# Patient Record
Sex: MALE | Race: WHITE | NOT HISPANIC OR LATINO | Employment: OTHER | URBAN - METROPOLITAN AREA
[De-identification: names, ages, dates, MRNs, and addresses within clinical notes are randomized per-mention and may not be internally consistent; named-entity substitution may affect disease eponyms.]

---

## 2017-10-03 ENCOUNTER — TRANSFERRED RECORDS (OUTPATIENT)
Dept: HEALTH INFORMATION MANAGEMENT | Facility: CLINIC | Age: 82
End: 2017-10-03

## 2017-10-11 ENCOUNTER — TELEPHONE (OUTPATIENT)
Dept: CARDIOLOGY | Facility: CLINIC | Age: 82
End: 2017-10-11

## 2017-10-11 DIAGNOSIS — I44.30 AV BLOCK: Primary | ICD-10-CM

## 2017-10-11 NOTE — TELEPHONE ENCOUNTER
Pt called to report that he was seen in [place], [place] for dizziness and shortness of breath with exertion and scheduled an OV with  on 10/19. He wanted to make sure it was ok to travel back to his farm in [place] tomorrow. I received pt's records from [facility], which includes a 24 hour holter report. 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 Mean heart rate is 41 bpm. 148 pauses, the longest of which was 2.26 seconds. Pt said he was having frequent episodes of dizziness and shortness of breath last week, but it was has improved a little so far this week. However, he said he has not done a lot of activity because he was worried about the reproducing the episodes. He said he did get a call from someone at the [facility] yesterday stating he would need a pacemaker. I told pt that he should not be driving until he is seen and that I would review his records with EP and call him back. 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    I reviewed with  and he advised pt be set up for OV with him on Friday 10/13 @ 1000 and set up for pacemaker implantation @ 1:00 on 10/13. Pt should hold his warfarin for 2 days prior as long as DVT was not recent. Pt can have clear liquid breakfast in the am on 10/13. I called pt and reviewed 's recommendations. Pt said his DVT was over 5 years ago.He understands that he should not be driving. 's RN will call pt to review prep further. Copy of records sent to be scanned. Pt advised to go to ED if he develops any prolonged episodes of dizziness or shortness of breath.

## 2017-10-12 RX ORDER — SODIUM CHLORIDE 450 MG/100ML
INJECTION, SOLUTION INTRAVENOUS CONTINUOUS
Status: CANCELLED | OUTPATIENT
Start: 2017-10-12

## 2017-10-12 RX ORDER — LIDOCAINE 40 MG/G
CREAM TOPICAL
Status: CANCELLED | OUTPATIENT
Start: 2017-10-12

## 2017-10-12 RX ORDER — CEFAZOLIN SODIUM 2 G/100ML
2 INJECTION, SOLUTION INTRAVENOUS
Status: CANCELLED | OUTPATIENT
Start: 2017-10-12

## 2017-10-13 ENCOUNTER — HOSPITAL ENCOUNTER (OUTPATIENT)
Facility: CLINIC | Age: 82
Discharge: HOME | End: 2017-10-15
Attending: INTERNAL MEDICINE | Admitting: INTERNAL MEDICINE
Payer: MEDICARE

## 2017-10-13 ENCOUNTER — APPOINTMENT (OUTPATIENT)
Dept: CARDIOLOGY | Facility: CLINIC | Age: 82
End: 2017-10-13
Attending: INTERNAL MEDICINE
Payer: MEDICARE

## 2017-10-13 ENCOUNTER — OFFICE VISIT (OUTPATIENT)
Dept: CARDIOLOGY | Facility: CLINIC | Age: 82
End: 2017-10-13
Payer: COMMERCIAL

## 2017-10-13 VITALS
HEIGHT: 72 IN | HEART RATE: 52 BPM | BODY MASS INDEX: 27.22 KG/M2 | SYSTOLIC BLOOD PRESSURE: 112 MMHG | DIASTOLIC BLOOD PRESSURE: 66 MMHG | WEIGHT: 201 LBS

## 2017-10-13 DIAGNOSIS — R00.1 BRADYCARDIA: Primary | ICD-10-CM

## 2017-10-13 DIAGNOSIS — R00.1 BRADYCARDIA: ICD-10-CM

## 2017-10-13 DIAGNOSIS — Z95.0 CARDIAC PACEMAKER IN SITU: Primary | ICD-10-CM

## 2017-10-13 LAB
ANION GAP SERPL CALCULATED.3IONS-SCNC: 5 MMOL/L (ref 3–14)
BUN SERPL-MCNC: 21 MG/DL (ref 7–30)
CALCIUM SERPL-MCNC: 8.9 MG/DL (ref 8.5–10.1)
CHLORIDE SERPL-SCNC: 106 MMOL/L (ref 94–109)
CO2 SERPL-SCNC: 29 MMOL/L (ref 20–32)
CREAT SERPL-MCNC: 0.98 MG/DL (ref 0.66–1.25)
ERYTHROCYTE [DISTWIDTH] IN BLOOD BY AUTOMATED COUNT: 12.6 % (ref 10–15)
GFR SERPL CREATININE-BSD FRML MDRD: 73 ML/MIN/1.7M2
GLUCOSE SERPL-MCNC: 91 MG/DL (ref 70–99)
HCT VFR BLD AUTO: 43.2 % (ref 40–53)
HGB BLD-MCNC: 14.8 G/DL (ref 13.3–17.7)
INR PPP: 2.1 (ref 0.86–1.14)
MCH RBC QN AUTO: 33 PG (ref 26.5–33)
MCHC RBC AUTO-ENTMCNC: 34.3 G/DL (ref 31.5–36.5)
MCV RBC AUTO: 96 FL (ref 78–100)
PLATELET # BLD AUTO: 161 10E9/L (ref 150–450)
POTASSIUM SERPL-SCNC: 4.6 MMOL/L (ref 3.4–5.3)
RBC # BLD AUTO: 4.49 10E12/L (ref 4.4–5.9)
SODIUM SERPL-SCNC: 140 MMOL/L (ref 133–144)
WBC # BLD AUTO: 5.7 10E9/L (ref 4–11)

## 2017-10-13 PROCEDURE — 80048 BASIC METABOLIC PNL TOTAL CA: CPT | Performed by: INTERNAL MEDICINE

## 2017-10-13 PROCEDURE — 85027 COMPLETE CBC AUTOMATED: CPT | Performed by: INTERNAL MEDICINE

## 2017-10-13 PROCEDURE — 85610 PROTHROMBIN TIME: CPT | Performed by: INTERNAL MEDICINE

## 2017-10-13 PROCEDURE — 99152 MOD SED SAME PHYS/QHP 5/>YRS: CPT

## 2017-10-13 PROCEDURE — 99205 OFFICE O/P NEW HI 60 MIN: CPT | Mod: 25 | Performed by: INTERNAL MEDICINE

## 2017-10-13 PROCEDURE — A9270 NON-COVERED ITEM OR SERVICE: HCPCS | Mod: GY | Performed by: INTERNAL MEDICINE

## 2017-10-13 PROCEDURE — 0JH606Z INSERTION OF PACEMAKER, DUAL CHAMBER INTO CHEST SUBCUTANEOUS TISSUE AND FASCIA, OPEN APPROACH: ICD-10-PCS | Performed by: INTERNAL MEDICINE

## 2017-10-13 PROCEDURE — 40000852 ZZH STATISTIC HEART CATH LAB OR EP LAB

## 2017-10-13 PROCEDURE — 36415 COLL VENOUS BLD VENIPUNCTURE: CPT | Performed by: INTERNAL MEDICINE

## 2017-10-13 PROCEDURE — 99152 MOD SED SAME PHYS/QHP 5/>YRS: CPT | Performed by: INTERNAL MEDICINE

## 2017-10-13 PROCEDURE — 40000235 ZZH STATISTIC TELEMETRY

## 2017-10-13 PROCEDURE — C1785 PMKR, DUAL, RATE-RESP: HCPCS

## 2017-10-13 PROCEDURE — 99153 MOD SED SAME PHYS/QHP EA: CPT

## 2017-10-13 PROCEDURE — 33208 INSRT HEART PM ATRIAL & VENT: CPT | Mod: KX | Performed by: INTERNAL MEDICINE

## 2017-10-13 PROCEDURE — 33208 INSRT HEART PM ATRIAL & VENT: CPT | Mod: KX

## 2017-10-13 PROCEDURE — C1898 LEAD, PMKR, OTHER THAN TRANS: HCPCS

## 2017-10-13 PROCEDURE — 25000128 H RX IP 250 OP 636: Performed by: INTERNAL MEDICINE

## 2017-10-13 PROCEDURE — 27210995 ZZH RX 272: Performed by: INTERNAL MEDICINE

## 2017-10-13 PROCEDURE — 27210784 ZZH KIT PACEMAKER CR8

## 2017-10-13 PROCEDURE — 40000936 ZZH STATISTIC OUTPATIENT (NON-OBS) NIGHT

## 2017-10-13 PROCEDURE — 25000132 ZZH RX MED GY IP 250 OP 250 PS 637: Mod: GY | Performed by: INTERNAL MEDICINE

## 2017-10-13 PROCEDURE — 93000 ELECTROCARDIOGRAM COMPLETE: CPT | Performed by: INTERNAL MEDICINE

## 2017-10-13 PROCEDURE — 27210886 ZZH ACCESSORY CR5

## 2017-10-13 PROCEDURE — 40000935 ZZH STATISTIC OUTPATIENT (NON-OBS) EVE

## 2017-10-13 PROCEDURE — 25000125 ZZHC RX 250: Performed by: INTERNAL MEDICINE

## 2017-10-13 PROCEDURE — 27210795 ZZH PAD DEFIB QUICK CR4

## 2017-10-13 PROCEDURE — 02H63JZ INSERTION OF PACEMAKER LEAD INTO RIGHT ATRIUM, PERCUTANEOUS APPROACH: ICD-10-PCS | Performed by: INTERNAL MEDICINE

## 2017-10-13 PROCEDURE — S0020 INJECTION, BUPIVICAINE HYDRO: HCPCS | Performed by: INTERNAL MEDICINE

## 2017-10-13 PROCEDURE — 02HK3JZ INSERTION OF PACEMAKER LEAD INTO RIGHT VENTRICLE, PERCUTANEOUS APPROACH: ICD-10-PCS | Performed by: INTERNAL MEDICINE

## 2017-10-13 PROCEDURE — C1769 GUIDE WIRE: HCPCS

## 2017-10-13 RX ORDER — FLUTICASONE PROPIONATE AND SALMETEROL XINAFOATE 115; 21 UG/1; UG/1
2 AEROSOL, METERED RESPIRATORY (INHALATION) 2 TIMES DAILY
Status: DISCONTINUED | OUTPATIENT
Start: 2017-10-13 | End: 2017-10-15 | Stop reason: HOSPADM

## 2017-10-13 RX ORDER — ATROPINE SULFATE 0.1 MG/ML
.5-1 INJECTION INTRAVENOUS
Status: DISCONTINUED | OUTPATIENT
Start: 2017-10-13 | End: 2017-10-13 | Stop reason: HOSPADM

## 2017-10-13 RX ORDER — CEFAZOLIN SODIUM 2 G/100ML
2 INJECTION, SOLUTION INTRAVENOUS
Status: COMPLETED | OUTPATIENT
Start: 2017-10-13 | End: 2017-10-13

## 2017-10-13 RX ORDER — WARFARIN SODIUM 4 MG/1
4 TABLET ORAL
Status: CANCELLED | OUTPATIENT
Start: 2017-10-14

## 2017-10-13 RX ORDER — LIDOCAINE 40 MG/G
CREAM TOPICAL
Status: DISCONTINUED | OUTPATIENT
Start: 2017-10-13 | End: 2017-10-13 | Stop reason: HOSPADM

## 2017-10-13 RX ORDER — WARFARIN SODIUM 5 MG/1
5 TABLET ORAL
Status: CANCELLED | OUTPATIENT
Start: 2017-10-13

## 2017-10-13 RX ORDER — DOBUTAMINE HYDROCHLORIDE 200 MG/100ML
5-40 INJECTION INTRAVENOUS CONTINUOUS PRN
Status: DISCONTINUED | OUTPATIENT
Start: 2017-10-13 | End: 2017-10-13 | Stop reason: HOSPADM

## 2017-10-13 RX ORDER — ONDANSETRON 2 MG/ML
4 INJECTION INTRAMUSCULAR; INTRAVENOUS EVERY 4 HOURS PRN
Status: DISCONTINUED | OUTPATIENT
Start: 2017-10-13 | End: 2017-10-13 | Stop reason: HOSPADM

## 2017-10-13 RX ORDER — BUPIVACAINE HYDROCHLORIDE 2.5 MG/ML
10-30 INJECTION, SOLUTION EPIDURAL; INFILTRATION; INTRACAUDAL
Status: COMPLETED | OUTPATIENT
Start: 2017-10-13 | End: 2017-10-13

## 2017-10-13 RX ORDER — MULTIPLE VITAMINS W/ MINERALS TAB 9MG-400MCG
1 TAB ORAL DAILY
Status: CANCELLED | OUTPATIENT
Start: 2017-10-13

## 2017-10-13 RX ORDER — NALOXONE HYDROCHLORIDE 0.4 MG/ML
.1-.4 INJECTION, SOLUTION INTRAMUSCULAR; INTRAVENOUS; SUBCUTANEOUS
Status: DISCONTINUED | OUTPATIENT
Start: 2017-10-13 | End: 2017-10-15 | Stop reason: HOSPADM

## 2017-10-13 RX ORDER — MORPHINE SULFATE 2 MG/ML
1-2 INJECTION, SOLUTION INTRAMUSCULAR; INTRAVENOUS EVERY 5 MIN PRN
Status: DISCONTINUED | OUTPATIENT
Start: 2017-10-13 | End: 2017-10-13 | Stop reason: HOSPADM

## 2017-10-13 RX ORDER — SODIUM CHLORIDE 450 MG/100ML
INJECTION, SOLUTION INTRAVENOUS CONTINUOUS
Status: DISCONTINUED | OUTPATIENT
Start: 2017-10-13 | End: 2017-10-13 | Stop reason: HOSPADM

## 2017-10-13 RX ORDER — SIMVASTATIN 40 MG
40 TABLET ORAL DAILY
Status: CANCELLED | OUTPATIENT
Start: 2017-10-13

## 2017-10-13 RX ORDER — SIMVASTATIN 40 MG
40 TABLET ORAL DAILY
Status: DISCONTINUED | OUTPATIENT
Start: 2017-10-14 | End: 2017-10-15 | Stop reason: HOSPADM

## 2017-10-13 RX ORDER — FUROSEMIDE 10 MG/ML
20-100 INJECTION INTRAMUSCULAR; INTRAVENOUS
Status: DISCONTINUED | OUTPATIENT
Start: 2017-10-13 | End: 2017-10-13 | Stop reason: HOSPADM

## 2017-10-13 RX ORDER — DIPHENHYDRAMINE HYDROCHLORIDE 50 MG/ML
25-50 INJECTION INTRAMUSCULAR; INTRAVENOUS
Status: DISCONTINUED | OUTPATIENT
Start: 2017-10-13 | End: 2017-10-13 | Stop reason: HOSPADM

## 2017-10-13 RX ORDER — LIDOCAINE HYDROCHLORIDE AND EPINEPHRINE 10; 10 MG/ML; UG/ML
10-30 INJECTION, SOLUTION INFILTRATION; PERINEURAL
Status: DISCONTINUED | OUTPATIENT
Start: 2017-10-13 | End: 2017-10-13 | Stop reason: HOSPADM

## 2017-10-13 RX ORDER — PROTAMINE SULFATE 10 MG/ML
1-5 INJECTION, SOLUTION INTRAVENOUS
Status: DISCONTINUED | OUTPATIENT
Start: 2017-10-13 | End: 2017-10-13 | Stop reason: HOSPADM

## 2017-10-13 RX ORDER — WARFARIN SODIUM 5 MG/1
5 TABLET ORAL
Status: COMPLETED | OUTPATIENT
Start: 2017-10-13 | End: 2017-10-13

## 2017-10-13 RX ORDER — NALOXONE HYDROCHLORIDE 0.4 MG/ML
0.4 INJECTION, SOLUTION INTRAMUSCULAR; INTRAVENOUS; SUBCUTANEOUS EVERY 5 MIN PRN
Status: DISCONTINUED | OUTPATIENT
Start: 2017-10-13 | End: 2017-10-13 | Stop reason: HOSPADM

## 2017-10-13 RX ORDER — FLUTICASONE PROPIONATE AND SALMETEROL XINAFOATE 115; 21 UG/1; UG/1
2 AEROSOL, METERED RESPIRATORY (INHALATION) 2 TIMES DAILY
Status: CANCELLED | OUTPATIENT
Start: 2017-10-13

## 2017-10-13 RX ORDER — CALCIUM CARBONATE 500(1250)
500 TABLET ORAL DAILY
Status: CANCELLED | OUTPATIENT
Start: 2017-10-13

## 2017-10-13 RX ORDER — HEPARIN SODIUM 1000 [USP'U]/ML
1000-10000 INJECTION, SOLUTION INTRAVENOUS; SUBCUTANEOUS EVERY 5 MIN PRN
Status: DISCONTINUED | OUTPATIENT
Start: 2017-10-13 | End: 2017-10-13 | Stop reason: HOSPADM

## 2017-10-13 RX ORDER — FENTANYL CITRATE 50 UG/ML
25-50 INJECTION, SOLUTION INTRAMUSCULAR; INTRAVENOUS
Status: DISCONTINUED | OUTPATIENT
Start: 2017-10-13 | End: 2017-10-13 | Stop reason: HOSPADM

## 2017-10-13 RX ORDER — ADENOSINE 3 MG/ML
6-12 INJECTION, SOLUTION INTRAVENOUS EVERY 5 MIN PRN
Status: DISCONTINUED | OUTPATIENT
Start: 2017-10-13 | End: 2017-10-13 | Stop reason: HOSPADM

## 2017-10-13 RX ORDER — FLUMAZENIL 0.1 MG/ML
0.2 INJECTION, SOLUTION INTRAVENOUS
Status: DISCONTINUED | OUTPATIENT
Start: 2017-10-13 | End: 2017-10-13 | Stop reason: HOSPADM

## 2017-10-13 RX ORDER — BUPIVACAINE HYDROCHLORIDE 2.5 MG/ML
10-30 INJECTION, SOLUTION EPIDURAL; INFILTRATION; INTRACAUDAL
Status: DISCONTINUED | OUTPATIENT
Start: 2017-10-13 | End: 2017-10-13 | Stop reason: HOSPADM

## 2017-10-13 RX ORDER — CEFAZOLIN SODIUM 2 G/100ML
2 INJECTION, SOLUTION INTRAVENOUS EVERY 8 HOURS
Status: DISPENSED | OUTPATIENT
Start: 2017-10-13 | End: 2017-10-14

## 2017-10-13 RX ORDER — LIDOCAINE HYDROCHLORIDE 10 MG/ML
10-30 INJECTION, SOLUTION EPIDURAL; INFILTRATION; INTRACAUDAL; PERINEURAL
Status: DISCONTINUED | OUTPATIENT
Start: 2017-10-13 | End: 2017-10-13 | Stop reason: HOSPADM

## 2017-10-13 RX ORDER — KETOROLAC TROMETHAMINE 30 MG/ML
15 INJECTION, SOLUTION INTRAMUSCULAR; INTRAVENOUS
Status: DISCONTINUED | OUTPATIENT
Start: 2017-10-13 | End: 2017-10-13 | Stop reason: HOSPADM

## 2017-10-13 RX ORDER — LORAZEPAM 2 MG/ML
.5-2 INJECTION INTRAMUSCULAR EVERY 10 MIN PRN
Status: DISCONTINUED | OUTPATIENT
Start: 2017-10-13 | End: 2017-10-13 | Stop reason: HOSPADM

## 2017-10-13 RX ORDER — LIDOCAINE HYDROCHLORIDE 10 MG/ML
10-30 INJECTION, SOLUTION EPIDURAL; INFILTRATION; INTRACAUDAL; PERINEURAL
Status: COMPLETED | OUTPATIENT
Start: 2017-10-13 | End: 2017-10-13

## 2017-10-13 RX ORDER — PROMETHAZINE HYDROCHLORIDE 25 MG/ML
6.25-25 INJECTION, SOLUTION INTRAMUSCULAR; INTRAVENOUS EVERY 4 HOURS PRN
Status: DISCONTINUED | OUTPATIENT
Start: 2017-10-13 | End: 2017-10-13 | Stop reason: HOSPADM

## 2017-10-13 RX ORDER — IBUTILIDE FUMARATE 1 MG/10ML
0.01 INJECTION, SOLUTION INTRAVENOUS
Status: DISCONTINUED | OUTPATIENT
Start: 2017-10-13 | End: 2017-10-13 | Stop reason: HOSPADM

## 2017-10-13 RX ORDER — MULTIPLE VITAMINS W/ MINERALS TAB 9MG-400MCG
1 TAB ORAL DAILY
Status: DISCONTINUED | OUTPATIENT
Start: 2017-10-14 | End: 2017-10-15 | Stop reason: HOSPADM

## 2017-10-13 RX ORDER — IBUTILIDE FUMARATE 1 MG/10ML
1 INJECTION, SOLUTION INTRAVENOUS
Status: DISCONTINUED | OUTPATIENT
Start: 2017-10-13 | End: 2017-10-13 | Stop reason: HOSPADM

## 2017-10-13 RX ORDER — LIDOCAINE 40 MG/G
CREAM TOPICAL
Status: DISCONTINUED | OUTPATIENT
Start: 2017-10-13 | End: 2017-10-15 | Stop reason: HOSPADM

## 2017-10-13 RX ORDER — PROTAMINE SULFATE 10 MG/ML
5-40 INJECTION, SOLUTION INTRAVENOUS EVERY 10 MIN PRN
Status: DISCONTINUED | OUTPATIENT
Start: 2017-10-13 | End: 2017-10-13 | Stop reason: HOSPADM

## 2017-10-13 RX ORDER — WARFARIN SODIUM 4 MG/1
4 TABLET ORAL
Status: DISCONTINUED | OUTPATIENT
Start: 2017-10-14 | End: 2017-10-13

## 2017-10-13 RX ADMIN — CEFAZOLIN SODIUM 2 G: 2 INJECTION, SOLUTION INTRAVENOUS at 20:02

## 2017-10-13 RX ADMIN — MIDAZOLAM HYDROCHLORIDE 1 MG: 1 INJECTION, SOLUTION INTRAMUSCULAR; INTRAVENOUS at 13:27

## 2017-10-13 RX ADMIN — FENTANYL CITRATE 50 MCG: 50 INJECTION, SOLUTION INTRAMUSCULAR; INTRAVENOUS at 12:57

## 2017-10-13 RX ADMIN — SODIUM CHLORIDE 25000 UNITS: 900 IRRIGANT IRRIGATION at 13:31

## 2017-10-13 RX ADMIN — FENTANYL CITRATE 50 MCG: 50 INJECTION, SOLUTION INTRAMUSCULAR; INTRAVENOUS at 13:05

## 2017-10-13 RX ADMIN — BUPIVACAINE HYDROCHLORIDE 10 ML: 2.5 INJECTION, SOLUTION EPIDURAL; INFILTRATION; INTRACAUDAL at 13:09

## 2017-10-13 RX ADMIN — WARFARIN SODIUM 5 MG: 5 TABLET ORAL at 20:01

## 2017-10-13 RX ADMIN — SODIUM CHLORIDE: 4.5 INJECTION, SOLUTION INTRAVENOUS at 10:20

## 2017-10-13 RX ADMIN — CEFAZOLIN SODIUM 2 G: 2 INJECTION, SOLUTION INTRAVENOUS at 12:45

## 2017-10-13 RX ADMIN — MIDAZOLAM HYDROCHLORIDE 1 MG: 1 INJECTION, SOLUTION INTRAMUSCULAR; INTRAVENOUS at 13:06

## 2017-10-13 RX ADMIN — MIDAZOLAM HYDROCHLORIDE 1 MG: 1 INJECTION, SOLUTION INTRAMUSCULAR; INTRAVENOUS at 13:10

## 2017-10-13 RX ADMIN — LIDOCAINE HYDROCHLORIDE 100 MG: 10 INJECTION, SOLUTION EPIDURAL; INFILTRATION; INTRACAUDAL; PERINEURAL at 13:09

## 2017-10-13 RX ADMIN — ACETAMINOPHEN AND CODEINE PHOSPHATE 1 TABLET: 300; 30 TABLET ORAL at 21:59

## 2017-10-13 RX ADMIN — MIDAZOLAM HYDROCHLORIDE 1 MG: 1 INJECTION, SOLUTION INTRAMUSCULAR; INTRAVENOUS at 12:57

## 2017-10-13 NOTE — MR AVS SNAPSHOT
After Visit Summary   10/13/2017    Victorino Khan    MRN: [id number]           Patient Information     Date Of Birth          2/13/1934        Visit Information        Provider Department      10/13/2017 9:00 AM Evaristo Carrera MD [facility] [place]        Today's Diagnoses     Bradycardia    -  1       Follow-ups after your visit        Your next 10 appointments already scheduled     Oct 13, 2017 11:30 AM CDT   Ep 90 Minute with SHCVR4   [facility] ([facility])    [address]  [place] [id number] [phone]            Oct 19, 2017 11:15 AM CDT   New Visit with Flaco Gray MD   [facility] ([facility])    [address]  [place] [id number] [phone]              Future tests that were ordered for you today     Open Future Orders        Priority Expected Expires Ordered    ICD/Pacemaker/Loop Recorder Procedure Routine 10/20/2017 10/13/2018 10/13/2017            Who to contact     If you have questions or need follow up information about today's clinic visit or your schedule please contact [facility] directly at [phone].  Normal or non-critical lab and imaging results will be communicated to you by Bundle Ithart, letter or phone within 4 business days after the clinic has received the results. If you do not hear from us within 7 days, please contact the clinic through Bundle Ithart or phone. If you have a critical or abnormal lab result, we will notify you by phone as soon as possible.  Submit refill requests through GetNotes or call your pharmacy and they will forward the refill request to us. Please allow 3 business days for your refill to be completed.          Additional Information About Your Visit        GetNotes Information     GetNotes lets you send messages to your doctor, view your test  "results, renew your prescriptions, schedule appointments and more. To sign up, go to [email or web address] . Click on \"Log in\" on the left side of the screen, which will take you to the Welcome page. Then click on \"Sign up Now\" on the right side of the page.     You will be asked to enter the access code listed below, as well as some personal information. Please follow the directions to create your username and password.     Your access code is: [email or web address]  Expires: 2018  9:31 AM     Your access code will  in 90 days. If you need help or a new code, please call your [facility] clinic or [phone].        Care EveryWhere ID     This is your Care EveryWhere ID. This could be used by other organizations to access your [facility] medical records  [id number]        Your Vitals Were     Pulse Height BMI (Body Mass Index)             52 1.829 m (6') 27.26 kg/m2          Blood Pressure from Last 3 Encounters:   10/13/17 112/66   09/10/15 115/75    Weight from Last 3 Encounters:   10/13/17 91.2 kg (201 lb)   09/10/15 96 kg (211 lb 11.2 oz)              We Performed the Following     EKG 12-lead complete w/read - Clinics (performed today)        Primary Care Provider Office Phone # Fax #    Vaughn POTTER [place] [phone]       [place] [facility] [address]  [facility] [address]        Equal Access to Services     [facility]: Hadii aad ku hadasho Soomaali, waaxda luqadaha, qaybta kaalmada adeegyada, hermes larson . So [facility] [phone].    ATENCIÓN: Si habla howard, tiene a jules disposición servicios gratuitos de asistencia lingüística. Llame al [phone].    We comply with applicable federal civil rights laws and [place] laws. We do not discriminate on the basis of race, color, national origin, age, disability, sex, sexual orientation, or gender identity.            Thank you!     Thank you for choosing [facility] [place]  for your " care. Our goal is always to provide you with excellent care. Hearing back from our patients is one way we can continue to improve our services. Please take a few minutes to complete the written survey that you may receive in the mail after your visit with us. Thank you!             Your Updated Medication List - Protect others around you: Learn how to safely use, store and throw away your medicines at www.disposemymeds.org.          This list is accurate as of: 10/13/17  9:31 AM.  Always use your most recent med list.                   Brand Name Dispense Instructions for use Diagnosis    ALENDRONATE SODIUM PO      Take 70 mg by mouth    Thrombocytopenia (H)       calcium carbonate 1250 MG tablet    OS- mg [place]. Ca     Take 500 mg by mouth 2 times daily    Thrombocytopenia (H)       cod liver oil Caps capsule      Take 1 capsule by mouth    Thrombocytopenia (H)       fluticasone-salmeterol 115-21 MCG/ACT Inhaler    ADVAIR-HFA     Inhale 2 puffs into the lungs 2 times daily    Thrombocytopenia (H)       Multi-vitamin Tabs tablet      Take 1 tablet by mouth daily    Thrombocytopenia (H)       SIMVASTATIN PO      Take 80 mg by mouth    Thrombocytopenia (H)       VITAMIN D3 PO      Take 1,000 Units by mouth daily    Thrombocytopenia (H)       WARFARIN SODIUM PO      Take 5 mg by mouth    Thrombocytopenia (H)

## 2017-10-13 NOTE — LETTER
10/13/2017    Vaughn Evans  [facility]   [address]  [place] [address]    RE: Victorino Khan       Dear Colleague,    I had the pleasure of seeing Victorino Khan in the [facility].    Thank you for allowing me to participate in the care of this very delightful patient.  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  Recently, he was up in the [place] where he felt he could not keep up with hiking and other physical activities.  He subsequently saw you for followup and you had him on a 24-hour Holter monitoring.  The Holter was read as frequent second-degree Mobitz type 2 along probable third-degree heart block even in the daytime.  In light of that, I was asked to see the patient on an urgent basis.      The patient otherwise was quite healthy before all this, able to do most of the things he wanted to.  He denies any fever or chills or skin rash, constipation or heat intolerance.  EKG today demonstrates sinus bradycardia with a right bundle branch block.      It appears that the patient had intermittent advanced AV block and likely to be the cause of his recent episode of decreased exercise tolerance and likely to explain his near-syncopal episode in the past.  This is likely to be from intrinsic conduction disease as well in the absence of any symptoms.  This is not suggestive of Lyme carditis or hypothyroidism.  I would agree that he would benefit from a permanent pacemaker.  I went over the procedure in detail with risks including but not limited to vascular injury, infection and pneumothorax.  The patient already held his warfarin for 2 days and will resume it after the procedure to be performed today.     Again, thank you for allowing me to participate in the care of  your patient.      Sincerely,    Evaristo Hernandez MD   [facility]

## 2017-10-13 NOTE — PROGRESS NOTES
HISTORY OF PRESENT ILLNESS:  Thank you for allowing me to participate in the care of this very delightful patient.  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  Recently, he was up in the [place] where he felt he could not keep up with hiking and other physical activities.  He subsequently saw you for followup and you had him on a 24-hour Holter monitoring.  The Holter was read as frequent second-degree Mobitz type 2 along probable third-degree heart block even in the daytime.  In light of that, I was asked to see the patient on an urgent basis.      The patient otherwise was quite healthy before all this, able to do most of the things he wanted to.  He denies any fever or chills or skin rash, constipation or heat intolerance.  EKG today demonstrates sinus bradycardia with a right bundle branch block.      It appears that the patient had intermittent advanced AV block and likely to be the cause of his recent episode of decreased exercise tolerance and likely to explain his near-syncopal episode in the past.  This is likely to be from intrinsic conduction disease as well in the absence of any symptoms.  This is not suggestive of Lyme carditis or hypothyroidism.  I would agree that he would benefit from a permanent pacemaker.  I went over the procedure in detail with risks including but not limited to vascular injury, infection and pneumothorax.  The patient already held his warfarin for 2 days and will resume it after the procedure to be performed today.      cc:   Vaughn Evans DO    [facility] [place]   [address]   [place] [id number]         WIL ANDRADE MD             D: 10/13/2017 09:30   T: 10/13/2017 12:16   MT: JACQUELINE      Name:     DONATO BULLOCK   MRN:      [id number]        Account:       [id number]   :      1934           Service Date: 10/13/2017      Document: [id number]

## 2017-10-13 NOTE — IP AVS SNAPSHOT
MRN:[id number]                      After Visit Summary   10/13/2017    Victorino Khan    MRN: [id number]           Thank you!     Thank you for choosing [facility] for your care. Our goal is always to provide you with excellent care. Hearing back from our patients is one way we can continue to improve our services. Please take a few minutes to complete the written survey that you may receive in the mail after you visit with us. Thank you!        Patient Information     Date Of Birth          2/13/1934        About your hospital stay     You were admitted on:  October 13, 2017 [facility]    You were discharged on:  October 15, 2017       Who to Call     For medical emergencies, please call 911.  For non-urgent questions about your medical care, please call your primary care provider or clinic, [phone]          Attending Provider     Provider Specialty    Evaristo Carrera MD Cardiology       Primary Care Provider Office Phone # Fax #    Vaughn Evans [phone]      Your next 10 appointments already scheduled     Oct 16, 2017 10:45 AM CDT   LAB with  LAB ONLY   [facility] ([facility])    [address] [place] [address]   [phone]           Patient must bring picture ID. Patient should be prepared to give a urine specimen  Please do not eat 10-12 hours before your appointment if you are coming in fasting for labs on lipids, cholesterol, or glucose (sugar). Pregnant women should follow their Care Team instructions. Water with medications is okay. Do not drink coffee or other fluids. If you have concerns about taking  your medications, please ask at office or if scheduling via [employer], send a message by clicking on Secure Messaging, Message Your Care Team.            Oct 19, 2017 11:15 AM CDT   New Visit with Flaco Gray MD   [facility] [place] ([facility]  [facility]    [id number]  [facility] [id number] [phone]              Additional Services     Follow-Up with Electrophysiologist       Patient needs follow up with Dr. Carrera on Monday, Oct. 16th along with Chest Xray.                  Future tests that were ordered for you     INR       INR to be drawn on Monday, Oct. 16th at [facility] at 10:45am.  Please check in at the Welcome Desk on 1st floor near the [facility].    Results to be called to patient's primary MD:   Vaughn Evans     [phone]            XR Chest 1 View       Repeat Chest Xray on Monday, Oct. 16th to re-evaluate Pneumothorax.                  Further instructions from your care team       Pacemaker Implant Discharge Instructions     After you go home:      Have an adult stay with you until tomorrow.    You may resume your normal diet.       For 24 hours - due to the sedation you received:    Relax and take it easy.    Do NOT make any important or legal decisions.    Do NOT drive or operate machines at home or at work.    Do NOT drink alcohol.    Care of Chest Incision:      Keep the bandage on at least 3 days. You may remove the dressing on October 16th. Change it only if it gets loose or soaked. If you need to change it, use 4x4-inch gauze and a large clear bandage.     If there is a pressure dressing (gauze & tape) - 24 hours after your procedure you may remove ONLY the top dressing. Leave the bottom dressing on.    Leave the strips of tape on. They will fall off on their own, or we will remove them at your first check-up.    Check your wound daily for signs of infection, such as increased redness, severe swelling or draining. Fever may also be a sign of infection. Call us if you see any of these signs.    If there are no signs of infection, you may shower after the bandage comes off in 3 days. If you take a tub bath, keep the wound dry.    No soaking the incision (swimming pool, bathtub, hot  tub) for 2 weeks.    You may have mild to medium pain for 3 to 5 days. Take Acetaminophen (Tylenol) or Ibuprofen (Advil) for the pain. If the pain persists or is severe, call us.    Activity:      For at least 2 weeks: Do not raise your elbow above your shoulder. You can begin to use your arm as it feels comfortable to you.    Do not use arm on implant side to lift more than 10 pounds for 2 weeks.    In 6 to 8 weeks: You may begin to golf, play tennis, swim and do similar activities.    No driving for one day & limit to necessary driving for one week.    Bleeding:      If you start bleeding from the incision site, sit down and press firmly on the site for 10 minutes.     Once bleeding stops, call [facility] as soon as you can.       Call 911 right away if you have heavy bleeding or bleeding that does not stop.      Medicines:      Take your medications, including blood thinners, unless your provider tells you not to.    If you have stopped any medicines, check with your provider about when to restart them.    Follow Up Appointments:      Follow up with Device Clinic at [facility] of patient preference in 7-10 days.    Call the clinic if:      You have a large or growing hard lump around the site.    The site is red, swollen, hot or tender.    Blood or fluid is draining from the site.    You have chills or a fever greater than 101 F (38 C).    You feel dizzy or light-headed.    Questions or concerns    Telling others about your device:      Before you leave the hospital, you will receive a temporary ID card. A permanent card will be mailed to you about 6 to 8 weeks later. Always carry the ID card with you. It has important details about your device.    You may also get a Medical Alert bracelet or tag that says you have a pacemaker.  Go to [email or web address].     Always tell doctors, dentists and other care providers that you have a device implanted in you.    Let us know before you plan any surgeries.  "Your care team must take special steps to keep you safe during certain procedures. These steps will depend on the type of device you have. Your provider will need to see your ID card. They may need to call us for instructions.    Device Safety:      Please refer to device  s booklet for further information.        [facility] [place]:    [phone] UMP (7 days a week)              Warfarin Instruction     You have started taking a medicine called warfarin. This is a blood-thinning medicine (anticoagulant). It helps prevent and treat blood clots.      Before leaving the hospital, make sure you know how much to take and how long to take it.      You will need regular blood tests to make sure your blood is clotting safely. It is very important to see your doctor for regular blood tests.    Talk to your doctor before taking any new medicine (this includes over-the-counter drugs and herbal products). Many medicines can interact with warfarin. This may cause more bleeding or too much clotting.     Eating a lot of vitamin K--found in green, leafy vegetables--can change the way warfarin works in your body. Do NOT avoid these foods. Instead, try to eat the same amount each day.     Bleeding is the most common side-effect of warfarin. You may notice bleeding gums, a bloody nose, bruises and bleeding longer when you cut yourself. See a doctor at once if:   o You cough up blood  o You find blood in your stool (poop)  o You have a deep cut, or a cut that bleeds longer than 10 minutes   o You have a bad cut, hard fall, accident or hit your head (go to urgent care or the emergency room).    For women who can get pregnant: This medicine can harm an unborn baby. Be very careful not to get pregnant while taking this medicine. If you think you might be pregnant, call your doctor right away.    For more information, read \"Guide to Warfarin Therapy,  the booklet you received in the " "hospital.        Pending Results     Date and Time Order Name Status Description    10/15/2017 1017 EKG 12-lead, tracing only Preliminary     10/15/2017 0005 XR Chest 1 View Preliminary     10/14/2017 0812 EKG 12-lead, tracing only Preliminary             Statement of Approval     Ordered          10/15/17 1355  I have reviewed and agree with all the recommendations and orders detailed in this document.  EFFECTIVE NOW     Approved and electronically signed by:  Angie Joseph MD           10/14/17 2831  I have reviewed and agree with all the recommendations and orders detailed in this document.  EFFECTIVE NOW     Approved and electronically signed by:  Angie Joseph MD             Admission Information     Date & Time Provider Department Dept. Phone    10/13/2017 Evaristo Carrera MD [facility] [phone]      Your Vitals Were     Blood Pressure Pulse Temperature Respirations Height Weight    122/71 (BP Location: Right arm) 60 97.2  F (36.2  C) (Oral) 16 1.829 m (6') 88.3 kg (194 lb 11.2 oz)    Pulse Oximetry BMI (Body Mass Index)                92% 26.41 kg/m2          SignNowharUber Information     [facility]ASS lets you send messages to your doctor, view your test results, renew your prescriptions, schedule appointments and more. To sign up, go to [email or web address] . Click on \"Log in\" on the left side of the screen, which will take you to the Welcome page. Then click on \"Sign up Now\" on the right side of the page.     You will be asked to enter the access code listed below, as well as some personal information. Please follow the directions to create your username and password.     Your access code is: [email or web address]  Expires: 2018  9:31 AM     Your access code will  in 90 days. If you need help or a new code, please call your [facility] clinic or [phone].        Care EveryWhere ID     This is your Care EveryWhere ID. This could be used by other organizations to access your [facility] medical " records  [id number]        Equal Access to Services     [facility] EYAD : Hadii marcela carey keonjany Solawandaali, wakeyurda luqadaha, qaybta rigotheresaneel jauregui, hermes burgosdyanmax wen. So [facility] [phone].    ATENCIÓN: Si habla español, tiene a jules disposición servicios gratuitos de asistencia lingüística. Llame al [phone].    We comply with applicable federal civil rights laws and [place] laws. We do not discriminate on the basis of race, color, national origin, age, disability, sex, sexual orientation, or gender identity.               Review of your medicines      CONTINUE these medicines which may have CHANGED, or have new prescriptions. If we are uncertain of the size of tablets/capsules you have at home, strength may be listed as something that might have changed.        Dose / Directions    * WARFARIN SODIUM PO   This may have changed:    - Another medication with the same name was added. Make sure you understand how and when to take each.  - Another medication with the same name was changed. Make sure you understand how and when to take each.        Dose:  5 mg   Take 5 mg by mouth three times a week Monday, Wednesday & Friday   Refills:  0       * warfarin 1 MG tablet   Commonly known as:  COUMADIN   This may have changed:    - how much to take  - when to take this  - additional instructions   Used for:  Bradycardia        Dose:  2.5 mg   Take 2.5 tablets (2.5 mg) by mouth daily   Quantity:  30 tablet   Refills:  3       * Warfarin Therapy Reminder   This may have changed:  You were already taking a medication with the same name, and this prescription was added. Make sure you understand how and when to take each.   Used for:  Bradycardia        Dose:  1 each   1 each continuous prn   Quantity:  30 each   Refills:  3       * Notice:  This list has 3 medication(s) that are the same as other medications prescribed for you. Read the directions carefully, and ask your doctor or other care provider  to review them with you.      CONTINUE these medicines which have NOT CHANGED        Dose / Directions    ALENDRONATE SODIUM PO   Used for:  Thrombocytopenia (H)        Dose:  70 mg   Take 70 mg by mouth once a week   Refills:  0       calcium carbonate 1250 MG tablet   Commonly known as:  OS- mg [place]. [place]   Used for:  Thrombocytopenia (H)        Dose:  500 mg   Take 500 mg by mouth daily   Refills:  0       fluticasone-salmeterol 115-21 MCG/ACT Inhaler   Commonly known as:  ADVAIR-HFA   Used for:  Thrombocytopenia (H)        Dose:  2 puff   Inhale 2 puffs into the lungs 2 times daily   Refills:  0       Multi-vitamin Tabs tablet   Used for:  Thrombocytopenia (H)        Dose:  1 tablet   Take 1 tablet by mouth daily   Refills:  0       SIMVASTATIN PO   Used for:  Thrombocytopenia (H)        Dose:  40 mg   Take 40 mg by mouth daily   Refills:  0       VITAMIN D3 PO   Used for:  Thrombocytopenia (H)        Dose:  1000 Units   Take 1,000 Units by mouth daily   Refills:  0            Where to get your medicines      These medications were sent to [facility] [place] - [address]  [address] [facility] [address], [place] [address]     Phone:  [phone]     warfarin 1 MG tablet         Some of these will need a paper prescription and others can be bought over the counter. Ask your nurse if you have questions.     Bring a paper prescription for each of these medications     Warfarin Therapy Reminder                Protect others around you: Learn how to safely use, store and throw away your medicines at www.disposemymeds.org.             Medication List: This is a list of all your medications and when to take them. Check marks below indicate your daily home schedule. Keep this list as a reference.      Medications           Morning Afternoon Evening Bedtime As Needed    ALENDRONATE SODIUM PO   Take 70 mg by mouth once a week                                calcium carbonate 1250 MG tablet    Commonly known as:  OS- mg [place]. Ca   Take 500 mg by mouth daily                                fluticasone-salmeterol 115-21 MCG/ACT Inhaler   Commonly known as:  ADVAIR-HFA   Inhale 2 puffs into the lungs 2 times daily   Last time this was given:  2 puffs on 10/15/2017  8:38 AM                                Multi-vitamin Tabs tablet   Take 1 tablet by mouth daily                                SIMVASTATIN PO   Take 40 mg by mouth daily   Last time this was given:  40 mg on 10/14/2017  6:34 PM                                VITAMIN D3 PO   Take 1,000 Units by mouth daily                                * WARFARIN SODIUM PO   Take 5 mg by mouth three times a week Monday, Wednesday & Friday   Last time this was given:  7.5 mg on 10/14/2017  7:55 PM                                * warfarin 1 MG tablet   Commonly known as:  COUMADIN   Take 2.5 tablets (2.5 mg) by mouth daily   Last time this was given:  7.5 mg on 10/14/2017  7:55 PM                                * Warfarin Therapy Reminder   1 each continuous prn                                * Notice:  This list has 3 medication(s) that are the same as other medications prescribed for you. Read the directions carefully, and ask your doctor or other care provider to review them with you.

## 2017-10-13 NOTE — PROGRESS NOTES
HPI and Plan:   See dictation  [id number]  Orders Placed This Encounter   Procedures     EKG 12-lead complete w/read - Clinics (performed today)     ICD/Pacemaker/Loop Recorder Procedure       No orders of the defined types were placed in this encounter.      There are no discontinued medications.      Encounter Diagnosis   Name Primary?     Bradycardia Yes       CURRENT MEDICATIONS:  Current Outpatient Prescriptions   Medication Sig Dispense Refill     WARFARIN SODIUM PO Take 5 mg by mouth       ALENDRONATE SODIUM PO Take 70 mg by mouth       fluticasone-salmeterol (ADVAIR-HFA) 115-21 MCG/ACT inhaler Inhale 2 puffs into the lungs 2 times daily       SIMVASTATIN PO Take 80 mg by mouth       Cod Liver Oil CAPS Take 1 capsule by mouth       calcium carbonate (OS- MG [place]. [place]) 500 MG tablet Take 500 mg by mouth 2 times daily       Cholecalciferol (VITAMIN D3 PO) Take 1,000 Units by mouth daily       multivitamin, therapeutic with minerals (MULTI-VITAMIN) TABS Take 1 tablet by mouth daily         ALLERGIES   Not on File    PAST MEDICAL HISTORY:  No past medical history on file.    PAST SURGICAL HISTORY:  No past surgical history on file.    FAMILY HISTORY:  Family History   Problem Relation Age of Onset     Coronary Artery Disease Early Onset Father      Coronary Artery Disease Early Onset Brother        SOCIAL HISTORY:  Social History     Social History     Marital status: Single     Spouse name: N/A     Number of children: N/A     Years of education: N/A     Social History Main Topics     Smoking status: Never Smoker     Smokeless tobacco: Never Used     Alcohol use None     Drug use: None     Sexual activity: Not Asked     Other Topics Concern     None     Social History Narrative     None       Review of Systems:  Skin:  Negative       Eyes:  Negative      ENT:  Positive for hearing loss    Respiratory:  Positive for dyspnea on exertion     Cardiovascular:  palpitations;dizziness      Gastroenterology: Negative       Genitourinary:  Negative      Musculoskeletal:  Negative      Neurologic:  Negative      Psychiatric:  Negative      Heme/Lymph/Imm:  Negative      Endocrine:  Negative        Physical Exam:  Vitals: /66  Pulse 52  Ht 1.829 m (6')  Wt 91.2 kg (201 lb)  BMI 27.26 kg/m2    Constitutional:  cooperative, alert and oriented, well developed, well nourished, in no acute distress        Skin:  warm and dry to the touch, no apparent skin lesions or masses noted        Head:  normocephalic, no masses or lesions        Eyes:  pupils equal and round, conjunctivae and lids unremarkable, sclera white, no xanthalasma, EOMS intact, no nystagmus        ENT:  no pallor or cyanosis, dentition good        Neck:  carotid pulses are full and equal bilaterally, JVP normal, no carotid bruit, no thyromegaly        Chest:  normal breath sounds, clear to auscultation, normal A-P diameter, normal symmetry, normal respiratory excursion, no use of accessory muscles          Cardiac: regular rhythm, normal S1/S2, no S3 or S4, apical impulse not displaced, no murmurs, gallops or rubs bradycardic                Abdomen:  abdomen soft, non-tender, BS normoactive, no mass, no HSM, no bruits        Vascular: pulses full and equal, no bruits auscultated                                        Extremities and Back:  no deformities, clubbing, cyanosis, erythema observed              Neurological:  affect appropriate, oriented to time, person and place              CC  No referring provider defined for this encounter.

## 2017-10-13 NOTE — PROGRESS NOTES
Return to CS 16 at 1400.  Pacemaker dressing CDI, no swelling or hematoma.  Denies pain, VSS.  Pt will be overnight in obs per verbal order from Dr Georges.  Wife here at bedside.  Tolerating fluids, tray ordered.  Rhythm is 100% AV paced post procedure. Will cont to monitor, call light in reach.

## 2017-10-13 NOTE — PROGRESS NOTES
1450 Report received from Haylie Yang RN.  1500 Drsg CDI to left chest. No oozing or hematoma noted. Area soft & flat. Pt denies pain. Pt instructed on activity restrictions with left arm. Verbal understanding received. No family present at this time. Wife has gone home for the evening.  1515 Pt taking diet & flds well. No complaints.  1615 Resting quietly - no complaints.  1700 Detailed report called to SINGH Hatch in OBS.  1715 OOB - steady on feet. Ambulated in halls to bathroom with good tejas.   1725 Pt transferred to OBS rm 20. All personal belongings sent w/ pt. PPM booklet & ID card sent with pt to OBS.

## 2017-10-13 NOTE — IP AVS SNAPSHOT
[facility]    [address] [place] [id number]    Phone:  [phone]                                       After Visit Summary   10/13/2017    Victorino Khan    MRN: [id number]           After Visit Summary Signature Page     I have received my discharge instructions, and my questions have been answered. I have discussed any challenges I see with this plan with the nurse or doctor.    ..........................................................................................................................................  Patient/Patient Representative Signature      ..........................................................................................................................................  Patient Representative Print Name and Relationship to Patient    ..................................................               ................................................  Date                                            Time    ..........................................................................................................................................  Reviewed by Signature/Title    ...................................................              ..............................................  Date                                                            Time

## 2017-10-14 ENCOUNTER — APPOINTMENT (OUTPATIENT)
Dept: GENERAL RADIOLOGY | Facility: CLINIC | Age: 82
End: 2017-10-14
Attending: INTERNAL MEDICINE
Payer: MEDICARE

## 2017-10-14 LAB
GLUCOSE BLDC GLUCOMTR-MCNC: 87 MG/DL (ref 70–99)
INR PPP: 1.64 (ref 0.86–1.14)
RADIOLOGIST FLAGS: ABNORMAL

## 2017-10-14 PROCEDURE — 99213 OFFICE O/P EST LOW 20 MIN: CPT | Mod: 24 | Performed by: INTERNAL MEDICINE

## 2017-10-14 PROCEDURE — 40000986 XR CHEST 2 VW

## 2017-10-14 PROCEDURE — A9270 NON-COVERED ITEM OR SERVICE: HCPCS | Mod: GY | Performed by: INTERNAL MEDICINE

## 2017-10-14 PROCEDURE — 25000132 ZZH RX MED GY IP 250 OP 250 PS 637: Mod: GY | Performed by: INTERNAL MEDICINE

## 2017-10-14 PROCEDURE — 71010 XR CHEST 1 VW: CPT

## 2017-10-14 PROCEDURE — 25000128 H RX IP 250 OP 636: Performed by: INTERNAL MEDICINE

## 2017-10-14 PROCEDURE — 40000936 ZZH STATISTIC OUTPATIENT (NON-OBS) NIGHT

## 2017-10-14 PROCEDURE — 93005 ELECTROCARDIOGRAM TRACING: CPT

## 2017-10-14 PROCEDURE — 82962 GLUCOSE BLOOD TEST: CPT

## 2017-10-14 PROCEDURE — 40000934 ZZH STATISTIC OUTPATIENT (NON-OBS) DAY

## 2017-10-14 PROCEDURE — 40000065 ZZH STATISTIC EKG NON-CHARGEABLE

## 2017-10-14 PROCEDURE — 36415 COLL VENOUS BLD VENIPUNCTURE: CPT | Performed by: INTERNAL MEDICINE

## 2017-10-14 PROCEDURE — 85610 PROTHROMBIN TIME: CPT | Performed by: INTERNAL MEDICINE

## 2017-10-14 PROCEDURE — 40000935 ZZH STATISTIC OUTPATIENT (NON-OBS) EVE

## 2017-10-14 RX ADMIN — FLUTICASONE PROPIONATE AND SALMETEROL XINAFOATE 2 PUFF: 115; 21 AEROSOL, METERED RESPIRATORY (INHALATION) at 20:38

## 2017-10-14 RX ADMIN — SIMVASTATIN 40 MG: 40 TABLET, FILM COATED ORAL at 18:34

## 2017-10-14 RX ADMIN — WARFARIN SODIUM 7.5 MG: 2.5 TABLET ORAL at 19:55

## 2017-10-14 RX ADMIN — CEFAZOLIN SODIUM 2 G: 2 INJECTION, SOLUTION INTRAVENOUS at 04:49

## 2017-10-14 NOTE — PLAN OF CARE
Problem: Patient Care Overview  Goal: Plan of Care/Patient Progress Review  Outcome: Improving  A&O. VSS,RA. Denied pain/discomfort. Incision site in le chest CDI. Sling present. CMS intact.  Regular diet. IV saline locked. Tele AV paced. Plan for x ray and EKG today. Continue to monitor.

## 2017-10-14 NOTE — PLAN OF CARE
Problem: Patient Care Overview  Goal: Plan of Care/Patient Progress Review  Outcome: Improving  VSS. Left chest incision UTV. Dressing is clean dry and intact. L. Arm in sling. Pt notes soreness around incisions. Denies pain meds. EKG showed NSR with BBB. Atrial paced. Chest xray showed 1.9 cm pneumothorax.  2nd Chest Xray showed pneumothorax 1.4 cm. IS teaching done. Ambulated in alcantar with stable 02 stats and HR. Pt denies shortness of breath. Keeping overnight for observation. Chest X ray Tomorrow morning. INR 1.64. Pharmacy is aware of latest INR and will order evening dose of warfarin. Continue to monitor.

## 2017-10-14 NOTE — DISCHARGE SUMMARY
[facility]    Discharge Summary  Cardiology    Date of Admission:  10/13/2017  Date of Discharge:  10/14/2017  Discharging Provider: Angie Joseph    Discharge Diagnoses   Patient Active Problem List   Diagnosis     Cardiac pacemaker in situ       History of Present Illness   Victorino Khan is an 83 year old male who presented with lightheadness and heart block. He is s/p PPM insertion by Dr. Carrera. No acute issues did well overnight. No pain in pacemaker site. He can discharge today.     Hospital Course   Victorino Khan was admitted on 10/13/2017.  The following problems were addressed during his hospitalization:    Summary:   -s/p PPM  -can discharge today  -outpatient follow up per request in 2 weeks      Angie Joseph MD    Significant Results and Procedures   Pacemaker Implantation    Pending Results   These results will be followed up by   Unresulted Labs Ordered in the Past 30 Days of this Admission     No orders found for last 61 day(s).          Code Status   Full Code    Primary Care Physician   Vaughn Evans        Time Spent on this Encounter   I, Angie Joseph, personally saw the patient today and spent less than or equal to 30 minutes discharging this patient.    Discharge Disposition   Discharged to home  Condition at discharge: Stable    Consultations This Hospital Stay   None    Discharge Orders   No discharge procedures on file.  Discharge Medications   Current Discharge Medication List      CONTINUE these medications which have NOT CHANGED    Details   WARFARIN SODIUM PO Take 5 mg by mouth three times a week Monday, Wednesday & Friday      fluticasone-salmeterol (ADVAIR-HFA) 115-21 MCG/ACT inhaler Inhale 2 puffs into the lungs 2 times daily    Associated Diagnoses: Thrombocytopenia (H)      SIMVASTATIN PO Take 40 mg by mouth daily     Associated Diagnoses: Thrombocytopenia (H)      calcium carbonate (OS- MG [place]. [place]) 500 MG tablet Take 500 mg by mouth daily     Associated  Diagnoses: Thrombocytopenia (H)      Cholecalciferol (VITAMIN D3 PO) Take 1,000 Units by mouth daily    Associated Diagnoses: Thrombocytopenia (H)      multivitamin, therapeutic with minerals (MULTI-VITAMIN) TABS Take 1 tablet by mouth daily    Associated Diagnoses: Thrombocytopenia (H)      ALENDRONATE SODIUM PO Take 70 mg by mouth once a week     Associated Diagnoses: Thrombocytopenia (H)           Allergies   No Known Allergies  Data   Most Recent 3 CBC's:  Recent Labs   Lab Test  10/13/17   1005  09/10/15   1614   WBC  5.7  6.0   HGB  14.8  14.8   MCV  96  99   PLT  161  130*      Most Recent 3 BMP's:  Recent Labs   Lab Test  10/13/17   1005  09/10/15   1614   NA  140  141   POTASSIUM  4.6  4.6   CHLORIDE  106  106   CO2  29  28   BUN  21  24   CR  0.98  1.02   ANIONGAP  5  6   TEE  8.9  9.1   GLC  91  90     Most Recent 2 LFT's:  Recent Labs   Lab Test  09/10/15   1614   AST  28   ALT  32   ALKPHOS  58   BILITOTAL  1.4*     Most Recent INR's and Anticoagulation Dosing History:  Anticoagulation Dose History     Recent Dosing and Labs Latest Ref Rng & Units 10/13/2017    Warfarin 5 mg - 5 mg    INR 0.86 - 1.14 2.10(H)        Most Recent 3 Troponin's:No lab results found.  Most Recent Cholesterol Panel:No lab results found.  Most Recent 6 Bacteria Isolates From Any Culture (See EPIC Reports for Culture Details):No lab results found.  Most Recent TSH, T4 and A1c Labs:No lab results found.

## 2017-10-14 NOTE — PLAN OF CARE
Problem: Patient Care Overview  Goal: Individualization & Mutuality  Outcome: No Change  Vss, aox4. Surgical site dressing is clean dry and intact. L arm in sling, pt educated on parameters of arm movement. Pain at 5/10, managed with tylenol 3. Up with standby assist. Regular diet. Tele: nsr. Pacemaker education booklet given to pt.

## 2017-10-14 NOTE — DISCHARGE INSTRUCTIONS
Pacemaker Implant Discharge Instructions     After you go home:      Have an adult stay with you until tomorrow.    You may resume your normal diet.       For 24 hours - due to the sedation you received:    Relax and take it easy.    Do NOT make any important or legal decisions.    Do NOT drive or operate machines at home or at work.    Do NOT drink alcohol.    Care of Chest Incision:      Keep the bandage on at least 3 days. You may remove the dressing on October 16th. Change it only if it gets loose or soaked. If you need to change it, use 4x4-inch gauze and a large clear bandage.     If there is a pressure dressing (gauze & tape) - 24 hours after your procedure you may remove ONLY the top dressing. Leave the bottom dressing on.    Leave the strips of tape on. They will fall off on their own, or we will remove them at your first check-up.    Check your wound daily for signs of infection, such as increased redness, severe swelling or draining. Fever may also be a sign of infection. Call us if you see any of these signs.    If there are no signs of infection, you may shower after the bandage comes off in 3 days. If you take a tub bath, keep the wound dry.    No soaking the incision (swimming pool, bathtub, hot tub) for 2 weeks.    You may have mild to medium pain for 3 to 5 days. Take Acetaminophen (Tylenol) or Ibuprofen (Advil) for the pain. If the pain persists or is severe, call us.    Activity:      For at least 2 weeks: Do not raise your elbow above your shoulder. You can begin to use your arm as it feels comfortable to you.    Do not use arm on implant side to lift more than 10 pounds for 2 weeks.    In 6 to 8 weeks: You may begin to golf, play tennis, swim and do similar activities.    No driving for one day & limit to necessary driving for one week.    Bleeding:      If you start bleeding from the incision site, sit down and press firmly on the site for 10 minutes.     Once bleeding stops, call [facility] as soon as you can.       Call 911 right away if you have heavy bleeding or bleeding that does not stop.      Medicines:      Take your medications, including blood thinners, unless your provider tells you not to.    If you have stopped any medicines, check with your provider about when to restart them.    Follow Up Appointments:      Follow up with Device [facility] at [facility] of patient preference in 7-10 days.    Call the clinic if:      You have a large or growing hard lump around the site.    The site is red, swollen, hot or tender.    Blood or fluid is draining from the site.    You have chills or a fever greater than 101 F (38 C).    You feel dizzy or light-headed.    Questions or concerns    Telling others about your device:      Before you leave the hospital, you will receive a temporary ID card. A permanent card will be mailed to you about 6 to 8 weeks later. Always carry the ID card with you. It has important details about your device.    You may also get a Medical Alert bracelet or tag that says you have a pacemaker.  Go to [email or web address].     Always tell doctors, dentists and other care providers that you have a device implanted in you.    Let us know before you plan any surgeries. Your care team must take special steps to keep you safe during certain procedures. These steps will depend on the type of device you have. Your provider will need to see your ID card. They may need to call us for instructions.    Device Safety:      Please refer to device  s booklet for further information.        [facility] [place]:    [phone] [facility] (7 days a week)

## 2017-10-14 NOTE — PROGRESS NOTES
[facility]    Cardiology Progress Note     Assessment & Plan   Victorino Khan is a 83 year old male who was admitted on 10/13/2017. He is s/p PPM for symptomatic heart block. Did well overnight.     Small PTX noted on CXR. WIll repeat in 2 hours. Will likely need continued observation over the weekend to monitor stability.     Summary:   -s/p PPM  -CXR in2 hours to monitor PTX  -plan for additional CXR tomorrow AM  -likely will need admission.       Angie Joseph MD    Interval History   S/p PPM. Doing well. No events overnight.     Physical Exam   Temp: 97.5  F (36.4  C) Temp src: Oral BP: 130/69 Pulse: 63   Resp: 18 SpO2: 95 % O2 Device: None (Room air)    Vitals:    10/13/17 0944   Weight: 91.6 kg (202 lb)     Vital Signs with Ranges  Temp:  [97.5  F (36.4  C)] 97.5  F (36.4  C)  Pulse:  [51-63] 63  Resp:  [16-18] 18  BP: (108-158)/(66-90) 130/69  SpO2:  [95 %-98 %] 95 %     There is no problem list on file for this patient.      Gen: no distress  ENT: oropharynx clear  Heart: RRR  Lungs: clear  Abdomen: soft, nontender  Extremities: no edema    Medications     Warfarin Therapy Reminder         fluticasone-salmeterol  2 puff Inhalation BID     simvastatin (ZOCOR) tablet 40 mg  40 mg Oral Daily     cholecalciferol  1,000 Units Oral Daily     multivitamin, therapeutic with minerals  1 tablet Oral Daily     sodium chloride (PF)  3 mL Intracatheter Q8H     ceFAZolin  2 g Intravenous Q8H       Data   Results for orders placed or performed during the hospital encounter of 10/13/17 (from the past 24 hour(s))   Basic metabolic panel   Result Value Ref Range    Sodium 140 133 - 144 mmol/L    Potassium 4.6 3.4 - 5.3 mmol/L    Chloride 106 94 - 109 mmol/L    Carbon Dioxide 29 20 - 32 mmol/L    Anion Gap 5 3 - 14 mmol/L    Glucose 91 70 - 99 mg/dL    Urea Nitrogen 21 7 - 30 mg/dL    Creatinine 0.98 0.66 - 1.25 mg/dL    GFR Estimate 73 >60 mL/min/1.7m2    GFR Estimate If Black 88 >60 mL/min/1.7m2    Calcium 8.9  8.5 - 10.1 mg/dL   CBC with platelets   Result Value Ref Range    WBC 5.7 4.0 - 11.0 10e9/L    RBC Count 4.49 4.4 - 5.9 10e12/L    Hemoglobin 14.8 13.3 - 17.7 g/dL    Hematocrit 43.2 40.0 - 53.0 %    MCV 96 78 - 100 fl    MCH 33.0 26.5 - 33.0 pg    MCHC 34.3 31.5 - 36.5 g/dL    RDW 12.6 10.0 - 15.0 %    Platelet Count 161 150 - 450 10e9/L   INR   Result Value Ref Range    INR 2.10 (H) 0.86 - 1.14   EP Perm pacer dble lead    Narrative    PROCEDURES PERFORMED:  1. Conscious sedation.  2. Cardiac fluoroscopy.  3. Dual-chamber permanent pacemaker implantation.    INDICATION: Symptomatic bradycardia secondary to paroxysmal complete  AV block.    HPI: 83-year-old gentleman with a history of DVT and factor V Leiden  deficiency, who presents with intermittent episodes of lightheadedness  associated diaphoresis and near syncope. A monitor confirmed  symptomatic episodes of paroxysmal complete AV block. ?No reversible  causes were identified and he was referred for permanent pacemaker  implantation.    Risks and benefits of the procedure were reviewed including but not  limited to arrhythmia, pain, infection, bleeding, skin damage from  ionized radiation, kidney damage or allergic reactions to conscious  dye, injury to the neighboring vascular structures, need for emergent  cardiopulmonary resuscitation, heart attack, stroke or death.  Alternatives were discussed including doing nothing. All questions  were answered to the patient's satisfaction. Informed consent was  obtained and patient wished to proceed.    SEDATION PROCEDURE:   Sedatives were given by the nurse staff under my direct supervision.   The patient received a total of 4 mg of Versed and 100 mcg of Fentanyl  during procedure.  Total sedation time was 63 minutes. Heart rate,  blood pressure, oxygen saturation and patient responses were monitored  throughout the procedure with the RN assistance.    FLUOROSCOPY DATA:  Fluoro time:  0.8 minutes.  Radiation dose  (AK): 5 mGy.  Dose-area product (DAP):  295 Gy.cm2.    DESCRIPTION OF PROCEDURE: After written informed consent was obtained,  patient was brought to the EP lab. An intravenous infusion of  prophylactic antibiotic was begun. The chest was sterilely prepped  from the level of iliac crest to level of the chin with antibiotic  soap and disinfectant, draped appropriately. Following infiltration  with 1% lidocaine, an incision was made parallel to and 1 cm beneath  the clavicle and extended across the deltopectoral groove. Using blunt  dissection, the excision was extended down the anterior pectoral  fascia. Using blunt and sharp dissection, a pocket was developed  parallel to the fascia and extended inferiorly.    Two pocket punctures via fluoroscopy guidance and modified Seldinger  technique was used to attain access into the left subclavian vein. A 8  Romansh sheath was inserted over the wire. The right ventricular lead  was advanced under fluoroscopy and a secure location found. The lead  was fixated. Stimulation and sensing thresholds were found to be  satisfactory. No diaphragmatic stimulation was noticed with high  output pacing. The lead was sutured to the underlying pectoral muscle  with interrupted 0 silk suture over a plastic collar. A 8 Romansh  sheath was inserted over the wire. The right atrial lead was advanced  under fluoroscopy and secure location found. The lead was fixated.  Stimulation and sensing thresholds were found to be satisfactory. No  diaphragmatic stimulation was noted with high output pacing. The lead  was sutured to the underlying pectoral muscle with interrupted 0 silk  suture over a plastic collar.    After irrigation with antibiotic solution, the pocket was inspected,  no bleeding was seen. The generator was connected to the leads and  inserted into the pocket. The wound was closed with two layers of  subcutaneous sutures.    PACEMAKER GENERATOR: [employer] Walter, model [id number], serial  #[id number].    LEAD INFORMATION:  Right atrial lead: [employer] Walter, model [id number], serial #[id number].  Right ventricular lead: [employer] Walter, model [id number]/58, serial #[id number].    MEASURED DATA:  Right atrial lead: Sensing 2.7 mV, threshold 0.7 volts at 0.5 msec,  impedance 590 ohms.  Right ventricular lead: Sensing 5.0 mV, threshold 0.5 volt at 0.5  msec, impedance 760 ohms.      Impression    IMPRESSION:  1. Successful dual chamber pacemaker implantation in left  infraclavicular region above the pectoral fascia.  2. Bradycardia pacing set to DDD .    RECOMMENDATIONS:  1. Avoid vascular access to the left jugular and subclavian veins.  2. Keep wound clean, dry and covered for seven days.  3. Ancef 1 gm IV q8h.  4. PA and lateral chest x-ray to rule out dislodgement.  5. Device interrogation prior to discharge.  6. Left arm in sling overnight and then off in the morning.  7. May start oral medications. Avoid IV or subcutaneous heparin for at  least two weeks. If heparin must be used, please contract the  procedural team to discuss.  8. Wound care as follows:  a) Do not get incision wet for three days.  b) Leave the Steri-Strips in place, allow to come off naturally. If  they do not come off in two weeks, you may remove them.  c) Check incision daily and call if they notice one of the following:  redness, drainage (pus or blood), swelling, warmth or if you develop  fever.    If you have questions regarding wound care, please contact our office.              ISH MONTERROSO MD   Glucose by meter   Result Value Ref Range    Glucose 87 70 - 99 mg/dL

## 2017-10-15 ENCOUNTER — APPOINTMENT (OUTPATIENT)
Dept: GENERAL RADIOLOGY | Facility: CLINIC | Age: 82
End: 2017-10-15
Attending: INTERNAL MEDICINE
Payer: MEDICARE

## 2017-10-15 VITALS
OXYGEN SATURATION: 92 % | RESPIRATION RATE: 16 BRPM | DIASTOLIC BLOOD PRESSURE: 71 MMHG | TEMPERATURE: 97.2 F | HEART RATE: 60 BPM | WEIGHT: 194.7 LBS | HEIGHT: 72 IN | BODY MASS INDEX: 26.37 KG/M2 | SYSTOLIC BLOOD PRESSURE: 122 MMHG

## 2017-10-15 LAB — INR PPP: 1.89 (ref 0.86–1.14)

## 2017-10-15 PROCEDURE — 93005 ELECTROCARDIOGRAM TRACING: CPT

## 2017-10-15 PROCEDURE — 40000935 ZZH STATISTIC OUTPATIENT (NON-OBS) EVE

## 2017-10-15 PROCEDURE — 40000065 ZZH STATISTIC EKG NON-CHARGEABLE

## 2017-10-15 PROCEDURE — 71010 XR CHEST 1 VW: CPT

## 2017-10-15 PROCEDURE — 71010 XR CHEST 1 VW: CPT | Mod: 76

## 2017-10-15 PROCEDURE — 40000934 ZZH STATISTIC OUTPATIENT (NON-OBS) DAY

## 2017-10-15 PROCEDURE — 36415 COLL VENOUS BLD VENIPUNCTURE: CPT | Performed by: INTERNAL MEDICINE

## 2017-10-15 PROCEDURE — 85610 PROTHROMBIN TIME: CPT | Performed by: INTERNAL MEDICINE

## 2017-10-15 RX ORDER — WARFARIN SODIUM 1 MG/1
2.5 TABLET ORAL DAILY
Qty: 30 TABLET | Refills: 3 | Status: SHIPPED | OUTPATIENT
Start: 2017-10-15 | End: 2022-04-13

## 2017-10-15 RX ADMIN — FLUTICASONE PROPIONATE AND SALMETEROL XINAFOATE 2 PUFF: 115; 21 AEROSOL, METERED RESPIRATORY (INHALATION) at 08:38

## 2017-10-15 NOTE — PLAN OF CARE
Problem: Patient Care Overview  Goal: Individualization & Mutuality  Outcome: Improving  Plan of care  Pt A&O, VSS, pt PPM site CD/I, pt is SR with 60% V-paced.  Pt wearing sling.  Pt denies any pain or sob.  Pt to have CXR this am.  Pt up independently around room and bathroom.  Will continue to monitor.

## 2017-10-15 NOTE — PLAN OF CARE
Problem: Patient Care Overview  Goal: Plan of Care/Patient Progress Review  Outcome: Adequate for Discharge Date Met:  10/15/17  VSS. A/O. AV Paced. Has small pneumothorax. Plan to DC home this evening. Will have INR draw in the am and repeat chest xray in [facility]. DC instructions reviewed with patient. DC script given to patient and reviewed including side effects/administration. Patient will take 6.5 mg Warfarin per pharmacy. All questions answered. Patient verbalizes understanding of DC care. Patient has pacemaker card in [facility].

## 2017-10-15 NOTE — PROGRESS NOTES
Repeat CXR shows ptx is smaller. He can be discharged home as we have observed for 24 hours. He will present for repeat CXR tomorrow and f/u with EP.   Please contact w questions.

## 2017-10-15 NOTE — PROGRESS NOTES
[facility]    Cardiology Progress Note     Assessment & Plan   Victorino Khan is a 83 year old male who was admitted on 10/13/2017. He is s/p PPM for symptomatic heart block. Did well overnight. However, CXR shows pneuomothorax appears slightly larger than CXR from yesterday,    Small PTX noted on CXR. WIll repeat in 2 hours. Will likely need continued observation over the weekend to monitor stability.     Summary:   -s/p PPM  -CXR in AM  -plan for additional CXR tomorrow AM        Angie Joseph MD    Interval History   S/p PPM. Ptx appears larger this morning. Will need to keep overnight to watch per radiology.   Physical Exam   Temp: 97.2  F (36.2  C) Temp src: Oral BP: 122/71 Pulse: 60   Resp: 16 SpO2: 92 % O2 Device: None (Room air)    Vitals:    10/13/17 0944 10/14/17 0653   Weight: 91.6 kg (202 lb) 88.3 kg (194 lb 11.2 oz)     Vital Signs with Ranges  Temp:  [97.2  F (36.2  C)-98.7  F (37.1  C)] 97.2  F (36.2  C)  Pulse:  [60-66] 60  Resp:  [16-18] 16  BP: ()/(60-71) 122/71  SpO2:  [92 %-96 %] 92 %  I/O last 3 completed shifts:  In: 600 [P.O.:600]  Out: -   There is no problem list on file for this patient.      Gen: no distress  ENT: oropharynx clear  Heart: RRR  Lungs: clear  Abdomen: soft, nontender  Extremities: no edema    Medications     Warfarin Therapy Reminder         warfarin  6.5 mg Oral ONCE at 18:00     fluticasone-salmeterol  2 puff Inhalation BID     simvastatin (ZOCOR) tablet 40 mg  40 mg Oral Daily     cholecalciferol  1,000 Units Oral Daily     multivitamin, therapeutic with minerals  1 tablet Oral Daily     sodium chloride (PF)  3 mL Intracatheter Q8H       Data   Results for orders placed or performed during the hospital encounter of 10/13/17 (from the past 24 hour(s))   INR   Result Value Ref Range    INR 1.64 (H) 0.86 - 1.14   INR   Result Value Ref Range    INR 1.89 (H) 0.86 - 1.14   XR Chest 1 View    Narrative    CHEST ONE VIEW  10/15/2017 7:20 AM     HISTORY:  Monitor pneumothorax.    COMPARISON: 10/14/2017.      Impression    IMPRESSION: Left apex pneumothorax is stable versus just slightly  larger measuring 1.7 cm in thickness, previously 1.4 cm. Remainder of  the exam appears stable.   EKG 12-lead, tracing only   Result Value Ref Range    Interpretation ECG Click View Image link to view waveform and result

## 2017-10-15 NOTE — PHARMACY-ANTICOAGULATION SERVICE
Clinical Pharmacy - Warfarin Dosing Consult     Pharmacy has been consulted to manage this patient s warfarin therapy.  Indication: DVT/PE Prophylaxis  Therapy Goal: INR 2-3  Provider/Team: Jake  Warfarin Prior to Admission: Yes  Warfarin PTA Regimen: 5mg MWF, 4mg ROW  Significant drug interactions: None  Recent documented change in oral intake/nutrition: Unknown  Dose Comments: 7.5mg today as patient missed dose 10/12    INR   Date Value Ref Range Status   10/14/2017 1.64 (H) 0.86 - 1.14 Final   10/13/2017 2.10 (H) 0.86 - 1.14 Final       Recommend warfarin 7.5 mg today.  Pharmacy will monitor Victorino Khan daily and order warfarin doses to achieve specified goal.      Please contact pharmacy as soon as possible if the warfarin needs to be held for a procedure or if the warfarin goals change.

## 2017-10-16 ENCOUNTER — TELEPHONE (OUTPATIENT)
Dept: CARDIOLOGY | Facility: CLINIC | Age: 82
End: 2017-10-16

## 2017-10-16 NOTE — TELEPHONE ENCOUNTER
"Call from Lisbeth WINTERS, discharge coordinator. Pt had PPM implanted on 10-13-17; developed a pneumo with subsequent CXRs, last one yesterday.  Report from Hospitalist stated \"improved\". Order placed for pt to be seen by EP with follow up xray. Reviewed with Dr Meyer- pt is asymptomatic (no chest pain, no SOB). Recommends pt have CXR tomorrow in the afternoon, with review by Dr Meyer before pt leaves the clinic.Will determine plan of care at that point. Pt will also have an INR in our clinic at 3pm. Results will be reported to the pts managing clinic- he will bring contact information.  I reviewed with Jorge; she will receive the INR and forward. SueLangenbrunnerRN  "

## 2017-10-16 NOTE — PROGRESS NOTES
Contacted [facility] RN Gayatri re: follow up CXR and appointment with Dr. Carrera that was to be scheduled for today.  Gayatri spoke with Dr. Meyer who was in clinic today and he said that the patient did not need to come in today, but that he should follow up on Tuesday, Oct. 17th at 3pm for repeat CXR, F/U Appt, and INR check in [facility].  INR scheduled for today was cancelled by writer and patient fully aware of new plan.

## 2017-10-17 ENCOUNTER — TELEPHONE (OUTPATIENT)
Dept: CARDIOLOGY | Facility: CLINIC | Age: 82
End: 2017-10-17

## 2017-10-17 ENCOUNTER — ANTICOAGULATION THERAPY VISIT (OUTPATIENT)
Dept: CARDIOLOGY | Facility: CLINIC | Age: 82
End: 2017-10-17

## 2017-10-17 ENCOUNTER — HOSPITAL ENCOUNTER (OUTPATIENT)
Dept: GENERAL RADIOLOGY | Facility: CLINIC | Age: 82
Discharge: HOME | End: 2017-10-17
Attending: PHYSICIAN ASSISTANT | Admitting: PHYSICIAN ASSISTANT
Payer: MEDICARE

## 2017-10-17 DIAGNOSIS — Z79.01 LONG-TERM (CURRENT) USE OF ANTICOAGULANTS: ICD-10-CM

## 2017-10-17 DIAGNOSIS — Z95.0 CARDIAC PACEMAKER IN SITU: ICD-10-CM

## 2017-10-17 DIAGNOSIS — D68.9 COAGULATION DEFECT (H): ICD-10-CM

## 2017-10-17 LAB
INR POINT OF CARE: 3.6 (ref 0.86–1.14)
INTERPRETATION ECG - MUSE: NORMAL
INTERPRETATION ECG - MUSE: NORMAL

## 2017-10-17 PROCEDURE — 71010 XR CHEST 1 VW: CPT

## 2017-10-17 NOTE — TELEPHONE ENCOUNTER
Pt is s/p PPM check and had small pneumothorax. He had CXR today and it was reviewed by Dr Marshall. There is only a very small pneumothorax remaining. Pt feels well without SOB or other symptoms. Pt will come back in one week for repeat CXR and PPM check. CXR ordered but not scheduled. He also had INR done and it was faxed to Dr Evans clinic as requested by RN from that clinic.

## 2017-10-27 ENCOUNTER — ALLIED HEALTH/NURSE VISIT (OUTPATIENT)
Dept: CARDIOLOGY | Facility: CLINIC | Age: 82
End: 2017-10-27
Payer: COMMERCIAL

## 2017-10-27 ENCOUNTER — HOSPITAL ENCOUNTER (OUTPATIENT)
Dept: GENERAL RADIOLOGY | Facility: CLINIC | Age: 82
Discharge: HOME | End: 2017-10-27
Attending: INTERNAL MEDICINE | Admitting: INTERNAL MEDICINE
Payer: MEDICARE

## 2017-10-27 DIAGNOSIS — Z95.0 CARDIAC PACEMAKER IN SITU: Primary | ICD-10-CM

## 2017-10-27 DIAGNOSIS — Z95.0 CARDIAC PACEMAKER IN SITU: ICD-10-CM

## 2017-10-27 PROCEDURE — 93280 PM DEVICE PROGR EVAL DUAL: CPT | Performed by: INTERNAL MEDICINE

## 2017-10-27 PROCEDURE — 71020 XR CHEST 2 VW: CPT

## 2017-10-27 NOTE — PROGRESS NOTES
[facility] 7-10 day Post Pacemaker Device Check  AP: 20 % : 73 %  Mode: DDD  bpm        Underlying Rhythm: NSR (hx of paroxysmal CHB and PAF)  Heart Rate: Heart rate stable with good variability.  Sensing: WNL    Pacing Threshold: WNL    Impedance: WNL  Battery Status: Approximately 5.4-6.4 years longevity  Incision: Steri strips removed, incision clean and dry with minimal bruising and swelling over site.  Atrial Arrhythmia: 6 mode switches comprising of <1% of the time, EGM showed a 10 second PAT. Patient is on warfarin.  Ventricular Arrhythmia: 0  Setting Change: 0    Care Plan: Follow up chest x-ray obtained, Follow up on 6 weeks for turn down. INOCENTE Woods RN

## 2017-10-27 NOTE — MR AVS SNAPSHOT
"              After Visit Summary   10/27/2017    Victorino Khan    MRN: [id number]           Patient Information     Date Of Birth          2/13/1934        Visit Information        Provider Department      10/27/2017 3:00 PM WU DCR2 [facility]        Today's Diagnoses     Cardiac pacemaker in situ    -  1       Follow-ups after your visit        Your next 10 appointments already scheduled     Dec 08, 2017 11:00 AM CST   Pacemaker Check with WU DCR2   [facility] ([facility])    [address] [place] [address] [id number] [phone]              Future tests that were ordered for you today     Open Future Orders        Priority Expected Expires Ordered    X-ray Chest 2 vws* Routine 10/27/2017 10/27/2018 10/27/2017            Who to contact     If you have questions or need follow up information about today's clinic visit or your schedule please contact [facility] directly at [phone].  Normal or non-critical lab and imaging results will be communicated to you by [employer], letter or phone within 4 business days after the clinic has received the results. If you do not hear from us within 7 days, please contact the clinic through [employer] or phone. If you have a critical or abnormal lab result, we will notify you by phone as soon as possible.  Submit refill requests through [employer] or call your pharmacy and they will forward the refill request to us. Please allow 3 business days for your refill to be completed.          Additional Information About Your Visit        [employer] Information     [employer] lets you send messages to your doctor, view your test results, renew your prescriptions, schedule appointments and more. To sign up, go to [email or web address] . Click on \"Log in\" on the left side of the screen, which will take you to the Welcome page. Then click on \"Sign up " "Now\" on the right side of the page.     You will be asked to enter the access code listed below, as well as some personal information. Please follow the directions to create your username and password.     Your access code is: [email or web address]  Expires: 2018  9:31 AM     Your access code will  in 90 days. If you need help or a new code, please call your [place] clinic or [phone].        Care EveryWhere ID     This is your Care EveryWhere ID. This could be used by other organizations to access your [place] medical records  [id number]         Blood Pressure from Last 3 Encounters:   10/15/17 122/71   10/13/17 112/66   09/10/15 115/75    Weight from Last 3 Encounters:   10/14/17 88.3 kg (194 lb 11.2 oz)   10/13/17 91.2 kg (201 lb)   09/10/15 96 kg (211 lb 11.2 oz)              We Performed the Following     PM DEVICE PROGRAMMING EVAL, DUAL LEAD PACER ([id number])        Primary Care Provider Office Phone # Fax #    Vaughn POTTER Cristina [phone]       [facility] [address]  [facility] [address]        Equal Access to Services     [facility] : Hadii aad ku hadasho Soomaali, waaxda luqadaha, qaybta kaalmada adeegyada, waxay idiin haystefann katie larson . So [facility] [phone].    ATENCIÓN: Si habla español, tiene a jules disposición servicios gratuitos de asistencia lingüística. Llame al [phone].    We comply with applicable federal civil rights laws and [place] laws. We do not discriminate on the basis of race, color, national origin, age, disability, sex, sexual orientation, or gender identity.            Thank you!     Thank you for choosing [facility] [place]  for your care. Our goal is always to provide you with excellent care. Hearing back from our patients is one way we can continue to improve our services. Please take a few minutes to complete the written survey that you may receive in the mail after your visit with us. Thank you!      "        Your Updated Medication List - Protect others around you: Learn how to safely use, store and throw away your medicines at www.disposemymeds.org.          This list is accurate as of: 10/27/17  3:54 PM.  Always use your most recent med list.                   Brand Name Dispense Instructions for use Diagnosis    ALENDRONATE SODIUM PO      Take 70 mg by mouth once a week    Thrombocytopenia (H)       calcium carbonate 1250 MG tablet    OS- mg [place]. Ca     Take 500 mg by mouth daily    Thrombocytopenia (H)       fluticasone-salmeterol 115-21 MCG/ACT Inhaler    ADVAIR-HFA     Inhale 2 puffs into the lungs 2 times daily    Thrombocytopenia (H)       Multi-vitamin Tabs tablet      Take 1 tablet by mouth daily    Thrombocytopenia (H)       SIMVASTATIN PO      Take 40 mg by mouth daily    Thrombocytopenia (H)       VITAMIN D3 PO      Take 1,000 Units by mouth daily    Thrombocytopenia (H)       * WARFARIN SODIUM PO      Take 5 mg by mouth three times a week Monday, Wednesday & Friday        * warfarin 1 MG tablet    COUMADIN    30 tablet    Take 2.5 tablets (2.5 mg) by mouth daily    Bradycardia       * Warfarin Therapy Reminder     30 each    1 each continuous prn    Bradycardia       * Notice:  This list has 3 medication(s) that are the same as other medications prescribed for you. Read the directions carefully, and ask your doctor or other care provider to review them with you.

## 2017-10-30 ENCOUNTER — TELEPHONE (OUTPATIENT)
Dept: CARDIOLOGY | Facility: CLINIC | Age: 82
End: 2017-10-30

## 2017-10-30 NOTE — TELEPHONE ENCOUNTER
Notes Recorded by Antonina Meyer MD on 10/28/2017 at 4:28 PM  Please inform patient that the PTX has resolved.  Called and informed pt clark PXT has resolved.

## 2017-11-07 ENCOUNTER — AMBULATORY - HEALTHEAST (OUTPATIENT)
Dept: LAB | Facility: HOSPITAL | Age: 82
End: 2017-11-07

## 2017-11-07 DIAGNOSIS — I48.91 ATRIAL FIBRILLATION (H): ICD-10-CM

## 2017-11-13 ENCOUNTER — AMBULATORY - HEALTHEAST (OUTPATIENT)
Dept: LAB | Facility: HOSPITAL | Age: 82
End: 2017-11-13

## 2017-11-13 DIAGNOSIS — Z79.01 ANTICOAGULATED ON COUMADIN: ICD-10-CM

## 2017-12-08 ENCOUNTER — ALLIED HEALTH/NURSE VISIT (OUTPATIENT)
Dept: CARDIOLOGY | Facility: CLINIC | Age: 82
End: 2017-12-08
Payer: COMMERCIAL

## 2017-12-08 DIAGNOSIS — I44.30 AV BLOCK: Primary | ICD-10-CM

## 2017-12-08 DIAGNOSIS — I47.29 NSVT (NONSUSTAINED VENTRICULAR TACHYCARDIA) (H): ICD-10-CM

## 2017-12-08 DIAGNOSIS — Z95.0 CARDIAC PACEMAKER IN SITU: ICD-10-CM

## 2017-12-08 PROCEDURE — 93280 PM DEVICE PROGR EVAL DUAL: CPT | Performed by: INTERNAL MEDICINE

## 2017-12-08 NOTE — PROGRESS NOTES
[employer] Ass[employer] MRI compatible (D) Pacemaker 6 Wk post implant Device Check  AP: 17 % : 62 %  Mode: DDD 60/130        Underlying Rhythm: intermittent Sinus with a First degree alternating with CHB  Heart Rate: Overall histogram shows stable heart rates  Sensing: WNL    Pacing Threshold: WNL   Impedance: WNL  Battery Status: Full  Device Site: Incision well healed, one suture end visible which was easily removed.   Atrial Arrhythmia: 8 mode switches, longest duration was one minute and 24 seconds. EGM's show short bursts of atrial tach. Patient is on warfarin.   Ventricular Arrhythmia: 1 VHR. EGM showed a 10 beat burst of NSVT at a rate of 197 BPM. No echo on file- will notify Dr Andrade.   Setting Change: Outputs lowered per protocol AV delays extended just slightly to allow for intrinsic conduction when possible.     Care Plan: Begin Q 3 month remote transmissions. Report will be faxed to patients PCP per his request. SINGH Soler   PER DR ANDRADE- WE WILL SCHEDULE AN ECHO TO ASSESS RV FUNCTION.

## 2017-12-08 NOTE — MR AVS SNAPSHOT
After Visit Summary   12/8/2017    Victorino Khan    MRN: [id number]           Patient Information     Date Of Birth          2/13/1934        Visit Information        Provider Department      12/8/2017 11:00 AM WU DCR2 [facility]        Today's Diagnoses     AV block    -  1    Cardiac pacemaker in situ        NSVT (nonsustained ventricular tachycardia) (H)           Follow-ups after your visit        Your next 10 appointments already scheduled     Mar 20, 2018  3:30 PM CDT   Remote PPM Check with WU TECH1   [facility] ([facility])    [address] [place] [address] [address]  [facility] [id number] [phone]           This appointment is for a remote check of your pacemaker.  This is not an appointment at the office.              Future tests that were ordered for you today     Open Future Orders        Priority Expected Expires Ordered    Echocardiogram Routine 12/15/2017 12/8/2018 12/8/2017            Who to contact     If you have questions or need follow up information about today's clinic visit or your schedule please contact [facility] directly at [phone].  Normal or non-critical lab and imaging results will be communicated to you by [employer], letter or phone within 4 business days after the clinic has received the results. If you do not hear from us within 7 days, please contact the clinic through [employer] or phone. If you have a critical or abnormal lab result, we will notify you by phone as soon as possible.  Submit refill requests through [email or web address] or call your pharmacy and they will forward the refill request to us. Please allow 3 business days for your refill to be completed.          Additional Information About Your Visit        [email or web address] Information     [email or web address] lets you send messages to your doctor, view your test results, renew your prescriptions, schedule appointments and  "more. To sign up, go to [email or web address] . Click on \"Log in\" on the left side of the screen, which will take you to the Welcome page. Then click on \"Sign up Now\" on the right side of the page.     You will be asked to enter the access code listed below, as well as some personal information. Please follow the directions to create your username and password.     Your access code is: [email or web address]  Expires: 2018  8:31 AM     Your access code will  in 90 days. If you need help or a new code, please call your [place] clinic or [phone].        Care EveryWhere ID     This is your Care EveryWhere ID. This could be used by other organizations to access your [place] medical records  [id number]         Blood Pressure from Last 3 Encounters:   10/15/17 122/71   10/13/17 112/66   09/10/15 115/75    Weight from Last 3 Encounters:   10/14/17 88.3 kg (194 lb 11.2 oz)   10/13/17 91.2 kg (201 lb)   09/10/15 96 kg (211 lb 11.2 oz)              We Performed the Following     PM DEVICE PROGRAMMING EVAL, DUAL LEAD PACER ([id number])        Primary Care Provider Office Phone # Fax #    Vaughn POTTER [place] [phone]       [facility] [address]  [facility] [address]        Equal Access to Services     [facility]: Hadii aad ku hadasho Solawandaali, waaxda luqadaha, qaybta kaalmada adeegyada, hermes wen. So [facility] [phone].    ATENCIÓN: Si habla español, tiene a jules disposición servicios gratuitos de asistencia lingüística. Llame al [phone].    We comply with applicable federal civil rights laws and [place] laws. We do not discriminate on the basis of race, color, national origin, age, disability, sex, sexual orientation, or gender identity.            Thank you!     Thank you for choosing [facility]  for your care. Our goal is always to provide you with excellent care. Hearing back from our patients is one way we can " continue to improve our services. Please take a few minutes to complete the written survey that you may receive in the mail after your visit with us. Thank you!             Your Updated Medication List - Protect others around you: Learn how to safely use, store and throw away your medicines at www.disposemymeds.org.          This list is accurate as of: 12/8/17  1:35 PM.  Always use your most recent med list.                   Brand Name Dispense Instructions for use Diagnosis    ALENDRONATE SODIUM PO      Take 70 mg by mouth once a week    Thrombocytopenia (H)       calcium carbonate 1250 MG tablet    OS- mg [place]. Ca     Take 500 mg by mouth daily    Thrombocytopenia (H)       fluticasone-salmeterol 115-21 MCG/ACT Inhaler    ADVAIR-HFA     Inhale 2 puffs into the lungs 2 times daily    Thrombocytopenia (H)       Multi-vitamin Tabs tablet      Take 1 tablet by mouth daily    Thrombocytopenia (H)       SIMVASTATIN PO      Take 40 mg by mouth daily    Thrombocytopenia (H)       VITAMIN D3 PO      Take 1,000 Units by mouth daily    Thrombocytopenia (H)       * WARFARIN SODIUM PO      Take 5 mg by mouth three times a week Monday, Wednesday & Friday        * warfarin 1 MG tablet    COUMADIN    30 tablet    Take 2.5 tablets (2.5 mg) by mouth daily    Bradycardia       * Warfarin Therapy Reminder     30 each    1 each continuous prn    Bradycardia       * Notice:  This list has 3 medication(s) that are the same as other medications prescribed for you. Read the directions carefully, and ask your doctor or other care provider to review them with you.

## 2017-12-14 ENCOUNTER — OFFICE VISIT (OUTPATIENT)
Dept: CARDIOLOGY | Facility: CLINIC | Age: 82
End: 2017-12-14
Payer: COMMERCIAL

## 2017-12-14 ENCOUNTER — HOSPITAL ENCOUNTER (OUTPATIENT)
Dept: CARDIOLOGY | Facility: CLINIC | Age: 82
Discharge: HOME | End: 2017-12-14
Attending: INTERNAL MEDICINE | Admitting: INTERNAL MEDICINE
Payer: MEDICARE

## 2017-12-14 ENCOUNTER — DOCUMENTATION ONLY (OUTPATIENT)
Dept: CARDIOLOGY | Facility: CLINIC | Age: 82
End: 2017-12-14

## 2017-12-14 VITALS
BODY MASS INDEX: 28.05 KG/M2 | WEIGHT: 207.1 LBS | HEART RATE: 69 BPM | HEIGHT: 72 IN | SYSTOLIC BLOOD PRESSURE: 112 MMHG | DIASTOLIC BLOOD PRESSURE: 70 MMHG

## 2017-12-14 DIAGNOSIS — M79.10 MYALGIA: ICD-10-CM

## 2017-12-14 DIAGNOSIS — I47.29 NSVT (NONSUSTAINED VENTRICULAR TACHYCARDIA) (H): ICD-10-CM

## 2017-12-14 DIAGNOSIS — M79.10 MYALGIA: Primary | ICD-10-CM

## 2017-12-14 LAB — CK SERPL-CCNC: 65 U/L (ref 30–300)

## 2017-12-14 PROCEDURE — 93321 DOPPLER ECHO F-UP/LMTD STD: CPT | Mod: 26 | Performed by: INTERNAL MEDICINE

## 2017-12-14 PROCEDURE — 93325 DOPPLER ECHO COLOR FLOW MAPG: CPT | Mod: 26 | Performed by: INTERNAL MEDICINE

## 2017-12-14 PROCEDURE — 93308 TTE F-UP OR LMTD: CPT | Mod: 26 | Performed by: INTERNAL MEDICINE

## 2017-12-14 PROCEDURE — 36415 COLL VENOUS BLD VENIPUNCTURE: CPT | Performed by: INTERNAL MEDICINE

## 2017-12-14 PROCEDURE — 40000264 ECHO LIMITED WITH OPTISON

## 2017-12-14 PROCEDURE — 82550 ASSAY OF CK (CPK): CPT | Performed by: INTERNAL MEDICINE

## 2017-12-14 PROCEDURE — 25500064 ZZH RX 255 OP 636: Performed by: INTERNAL MEDICINE

## 2017-12-14 PROCEDURE — 99214 OFFICE O/P EST MOD 30 MIN: CPT | Mod: 24 | Performed by: INTERNAL MEDICINE

## 2017-12-14 RX ADMIN — HUMAN ALBUMIN MICROSPHERES AND PERFLUTREN 2 ML: 10; .22 INJECTION, SOLUTION INTRAVENOUS at 11:15

## 2017-12-14 NOTE — PROGRESS NOTES
HPI and Plan:   See dictation  [id number]  Orders Placed This Encounter   Procedures     CK total       No orders of the defined types were placed in this encounter.      Medications Discontinued During This Encounter   Medication Reason     SIMVASTATIN PO          Encounter Diagnosis   Name Primary?     Myalgia Yes       CURRENT MEDICATIONS:  Current Outpatient Prescriptions   Medication Sig Dispense Refill     warfarin (COUMADIN) 1 MG tablet Take 2.5 tablets (2.5 mg) by mouth daily 30 tablet 3     Warfarin Therapy Reminder 1 each continuous prn 30 each 3     WARFARIN SODIUM PO Take 5 mg by mouth three times a week Monday, Wednesday & Friday       ALENDRONATE SODIUM PO Take 70 mg by mouth once a week        fluticasone-salmeterol (ADVAIR-HFA) 115-21 MCG/ACT inhaler Inhale 2 puffs into the lungs 2 times daily       calcium carbonate (OS- MG [place]. CA) 500 MG tablet Take 500 mg by mouth daily        Cholecalciferol (VITAMIN D3 PO) Take 1,000 Units by mouth daily       multivitamin, therapeutic with minerals (MULTI-VITAMIN) TABS Take 1 tablet by mouth daily         ALLERGIES   No Known Allergies    PAST MEDICAL HISTORY:  History reviewed. No pertinent past medical history.    PAST SURGICAL HISTORY:  History reviewed. No pertinent surgical history.    FAMILY HISTORY:  Family History   Problem Relation Age of Onset     Coronary Artery Disease Early Onset Father      Coronary Artery Disease Early Onset Brother        SOCIAL HISTORY:  Social History     Social History     Marital status: Single     Spouse name: N/A     Number of children: N/A     Years of education: N/A     Social History Main Topics     Smoking status: Never Smoker     Smokeless tobacco: Never Used     Alcohol use No     Drug use: None     Sexual activity: Not Asked     Other Topics Concern     None     Social History Narrative       Review of Systems:  Skin:  Negative       Eyes:  Negative      ENT:  Positive for hearing loss    Respiratory:  Negative        Cardiovascular:  Negative      Gastroenterology: Negative      Genitourinary:  Negative      Musculoskeletal:  Negative      Neurologic:  Negative      Psychiatric:  Negative      Heme/Lymph/Imm:  Negative      Endocrine:  Negative        Physical Exam:  Vitals: /70  Pulse 69  Ht 1.829 m (6')  Wt 93.9 kg (207 lb 1.6 oz)  BMI 28.09 kg/m2    Constitutional:  cooperative, alert and oriented, well developed, well nourished, in no acute distress        Skin:  warm and dry to the touch, no apparent skin lesions or masses noted          Head:  normocephalic, no masses or lesions        Eyes:  pupils equal and round, conjunctivae and lids unremarkable, sclera white, no xanthalasma, EOMS intact, no nystagmus        Lymph:No Cervical lymphadenopathy present     ENT:           Neck:  carotid pulses are full and equal bilaterally, JVP normal, no carotid bruit        Respiratory:  normal breath sounds, clear to auscultation, normal A-P diameter, normal symmetry, normal respiratory excursion, no use of accessory muscles         Cardiac: regular rhythm, normal S1/S2, no S3 or S4, apical impulse not displaced, no murmurs, gallops or rubs                pulses full and equal, no bruits auscultated                                        GI:  abdomen soft, non-tender, BS normoactive, no mass, no HSM, no bruits        Extremities and Muscular Skeletal:  no deformities, clubbing, cyanosis, erythema observed              Neurological:  no gross motor deficits        Psych:  Alert and Oriented x 3        CC  Vaughn POTTER [facility]  [address] [address]

## 2017-12-14 NOTE — LETTER
12/14/2017      Vaughn TRAVIS [facility] [address]  [place] [address]      RE: Victorino Khan       Dear Colleague,    I had the pleasure of seeing Victorino Khan in the [facility].    HISTORY OF PRESENT ILLNESS:  Thank you for allowing me to participate in the care of your delightful patient.  As you know, Victorino is an 83-year-old gentleman whom I had the pleasure of meeting for the first time this past October when he was noted to have frequent advanced AV block.  He does have a history of intermittent lightheadedness which is likely due to these bradyarrhythmias from intrinsic cause.  In light of that, I recommended pacemaker implantation for the patient.  He since was noted to have nonsustained VT episodes.  Because of that, I recommended an echocardiogram which was performed today.  The echocardiogram demonstrated normal LV systolic function.  The patient is also noted to have some paroxysmal atrial tachycardia for which I understand you had him on a beta blocker on a p.r.n. basis.      I was asked to see the patient on an urgent basis after the echocardiogram today as he was complaining of some muscle aches and weakness this past Friday.  The patient stated he could not touch his face with his fingers on both sides and was having a tough time getting up, especially climbing stairs.  He is getting better now.  I noted the patient has been on simvastatin for the past 5 years and that this could be a manifestation of a myalgia.  I asked him to stop this medication and obtain a total CK level today.  If the patient continues to feel weak but has a normal CK level and despite the stoppage of simvastatin, I would recommend neurological consultation.  Otherwise, the patient will continue to follow in the Device Clinic and see us back in a year's time.  We are going to continue to monitor for these nonsustained VT episodes along with paroxysmal atrial tachycardia for now.         Outpatient Encounter Prescriptions as of 12/14/2017   Medication Sig Dispense Refill     warfarin (COUMADIN) 1 MG tablet Take 2.5 tablets (2.5 mg) by mouth daily 30 tablet 3     Warfarin Therapy Reminder 1 each continuous prn 30 each 3     WARFARIN SODIUM PO Take 5 mg by mouth three times a week Monday, Wednesday & Friday       ALENDRONATE SODIUM PO Take 70 mg by mouth once a week        fluticasone-salmeterol (ADVAIR-HFA) 115-21 MCG/ACT inhaler Inhale 2 puffs into the lungs 2 times daily       calcium carbonate (OS- MG [place]. CA) 500 MG tablet Take 500 mg by mouth daily        Cholecalciferol (VITAMIN D3 PO) Take 1,000 Units by mouth daily       multivitamin, therapeutic with minerals (MULTI-VITAMIN) TABS Take 1 tablet by mouth daily       [DISCONTINUED] SIMVASTATIN PO Take 40 mg by mouth daily        No facility-administered encounter medications on file as of 12/14/2017.        Again, thank you for allowing me to participate in the care of your patient.      Sincerely,    Evaristo Hernandez MD     [facility]

## 2017-12-14 NOTE — PROGRESS NOTES
HISTORY OF PRESENT ILLNESS:  Thank you for allowing me to participate in the care of your delightful patient.  As you know, Donato is an 83-year-old gentleman whom I had the pleasure of meeting for the first time this past October when he was noted to have frequent advanced AV block.  He does have a history of intermittent lightheadedness which is likely due to these bradyarrhythmias from intrinsic cause.  In light of that, I recommended pacemaker implantation for the patient.  He since was noted to have nonsustained VT episodes.  Because of that, I recommended an echocardiogram which was performed today.  The echocardiogram demonstrated normal LV systolic function.  The patient is also noted to have some paroxysmal atrial tachycardia for which I understand you had him on a beta blocker on a p.r.n. basis.      I was asked to see the patient on an urgent basis after the echocardiogram today as he was complaining of some muscle aches and weakness this past Friday.  The patient stated he could not touch his face with his fingers on both sides and was having a tough time getting up, especially climbing stairs.  He is getting better now.  I noted the patient has been on simvastatin for the past 5 years and that this could be a manifestation of a myalgia.  I asked him to stop this medication and obtain a total CK level today.  If the patient continues to feel weak but has a normal CK level and despite the stoppage of simvastatin, I would recommend neurological consultation.  Otherwise, the patient will continue to follow in the Device Clinic and see us back in a year's time.  We are going to continue to monitor for these nonsustained VT episodes along with paroxysmal atrial tachycardia for now.      cc:   [place] [address]         WIL ANDRADE MD             D: 12/14/2017 14:27   T: 12/14/2017 17:10   MT: PRADEEP      Name:     DONATO BULLOCK   MRN:       [id number]        Account:      [id number]   :      1934           Service Date: 2017      Document: [id number]

## 2017-12-14 NOTE — MR AVS SNAPSHOT
After Visit Summary   12/14/2017    Victorino Khan    MRN: [id number]           Patient Information     Date Of Birth          2/13/1934        Visit Information        Provider Department      12/14/2017 1:15 PM Evaristo Carrera MD [facility]        Today's Diagnoses     Myalgia    -  1       Follow-ups after your visit        Additional Services     Follow-Up with Cardiac Advanced Practice Provider                 Your next 10 appointments already scheduled     Mar 20, 2018  3:30 PM CDT   Remote PPM Check with WU TECH1   [facility] ([facility])    [facility]  [facility] [id number] [phone]           This appointment is for a remote check of your pacemaker.  This is not an appointment at the office.              Future tests that were ordered for you today     Open Future Orders        Priority Expected Expires Ordered    Follow-Up with Cardiac Advanced Practice Provider Routine 12/14/2018 12/15/2018 12/14/2017    ECHO LIMITED WITH OPTISON Routine 12/15/2017 12/8/2018 12/8/2017            Who to contact     If you have questions or need follow up information about today's clinic visit or your schedule please contact [facility] directly at [phone].  Normal or non-critical lab and imaging results will be communicated to you by MyChart, letter or phone within 4 business days after the clinic has received the results. If you do not hear from us within 7 days, please contact the clinic through MyChart or phone. If you have a critical or abnormal lab result, we will notify you by phone as soon as possible.  Submit refill requests through Vilynx or call your pharmacy and they will forward the refill request to us. Please allow 3 business days for your refill to be completed.          Additional Information About Your Visit        MyChart Information      "Tabtor lets you send messages to your doctor, view your test results, renew your prescriptions, schedule appointments and more. To sign up, go to [email or web address] . Click on \"Log in\" on the left side of the screen, which will take you to the Welcome page. Then click on \"Sign up Now\" on the right side of the page.     You will be asked to enter the access code listed below, as well as some personal information. Please follow the directions to create your username and password.     Your access code is: [email or web address]  Expires: 2018  8:31 AM     Your access code will  in 90 days. If you need help or a new code, please call your [place] clinic or [phone].        Care EveryWhere ID     This is your Care EveryWhere ID. This could be used by other organizations to access your [place] medical records  [id number]        Your Vitals Were     Pulse Height BMI (Body Mass Index)             69 1.829 m (6') 28.09 kg/m2          Blood Pressure from Last 3 Encounters:   17 112/70   10/15/17 122/71   10/13/17 112/66    Weight from Last 3 Encounters:   17 93.9 kg (207 lb 1.6 oz)   10/14/17 88.3 kg (194 lb 11.2 oz)   10/13/17 91.2 kg (201 lb)                 Today's Medication Changes          These changes are accurate as of: 17  2:28 PM.  If you have any questions, ask your nurse or doctor.               Stop taking these medicines if you haven't already. Please contact your care team if you have questions.     SIMVASTATIN PO   Stopped by:  Evaristo Carrera MD                    Primary Care Provider Office Phone # Fax #    Vaughn Evans [phone]       [facility] [address]  [place] [address]        Equal Access to Services     [facility]: Geraldine Montoya, manasa hua, qaybta kaalhermes chang . [facility] [phone].    ATENCIÓN: Si habla español, tiene a jules disposición servicios gratuitos de " asistencia lingüísticaJcaob Willingham al [phone].    We comply with applicable federal civil rights laws and [place] laws. We do not discriminate on the basis of race, color, national origin, age, disability, sex, sexual orientation, or gender identity.            Thank you!     Thank you for choosing [facility]  [facility] your [facility]. Our goal is always to provide you with excellent care. Hearing back from our patients is one way we can continue to improve our services. Please take a few minutes to complete the written survey that you may receive in the mail after your visit with us. Thank you!             Your Updated Medication List - Protect others around you: Learn how to safely use, store and throw away your medicines at www.disposemymeds.org.          This list is accurate as of: 12/14/17  2:28 PM.  Always use your most recent med list.                   Brand Name Dispense Instructions for use Diagnosis    ALENDRONATE SODIUM PO      Take 70 mg by mouth once a week    Thrombocytopenia (H)       calcium carbonate 1250 MG tablet    OS- mg [place]. Ca     Take 500 mg by mouth daily    Thrombocytopenia (H)       fluticasone-salmeterol 115-21 MCG/ACT Inhaler    ADVAIR-HFA     Inhale 2 puffs into the lungs 2 times daily    Thrombocytopenia (H)       Multi-vitamin Tabs tablet      Take 1 tablet by mouth daily    Thrombocytopenia (H)       VITAMIN D3 PO      Take 1,000 Units by mouth daily    Thrombocytopenia (H)       * WARFARIN SODIUM PO      Take 5 mg by mouth three times a week Monday, Wednesday & Friday        * warfarin 1 MG tablet    COUMADIN    30 tablet    Take 2.5 tablets (2.5 mg) by mouth daily    Bradycardia       * Warfarin Therapy Reminder     30 each    1 each continuous prn    Bradycardia       * Notice:  This list has 3 medication(s) that are the same as other medications prescribed for you. Read the directions carefully, and ask your doctor or other care provider to  review them with you.

## 2017-12-14 NOTE — PROGRESS NOTES
"Pt has a [employer] Assurity PPM, implanted 10-13-17 for CHB. He was in the clinic on 12-8-17 for his 8 week device check.  One episode of NSVT lasting 10 beats at 197 bpm were documented. As there is no echo on file, Dr Carrera ordered this to be done. Pt is here today for the echo. He called and spoke to writer to report 2 days of extreme fatigue and \"muscle failure\".  On Sat/Sun (12-9,10) he states his muscles did not work. He could not raise his arms and legs didn't want to work. It gradually resolved over the next couple of days. I reviewed with Dr Carrera- he will see Mr Khan today. I spoke with his wife, who is in the waiting room on 3rd floor. They will come down to our waiting room when finished with the echo. I requested a stat read on the echo. SueLangenbrunnerRN    Pt added to the schedule at 1:15pm today.   "

## 2017-12-15 NOTE — PROGRESS NOTES
Pt left voicemail asking for his records from yesterday to be faxed to his PMD Dr. Evans in [place], [place], fax number [phone]. Writer faxed note from Gayatri WINTERS, OV note from Dr. Carrera, echo results, and lab results.     Called pt to let him know, no answer, left message that the information has been sent.

## 2017-12-18 ENCOUNTER — DOCUMENTATION ONLY (OUTPATIENT)
Dept: CARDIOLOGY | Facility: CLINIC | Age: 82
End: 2017-12-18

## 2017-12-18 NOTE — PROGRESS NOTES
[employer]urity PPM Remote  Alert for VHR   1 NSVT detected lasting 7 beats at a rate of 180 BPM. Normal EF per recent echo. Dr Carrera is aware of NSVT episodes. continue to monitor at this time. SINGH Soler

## 2017-12-21 ENCOUNTER — TELEPHONE (OUTPATIENT)
Dept: CARDIOLOGY | Facility: CLINIC | Age: 82
End: 2017-12-21

## 2017-12-21 NOTE — TELEPHONE ENCOUNTER
Called and LM regarding normal CK lab level. Pt is instructed however to stay off of statin and he should follow up with Neurology regarding his event of sudden weakness. I instructed pt to call us back at number given with any further questions. SINGH Soler

## 2017-12-22 ENCOUNTER — TELEPHONE (OUTPATIENT)
Dept: CARDIOLOGY | Facility: CLINIC | Age: 82
End: 2017-12-22

## 2017-12-22 NOTE — TELEPHONE ENCOUNTER
Patient calling in for a recommendation on a neurologist to follow up with. I will review with Dr Carrera. SINGH Soler

## 2018-01-11 ENCOUNTER — AMBULATORY - HEALTHEAST (OUTPATIENT)
Dept: LAB | Facility: HOSPITAL | Age: 83
End: 2018-01-11

## 2018-01-11 DIAGNOSIS — I48.91 A-FIB (H): ICD-10-CM

## 2018-01-11 LAB — INR PPP: 2.82 (ref 0.9–1.1)

## 2018-03-13 ENCOUNTER — ALLIED HEALTH/NURSE VISIT (OUTPATIENT)
Dept: CARDIOLOGY | Facility: CLINIC | Age: 83
End: 2018-03-13
Payer: COMMERCIAL

## 2018-03-13 DIAGNOSIS — Z95.0 CARDIAC PACEMAKER IN SITU: Primary | ICD-10-CM

## 2018-03-13 PROCEDURE — 99207 ZZC NO CHARGE LOS: CPT

## 2018-03-13 NOTE — PROGRESS NOTES
[employer] Merlin Alert-Courtesy Check Only-No Charge  One episode of NSVT logged, lasting 6 beats at a rate of 196 bpm. Last EF 2017 was 55%. Dr. Carrera aware of episodes of NSVT. Continue to monitor. INOCENTE Gong RN.

## 2018-03-13 NOTE — MR AVS SNAPSHOT
After Visit Summary   3/13/2018    Victorino Khan    MRN: [id number]           Patient Information     Date Of Birth          2/13/1934        Visit Information        Provider Department      3/13/2018 4:30 PM WU DCR2 [facility]        Today's Diagnoses     Cardiac pacemaker in situ    -  1       Follow-ups after your visit        Your next 10 appointments already scheduled     Mar 13, 2018  4:30 PM CDT   Courtesy Device Check with WU DCR2   [facility] ([facility])    [address]  [place] [id number] [phone]            Mar 20, 2018  3:30 PM CDT   Remote PPM Check with WU TECH1   [facility] ([facility])    [address]  [place] [id number] [phone]           This appointment is for a remote check of your pacemaker.  This is not an appointment at the office.              Who to contact     If you have questions or need follow up information about today's clinic visit or your schedule please contact [facility] directly at [phone].  Normal or non-critical lab and imaging results will be communicated to you by Stratiohart, letter or phone within 4 business days after the clinic has received the results. If you do not hear from us within 7 days, please contact the clinic through Stratiohart or phone. If you have a critical or abnormal lab result, we will notify you by phone as soon as possible.  Submit refill requests through 1spire or call your pharmacy and they will forward the refill request to us. Please allow 3 business days for your refill to be completed.          Additional Information About Your Visit        MyChart Information     1spire lets you send messages to your doctor, view your test results, renew your prescriptions, schedule appointments and more. To sign up, go to  "[email or web address] . Click on \"Log in\" on the left side of the screen, which will take you to the Welcome page. Then click on \"Sign up Now\" on the right side of the page.     You will be asked to enter the access code listed below, as well as some personal information. Please follow the directions to create your username and password.     Your access code is: [email or web address]  Expires: 2018  3:53 PM     Your access code will  in 90 days. If you need help or a new code, please call your [place] clinic or [phone].        Care EveryWhere ID     This is your Care EveryWhere ID. This could be used by other organizations to access your [place] medical records  [id number]         Blood Pressure from Last 3 Encounters:   17 112/70   10/15/17 122/71   10/13/17 112/66    Weight from Last 3 Encounters:   17 93.9 kg (207 lb 1.6 oz)   10/14/17 88.3 kg (194 lb 11.2 oz)   10/13/17 91.2 kg (201 lb)              Today, you had the following     No orders found for display       Primary Care Provider Office Phone # Fax #    Vaughn POTTER [place] [phone]       [facility] [address]  [facility] [address]        Equal Access to Services     [facility]: Hadii aad ku hadasho Soomaali, waaxda luqadaha, qaybta kaalmada adeegyada, hermes rowe haymarino larson . So [facility] [phone].    ATENCIÓN: Si habla español, tiene a jules disposición servicios gratuitos de asistencia lingüística. Llame al [phone].    We comply with applicable federal civil rights laws and [place] laws. We do not discriminate on the basis of race, color, national origin, age, disability, sex, sexual orientation, or gender identity.            Thank you!     Thank you for choosing [facility]  for your care. Our goal is always to provide you with excellent care. Hearing back from our patients is one way we can continue to improve our services. Please take a few " minutes to complete the written survey that you may receive in the mail after your visit with us. Thank you!             Your Updated Medication List - Protect others around you: Learn how to safely use, store and throw away your medicines at www.disposemymeds.org.          This list is accurate as of 3/13/18  3:53 PM.  Always use your most recent med list.                   Brand Name Dispense Instructions for use Diagnosis    ALENDRONATE SODIUM PO      Take 70 mg by mouth once a week    Thrombocytopenia (H)       calcium carbonate 1250 MG tablet    OS- mg [place]. Ca     Take 500 mg by mouth daily    Thrombocytopenia (H)       fluticasone-salmeterol 115-21 MCG/ACT Inhaler    ADVAIR-HFA     Inhale 2 puffs into the lungs 2 times daily    Thrombocytopenia (H)       Multi-vitamin Tabs tablet      Take 1 tablet by mouth daily    Thrombocytopenia (H)       VITAMIN D3 PO      Take 1,000 Units by mouth daily    Thrombocytopenia (H)       * WARFARIN SODIUM PO      Take 5 mg by mouth three times a week Monday, Wednesday & Friday        * warfarin 1 MG tablet    COUMADIN    30 tablet    Take 2.5 tablets (2.5 mg) by mouth daily    Bradycardia       * Warfarin Therapy Reminder     30 each    1 each continuous prn    Bradycardia       * Notice:  This list has 3 medication(s) that are the same as other medications prescribed for you. Read the directions carefully, and ask your doctor or other care provider to review them with you.

## 2018-03-20 ENCOUNTER — ALLIED HEALTH/NURSE VISIT (OUTPATIENT)
Dept: CARDIOLOGY | Facility: CLINIC | Age: 83
End: 2018-03-20
Payer: COMMERCIAL

## 2018-03-20 DIAGNOSIS — Z95.0 CARDIAC PACEMAKER IN SITU: Primary | ICD-10-CM

## 2018-03-20 PROCEDURE — 93294 REM INTERROG EVL PM/LDLS PM: CPT | Performed by: INTERNAL MEDICINE

## 2018-03-20 PROCEDURE — 93296 REM INTERROG EVL PM/IDS: CPT | Performed by: INTERNAL MEDICINE

## 2018-03-20 NOTE — PROGRESS NOTES
[employer] Walter Assurity (D) Remote PPM Device Check  AP: 19 % : 81 %  Mode: DDD        Presenting Rhythm: AP/  Heart Rate: Adequate rates per histogram  Sensing: Stable    Pacing Threshold: Stable    Impedance: Stable  Battery Status: 8.8 years  Atrial Arrhythmia: 29 mode switch episodes comprising <1% of the time. EGM's show short bursts of atrial tach. Patient is on warfarin. All episodes lasted <1 minute.     Ventricular Arrhythmia: 6 ventricular high rates. 3 EGMs show As=Vs for PAT lasting 12-17 beats, rates 165-210bpm. 2 EGMs show Vs>As for NSVT lasting 6-12 beats, rates 180-220bpm. EF 55-60% (2017). Per Dr. Carrera, continue to monitor. Reviewed with SINGH Johnston.       Care Plan: F/u PPM Merlin q 3 months. LM with results. TONY ShahT

## 2018-03-20 NOTE — MR AVS SNAPSHOT
"              After Visit Summary   3/20/2018    Victorino Khan    MRN: [id number]           Patient Information     Date Of Birth          2/13/1934        Visit Information        Provider Department      3/20/2018 3:30 PM JAZMIN OVERTON [facility]        Today's Diagnoses     Cardiac pacemaker in situ    -  1       Follow-ups after your visit        Your next 10 appointments already scheduled     Mar 20, 2018  3:30 PM CDT   Remote PPM Check with JAZMIN OVERTON   [facility] ([facility])    [address] [place], [place] [address]  [facility] [address] [phone]           This appointment is for a remote check of your pacemaker.  This is not an appointment at the office.              Who to contact     If you have questions or need follow up information about today's clinic visit or your schedule please contact [facility] directly at [phone].  Normal or non-critical lab and imaging results will be communicated to you by Lyfepointshart, letter or phone within 4 business days after the clinic has received the results. If you do not hear from us within 7 days, please contact the clinic through Lyfepointshart or phone. If you have a critical or abnormal lab result, we will notify you by phone as soon as possible.  Submit refill requests through [employer] or call your pharmacy and they will forward the refill request to us. Please allow 3 business days for your refill to be completed.          Additional Information About Your Visit        Lyfepointshart Information     E[employer] lets you send messages to your doctor, view your test results, renew your prescriptions, schedule appointments and more. To sign up, go to [email or web address] . Click on \"Log in\" on the left side of the screen, which will take you to the Welcome page. Then click on \"Sign up Now\" on the right side of the page.     You will be asked to enter the access code " listed below, as well as some personal information. Please follow the directions to create your username and password.     Your access code is: [email or web address]  Expires: 2018  3:53 PM     Your access code will  in 90 days. If you need help or a new code, please call your [place] clinic or [phone].        Care EveryWhere ID     This is your Care EveryWhere ID. This could be used by other organizations to access your [place] medical records  [id number]         Blood Pressure from Last 3 Encounters:   17 112/70   10/15/17 122/71   10/13/17 112/66    Weight from Last 3 Encounters:   17 93.9 kg (207 lb 1.6 oz)   10/14/17 88.3 kg (194 lb 11.2 oz)   10/13/17 91.2 kg (201 lb)              We Performed the Following     INTERROGATION DEVICE EVAL REMOTE, PACER/ICD (72584)     PM DEVICE INTERROGATE REMOTE (27993)        Primary Care Provider Office Phone # Fax #    Vaughn POTTER Cristina [phone]       [facility] [address]  [facility] [address]        Equal Access to Services     [facility] : Hadii aad ku hadasho Soomaali, waaxda luqadaha, qaybta kaalmada adeegyada, waxay idiin hayaan adeeg khdavion lajeb . So [facility] [phone].    ATENCIÓN: Si habla español, tiene a jules disposición servicios gratuitos de asistencia lingüística. Llame al [phone].    We comply with applicable federal civil rights laws and [place] laws. We do not discriminate on the basis of race, color, national origin, age, disability, sex, sexual orientation, or gender identity.            Thank you!     Thank you for choosing [facility]  for your care. Our goal is always to provide you with excellent care. Hearing back from our patients is one way we can continue to improve our services. Please take a few minutes to complete the written survey that you may receive in the mail after your visit with us. Thank you!             Your Updated Medication List - Protect  others around you: Learn how to safely use, store and throw away your medicines at www.disposemymeds.org.          This list is accurate as of 3/20/18  9:51 AM.  Always use your most recent med list.                   Brand Name Dispense Instructions for use Diagnosis    ALENDRONATE SODIUM PO      Take 70 mg by mouth once a week    Thrombocytopenia (H)       calcium carbonate 1250 MG tablet    OS- mg [place]. Ca     Take 500 mg by mouth daily    Thrombocytopenia (H)       fluticasone-salmeterol 115-21 MCG/ACT Inhaler    ADVAIR-HFA     Inhale 2 puffs into the lungs 2 times daily    Thrombocytopenia (H)       Multi-vitamin Tabs tablet      Take 1 tablet by mouth daily    Thrombocytopenia (H)       VITAMIN D3 PO      Take 1,000 Units by mouth daily    Thrombocytopenia (H)       * WARFARIN SODIUM PO      Take 5 mg by mouth three times a week Monday, Wednesday & Friday        * warfarin 1 MG tablet    COUMADIN    30 tablet    Take 2.5 tablets (2.5 mg) by mouth daily    Bradycardia       * Warfarin Therapy Reminder     30 each    1 each continuous prn    Bradycardia       * Notice:  This list has 3 medication(s) that are the same as other medications prescribed for you. Read the directions carefully, and ask your doctor or other care provider to review them with you.

## 2018-06-30 ENCOUNTER — ALLIED HEALTH/NURSE VISIT (OUTPATIENT)
Dept: CARDIOLOGY | Facility: CLINIC | Age: 83
End: 2018-06-30
Payer: COMMERCIAL

## 2018-06-30 DIAGNOSIS — Z95.0 CARDIAC PACEMAKER IN SITU: Primary | ICD-10-CM

## 2018-06-30 PROCEDURE — 93294 REM INTERROG EVL PM/LDLS PM: CPT | Performed by: INTERNAL MEDICINE

## 2018-06-30 PROCEDURE — 93296 REM INTERROG EVL PM/IDS: CPT | Performed by: INTERNAL MEDICINE

## 2018-06-30 NOTE — MR AVS SNAPSHOT
"              After Visit Summary   2018    Victorino Khan    MRN: [id number]           Patient Information     Date Of Birth          1934        Visit Information        Provider Department      2018 4:30 PM WU TECH1 [facility]        Today's Diagnoses     Cardiac pacemaker in situ    -  1       Follow-ups after your visit        Who to contact     If you have questions or need follow up information about today's clinic visit or your schedule please contact [facility] directly at [phone].  Normal or non-critical lab and imaging results will be communicated to you by Airwoothart, letter or phone within 4 business days after the clinic has received the results. If you do not hear from us within 7 days, please contact the clinic through [employer] or phone. If you have a critical or abnormal lab result, we will notify you by phone as soon as possible.  Submit refill requests through [email or web address] or call your pharmacy and they will forward the refill request to us. Please allow 3 business days for your refill to be completed.          Additional Information About Your Visit        MyChart Information     [email or web address] lets you send messages to your doctor, view your test results, renew your prescriptions, schedule appointments and more. To sign up, go to [email or web address] . Click on \"Log in\" on the left side of the screen, which will take you to the Welcome page. Then click on \"Sign up Now\" on the right side of the page.     You will be asked to enter the access code listed below, as well as some personal information. Please follow the directions to create your username and password.     Your access code is: [email or web address]  Expires: 2018  8:44 AM     Your access code will  in 90 days. If you need help or a new code, please call your [place] clinic or [phone].        Care EveryWhere ID     This is your Care EveryWhere ID. " This could be used by other organizations to access your [place] medical records  [id number]         Blood Pressure from Last 3 Encounters:   12/14/17 112/70   10/15/17 122/71   10/13/17 112/66    Weight from Last 3 Encounters:   12/14/17 93.9 kg (207 lb 1.6 oz)   10/14/17 88.3 kg (194 lb 11.2 oz)   10/13/17 91.2 kg (201 lb)              We Performed the Following     INTERROGATION DEVICE EVAL REMOTE, PACER/ICD (16140)     PM DEVICE INTERROGATE REMOTE (64059)        Primary Care Provider Office Phone # Fax #    Vaughn Evans [phone]       [facility] [address]  [facility] [address]        Equal Access to Services     JOSE FRANCISCO CASTANO : Hadii aad ku hadasho Soomaali, waaxda luqadaha, qaybta kaalmada adeegyada, waxay soledad haystefann katie larson . So [facility] [phone].    ATENCIÓN: Si habla español, tiene a jules disposición servicios gratuitos de asistencia lingüística. Llame al [phone].    We comply with applicable federal civil rights laws and [place] laws. We do not discriminate on the basis of race, color, national origin, age, disability, sex, sexual orientation, or gender identity.            Thank you!     Thank you for choosing [facility]  for your care. Our goal is always to provide you with excellent care. Hearing back from our patients is one way we can continue to improve our services. Please take a few minutes to complete the written survey that you may receive in the mail after your visit with us. Thank you!             Your Updated Medication List - Protect others around you: Learn how to safely use, store and throw away your medicines at www.disposemymeds.org.          This list is accurate as of 6/30/18 11:59 PM.  Always use your most recent med list.                   Brand Name Dispense Instructions for use Diagnosis    ALENDRONATE SODIUM PO      Take 70 mg by mouth once a week    Thrombocytopenia (H)       calcium  carbonate 500 MG tablet    OS- mg [place]. Ca     Take 500 mg by mouth daily    Thrombocytopenia (H)       fluticasone-salmeterol 115-21 MCG/ACT Inhaler    ADVAIR-HFA     Inhale 2 puffs into the lungs 2 times daily    Thrombocytopenia (H)       Multi-vitamin Tabs tablet      Take 1 tablet by mouth daily    Thrombocytopenia (H)       VITAMIN D3 PO      Take 1,000 Units by mouth daily    Thrombocytopenia (H)       * WARFARIN SODIUM PO      Take 5 mg by mouth three times a week Monday, Wednesday & Friday        * warfarin 1 MG tablet    COUMADIN    30 tablet    Take 2.5 tablets (2.5 mg) by mouth daily    Bradycardia       * Warfarin Therapy Reminder     30 each    1 each continuous prn    Bradycardia       * Notice:  This list has 3 medication(s) that are the same as other medications prescribed for you. Read the directions carefully, and ask your doctor or other care provider to review them with you.

## 2018-07-02 NOTE — PROGRESS NOTES
St Walter Assurity (D) Remote PPM Device Check  AP: 16 % : 99 %  Mode: DDD        Presenting Rhythm: AS/  Heart Rate: Adequate rates per histogram  Sensing: Stable    Pacing Threshold: A. Safety margin <2:1, V. Stable    Impedance: Stable  Battery Status: 8.1-8.4 years  Atrial Arrhythmia: 29 mode switch episodes comprising <1% of the time. 1 EGM shows PAT lasting 10 seconds, ventricular rates controlled.    Ventricular Arrhythmia: 5 ventricular high rates. 3 EGMs show As=Vs for PAT lasting 2-6 seconds, rates 175-220bpm. 2 EGMs show Vs>As for NSVT lasting 6-17 beats, rates 155-195bpm. EF 55-60% (2017). Reviewed with SINGH Johnston.       Care Plan: F/u PPM Merlin q 3 months. Gave patient results over the phone. AlyssaCVT

## 2018-10-13 ENCOUNTER — ALLIED HEALTH/NURSE VISIT (OUTPATIENT)
Dept: CARDIOLOGY | Facility: CLINIC | Age: 83
End: 2018-10-13
Payer: COMMERCIAL

## 2018-10-13 DIAGNOSIS — Z95.0 CARDIAC PACEMAKER IN SITU: Primary | ICD-10-CM

## 2018-10-13 PROCEDURE — 93296 REM INTERROG EVL PM/IDS: CPT | Performed by: INTERNAL MEDICINE

## 2018-10-13 PROCEDURE — 93294 REM INTERROG EVL PM/LDLS PM: CPT | Performed by: INTERNAL MEDICINE

## 2018-10-13 NOTE — MR AVS SNAPSHOT
"              After Visit Summary   10/13/2018    Victorino Khan    MRN: [id number]           Patient Information     Date Of Birth          1934        Visit Information        Provider Department      10/13/2018 4:45 PM WU TECH1 [facility]        Today's Diagnoses     Cardiac pacemaker in situ    -  1       Follow-ups after your visit        Additional Services     Follow-Up with Device Clinic                 Who to contact     If you have questions or need follow up information about today's clinic visit or your schedule please contact [facility] directly at [phone].  Normal or non-critical lab and imaging results will be communicated to you by Autrement (HotelHotel)hart, letter or phone within 4 business days after the clinic has received the results. If you do not hear from us within 7 days, please contact the clinic through Autrement (HotelHotel)hart or phone. If you have a critical or abnormal lab result, we will notify you by phone as soon as possible.  Submit refill requests through Simple Crossing or call your pharmacy and they will forward the refill request to us. Please allow 3 business days for your refill to be completed.          Additional Information About Your Visit        MyChart Information     Simple Crossing lets you send messages to your doctor, view your test results, renew your prescriptions, schedule appointments and more. To sign up, go to [email or web address] . Click on \"Log in\" on the left side of the screen, which will take you to the Welcome page. Then click on \"Sign up Now\" on the right side of the page.     You will be asked to enter the access code listed below, as well as some personal information. Please follow the directions to create your username and password.     Your access code is: [email or web address]  Expires: 2019  2:30 PM     Your access code will  in 90 days. If you need help or a new code, please call your [place] clinic or " [phone].        Care EveryWhere ID     This is your Care EveryWhere ID. This could be used by other organizations to access your [place] medical records  [id number]         Blood Pressure from Last 3 Encounters:   12/14/17 112/70   10/15/17 122/71   10/13/17 112/66    Weight from Last 3 Encounters:   12/14/17 93.9 kg (207 lb 1.6 oz)   10/14/17 88.3 kg (194 lb 11.2 oz)   10/13/17 91.2 kg (201 lb)              We Performed the Following     INTERROGATION DEVICE EVAL REMOTE, PACER/ICD (00693)     PM DEVICE INTERROGATE REMOTE (88065)        Primary Care Provider Office Phone # Fax #    Vaughn POTTER [place] [phone]       [facility] [address]  [place] [address]        Equal Access to Services     [facility]: Hadii aad ku hadasho Soomaali, waaxda luqadaha, qaybta kaalmada adeegyada, waxay idiin hayaan adeeg kharamax larson . So [facility] [phone].    ATENCIÓN: Si habla español, tiene a jules disposición servicios gratuitos de asistencia lingüística. Llame al [phone].    We comply with applicable federal civil rights laws and [place] laws. We do not discriminate on the basis of race, color, national origin, age, disability, sex, sexual orientation, or gender identity.            Thank you!     Thank you for choosing [facility]  for your care. Our goal is always to provide you with excellent care. Hearing back from our patients is one way we can continue to improve our services. Please take a few minutes to complete the written survey that you may receive in the mail after your visit with us. Thank you!             Your Updated Medication List - Protect others around you: Learn how to safely use, store and throw away your medicines at www.disposemymeds.org.          This list is accurate as of 10/13/18 11:59 PM.  Always use your most recent med list.                   Brand Name Dispense Instructions for use Diagnosis    ALENDRONATE SODIUM PO       Take 70 mg by mouth once a week    Thrombocytopenia (H)       calcium carbonate 500 mg (elemental) 500 MG tablet    OS-TEE     Take 500 mg by mouth daily    Thrombocytopenia (H)       fluticasone-salmeterol 115-21 MCG/ACT Inhaler    ADVAIR-HFA     Inhale 2 puffs into the lungs 2 times daily    Thrombocytopenia (H)       Multi-vitamin Tabs tablet      Take 1 tablet by mouth daily    Thrombocytopenia (H)       VITAMIN D3 PO      Take 1,000 Units by mouth daily    Thrombocytopenia (H)       * WARFARIN SODIUM PO      Take 5 mg by mouth three times a week Monday, Wednesday & Friday        * warfarin 1 MG tablet    COUMADIN    30 tablet    Take 2.5 tablets (2.5 mg) by mouth daily    Bradycardia       * Warfarin Therapy Reminder     30 each    1 each continuous prn    Bradycardia       * Notice:  This list has 3 medication(s) that are the same as other medications prescribed for you. Read the directions carefully, and ask your doctor or other care provider to review them with you.

## 2018-10-15 NOTE — PROGRESS NOTES
St Walter Assurity (D) Remote PPM Device Check  AP: 26 % : 91 %  Mode: DDD        Presenting Rhythm: AS/, AP/  Heart Rate: Adequate rates per histogram  Sensing: Stable    Pacing Threshold: Stable    Impedance: Stable  Battery Status: 8.5-8.9 years  Atrial Arrhythmia: 62 mode switch episodes comprising <1% of the time. Longest episode lasted 56 seconds, ventricular rates controlled. 39 PMT episodes detected,  tracings show ST rates 120-130 bpm and durations of 6-10 seconds. These are not new findings.    Ventricular Arrhythmia: 4 ventricular high rates. EGM shows NSVT lasting 1-2 seconds, rate 157 bpm.  V>A. Normal EF. Not a new finding.Reviewed with SLangenbrunner,RN.       Care Plan: F/u Annual threshold in 3 months, order placed. LM with results. JORDIN Shah

## 2018-10-19 DIAGNOSIS — Z95.0 CARDIAC PACEMAKER IN SITU: Primary | ICD-10-CM

## 2018-12-10 ENCOUNTER — AMBULATORY - HEALTHEAST (OUTPATIENT)
Dept: LAB | Facility: HOSPITAL | Age: 83
End: 2018-12-10

## 2018-12-10 DIAGNOSIS — D68.51 FACTOR V LEIDEN MUTATION (H): ICD-10-CM

## 2018-12-10 DIAGNOSIS — Z51.81 ENCOUNTER FOR THERAPEUTIC DRUG MONITORING: ICD-10-CM

## 2018-12-10 LAB — INR PPP: 1.11 (ref 0.9–1.1)

## 2018-12-21 PROBLEM — I44.1 2ND DEGREE AV BLOCK: Status: ACTIVE | Noted: 2018-12-21

## 2018-12-21 PROBLEM — I44.1 SECOND DEGREE MOBITZ II AV BLOCK: Status: ACTIVE | Noted: 2018-12-21

## 2018-12-26 ENCOUNTER — ANCILLARY PROCEDURE (OUTPATIENT)
Dept: CARDIOLOGY | Facility: CLINIC | Age: 83
End: 2018-12-26
Attending: INTERNAL MEDICINE
Payer: COMMERCIAL

## 2018-12-26 ENCOUNTER — OFFICE VISIT (OUTPATIENT)
Dept: CARDIOLOGY | Facility: CLINIC | Age: 83
End: 2018-12-26
Payer: COMMERCIAL

## 2018-12-26 VITALS
WEIGHT: 191 LBS | HEART RATE: 81 BPM | BODY MASS INDEX: 25.9 KG/M2 | SYSTOLIC BLOOD PRESSURE: 119 MMHG | DIASTOLIC BLOOD PRESSURE: 80 MMHG

## 2018-12-26 DIAGNOSIS — Z95.0 CARDIAC PACEMAKER IN SITU: ICD-10-CM

## 2018-12-26 DIAGNOSIS — Z95.0 PACEMAKER: ICD-10-CM

## 2018-12-26 PROCEDURE — 93280 PM DEVICE PROGR EVAL DUAL: CPT | Performed by: INTERNAL MEDICINE

## 2018-12-26 PROCEDURE — 99214 OFFICE O/P EST MOD 30 MIN: CPT | Performed by: NURSE PRACTITIONER

## 2018-12-26 NOTE — LETTER
12/26/2018    Vaughn Evans  [facility] [address]  [facility] [address]    RE: Victorino Khan       Dear Colleague,    I had the pleasure of seeing Victorino Khan in the [facility].    HPI:  Victorino Khan is a 84 year old male who presents today for 1 year follow up of his permanent pacemaker.  Dr. 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      Diagnostics.  ECHO (12/2017) EF is 55-60%.      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     Today Victorino Khan presents having had 3 different interventions for his back over the course of the year.  Unfortunately he continues to have chronic back pain.  He exercises regularly at the [facility] with no angina.  His weight has been stable with no increase swelling in his abdomen or LE extremities.  He is tolerating his warfarin and has it monitored in [place] and in [place]; he denies bleeding, hematuria, hematochezia, and epistaxis.   He continues to have intermittent episodes diffuse diaphoresis at rest; becomes exhausted, sleeps afterward and then feels fine.  He states his last 2 episodes occurred on 11/11/2018 and 11/15/2018.      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    Device check completed today  Plan:    Continue to follow with clinic every 3 months    Normal device function    Patient expresses understanding and agreement with the plan.     I appreciate the chance to help with Victorino Khan Please let me know if you have any questions or concerns.    Allie Frazier, JOHN, CNP    This note was completed in part using Dragon voice recognition software. Although reviewed after completion, some word and grammatical errors may occur.    No orders of the defined types were  placed in this encounter.    Orders Placed This Encounter   Medications     aspirin (ASA) 81 MG EC tablet     Sig: Take 81 mg by mouth daily     There are no discontinued medications.      Encounter Diagnosis   Name Primary?     Cardiac pacemaker in situ        CURRENT MEDICATIONS:  Current Outpatient Medications   Medication Sig Dispense Refill     aspirin (ASA) 81 MG EC tablet Take 81 mg by mouth daily       calcium carbonate (OS- MG [place]. CA) 500 MG tablet Take 500 mg by mouth daily        Cholecalciferol (VITAMIN D3 PO) Take 1,000 Units by mouth daily       fluticasone-salmeterol (ADVAIR-HFA) 115-21 MCG/ACT inhaler Inhale 2 puffs into the lungs 2 times daily       multivitamin, therapeutic with minerals (MULTI-VITAMIN) TABS Take 1 tablet by mouth daily       ALENDRONATE SODIUM PO Take 70 mg by mouth once a week        warfarin (COUMADIN) 1 MG tablet Take 2.5 tablets (2.5 mg) by mouth daily 30 tablet 3     WARFARIN SODIUM PO Take 5 mg by mouth three times a week Monday, Wednesday & Friday       Warfarin Therapy Reminder 1 each continuous prn 30 each 3       ALLERGIES   No Known Allergies    PAST MEDICAL HISTORY:  History reviewed. No pertinent past medical history.    PAST SURGICAL HISTORY:  History reviewed. No pertinent surgical history.    FAMILY HISTORY:  Family History   Problem Relation Age of Onset     Coronary Artery Disease Early Onset Father      Coronary Artery Disease Early Onset Brother        SOCIAL HISTORY:  Social History     Socioeconomic History     Marital status: Single     Spouse name: None     Number of children: None     Years of education: None     Highest education level: None   Social Needs     Financial resource strain: None     Food insecurity - worry: None     Food insecurity - inability: None     Transportation needs - medical: None     Transportation needs - non-medical: None   Occupational History     None   Tobacco Use     Smoking status: Never Smoker     Smokeless tobacco:  Never Used   Substance and Sexual Activity     Alcohol use: No     Drug use: None     Sexual activity: None   Other Topics Concern     Parent/sibling w/ CABG, MI or angioplasty before 65F 55M? Not Asked   Social History Narrative     None       Review of Systems:  Skin:  Negative     Eyes:  Negative    ENT:  Positive for hearing loss  Respiratory:  Positive for dyspnea on exertion;shortness of breath  Cardiovascular:  Negative lightheadedness;Positive for;fatigue  Gastroenterology: Negative    Genitourinary:  Negative    Musculoskeletal:  Negative    Neurologic:  Negative    Psychiatric:  Negative    Heme/Lymph/Imm:  Negative    Endocrine:  Negative      Physical Exam:  Vitals: /80   Pulse 81   Wt 86.6 kg (191 lb)   BMI 25.90 kg/m       Constitutional:    frail;thin kyphosis    Skin:           Head:  normocephalic        Eyes:  pupils equal and round        ENT:  no pallor or cyanosis        Neck:  JVP normal        Chest:  clear to auscultation        Cardiac: normal S1 and S2                  Abdomen:  abdomen soft        Vascular:       right radial artery;2+             left radial artery;2+                  Extremities and Back:  no edema        Neurological:  affect appropriate          Recent Lab Results:  LIPID RESULTS:  No results found for: CHOL, HDL, LDL, TRIG, CHOLHDLRATIO    LIVER ENZYME RESULTS:  Lab Results   Component Value Date    AST 28 09/10/2015    ALT 32 09/10/2015       CBC RESULTS:  Lab Results   Component Value Date    WBC 5.7 10/13/2017    RBC 4.49 10/13/2017    HGB 14.8 10/13/2017    HCT 43.2 10/13/2017    MCV 96 10/13/2017    MCH 33.0 10/13/2017    MCHC 34.3 10/13/2017    RDW 12.6 10/13/2017     10/13/2017       BMP RESULTS:  Lab Results   Component Value Date     10/13/2017    POTASSIUM 4.6 10/13/2017    CHLORIDE 106 10/13/2017    CO2 29 10/13/2017    ANIONGAP 5 10/13/2017    GLC 91 10/13/2017    BUN 21 10/13/2017    CR 0.98 10/13/2017    GFRESTIMATED 73 10/13/2017     GFRESTBLACK 88 10/13/2017    TEE 8.9 10/13/2017        A1C RESULTS:  No results found for: A1C    INR RESULTS:  Lab Results   Component Value Date    INR 3.6 (A) 10/17/2017    INR 1.89 (H) 10/15/2017    INR 1.64 (H) 10/14/2017           CC  Vaughn Evans  [facility]  [address]  [place] [address]                  Thank you for allowing me to participate in the care of your patient.      Sincerely,     JOHN Renee [facility]    cc:   Vaughn Evans  [facility]  [address] [place] [address]

## 2018-12-26 NOTE — LETTER
12/26/2018    Vaughn Evans  [facility] [address]  [facility] [address]    RE: Victorino Khan       Dear Colleague,    I had the pleasure of seeing Victorino Khan in the [facility].    HPI:  Victorino Khan is a 84 year old male who presents today for 1 year follow up of his permanent pacemaker.  Dr. 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      Diagnostics.  ECHO (12/2017) EF is 55-60%.      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     Today Victorino Khan presents having had 3 different interventions for his back over the course of the year.  Unfortunately he continues to have chronic back pain.  He exercises regularly at the [facility] with no angina.  His weight has been stable with no increase swelling in his abdomen or LE extremities.  He is tolerating his warfarin and has it monitored in [place] and in [place]; he denies bleeding, hematuria, hematochezia, and epistaxis.   He continues to have intermittent episodes diffuse diaphoresis at rest; becomes exhausted, sleeps afterward and then feels fine.  He states his last 2 episodes occurred on 11/11/2018 and 11/15/2018.      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    Device check completed today  Plan:    Continue to follow with clinic every 3 months    Normal device function    Patient expresses understanding and agreement with the plan.     I appreciate the chance to help with Victorino Khan Please let me know if you have any questions or concerns.    Allie Frazier, JOHN, CNP    This note was completed in part using Dragon voice recognition software. Although reviewed after completion, some word and grammatical errors may occur.    No orders of the defined types were  placed in this encounter.    Orders Placed This Encounter   Medications     aspirin (ASA) 81 MG EC tablet     Sig: Take 81 mg by mouth daily     There are no discontinued medications.      Encounter Diagnosis   Name Primary?     Cardiac pacemaker in situ        CURRENT MEDICATIONS:  Current Outpatient Medications   Medication Sig Dispense Refill     aspirin (ASA) 81 MG EC tablet Take 81 mg by mouth daily       calcium carbonate (OS- MG [place]. [place]) 500 MG tablet Take 500 mg by mouth daily        Cholecalciferol (VITAMIN D3 PO) Take 1,000 Units by mouth daily       fluticasone-salmeterol (ADVAIR-HFA) 115-21 MCG/ACT inhaler Inhale 2 puffs into the lungs 2 times daily       multivitamin, therapeutic with minerals (MULTI-VITAMIN) TABS Take 1 tablet by mouth daily       ALENDRONATE SODIUM PO Take 70 mg by mouth once a week        warfarin (COUMADIN) 1 MG tablet Take 2.5 tablets (2.5 mg) by mouth daily 30 tablet 3     WARFARIN SODIUM PO Take 5 mg by mouth three times a week Monday, Wednesday & Friday       Warfarin Therapy Reminder 1 each continuous prn 30 each 3       ALLERGIES   No Known Allergies    PAST MEDICAL HISTORY:  History reviewed. No pertinent past medical history.    PAST SURGICAL HISTORY:  History reviewed. No pertinent surgical history.    FAMILY HISTORY:  Family History   Problem Relation Age of Onset     Coronary Artery Disease Early Onset Father      Coronary Artery Disease Early Onset Brother        SOCIAL HISTORY:  Social History     Socioeconomic History     Marital status: Single     Spouse name: None     Number of children: None     Years of education: None     Highest education level: None   Social Needs     Financial resource strain: None     Food insecurity - worry: None     Food insecurity - inability: None     Transportation needs - medical: None     Transportation needs - non-medical: None   Occupational History     None   Tobacco Use     Smoking status: Never Smoker     Smokeless tobacco:  Never Used   Substance and Sexual Activity     Alcohol use: No     Drug use: None     Sexual activity: None   Other Topics Concern     Parent/sibling w/ CABG, MI or angioplasty before 65F 55M? Not Asked   Social History Narrative     None       Review of Systems:  Skin:  Negative     Eyes:  Negative    ENT:  Positive for hearing loss  Respiratory:  Positive for dyspnea on exertion;shortness of breath  Cardiovascular:  Negative lightheadedness;Positive for;fatigue  Gastroenterology: Negative    Genitourinary:  Negative    Musculoskeletal:  Negative    Neurologic:  Negative    Psychiatric:  Negative    Heme/Lymph/Imm:  Negative    Endocrine:  Negative      Physical Exam:  Vitals: /80   Pulse 81   Wt 86.6 kg (191 lb)   BMI 25.90 kg/m       Constitutional:    frail;thin kyphosis    Skin:           Head:  normocephalic        Eyes:  pupils equal and round        ENT:  no pallor or cyanosis        Neck:  JVP normal        Chest:  clear to auscultation        Cardiac: normal S1 and S2                  Abdomen:  abdomen soft        Vascular:       right radial artery;2+             left radial artery;2+                  Extremities and Back:  no edema        Neurological:  affect appropriate          Recent Lab Results:  LIPID RESULTS:  No results found for: CHOL, HDL, LDL, TRIG, CHOLHDLRATIO    LIVER ENZYME RESULTS:  Lab Results   Component Value Date    AST 28 09/10/2015    ALT 32 09/10/2015       CBC RESULTS:  Lab Results   Component Value Date    WBC 5.7 10/13/2017    RBC 4.49 10/13/2017    HGB 14.8 10/13/2017    HCT 43.2 10/13/2017    MCV 96 10/13/2017    MCH 33.0 10/13/2017    MCHC 34.3 10/13/2017    RDW 12.6 10/13/2017     10/13/2017       BMP RESULTS:  Lab Results   Component Value Date     10/13/2017    POTASSIUM 4.6 10/13/2017    CHLORIDE 106 10/13/2017    CO2 29 10/13/2017    ANIONGAP 5 10/13/2017    GLC 91 10/13/2017    BUN 21 10/13/2017    CR 0.98 10/13/2017    GFRESTIMATED 73 10/13/2017     GFRESTBLACK 88 10/13/2017    TEE 8.9 10/13/2017        A1C RESULTS:  No results found for: A1C    INR RESULTS:  Lab Results   Component Value Date    INR 3.6 (A) 10/17/2017    INR 1.89 (H) 10/15/2017    INR 1.64 (H) 10/14/2017         Thank you for allowing me to participate in the care of your patient.    Sincerely,     JOHN Renee [facility]

## 2018-12-26 NOTE — PROGRESS NOTES
HPI:  Victorino Khan is a 84 year old male who presents today for 1 year follow up of his permanent pacemaker.  Dr. 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      Diagnostics.  ECHO (12/2017) EF is 55-60%.      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     Today Victorino Khan presents having had 3 different interventions for his back over the course of the year.  Unfortunately he continues to have chronic back pain.  He exercises regularly at the [facility] with no angina.  His weight has been stable with no increase swelling in his abdomen or LE extremities.  He is tolerating his warfarin and has it monitored in [place] and in [place]; he denies bleeding, hematuria, hematochezia, and epistaxis.   He continues to have intermittent episodes diffuse diaphoresis at rest; becomes exhausted, sleeps afterward and then feels fine.  He states his last 2 episodes occurred on 11/11/2018 and 11/15/2018.      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    Device check completed today  Plan:    Continue to follow with clinic every 3 months    Normal device function    Patient expresses understanding and agreement with the plan.     I appreciate the chance to help with Victorino Khan Please let me know if you have any questions or concerns.    JOHN Gill, CNP    This note was completed in part using Dragon voice recognition software. Although reviewed after completion, some word and grammatical errors may occur.    No orders of the defined types were placed in this encounter.    Orders Placed This Encounter   Medications     aspirin (ASA) 81 MG EC tablet     Sig: Take 81 mg by mouth daily     There are no discontinued medications.      Encounter Diagnosis   Name  Primary?     Cardiac pacemaker in situ        CURRENT MEDICATIONS:  Current Outpatient Medications   Medication Sig Dispense Refill     aspirin (ASA) 81 MG EC tablet Take 81 mg by mouth daily       calcium carbonate (OS- MG [place]. [place]) 500 MG tablet Take 500 mg by mouth daily        Cholecalciferol (VITAMIN D3 PO) Take 1,000 Units by mouth daily       fluticasone-salmeterol (ADVAIR-HFA) 115-21 MCG/ACT inhaler Inhale 2 puffs into the lungs 2 times daily       multivitamin, therapeutic with minerals (MULTI-VITAMIN) TABS Take 1 tablet by mouth daily       ALENDRONATE SODIUM PO Take 70 mg by mouth once a week        warfarin (COUMADIN) 1 MG tablet Take 2.5 tablets (2.5 mg) by mouth daily 30 tablet 3     WARFARIN SODIUM PO Take 5 mg by mouth three times a week Monday, Wednesday & Friday       Warfarin Therapy Reminder 1 each continuous prn 30 each 3       ALLERGIES   No Known Allergies    PAST MEDICAL HISTORY:  History reviewed. No pertinent past medical history.    PAST SURGICAL HISTORY:  History reviewed. No pertinent surgical history.    FAMILY HISTORY:  Family History   Problem Relation Age of Onset     Coronary Artery Disease Early Onset Father      Coronary Artery Disease Early Onset Brother        SOCIAL HISTORY:  Social History     Socioeconomic History     Marital status: Single     Spouse name: None     Number of children: None     Years of education: None     Highest education level: None   Social Needs     Financial resource strain: None     Food insecurity - worry: None     Food insecurity - inability: None     Transportation needs - medical: None     Transportation needs - non-medical: None   Occupational History     None   Tobacco Use     Smoking status: Never Smoker     Smokeless tobacco: Never Used   Substance and Sexual Activity     Alcohol use: No     Drug use: None     Sexual activity: None   Other Topics Concern     Parent/sibling w/ CABG, MI or angioplasty before 65F 55M? Not Asked   Social  History Narrative     None       Review of Systems:  Skin:  Negative     Eyes:  Negative    ENT:  Positive for hearing loss  Respiratory:  Positive for dyspnea on exertion;shortness of breath  Cardiovascular:  Negative lightheadedness;Positive for;fatigue  Gastroenterology: Negative    Genitourinary:  Negative    Musculoskeletal:  Negative    Neurologic:  Negative    Psychiatric:  Negative    Heme/Lymph/Imm:  Negative    Endocrine:  Negative      Physical Exam:  Vitals: /80   Pulse 81   Wt 86.6 kg (191 lb)   BMI 25.90 kg/m      Constitutional:    frail;thin kyphosis    Skin:           Head:  normocephalic        Eyes:  pupils equal and round        ENT:  no pallor or cyanosis        Neck:  JVP normal        Chest:  clear to auscultation        Cardiac: normal S1 and S2                  Abdomen:  abdomen soft        Vascular:       right radial artery;2+             left radial artery;2+                  Extremities and Back:  no edema        Neurological:  affect appropriate          Recent Lab Results:  LIPID RESULTS:  No results found for: CHOL, HDL, LDL, TRIG, CHOLHDLRATIO    LIVER ENZYME RESULTS:  Lab Results   Component Value Date    AST 28 09/10/2015    ALT 32 09/10/2015       CBC RESULTS:  Lab Results   Component Value Date    WBC 5.7 10/13/2017    RBC 4.49 10/13/2017    HGB 14.8 10/13/2017    HCT 43.2 10/13/2017    MCV 96 10/13/2017    MCH 33.0 10/13/2017    MCHC 34.3 10/13/2017    RDW 12.6 10/13/2017     10/13/2017       BMP RESULTS:  Lab Results   Component Value Date     10/13/2017    POTASSIUM 4.6 10/13/2017    CHLORIDE 106 10/13/2017    CO2 29 10/13/2017    ANIONGAP 5 10/13/2017    GLC 91 10/13/2017    BUN 21 10/13/2017    CR 0.98 10/13/2017    GFRESTIMATED 73 10/13/2017    GFRESTBLACK 88 10/13/2017    TEE 8.9 10/13/2017        A1C RESULTS:  No results found for: A1C    INR RESULTS:  Lab Results   Component Value Date    INR 3.6 (A) 10/17/2017    INR 1.89 (H) 10/15/2017    INR 1.64  (H) 10/14/2017           CC  Vaughn POTTER [facility]  [address] [place] [address]

## 2018-12-27 LAB
ICDO DEVICE COMMENTS: NORMAL
MDC_IDC_LEAD_IMPLANT_DT: NORMAL
MDC_IDC_LEAD_IMPLANT_DT: NORMAL
MDC_IDC_LEAD_LOCATION: NORMAL
MDC_IDC_LEAD_LOCATION: NORMAL
MDC_IDC_LEAD_LOCATION_DETAIL_1: NORMAL
MDC_IDC_LEAD_LOCATION_DETAIL_1: NORMAL
MDC_IDC_LEAD_MFG: NORMAL
MDC_IDC_LEAD_MFG: NORMAL
MDC_IDC_LEAD_MODEL: NORMAL
MDC_IDC_LEAD_MODEL: NORMAL
MDC_IDC_LEAD_SERIAL: NORMAL
MDC_IDC_LEAD_SERIAL: NORMAL
MDC_IDC_MSMT_BATTERY_REMAINING_LONGEVITY: 102 MO
MDC_IDC_MSMT_BATTERY_STATUS: NORMAL
MDC_IDC_MSMT_BATTERY_VOLTAGE: 3.01 V
MDC_IDC_MSMT_LEADCHNL_RA_IMPEDANCE_VALUE: 525 OHM
MDC_IDC_MSMT_LEADCHNL_RA_PACING_THRESHOLD_AMPLITUDE: 0.75 V
MDC_IDC_MSMT_LEADCHNL_RA_PACING_THRESHOLD_AMPLITUDE: 0.75 V
MDC_IDC_MSMT_LEADCHNL_RA_PACING_THRESHOLD_PULSEWIDTH: 0.5 MS
MDC_IDC_MSMT_LEADCHNL_RA_PACING_THRESHOLD_PULSEWIDTH: 0.5 MS
MDC_IDC_MSMT_LEADCHNL_RA_SENSING_INTR_AMPL: 5 MV
MDC_IDC_MSMT_LEADCHNL_RV_IMPEDANCE_VALUE: 437.5 OHM
MDC_IDC_MSMT_LEADCHNL_RV_PACING_THRESHOLD_AMPLITUDE: 0.75 V
MDC_IDC_MSMT_LEADCHNL_RV_PACING_THRESHOLD_AMPLITUDE: 0.75 V
MDC_IDC_MSMT_LEADCHNL_RV_PACING_THRESHOLD_PULSEWIDTH: 0.5 MS
MDC_IDC_MSMT_LEADCHNL_RV_PACING_THRESHOLD_PULSEWIDTH: 0.5 MS
MDC_IDC_MSMT_LEADCHNL_RV_SENSING_INTR_AMPL: 5 MV
MDC_IDC_PG_IMPLANT_DTM: NORMAL
MDC_IDC_PG_MFG: NORMAL
MDC_IDC_PG_MODEL: NORMAL
MDC_IDC_PG_SERIAL: NORMAL
MDC_IDC_PG_TYPE: NORMAL
MDC_IDC_SESS_CLINIC_NAME: NORMAL
MDC_IDC_SESS_DTM: NORMAL
MDC_IDC_SESS_TYPE: NORMAL
MDC_IDC_SET_BRADY_AT_MODE_SWITCH_MODE: NORMAL
MDC_IDC_SET_BRADY_AT_MODE_SWITCH_RATE: 170 {BEATS}/MIN
MDC_IDC_SET_BRADY_HYSTRATE: NORMAL
MDC_IDC_SET_BRADY_LOWRATE: 60 {BEATS}/MIN
MDC_IDC_SET_BRADY_MAX_SENSOR_RATE: 130 {BEATS}/MIN
MDC_IDC_SET_BRADY_MAX_TRACKING_RATE: 125 {BEATS}/MIN
MDC_IDC_SET_BRADY_MODE: NORMAL
MDC_IDC_SET_BRADY_NIGHT_RATE: NORMAL
MDC_IDC_SET_BRADY_PAV_DELAY_LOW: 250 MS
MDC_IDC_SET_BRADY_SAV_DELAY_LOW: 225 MS
MDC_IDC_SET_LEADCHNL_RA_PACING_AMPLITUDE: 2 V
MDC_IDC_SET_LEADCHNL_RA_PACING_ANODE_ELECTRODE_1: NORMAL
MDC_IDC_SET_LEADCHNL_RA_PACING_ANODE_LOCATION_1: NORMAL
MDC_IDC_SET_LEADCHNL_RA_PACING_CAPTURE_MODE: NORMAL
MDC_IDC_SET_LEADCHNL_RA_PACING_CATHODE_ELECTRODE_1: NORMAL
MDC_IDC_SET_LEADCHNL_RA_PACING_CATHODE_LOCATION_1: NORMAL
MDC_IDC_SET_LEADCHNL_RA_PACING_POLARITY: NORMAL
MDC_IDC_SET_LEADCHNL_RA_PACING_PULSEWIDTH: 0.5 MS
MDC_IDC_SET_LEADCHNL_RA_SENSING_ADAPTATION_MODE: NORMAL
MDC_IDC_SET_LEADCHNL_RA_SENSING_ANODE_ELECTRODE_1: NORMAL
MDC_IDC_SET_LEADCHNL_RA_SENSING_ANODE_LOCATION_1: NORMAL
MDC_IDC_SET_LEADCHNL_RA_SENSING_CATHODE_ELECTRODE_1: NORMAL
MDC_IDC_SET_LEADCHNL_RA_SENSING_CATHODE_LOCATION_1: NORMAL
MDC_IDC_SET_LEADCHNL_RA_SENSING_POLARITY: NORMAL
MDC_IDC_SET_LEADCHNL_RA_SENSING_SENSITIVITY: 0.3 MV
MDC_IDC_SET_LEADCHNL_RV_PACING_AMPLITUDE: 2.5 V
MDC_IDC_SET_LEADCHNL_RV_PACING_ANODE_ELECTRODE_1: NORMAL
MDC_IDC_SET_LEADCHNL_RV_PACING_ANODE_LOCATION_1: NORMAL
MDC_IDC_SET_LEADCHNL_RV_PACING_CAPTURE_MODE: NORMAL
MDC_IDC_SET_LEADCHNL_RV_PACING_CATHODE_ELECTRODE_1: NORMAL
MDC_IDC_SET_LEADCHNL_RV_PACING_CATHODE_LOCATION_1: NORMAL
MDC_IDC_SET_LEADCHNL_RV_PACING_POLARITY: NORMAL
MDC_IDC_SET_LEADCHNL_RV_PACING_PULSEWIDTH: 0.5 MS
MDC_IDC_SET_LEADCHNL_RV_SENSING_ANODE_ELECTRODE_1: NORMAL
MDC_IDC_SET_LEADCHNL_RV_SENSING_ANODE_LOCATION_1: NORMAL
MDC_IDC_SET_LEADCHNL_RV_SENSING_CATHODE_ELECTRODE_1: NORMAL
MDC_IDC_SET_LEADCHNL_RV_SENSING_CATHODE_LOCATION_1: NORMAL
MDC_IDC_SET_LEADCHNL_RV_SENSING_POLARITY: NORMAL
MDC_IDC_SET_LEADCHNL_RV_SENSING_SENSITIVITY: 0.5 MV
MDC_IDC_STAT_AT_MODE_SW_COUNT: 0
MDC_IDC_STAT_BRADY_RA_PERCENT_PACED: 26 %
MDC_IDC_STAT_BRADY_RV_PERCENT_PACED: 94 %

## 2019-01-16 ENCOUNTER — RECORDS - HEALTHEAST (OUTPATIENT)
Dept: ADMINISTRATIVE | Facility: OTHER | Age: 84
End: 2019-01-16

## 2019-02-21 ENCOUNTER — AMBULATORY - HEALTHEAST (OUTPATIENT)
Dept: LAB | Facility: HOSPITAL | Age: 84
End: 2019-02-21

## 2019-02-21 DIAGNOSIS — D69.6 THROMBOCYTOPENIA, UNSPECIFIED (H): ICD-10-CM

## 2019-02-21 DIAGNOSIS — D68.51 FACTOR V LEIDEN MUTATION (H): ICD-10-CM

## 2019-02-21 LAB
ERYTHROCYTE [DISTWIDTH] IN BLOOD BY AUTOMATED COUNT: 12.8 % (ref 11–14.5)
HCT VFR BLD AUTO: 43.5 % (ref 40–54)
HGB BLD-MCNC: 14.7 G/DL (ref 14–18)
INR PPP: 1.89 (ref 0.9–1.1)
MCH RBC QN AUTO: 32.9 PG (ref 27–34)
MCHC RBC AUTO-ENTMCNC: 33.8 G/DL (ref 32–36)
MCV RBC AUTO: 97 FL (ref 80–100)
PLATELET # BLD AUTO: 159 THOU/UL (ref 140–440)
PMV BLD AUTO: 9.9 FL (ref 8.5–12.5)
RBC # BLD AUTO: 4.47 MILL/UL (ref 4.4–6.2)
WBC: 7.3 THOU/UL (ref 4–11)

## 2019-03-28 ENCOUNTER — ANCILLARY PROCEDURE (OUTPATIENT)
Dept: CARDIOLOGY | Facility: CLINIC | Age: 84
End: 2019-03-28
Attending: INTERNAL MEDICINE
Payer: COMMERCIAL

## 2019-03-28 DIAGNOSIS — Z95.0 PACEMAKER: ICD-10-CM

## 2019-03-28 PROCEDURE — 93296 REM INTERROG EVL PM/IDS: CPT | Performed by: INTERNAL MEDICINE

## 2019-03-28 PROCEDURE — 93294 REM INTERROG EVL PM/LDLS PM: CPT | Performed by: INTERNAL MEDICINE

## 2019-04-03 ENCOUNTER — RECORDS - HEALTHEAST (OUTPATIENT)
Dept: ADMINISTRATIVE | Facility: OTHER | Age: 84
End: 2019-04-03

## 2019-04-05 ENCOUNTER — RECORDS - HEALTHEAST (OUTPATIENT)
Dept: ADMINISTRATIVE | Facility: OTHER | Age: 84
End: 2019-04-05

## 2019-04-05 LAB
MDC_IDC_EPISODE_DTM: NORMAL
MDC_IDC_EPISODE_DTM: NORMAL
MDC_IDC_EPISODE_ID: NORMAL
MDC_IDC_EPISODE_ID: NORMAL
MDC_IDC_EPISODE_TYPE: NORMAL
MDC_IDC_EPISODE_TYPE: NORMAL
MDC_IDC_LEAD_IMPLANT_DT: NORMAL
MDC_IDC_LEAD_IMPLANT_DT: NORMAL
MDC_IDC_LEAD_LOCATION: NORMAL
MDC_IDC_LEAD_LOCATION: NORMAL
MDC_IDC_LEAD_LOCATION_DETAIL_1: NORMAL
MDC_IDC_LEAD_LOCATION_DETAIL_1: NORMAL
MDC_IDC_LEAD_MFG: NORMAL
MDC_IDC_LEAD_MFG: NORMAL
MDC_IDC_LEAD_MODEL: NORMAL
MDC_IDC_LEAD_MODEL: NORMAL
MDC_IDC_LEAD_SERIAL: NORMAL
MDC_IDC_LEAD_SERIAL: NORMAL
MDC_IDC_MSMT_BATTERY_DTM: NORMAL
MDC_IDC_MSMT_BATTERY_REMAINING_LONGEVITY: 107 MO
MDC_IDC_MSMT_BATTERY_REMAINING_PERCENTAGE: 95.5 %
MDC_IDC_MSMT_BATTERY_RRT_TRIGGER: NORMAL
MDC_IDC_MSMT_BATTERY_STATUS: NORMAL
MDC_IDC_MSMT_BATTERY_VOLTAGE: 3.01 V
MDC_IDC_MSMT_LEADCHNL_RA_IMPEDANCE_VALUE: 490 OHM
MDC_IDC_MSMT_LEADCHNL_RA_LEAD_CHANNEL_STATUS: NORMAL
MDC_IDC_MSMT_LEADCHNL_RA_PACING_THRESHOLD_AMPLITUDE: 0.5 V
MDC_IDC_MSMT_LEADCHNL_RA_PACING_THRESHOLD_PULSEWIDTH: 0.5 MS
MDC_IDC_MSMT_LEADCHNL_RA_SENSING_INTR_AMPL: 5 MV
MDC_IDC_MSMT_LEADCHNL_RV_IMPEDANCE_VALUE: 430 OHM
MDC_IDC_MSMT_LEADCHNL_RV_LEAD_CHANNEL_STATUS: NORMAL
MDC_IDC_MSMT_LEADCHNL_RV_PACING_THRESHOLD_AMPLITUDE: 0.75 V
MDC_IDC_MSMT_LEADCHNL_RV_PACING_THRESHOLD_PULSEWIDTH: 0.5 MS
MDC_IDC_MSMT_LEADCHNL_RV_SENSING_INTR_AMPL: 7 MV
MDC_IDC_PG_IMPLANT_DTM: NORMAL
MDC_IDC_PG_MFG: NORMAL
MDC_IDC_PG_MODEL: NORMAL
MDC_IDC_PG_SERIAL: NORMAL
MDC_IDC_PG_TYPE: NORMAL
MDC_IDC_SESS_CLINIC_NAME: NORMAL
MDC_IDC_SESS_DTM: NORMAL
MDC_IDC_SESS_REPROGRAMMED: NO
MDC_IDC_SESS_TYPE: NORMAL
MDC_IDC_SET_BRADY_AT_MODE_SWITCH_MODE: NORMAL
MDC_IDC_SET_BRADY_AT_MODE_SWITCH_RATE: 170 {BEATS}/MIN
MDC_IDC_SET_BRADY_LOWRATE: 60 {BEATS}/MIN
MDC_IDC_SET_BRADY_MAX_SENSOR_RATE: 130 {BEATS}/MIN
MDC_IDC_SET_BRADY_MAX_TRACKING_RATE: 125 {BEATS}/MIN
MDC_IDC_SET_BRADY_MODE: NORMAL
MDC_IDC_SET_BRADY_PAV_DELAY_LOW: 250 MS
MDC_IDC_SET_BRADY_SAV_DELAY_LOW: 225 MS
MDC_IDC_SET_LEADCHNL_RA_PACING_AMPLITUDE: 2 V
MDC_IDC_SET_LEADCHNL_RA_PACING_ANODE_ELECTRODE_1: NORMAL
MDC_IDC_SET_LEADCHNL_RA_PACING_ANODE_LOCATION_1: NORMAL
MDC_IDC_SET_LEADCHNL_RA_PACING_CAPTURE_MODE: NORMAL
MDC_IDC_SET_LEADCHNL_RA_PACING_CATHODE_ELECTRODE_1: NORMAL
MDC_IDC_SET_LEADCHNL_RA_PACING_CATHODE_LOCATION_1: NORMAL
MDC_IDC_SET_LEADCHNL_RA_PACING_POLARITY: NORMAL
MDC_IDC_SET_LEADCHNL_RA_PACING_PULSEWIDTH: 0.5 MS
MDC_IDC_SET_LEADCHNL_RA_SENSING_ADAPTATION_MODE: NORMAL
MDC_IDC_SET_LEADCHNL_RA_SENSING_ANODE_ELECTRODE_1: NORMAL
MDC_IDC_SET_LEADCHNL_RA_SENSING_ANODE_LOCATION_1: NORMAL
MDC_IDC_SET_LEADCHNL_RA_SENSING_CATHODE_ELECTRODE_1: NORMAL
MDC_IDC_SET_LEADCHNL_RA_SENSING_CATHODE_LOCATION_1: NORMAL
MDC_IDC_SET_LEADCHNL_RA_SENSING_POLARITY: NORMAL
MDC_IDC_SET_LEADCHNL_RA_SENSING_SENSITIVITY: 0.3 MV
MDC_IDC_SET_LEADCHNL_RV_PACING_AMPLITUDE: 2.5 V
MDC_IDC_SET_LEADCHNL_RV_PACING_ANODE_ELECTRODE_1: NORMAL
MDC_IDC_SET_LEADCHNL_RV_PACING_ANODE_LOCATION_1: NORMAL
MDC_IDC_SET_LEADCHNL_RV_PACING_CAPTURE_MODE: NORMAL
MDC_IDC_SET_LEADCHNL_RV_PACING_CATHODE_ELECTRODE_1: NORMAL
MDC_IDC_SET_LEADCHNL_RV_PACING_CATHODE_LOCATION_1: NORMAL
MDC_IDC_SET_LEADCHNL_RV_PACING_POLARITY: NORMAL
MDC_IDC_SET_LEADCHNL_RV_PACING_PULSEWIDTH: 0.5 MS
MDC_IDC_SET_LEADCHNL_RV_SENSING_ADAPTATION_MODE: NORMAL
MDC_IDC_SET_LEADCHNL_RV_SENSING_ANODE_ELECTRODE_1: NORMAL
MDC_IDC_SET_LEADCHNL_RV_SENSING_ANODE_LOCATION_1: NORMAL
MDC_IDC_SET_LEADCHNL_RV_SENSING_CATHODE_ELECTRODE_1: NORMAL
MDC_IDC_SET_LEADCHNL_RV_SENSING_CATHODE_LOCATION_1: NORMAL
MDC_IDC_SET_LEADCHNL_RV_SENSING_POLARITY: NORMAL
MDC_IDC_SET_LEADCHNL_RV_SENSING_SENSITIVITY: 0.5 MV
MDC_IDC_STAT_AT_BURDEN_PERCENT: 0 %
MDC_IDC_STAT_AT_DTM_END: NORMAL
MDC_IDC_STAT_AT_DTM_START: NORMAL
MDC_IDC_STAT_AT_MODE_SW_COUNT: 0
MDC_IDC_STAT_AT_MODE_SW_COUNT_PER_DAY: 0
MDC_IDC_STAT_AT_MODE_SW_PERCENT_TIME: 0 %
MDC_IDC_STAT_BRADY_AP_VP_PERCENT: 18 %
MDC_IDC_STAT_BRADY_AP_VS_PERCENT: 7.4 %
MDC_IDC_STAT_BRADY_AS_VP_PERCENT: 54 %
MDC_IDC_STAT_BRADY_AS_VS_PERCENT: 20 %
MDC_IDC_STAT_BRADY_DTM_END: NORMAL
MDC_IDC_STAT_BRADY_DTM_START: NORMAL
MDC_IDC_STAT_BRADY_RA_PERCENT_PACED: 24 %
MDC_IDC_STAT_BRADY_RV_PERCENT_PACED: 72 %
MDC_IDC_STAT_CRT_DTM_END: NORMAL
MDC_IDC_STAT_CRT_DTM_START: NORMAL
MDC_IDC_STAT_HEART_RATE_ATRIAL_MAX: 240 {BEATS}/MIN
MDC_IDC_STAT_HEART_RATE_ATRIAL_MEAN: 77 {BEATS}/MIN
MDC_IDC_STAT_HEART_RATE_ATRIAL_MIN: 60 {BEATS}/MIN
MDC_IDC_STAT_HEART_RATE_DTM_END: NORMAL
MDC_IDC_STAT_HEART_RATE_DTM_START: NORMAL
MDC_IDC_STAT_HEART_RATE_VENTRICULAR_MAX: 260 {BEATS}/MIN
MDC_IDC_STAT_HEART_RATE_VENTRICULAR_MEAN: 76 {BEATS}/MIN
MDC_IDC_STAT_HEART_RATE_VENTRICULAR_MIN: 40 {BEATS}/MIN

## 2019-04-08 ENCOUNTER — HOSPITAL ENCOUNTER (OUTPATIENT)
Dept: CT IMAGING | Facility: HOSPITAL | Age: 84
Discharge: HOME | End: 2019-04-08
Attending: ORTHOPAEDIC SURGERY

## 2019-04-08 ENCOUNTER — COMMUNICATION - HEALTHEAST (OUTPATIENT)
Dept: TELEHEALTH | Facility: CLINIC | Age: 84
End: 2019-04-08

## 2019-04-08 DIAGNOSIS — M79.673 FOOT PAIN: ICD-10-CM

## 2019-04-08 DIAGNOSIS — R26.9 GAIT DISTURBANCE: ICD-10-CM

## 2019-05-22 ENCOUNTER — RECORDS - HEALTHEAST (OUTPATIENT)
Dept: ADMINISTRATIVE | Facility: OTHER | Age: 84
End: 2019-05-22

## 2019-06-17 PROBLEM — Z79.01 LONG TERM CURRENT USE OF ANTICOAGULANT THERAPY: Status: ACTIVE | Noted: 2017-10-17

## 2019-06-26 ENCOUNTER — RECORDS - HEALTHEAST (OUTPATIENT)
Dept: ADMINISTRATIVE | Facility: OTHER | Age: 84
End: 2019-06-26

## 2019-07-12 ENCOUNTER — ANCILLARY PROCEDURE (OUTPATIENT)
Dept: CARDIOLOGY | Facility: CLINIC | Age: 84
End: 2019-07-12
Attending: INTERNAL MEDICINE
Payer: COMMERCIAL

## 2019-07-12 DIAGNOSIS — Z95.0 PACEMAKER: ICD-10-CM

## 2019-07-12 PROCEDURE — 93294 REM INTERROG EVL PM/LDLS PM: CPT | Performed by: INTERNAL MEDICINE

## 2019-07-12 PROCEDURE — 93296 REM INTERROG EVL PM/IDS: CPT | Performed by: INTERNAL MEDICINE

## 2019-07-15 LAB
MDC_IDC_EPISODE_DTM: NORMAL
MDC_IDC_EPISODE_DURATION: 8 S
MDC_IDC_EPISODE_ID: NORMAL
MDC_IDC_EPISODE_TYPE: NORMAL
MDC_IDC_EPISODE_VENDOR_TYPE: NORMAL
MDC_IDC_LEAD_IMPLANT_DT: NORMAL
MDC_IDC_LEAD_IMPLANT_DT: NORMAL
MDC_IDC_LEAD_LOCATION: NORMAL
MDC_IDC_LEAD_LOCATION: NORMAL
MDC_IDC_LEAD_LOCATION_DETAIL_1: NORMAL
MDC_IDC_LEAD_LOCATION_DETAIL_1: NORMAL
MDC_IDC_LEAD_MFG: NORMAL
MDC_IDC_LEAD_MFG: NORMAL
MDC_IDC_LEAD_MODEL: NORMAL
MDC_IDC_LEAD_MODEL: NORMAL
MDC_IDC_LEAD_SERIAL: NORMAL
MDC_IDC_LEAD_SERIAL: NORMAL
MDC_IDC_MSMT_BATTERY_DTM: NORMAL
MDC_IDC_MSMT_BATTERY_REMAINING_LONGEVITY: 102 MO
MDC_IDC_MSMT_BATTERY_REMAINING_PERCENTAGE: 95.5 %
MDC_IDC_MSMT_BATTERY_RRT_TRIGGER: NORMAL
MDC_IDC_MSMT_BATTERY_STATUS: NORMAL
MDC_IDC_MSMT_BATTERY_VOLTAGE: 3.01 V
MDC_IDC_MSMT_LEADCHNL_RA_IMPEDANCE_VALUE: 480 OHM
MDC_IDC_MSMT_LEADCHNL_RA_LEAD_CHANNEL_STATUS: NORMAL
MDC_IDC_MSMT_LEADCHNL_RA_PACING_THRESHOLD_AMPLITUDE: 0.5 V
MDC_IDC_MSMT_LEADCHNL_RA_PACING_THRESHOLD_PULSEWIDTH: 0.5 MS
MDC_IDC_MSMT_LEADCHNL_RA_SENSING_INTR_AMPL: 5 MV
MDC_IDC_MSMT_LEADCHNL_RV_IMPEDANCE_VALUE: 430 OHM
MDC_IDC_MSMT_LEADCHNL_RV_LEAD_CHANNEL_STATUS: NORMAL
MDC_IDC_MSMT_LEADCHNL_RV_PACING_THRESHOLD_AMPLITUDE: 0.75 V
MDC_IDC_MSMT_LEADCHNL_RV_PACING_THRESHOLD_PULSEWIDTH: 0.5 MS
MDC_IDC_MSMT_LEADCHNL_RV_SENSING_INTR_AMPL: 5.2 MV
MDC_IDC_PG_IMPLANT_DTM: NORMAL
MDC_IDC_PG_MFG: NORMAL
MDC_IDC_PG_MODEL: NORMAL
MDC_IDC_PG_SERIAL: NORMAL
MDC_IDC_PG_TYPE: NORMAL
MDC_IDC_SESS_CLINIC_NAME: NORMAL
MDC_IDC_SESS_DTM: NORMAL
MDC_IDC_SESS_REPROGRAMMED: NO
MDC_IDC_SESS_TYPE: NORMAL
MDC_IDC_SET_BRADY_AT_MODE_SWITCH_MODE: NORMAL
MDC_IDC_SET_BRADY_AT_MODE_SWITCH_RATE: 170 {BEATS}/MIN
MDC_IDC_SET_BRADY_LOWRATE: 60 {BEATS}/MIN
MDC_IDC_SET_BRADY_MAX_SENSOR_RATE: 130 {BEATS}/MIN
MDC_IDC_SET_BRADY_MAX_TRACKING_RATE: 125 {BEATS}/MIN
MDC_IDC_SET_BRADY_MODE: NORMAL
MDC_IDC_SET_BRADY_PAV_DELAY_LOW: 250 MS
MDC_IDC_SET_BRADY_SAV_DELAY_LOW: 225 MS
MDC_IDC_SET_LEADCHNL_RA_PACING_AMPLITUDE: 2 V
MDC_IDC_SET_LEADCHNL_RA_PACING_ANODE_ELECTRODE_1: NORMAL
MDC_IDC_SET_LEADCHNL_RA_PACING_ANODE_LOCATION_1: NORMAL
MDC_IDC_SET_LEADCHNL_RA_PACING_CAPTURE_MODE: NORMAL
MDC_IDC_SET_LEADCHNL_RA_PACING_CATHODE_ELECTRODE_1: NORMAL
MDC_IDC_SET_LEADCHNL_RA_PACING_CATHODE_LOCATION_1: NORMAL
MDC_IDC_SET_LEADCHNL_RA_PACING_POLARITY: NORMAL
MDC_IDC_SET_LEADCHNL_RA_PACING_PULSEWIDTH: 0.5 MS
MDC_IDC_SET_LEADCHNL_RA_SENSING_ADAPTATION_MODE: NORMAL
MDC_IDC_SET_LEADCHNL_RA_SENSING_ANODE_ELECTRODE_1: NORMAL
MDC_IDC_SET_LEADCHNL_RA_SENSING_ANODE_LOCATION_1: NORMAL
MDC_IDC_SET_LEADCHNL_RA_SENSING_CATHODE_ELECTRODE_1: NORMAL
MDC_IDC_SET_LEADCHNL_RA_SENSING_CATHODE_LOCATION_1: NORMAL
MDC_IDC_SET_LEADCHNL_RA_SENSING_POLARITY: NORMAL
MDC_IDC_SET_LEADCHNL_RA_SENSING_SENSITIVITY: 0.3 MV
MDC_IDC_SET_LEADCHNL_RV_PACING_AMPLITUDE: 2.5 V
MDC_IDC_SET_LEADCHNL_RV_PACING_ANODE_ELECTRODE_1: NORMAL
MDC_IDC_SET_LEADCHNL_RV_PACING_ANODE_LOCATION_1: NORMAL
MDC_IDC_SET_LEADCHNL_RV_PACING_CAPTURE_MODE: NORMAL
MDC_IDC_SET_LEADCHNL_RV_PACING_CATHODE_ELECTRODE_1: NORMAL
MDC_IDC_SET_LEADCHNL_RV_PACING_CATHODE_LOCATION_1: NORMAL
MDC_IDC_SET_LEADCHNL_RV_PACING_POLARITY: NORMAL
MDC_IDC_SET_LEADCHNL_RV_PACING_PULSEWIDTH: 0.5 MS
MDC_IDC_SET_LEADCHNL_RV_SENSING_ADAPTATION_MODE: NORMAL
MDC_IDC_SET_LEADCHNL_RV_SENSING_ANODE_ELECTRODE_1: NORMAL
MDC_IDC_SET_LEADCHNL_RV_SENSING_ANODE_LOCATION_1: NORMAL
MDC_IDC_SET_LEADCHNL_RV_SENSING_CATHODE_ELECTRODE_1: NORMAL
MDC_IDC_SET_LEADCHNL_RV_SENSING_CATHODE_LOCATION_1: NORMAL
MDC_IDC_SET_LEADCHNL_RV_SENSING_POLARITY: NORMAL
MDC_IDC_SET_LEADCHNL_RV_SENSING_SENSITIVITY: 0.5 MV
MDC_IDC_STAT_AT_BURDEN_PERCENT: 1 %
MDC_IDC_STAT_AT_DTM_END: NORMAL
MDC_IDC_STAT_AT_DTM_START: NORMAL
MDC_IDC_STAT_AT_MODE_SW_COUNT: 21
MDC_IDC_STAT_AT_MODE_SW_COUNT_PER_DAY: 0
MDC_IDC_STAT_AT_MODE_SW_MAX_DURATION: 86 S
MDC_IDC_STAT_AT_MODE_SW_PERCENT_TIME: 1 %
MDC_IDC_STAT_BRADY_AP_VP_PERCENT: 21 %
MDC_IDC_STAT_BRADY_AP_VS_PERCENT: 4.4 %
MDC_IDC_STAT_BRADY_AS_VP_PERCENT: 66 %
MDC_IDC_STAT_BRADY_AS_VS_PERCENT: 8.4 %
MDC_IDC_STAT_BRADY_DTM_END: NORMAL
MDC_IDC_STAT_BRADY_DTM_START: NORMAL
MDC_IDC_STAT_BRADY_RA_PERCENT_PACED: 24 %
MDC_IDC_STAT_BRADY_RV_PERCENT_PACED: 87 %
MDC_IDC_STAT_CRT_DTM_END: NORMAL
MDC_IDC_STAT_CRT_DTM_START: NORMAL
MDC_IDC_STAT_HEART_RATE_ATRIAL_MAX: 310 {BEATS}/MIN
MDC_IDC_STAT_HEART_RATE_ATRIAL_MEAN: 81 {BEATS}/MIN
MDC_IDC_STAT_HEART_RATE_ATRIAL_MIN: 40 {BEATS}/MIN
MDC_IDC_STAT_HEART_RATE_DTM_END: NORMAL
MDC_IDC_STAT_HEART_RATE_DTM_START: NORMAL
MDC_IDC_STAT_HEART_RATE_VENTRICULAR_MAX: 280 {BEATS}/MIN
MDC_IDC_STAT_HEART_RATE_VENTRICULAR_MEAN: 80 {BEATS}/MIN
MDC_IDC_STAT_HEART_RATE_VENTRICULAR_MIN: 40 {BEATS}/MIN

## 2019-08-06 ENCOUNTER — DOCUMENTATION ONLY (OUTPATIENT)
Dept: CARDIOLOGY | Facility: CLINIC | Age: 84
End: 2019-08-06

## 2019-08-06 NOTE — TELEPHONE ENCOUNTER
Remote alert received for PMT and ventricular high rate. Patient has 5 short PMT episodes logged. All are terminated by the algorithm. He also has 2 ventricular high rates logged since his last check in July. EGMs show NSVT lasting 5-7 sec with rates in the 160s. Dr. Carrera is aware of occasional short bursts of NSVT. Echo normal. Continue to monitor.

## 2019-08-09 ENCOUNTER — RECORDS - HEALTHEAST (OUTPATIENT)
Dept: ADMINISTRATIVE | Facility: OTHER | Age: 84
End: 2019-08-09

## 2019-10-17 ENCOUNTER — ANCILLARY PROCEDURE (OUTPATIENT)
Dept: CARDIOLOGY | Facility: CLINIC | Age: 84
End: 2019-10-17
Attending: INTERNAL MEDICINE
Payer: COMMERCIAL

## 2019-10-17 DIAGNOSIS — Z95.0 PACEMAKER: ICD-10-CM

## 2019-10-17 PROCEDURE — 93294 REM INTERROG EVL PM/LDLS PM: CPT | Performed by: INTERNAL MEDICINE

## 2019-10-17 PROCEDURE — 93296 REM INTERROG EVL PM/IDS: CPT | Performed by: INTERNAL MEDICINE

## 2019-10-26 LAB
MDC_IDC_EPISODE_DTM: NORMAL
MDC_IDC_EPISODE_DURATION: 14 S
MDC_IDC_EPISODE_DURATION: 38 S
MDC_IDC_EPISODE_ID: NORMAL
MDC_IDC_EPISODE_TYPE: NORMAL
MDC_IDC_EPISODE_VENDOR_TYPE: NORMAL
MDC_IDC_EPISODE_VENDOR_TYPE: NORMAL
MDC_IDC_LEAD_IMPLANT_DT: NORMAL
MDC_IDC_LEAD_IMPLANT_DT: NORMAL
MDC_IDC_LEAD_LOCATION: NORMAL
MDC_IDC_LEAD_LOCATION: NORMAL
MDC_IDC_LEAD_LOCATION_DETAIL_1: NORMAL
MDC_IDC_LEAD_LOCATION_DETAIL_1: NORMAL
MDC_IDC_LEAD_MFG: NORMAL
MDC_IDC_LEAD_MFG: NORMAL
MDC_IDC_LEAD_MODEL: NORMAL
MDC_IDC_LEAD_MODEL: NORMAL
MDC_IDC_LEAD_SERIAL: NORMAL
MDC_IDC_LEAD_SERIAL: NORMAL
MDC_IDC_MSMT_BATTERY_DTM: NORMAL
MDC_IDC_MSMT_BATTERY_REMAINING_LONGEVITY: 103 MO
MDC_IDC_MSMT_BATTERY_REMAINING_PERCENTAGE: 95.5 %
MDC_IDC_MSMT_BATTERY_RRT_TRIGGER: NORMAL
MDC_IDC_MSMT_BATTERY_STATUS: NORMAL
MDC_IDC_MSMT_BATTERY_VOLTAGE: 3.01 V
MDC_IDC_MSMT_LEADCHNL_RA_IMPEDANCE_VALUE: 460 OHM
MDC_IDC_MSMT_LEADCHNL_RA_LEAD_CHANNEL_STATUS: NORMAL
MDC_IDC_MSMT_LEADCHNL_RA_PACING_THRESHOLD_AMPLITUDE: 0.5 V
MDC_IDC_MSMT_LEADCHNL_RA_PACING_THRESHOLD_PULSEWIDTH: 0.5 MS
MDC_IDC_MSMT_LEADCHNL_RA_SENSING_INTR_AMPL: 5 MV
MDC_IDC_MSMT_LEADCHNL_RV_IMPEDANCE_VALUE: 410 OHM
MDC_IDC_MSMT_LEADCHNL_RV_LEAD_CHANNEL_STATUS: NORMAL
MDC_IDC_MSMT_LEADCHNL_RV_PACING_THRESHOLD_AMPLITUDE: 0.75 V
MDC_IDC_MSMT_LEADCHNL_RV_PACING_THRESHOLD_PULSEWIDTH: 0.5 MS
MDC_IDC_MSMT_LEADCHNL_RV_SENSING_INTR_AMPL: 5.2 MV
MDC_IDC_PG_IMPLANT_DTM: NORMAL
MDC_IDC_PG_MFG: NORMAL
MDC_IDC_PG_MODEL: NORMAL
MDC_IDC_PG_SERIAL: NORMAL
MDC_IDC_PG_TYPE: NORMAL
MDC_IDC_SESS_CLINIC_NAME: NORMAL
MDC_IDC_SESS_DTM: NORMAL
MDC_IDC_SESS_REPROGRAMMED: NO
MDC_IDC_SESS_TYPE: NORMAL
MDC_IDC_SET_BRADY_AT_MODE_SWITCH_MODE: NORMAL
MDC_IDC_SET_BRADY_AT_MODE_SWITCH_RATE: 170 {BEATS}/MIN
MDC_IDC_SET_BRADY_LOWRATE: 60 {BEATS}/MIN
MDC_IDC_SET_BRADY_MAX_SENSOR_RATE: 130 {BEATS}/MIN
MDC_IDC_SET_BRADY_MAX_TRACKING_RATE: 125 {BEATS}/MIN
MDC_IDC_SET_BRADY_MODE: NORMAL
MDC_IDC_SET_BRADY_PAV_DELAY_LOW: 250 MS
MDC_IDC_SET_BRADY_SAV_DELAY_LOW: 225 MS
MDC_IDC_SET_LEADCHNL_RA_PACING_AMPLITUDE: 2 V
MDC_IDC_SET_LEADCHNL_RA_PACING_ANODE_ELECTRODE_1: NORMAL
MDC_IDC_SET_LEADCHNL_RA_PACING_ANODE_LOCATION_1: NORMAL
MDC_IDC_SET_LEADCHNL_RA_PACING_CAPTURE_MODE: NORMAL
MDC_IDC_SET_LEADCHNL_RA_PACING_CATHODE_ELECTRODE_1: NORMAL
MDC_IDC_SET_LEADCHNL_RA_PACING_CATHODE_LOCATION_1: NORMAL
MDC_IDC_SET_LEADCHNL_RA_PACING_POLARITY: NORMAL
MDC_IDC_SET_LEADCHNL_RA_PACING_PULSEWIDTH: 0.5 MS
MDC_IDC_SET_LEADCHNL_RA_SENSING_ADAPTATION_MODE: NORMAL
MDC_IDC_SET_LEADCHNL_RA_SENSING_ANODE_ELECTRODE_1: NORMAL
MDC_IDC_SET_LEADCHNL_RA_SENSING_ANODE_LOCATION_1: NORMAL
MDC_IDC_SET_LEADCHNL_RA_SENSING_CATHODE_ELECTRODE_1: NORMAL
MDC_IDC_SET_LEADCHNL_RA_SENSING_CATHODE_LOCATION_1: NORMAL
MDC_IDC_SET_LEADCHNL_RA_SENSING_POLARITY: NORMAL
MDC_IDC_SET_LEADCHNL_RA_SENSING_SENSITIVITY: 0.3 MV
MDC_IDC_SET_LEADCHNL_RV_PACING_AMPLITUDE: 2.5 V
MDC_IDC_SET_LEADCHNL_RV_PACING_ANODE_ELECTRODE_1: NORMAL
MDC_IDC_SET_LEADCHNL_RV_PACING_ANODE_LOCATION_1: NORMAL
MDC_IDC_SET_LEADCHNL_RV_PACING_CAPTURE_MODE: NORMAL
MDC_IDC_SET_LEADCHNL_RV_PACING_CATHODE_ELECTRODE_1: NORMAL
MDC_IDC_SET_LEADCHNL_RV_PACING_CATHODE_LOCATION_1: NORMAL
MDC_IDC_SET_LEADCHNL_RV_PACING_POLARITY: NORMAL
MDC_IDC_SET_LEADCHNL_RV_PACING_PULSEWIDTH: 0.5 MS
MDC_IDC_SET_LEADCHNL_RV_SENSING_ADAPTATION_MODE: NORMAL
MDC_IDC_SET_LEADCHNL_RV_SENSING_ANODE_ELECTRODE_1: NORMAL
MDC_IDC_SET_LEADCHNL_RV_SENSING_ANODE_LOCATION_1: NORMAL
MDC_IDC_SET_LEADCHNL_RV_SENSING_CATHODE_ELECTRODE_1: NORMAL
MDC_IDC_SET_LEADCHNL_RV_SENSING_CATHODE_LOCATION_1: NORMAL
MDC_IDC_SET_LEADCHNL_RV_SENSING_POLARITY: NORMAL
MDC_IDC_SET_LEADCHNL_RV_SENSING_SENSITIVITY: 0.5 MV
MDC_IDC_STAT_AT_BURDEN_PERCENT: 1 %
MDC_IDC_STAT_AT_DTM_END: NORMAL
MDC_IDC_STAT_AT_DTM_START: NORMAL
MDC_IDC_STAT_AT_MODE_SW_COUNT: 113
MDC_IDC_STAT_AT_MODE_SW_COUNT_PER_DAY: 1
MDC_IDC_STAT_AT_MODE_SW_MAX_DURATION: 86 S
MDC_IDC_STAT_AT_MODE_SW_PERCENT_TIME: 1 %
MDC_IDC_STAT_BRADY_AP_VP_PERCENT: 22 %
MDC_IDC_STAT_BRADY_AP_VS_PERCENT: 5.8 %
MDC_IDC_STAT_BRADY_AS_VP_PERCENT: 59 %
MDC_IDC_STAT_BRADY_AS_VS_PERCENT: 12 %
MDC_IDC_STAT_BRADY_DTM_END: NORMAL
MDC_IDC_STAT_BRADY_DTM_START: NORMAL
MDC_IDC_STAT_BRADY_RA_PERCENT_PACED: 26 %
MDC_IDC_STAT_BRADY_RV_PERCENT_PACED: 81 %
MDC_IDC_STAT_CRT_DTM_END: NORMAL
MDC_IDC_STAT_CRT_DTM_START: NORMAL
MDC_IDC_STAT_HEART_RATE_ATRIAL_MAX: 320 {BEATS}/MIN
MDC_IDC_STAT_HEART_RATE_ATRIAL_MEAN: 81 {BEATS}/MIN
MDC_IDC_STAT_HEART_RATE_ATRIAL_MIN: 40 {BEATS}/MIN
MDC_IDC_STAT_HEART_RATE_DTM_END: NORMAL
MDC_IDC_STAT_HEART_RATE_DTM_START: NORMAL
MDC_IDC_STAT_HEART_RATE_VENTRICULAR_MAX: 280 {BEATS}/MIN
MDC_IDC_STAT_HEART_RATE_VENTRICULAR_MEAN: 80 {BEATS}/MIN
MDC_IDC_STAT_HEART_RATE_VENTRICULAR_MIN: 40 {BEATS}/MIN

## 2019-11-04 ENCOUNTER — TELEPHONE (OUTPATIENT)
Dept: CARDIOLOGY | Facility: CLINIC | Age: 84
End: 2019-11-04

## 2019-11-04 NOTE — TELEPHONE ENCOUNTER
PPM remote alert transmission received for HVR, PMT and  greater than limit.       =59%,  percent has been between 70-90% for last year.  Pt's underlying is SB with 1st degree AVB.      1 PMT episode logged, EGM shows AS/ rhythm at [facility] appropriately broken by PMT algorithm.      VHR shows 7 seconds, or 19 beats, of VS rhythm in the 150-170's with change in morphology suggestive of NSVT.  EF 55-60% per echo on 12/14/17.  Message left for patient to discuss symptoms.      Pt is scheduled to see Dr. Carrera on 12/16/19.  Will route to Dr. Carrera for review.     SINGH Renae

## 2019-12-02 ENCOUNTER — RECORDS - HEALTHEAST (OUTPATIENT)
Dept: ADMINISTRATIVE | Facility: OTHER | Age: 84
End: 2019-12-02

## 2019-12-16 ENCOUNTER — ANCILLARY PROCEDURE (OUTPATIENT)
Dept: CARDIOLOGY | Facility: CLINIC | Age: 84
End: 2019-12-16
Attending: INTERNAL MEDICINE
Payer: COMMERCIAL

## 2019-12-16 ENCOUNTER — OFFICE VISIT (OUTPATIENT)
Dept: CARDIOLOGY | Facility: CLINIC | Age: 84
End: 2019-12-16
Attending: NURSE PRACTITIONER
Payer: COMMERCIAL

## 2019-12-16 VITALS
DIASTOLIC BLOOD PRESSURE: 73 MMHG | HEART RATE: 62 BPM | HEIGHT: 72 IN | BODY MASS INDEX: 25.76 KG/M2 | SYSTOLIC BLOOD PRESSURE: 112 MMHG | WEIGHT: 190.2 LBS

## 2019-12-16 DIAGNOSIS — Z95.0 CARDIAC PACEMAKER IN SITU: ICD-10-CM

## 2019-12-16 LAB
MDC_IDC_LEAD_IMPLANT_DT: NORMAL
MDC_IDC_LEAD_IMPLANT_DT: NORMAL
MDC_IDC_LEAD_LOCATION: NORMAL
MDC_IDC_LEAD_LOCATION: NORMAL
MDC_IDC_LEAD_LOCATION_DETAIL_1: NORMAL
MDC_IDC_LEAD_LOCATION_DETAIL_1: NORMAL
MDC_IDC_LEAD_MFG: NORMAL
MDC_IDC_LEAD_MFG: NORMAL
MDC_IDC_LEAD_MODEL: NORMAL
MDC_IDC_LEAD_MODEL: NORMAL
MDC_IDC_LEAD_SERIAL: NORMAL
MDC_IDC_LEAD_SERIAL: NORMAL
MDC_IDC_MSMT_BATTERY_REMAINING_LONGEVITY: 112 MO
MDC_IDC_MSMT_BATTERY_STATUS: NORMAL
MDC_IDC_MSMT_BATTERY_VOLTAGE: 3.01 V
MDC_IDC_MSMT_LEADCHNL_RA_IMPEDANCE_VALUE: 512.5 OHM
MDC_IDC_MSMT_LEADCHNL_RA_PACING_THRESHOLD_AMPLITUDE: 0.5 V
MDC_IDC_MSMT_LEADCHNL_RA_PACING_THRESHOLD_AMPLITUDE: 0.5 V
MDC_IDC_MSMT_LEADCHNL_RA_PACING_THRESHOLD_PULSEWIDTH: 0.5 MS
MDC_IDC_MSMT_LEADCHNL_RA_PACING_THRESHOLD_PULSEWIDTH: 0.5 MS
MDC_IDC_MSMT_LEADCHNL_RA_SENSING_INTR_AMPL: 5 MV
MDC_IDC_MSMT_LEADCHNL_RV_IMPEDANCE_VALUE: 437.5 OHM
MDC_IDC_MSMT_LEADCHNL_RV_PACING_THRESHOLD_AMPLITUDE: 0.75 V
MDC_IDC_MSMT_LEADCHNL_RV_PACING_THRESHOLD_AMPLITUDE: 0.75 V
MDC_IDC_MSMT_LEADCHNL_RV_PACING_THRESHOLD_PULSEWIDTH: 0.5 MS
MDC_IDC_MSMT_LEADCHNL_RV_PACING_THRESHOLD_PULSEWIDTH: 0.5 MS
MDC_IDC_MSMT_LEADCHNL_RV_SENSING_INTR_AMPL: 6.4 MV
MDC_IDC_PG_IMPLANT_DTM: NORMAL
MDC_IDC_PG_MFG: NORMAL
MDC_IDC_PG_MODEL: NORMAL
MDC_IDC_PG_SERIAL: NORMAL
MDC_IDC_PG_TYPE: NORMAL
MDC_IDC_SESS_CLINIC_NAME: NORMAL
MDC_IDC_SESS_DTM: NORMAL
MDC_IDC_SESS_TYPE: NORMAL
MDC_IDC_SET_BRADY_AT_MODE_SWITCH_MODE: NORMAL
MDC_IDC_SET_BRADY_AT_MODE_SWITCH_RATE: 170 {BEATS}/MIN
MDC_IDC_SET_BRADY_HYSTRATE: NORMAL
MDC_IDC_SET_BRADY_LOWRATE: 60 {BEATS}/MIN
MDC_IDC_SET_BRADY_MAX_SENSOR_RATE: 130 {BEATS}/MIN
MDC_IDC_SET_BRADY_MAX_TRACKING_RATE: 120 {BEATS}/MIN
MDC_IDC_SET_BRADY_MODE: NORMAL
MDC_IDC_SET_BRADY_NIGHT_RATE: NORMAL
MDC_IDC_SET_BRADY_PAV_DELAY_LOW: 250 MS
MDC_IDC_SET_BRADY_SAV_DELAY_LOW: 250 MS
MDC_IDC_SET_LEADCHNL_RA_PACING_AMPLITUDE: 2 V
MDC_IDC_SET_LEADCHNL_RA_PACING_ANODE_ELECTRODE_1: NORMAL
MDC_IDC_SET_LEADCHNL_RA_PACING_ANODE_LOCATION_1: NORMAL
MDC_IDC_SET_LEADCHNL_RA_PACING_CAPTURE_MODE: NORMAL
MDC_IDC_SET_LEADCHNL_RA_PACING_CATHODE_ELECTRODE_1: NORMAL
MDC_IDC_SET_LEADCHNL_RA_PACING_CATHODE_LOCATION_1: NORMAL
MDC_IDC_SET_LEADCHNL_RA_PACING_POLARITY: NORMAL
MDC_IDC_SET_LEADCHNL_RA_PACING_PULSEWIDTH: 0.5 MS
MDC_IDC_SET_LEADCHNL_RA_SENSING_ADAPTATION_MODE: NORMAL
MDC_IDC_SET_LEADCHNL_RA_SENSING_ANODE_ELECTRODE_1: NORMAL
MDC_IDC_SET_LEADCHNL_RA_SENSING_ANODE_LOCATION_1: NORMAL
MDC_IDC_SET_LEADCHNL_RA_SENSING_CATHODE_ELECTRODE_1: NORMAL
MDC_IDC_SET_LEADCHNL_RA_SENSING_CATHODE_LOCATION_1: NORMAL
MDC_IDC_SET_LEADCHNL_RA_SENSING_POLARITY: NORMAL
MDC_IDC_SET_LEADCHNL_RA_SENSING_SENSITIVITY: 0.3 MV
MDC_IDC_SET_LEADCHNL_RV_PACING_AMPLITUDE: 2 V
MDC_IDC_SET_LEADCHNL_RV_PACING_ANODE_ELECTRODE_1: NORMAL
MDC_IDC_SET_LEADCHNL_RV_PACING_ANODE_LOCATION_1: NORMAL
MDC_IDC_SET_LEADCHNL_RV_PACING_CAPTURE_MODE: NORMAL
MDC_IDC_SET_LEADCHNL_RV_PACING_CATHODE_ELECTRODE_1: NORMAL
MDC_IDC_SET_LEADCHNL_RV_PACING_CATHODE_LOCATION_1: NORMAL
MDC_IDC_SET_LEADCHNL_RV_PACING_POLARITY: NORMAL
MDC_IDC_SET_LEADCHNL_RV_PACING_PULSEWIDTH: 0.5 MS
MDC_IDC_SET_LEADCHNL_RV_SENSING_ANODE_ELECTRODE_1: NORMAL
MDC_IDC_SET_LEADCHNL_RV_SENSING_ANODE_LOCATION_1: NORMAL
MDC_IDC_SET_LEADCHNL_RV_SENSING_CATHODE_ELECTRODE_1: NORMAL
MDC_IDC_SET_LEADCHNL_RV_SENSING_CATHODE_LOCATION_1: NORMAL
MDC_IDC_SET_LEADCHNL_RV_SENSING_POLARITY: NORMAL
MDC_IDC_SET_LEADCHNL_RV_SENSING_SENSITIVITY: 0.5 MV
MDC_IDC_STAT_AT_MODE_SW_COUNT: 0
MDC_IDC_STAT_BRADY_RA_PERCENT_PACED: 28 %
MDC_IDC_STAT_BRADY_RV_PERCENT_PACED: 71 %

## 2019-12-16 PROCEDURE — 93283 PRGRMG EVAL IMPLANTABLE DFB: CPT | Performed by: INTERNAL MEDICINE

## 2019-12-16 PROCEDURE — 99213 OFFICE O/P EST LOW 20 MIN: CPT | Mod: 25 | Performed by: INTERNAL MEDICINE

## 2019-12-16 ASSESSMENT — MIFFLIN-ST. JEOR: SCORE: 1585.74

## 2019-12-16 NOTE — PROGRESS NOTES
HPI and Plan:   See dictation  [id number]  No orders of the defined types were placed in this encounter.      No orders of the defined types were placed in this encounter.      There are no discontinued medications.      Encounter Diagnosis   Name Primary?     Cardiac pacemaker in situ        CURRENT MEDICATIONS:  Current Outpatient Medications   Medication Sig Dispense Refill     aspirin (ASA) 81 MG EC tablet Take 81 mg by mouth daily       calcium carbonate (OS- MG [place]. [place]) 500 MG tablet Take 500 mg by mouth daily        Cholecalciferol (VITAMIN D3 PO) Take 1,000 Units by mouth daily       fluticasone-salmeterol (ADVAIR-HFA) 115-21 MCG/ACT inhaler Inhale 2 puffs into the lungs 2 times daily       multivitamin, therapeutic with minerals (MULTI-VITAMIN) TABS Take 1 tablet by mouth daily       warfarin (COUMADIN) 1 MG tablet Take 2.5 tablets (2.5 mg) by mouth daily 30 tablet 3     WARFARIN SODIUM PO Take 5 mg by mouth three times a week Monday, Wednesday & Friday       Warfarin Therapy Reminder 1 each continuous prn 30 each 3     ALENDRONATE SODIUM PO Take 70 mg by mouth once a week          ALLERGIES   No Known Allergies    PAST MEDICAL HISTORY:  No past medical history on file.    PAST SURGICAL HISTORY:  No past surgical history on file.    FAMILY HISTORY:  Family History   Problem Relation Age of Onset     Coronary Artery Disease Early Onset Father      Coronary Artery Disease Early Onset Brother        SOCIAL HISTORY:  Social History     Socioeconomic History     Marital status: Single     Spouse name: None     Number of children: None     Years of education: None     Highest education level: None   Occupational History     None   Social Needs     Financial resource strain: None     Food insecurity:     Worry: None     Inability: None     Transportation needs:     Medical: None     Non-medical: None   Tobacco Use     Smoking status: Never Smoker     Smokeless tobacco: Never Used   Substance and Sexual  Activity     Alcohol use: No     Drug use: None     Sexual activity: None   Lifestyle     Physical activity:     Days per week: None     Minutes per session: None     Stress: None   Relationships     Social connections:     Talks on phone: None     Gets together: None     Attends [employer] service: None     Active member of club or organization: None     Attends meetings of clubs or organizations: None     Relationship status: None     Intimate partner violence:     Fear of current or ex partner: None     Emotionally abused: None     Physically abused: None     Forced sexual activity: None   Other Topics Concern     Parent/sibling w/ CABG, MI or angioplasty before 65F 55M? Not Asked   Social History Narrative     None       Review of Systems:  Skin:  Negative       Eyes:    glasses    ENT:  Positive for hearing loss    Respiratory:  Positive for dyspnea on exertion;shortness of breath     Cardiovascular:    Positive for;lightheadedness;dizziness(when standing or exerting)    Gastroenterology: Negative      Genitourinary:  Negative      Musculoskeletal:  Positive for arthritis    Neurologic:  Negative      Psychiatric:  Negative      Heme/Lymph/Imm:  Negative      Endocrine:  Negative        Physical Exam:  Vitals: /73   Pulse 62   Ht 1.829 m (6')   Wt 86.3 kg (190 lb 3.2 oz)   BMI 25.80 kg/m      Constitutional:  cooperative, alert and oriented, well developed, well nourished, in no acute distress appears younger than stated age      Skin:  warm and dry to the touch, no apparent skin lesions or masses noted   pacemaker incision in the left infraclavicular area was well-healed      Head:  normocephalic, no masses or lesions        Eyes:  pupils equal and round, conjunctivae and lids unremarkable, sclera white, no xanthalasma, EOMS intact, no nystagmus        Lymph:No Cervical lymphadenopathy present     ENT:           Neck:  carotid pulses are full and equal bilaterally, JVP normal, no carotid bruit         Respiratory:  normal breath sounds, clear to auscultation, normal A-P diameter, normal symmetry, normal respiratory excursion, no use of accessory muscles         Cardiac: regular rhythm, normal S1/S2, no S3 or S4, apical impulse not displaced, no murmurs, gallops or rubs                pulses full and equal, no bruits auscultated                                        GI:  abdomen soft, non-tender, BS normoactive, no mass, no HSM, no bruits        Extremities and Muscular Skeletal:  no deformities, clubbing, cyanosis, erythema observed              Neurological:           Psych:  Alert and Oriented x 3        CC  Vaughn POTTER [facility]  [address] [address]

## 2019-12-16 NOTE — LETTER
12/16/2019    Vaughn Evans  [facility] [address]  [place] [address]    RE: Victorino Khan       Dear Colleague,    I had the pleasure of seeing Victorino Millanmeka in the [facility].    HPI and Plan:   See dictation  [id number]  No orders of the defined types were placed in this encounter.      No orders of the defined types were placed in this encounter.      There are no discontinued medications.      Encounter Diagnosis   Name Primary?     Cardiac pacemaker in situ        CURRENT MEDICATIONS:  Current Outpatient Medications   Medication Sig Dispense Refill     aspirin (ASA) 81 MG EC tablet Take 81 mg by mouth daily       calcium carbonate (OS- MG [place]. [place]) 500 MG tablet Take 500 mg by mouth daily        Cholecalciferol (VITAMIN D3 PO) Take 1,000 Units by mouth daily       fluticasone-salmeterol (ADVAIR-HFA) 115-21 MCG/ACT inhaler Inhale 2 puffs into the lungs 2 times daily       multivitamin, therapeutic with minerals (MULTI-VITAMIN) TABS Take 1 tablet by mouth daily       warfarin (COUMADIN) 1 MG tablet Take 2.5 tablets (2.5 mg) by mouth daily 30 tablet 3     WARFARIN SODIUM PO Take 5 mg by mouth three times a week Monday, Wednesday & Friday       Warfarin Therapy Reminder 1 each continuous prn 30 each 3     ALENDRONATE SODIUM PO Take 70 mg by mouth once a week          ALLERGIES   No Known Allergies    PAST MEDICAL HISTORY:  No past medical history on file.    PAST SURGICAL HISTORY:  No past surgical history on file.    FAMILY HISTORY:  Family History   Problem Relation Age of Onset     Coronary Artery Disease Early Onset Father      Coronary Artery Disease Early Onset Brother        SOCIAL HISTORY:  Social History     Socioeconomic History     Marital status: Single     Spouse name: None     Number of children: None     Years of education: None     Highest education level: None   Occupational History     None   Social Needs     Financial resource strain: None     Food  insecurity:     Worry: None     Inability: None     Transportation needs:     Medical: None     Non-medical: None   Tobacco Use     Smoking status: Never Smoker     Smokeless tobacco: Never Used   Substance and Sexual Activity     Alcohol use: No     Drug use: None     Sexual activity: None   Lifestyle     Physical activity:     Days per week: None     Minutes per session: None     Stress: None   Relationships     Social connections:     Talks on phone: None     Gets together: None     Attends [employer] service: None     Active member of club or organization: None     Attends meetings of clubs or organizations: None     Relationship status: None     Intimate partner violence:     Fear of current or ex partner: None     Emotionally abused: None     Physically abused: None     Forced sexual activity: None   Other Topics Concern     Parent/sibling w/ CABG, MI or angioplasty before 65F 55M? Not Asked   Social History Narrative     None       Review of Systems:  Skin:  Negative       Eyes:    glasses    ENT:  Positive for hearing loss    Respiratory:  Positive for dyspnea on exertion;shortness of breath     Cardiovascular:    Positive for;lightheadedness;dizziness(when standing or exerting)    Gastroenterology: Negative      Genitourinary:  Negative      Musculoskeletal:  Positive for arthritis    Neurologic:  Negative      Psychiatric:  Negative      Heme/Lymph/Imm:  Negative      Endocrine:  Negative        Physical Exam:  Vitals: /73   Pulse 62   Ht 1.829 m (6')   Wt 86.3 kg (190 lb 3.2 oz)   BMI 25.80 kg/m       Constitutional:  cooperative, alert and oriented, well developed, well nourished, in no acute distress appears younger than stated age      Skin:  warm and dry to the touch, no apparent skin lesions or masses noted   pacemaker incision in the left infraclavicular area was well-healed      Head:  normocephalic, no masses or lesions        Eyes:  pupils equal and round, conjunctivae and lids  unremarkable, sclera white, no xanthalasma, EOMS intact, no nystagmus        Lymph:No Cervical lymphadenopathy present     ENT:           Neck:  carotid pulses are full and equal bilaterally, JVP normal, no carotid bruit        Respiratory:  normal breath sounds, clear to auscultation, normal A-P diameter, normal symmetry, normal respiratory excursion, no use of accessory muscles         Cardiac: regular rhythm, normal S1/S2, no S3 or S4, apical impulse not displaced, no murmurs, gallops or rubs                pulses full and equal, no bruits auscultated                                        GI:  abdomen soft, non-tender, BS normoactive, no mass, no HSM, no bruits        Extremities and Muscular Skeletal:  no deformities, clubbing, cyanosis, erythema observed              Neurological:           Psych:  Alert and Oriented x 3        CC  Vaughn Evans  [facility]  [address] [place] [address]                Thank you for allowing me to participate in the care of your patient.      Sincerely,     Evaristo Hernandez MD     [facility]    cc:   Vaughn ROMAN [facility]  [address] [place] [address]

## 2019-12-16 NOTE — LETTER
12/16/2019      Vaughn Evans  [facility] [address]  [place] [address]      RE: Victorino Khan       Dear Colleague,    I had the pleasure of seeing Victorino Khan in the [facility].    Service Date: 12/16/2019      HISTORY OF PRESENT ILLNESS:  Thank you for allowing me to participate in the care of this very delightful patient.  As you know, Eddy is an 85-year-old gentleman with history of near syncope from having advanced AV block status post pacemaker implantation and noted to have occasional nonsustained VT in the background of preserved LV systolic function.  I last saw the patient a few years ago.        At that time, the patient was noting severe muscle aches, especially having trouble climbing stairs and he could not touch his face with his fingers on both sides.  He has been on simvastatin for the past 5 years prior to that but I thought the patient may have severe myalgias and therefore had him stop and reassess.  Fortunately, his symptoms abated after simvastatin was discontinued.  His pacemaker was checked today and shows appropriate function.  The patient has been on warfarin for his factor V Leiden deficiency and history of DVT as well.  He has a few questions at today's visit, namely his cold hands which have been chronic for him and if they worsen over the winter, are consistent with Raynaud's.  He denies having purple or blue fingertips upon exposure to cold but this is a mild case of it.  The patient seemed to tolerate it quite well.  His other question is whether heat loss is mostly from the head, as his father who was a dentist told him so.  I told him that this is likely to be the case.      The patient appeared to be doing well and remaining active for the most part.  He will continue to follow in the Device [facility] and will have him come back to see one of the APPs a year from now and at that KARLIE's discretion the patient comes back to see me again.             cc:   Vaughn Evans DO    [facility]   [address] [place] [address]         WIL ANDRADE MD             D: 2019   T: 2019   MT: JACQUELINE      Name:     DONATO BULLOCK   MRN:      [id number]        Account:      [id number]   :      1934           Service Date: 2019      Document: [id number]           Outpatient Encounter Medications as of 2019   Medication Sig Dispense Refill     aspirin (ASA) 81 MG EC tablet Take 81 mg by mouth daily       calcium carbonate (OS- MG [place]. [place]) 500 MG tablet Take 500 mg by mouth daily        Cholecalciferol (VITAMIN D3 PO) Take 1,000 Units by mouth daily       fluticasone-salmeterol (ADVAIR-HFA) 115-21 MCG/ACT inhaler Inhale 2 puffs into the lungs 2 times daily       multivitamin, therapeutic with minerals (MULTI-VITAMIN) TABS Take 1 tablet by mouth daily       warfarin (COUMADIN) 1 MG tablet Take 2.5 tablets (2.5 mg) by mouth daily 30 tablet 3     WARFARIN SODIUM PO Take 5 mg by mouth three times a week Monday, Wednesday & Friday       Warfarin Therapy Reminder 1 each continuous prn 30 each 3     ALENDRONATE SODIUM PO Take 70 mg by mouth once a week        No facility-administered encounter medications on file as of 2019.                Again, thank you for allowing me to participate in the care of your patient.      Sincerely,    Wil Hernandez MD     [facility]

## 2019-12-16 NOTE — PROGRESS NOTES
Service Date: 12/16/2019      HISTORY OF PRESENT ILLNESS:  Thank you for allowing me to participate in the care of this very delightful patient.  As you know, Eddy is an 85-year-old gentleman with history of near syncope from having advanced AV block status post pacemaker implantation and noted to have occasional nonsustained VT in the background of preserved LV systolic function.  I last saw the patient a few years ago.        At that time, the patient was noting severe muscle aches, especially having trouble climbing stairs and he could not touch his face with his fingers on both sides.  He has been on simvastatin for the past 5 years prior to that but I thought the patient may have severe myalgias and therefore had him stop and reassess.  Fortunately, his symptoms abated after simvastatin was discontinued.  His pacemaker was checked today and shows appropriate function.  The patient has been on warfarin for his factor V Leiden deficiency and history of DVT as well.  He has a few questions at today's visit, namely his cold hands which have been chronic for him and if they worsen over the winter, are consistent with Raynaud's.  He denies having purple or blue fingertips upon exposure to cold but this is a mild case of it.  The patient seemed to tolerate it quite well.  His other question is whether heat loss is mostly from the head, as his father who was a dentist told him so.  I told him that this is likely to be the case.      The patient appeared to be doing well and remaining active for the most part.  He will continue to follow in the Device Clinic and will have him come back to see one of the APPs a year from now and at that KARLIE's discretion the patient comes back to see me again.      cc:   Vaughn Evans DO    [facility] [place]   [address] [facility] [place] [address]         WIL ANDRADE MD             D: 12/16/2019   T: 12/16/2019   MT: JACQUELINE      Name:     DONATO BULLOCK   MRN:      [id number]         Account:      [id number]   :      1934           Service Date: 2019      Document: [id number]

## 2020-01-15 ENCOUNTER — RECORDS - HEALTHEAST (OUTPATIENT)
Dept: ADMINISTRATIVE | Facility: OTHER | Age: 85
End: 2020-01-15

## 2020-01-30 ENCOUNTER — DOCUMENTATION ONLY (OUTPATIENT)
Dept: CARDIOLOGY | Facility: CLINIC | Age: 85
End: 2020-01-30

## 2020-01-30 DIAGNOSIS — Z95.0 CARDIAC PACEMAKER IN SITU: Primary | ICD-10-CM

## 2020-01-30 NOTE — TELEPHONE ENCOUNTER
Remote alert received for HVR detection and for RA amplitude less than a 2:1 safety margin.      1 EGM available showing 7 beats NSVT with rates in the 160-180's.  Per Dr. Carrera on 11/4/19, continue to monitor for now.      RA capture test showed an amplitude of 1.5V@0.5ms.  This was measured at 0.5V@0.5ms in clinic on 12/16/19 and on remote on 1/18/20.  Impedance and sensitivity are stable.  With this being first alert, will schedule a courtesy remote for 1 week to see if has trended down. If it remains elevated, will need to schedule in clinic device check to check lead integrity.    Pt called and message was left outlining plan.  Pt instructed to call the clinic if he has any questions.    SINGH Renae

## 2020-02-07 ENCOUNTER — ANCILLARY PROCEDURE (OUTPATIENT)
Dept: CARDIOLOGY | Facility: CLINIC | Age: 85
End: 2020-02-07
Attending: INTERNAL MEDICINE
Payer: COMMERCIAL

## 2020-02-07 DIAGNOSIS — Z95.0 CARDIAC PACEMAKER IN SITU: ICD-10-CM

## 2020-02-19 LAB
MDC_IDC_EPISODE_DTM: NORMAL
MDC_IDC_EPISODE_DURATION: 54 S
MDC_IDC_EPISODE_ID: NORMAL
MDC_IDC_EPISODE_TYPE: NORMAL
MDC_IDC_EPISODE_VENDOR_TYPE: NORMAL
MDC_IDC_LEAD_IMPLANT_DT: NORMAL
MDC_IDC_LEAD_IMPLANT_DT: NORMAL
MDC_IDC_LEAD_LOCATION: NORMAL
MDC_IDC_LEAD_LOCATION: NORMAL
MDC_IDC_LEAD_LOCATION_DETAIL_1: NORMAL
MDC_IDC_LEAD_LOCATION_DETAIL_1: NORMAL
MDC_IDC_LEAD_MFG: NORMAL
MDC_IDC_LEAD_MFG: NORMAL
MDC_IDC_LEAD_MODEL: NORMAL
MDC_IDC_LEAD_MODEL: NORMAL
MDC_IDC_LEAD_SERIAL: NORMAL
MDC_IDC_LEAD_SERIAL: NORMAL
MDC_IDC_MSMT_BATTERY_DTM: NORMAL
MDC_IDC_MSMT_BATTERY_REMAINING_LONGEVITY: 111 MO
MDC_IDC_MSMT_BATTERY_REMAINING_PERCENTAGE: 95.5 %
MDC_IDC_MSMT_BATTERY_RRT_TRIGGER: NORMAL
MDC_IDC_MSMT_BATTERY_STATUS: NORMAL
MDC_IDC_MSMT_BATTERY_VOLTAGE: 2.99 V
MDC_IDC_MSMT_LEADCHNL_RA_IMPEDANCE_VALUE: 510 OHM
MDC_IDC_MSMT_LEADCHNL_RA_LEAD_CHANNEL_STATUS: NORMAL
MDC_IDC_MSMT_LEADCHNL_RA_PACING_THRESHOLD_AMPLITUDE: 0.62 V
MDC_IDC_MSMT_LEADCHNL_RA_PACING_THRESHOLD_PULSEWIDTH: 0.5 MS
MDC_IDC_MSMT_LEADCHNL_RA_SENSING_INTR_AMPL: 5 MV
MDC_IDC_MSMT_LEADCHNL_RV_IMPEDANCE_VALUE: 410 OHM
MDC_IDC_MSMT_LEADCHNL_RV_LEAD_CHANNEL_STATUS: NORMAL
MDC_IDC_MSMT_LEADCHNL_RV_PACING_THRESHOLD_AMPLITUDE: 0.75 V
MDC_IDC_MSMT_LEADCHNL_RV_PACING_THRESHOLD_PULSEWIDTH: 0.5 MS
MDC_IDC_MSMT_LEADCHNL_RV_SENSING_INTR_AMPL: 4.2 MV
MDC_IDC_PG_IMPLANT_DTM: NORMAL
MDC_IDC_PG_MFG: NORMAL
MDC_IDC_PG_MODEL: NORMAL
MDC_IDC_PG_SERIAL: NORMAL
MDC_IDC_PG_TYPE: NORMAL
MDC_IDC_SESS_CLINIC_NAME: NORMAL
MDC_IDC_SESS_DTM: NORMAL
MDC_IDC_SESS_REPROGRAMMED: NO
MDC_IDC_SESS_TYPE: NORMAL
MDC_IDC_SET_BRADY_AT_MODE_SWITCH_MODE: NORMAL
MDC_IDC_SET_BRADY_AT_MODE_SWITCH_RATE: 170 {BEATS}/MIN
MDC_IDC_SET_BRADY_LOWRATE: 60 {BEATS}/MIN
MDC_IDC_SET_BRADY_MAX_SENSOR_RATE: 130 {BEATS}/MIN
MDC_IDC_SET_BRADY_MAX_TRACKING_RATE: 120 {BEATS}/MIN
MDC_IDC_SET_BRADY_MODE: NORMAL
MDC_IDC_SET_BRADY_PAV_DELAY_LOW: 250 MS
MDC_IDC_SET_BRADY_SAV_DELAY_LOW: 250 MS
MDC_IDC_SET_LEADCHNL_RA_PACING_AMPLITUDE: 2 V
MDC_IDC_SET_LEADCHNL_RA_PACING_ANODE_ELECTRODE_1: NORMAL
MDC_IDC_SET_LEADCHNL_RA_PACING_ANODE_LOCATION_1: NORMAL
MDC_IDC_SET_LEADCHNL_RA_PACING_CAPTURE_MODE: NORMAL
MDC_IDC_SET_LEADCHNL_RA_PACING_CATHODE_ELECTRODE_1: NORMAL
MDC_IDC_SET_LEADCHNL_RA_PACING_CATHODE_LOCATION_1: NORMAL
MDC_IDC_SET_LEADCHNL_RA_PACING_POLARITY: NORMAL
MDC_IDC_SET_LEADCHNL_RA_PACING_PULSEWIDTH: 0.5 MS
MDC_IDC_SET_LEADCHNL_RA_SENSING_ADAPTATION_MODE: NORMAL
MDC_IDC_SET_LEADCHNL_RA_SENSING_ANODE_ELECTRODE_1: NORMAL
MDC_IDC_SET_LEADCHNL_RA_SENSING_ANODE_LOCATION_1: NORMAL
MDC_IDC_SET_LEADCHNL_RA_SENSING_CATHODE_ELECTRODE_1: NORMAL
MDC_IDC_SET_LEADCHNL_RA_SENSING_CATHODE_LOCATION_1: NORMAL
MDC_IDC_SET_LEADCHNL_RA_SENSING_POLARITY: NORMAL
MDC_IDC_SET_LEADCHNL_RA_SENSING_SENSITIVITY: 0.3 MV
MDC_IDC_SET_LEADCHNL_RV_PACING_AMPLITUDE: 2 V
MDC_IDC_SET_LEADCHNL_RV_PACING_ANODE_ELECTRODE_1: NORMAL
MDC_IDC_SET_LEADCHNL_RV_PACING_ANODE_LOCATION_1: NORMAL
MDC_IDC_SET_LEADCHNL_RV_PACING_CAPTURE_MODE: NORMAL
MDC_IDC_SET_LEADCHNL_RV_PACING_CATHODE_ELECTRODE_1: NORMAL
MDC_IDC_SET_LEADCHNL_RV_PACING_CATHODE_LOCATION_1: NORMAL
MDC_IDC_SET_LEADCHNL_RV_PACING_POLARITY: NORMAL
MDC_IDC_SET_LEADCHNL_RV_PACING_PULSEWIDTH: 0.5 MS
MDC_IDC_SET_LEADCHNL_RV_SENSING_ADAPTATION_MODE: NORMAL
MDC_IDC_SET_LEADCHNL_RV_SENSING_ANODE_ELECTRODE_1: NORMAL
MDC_IDC_SET_LEADCHNL_RV_SENSING_ANODE_LOCATION_1: NORMAL
MDC_IDC_SET_LEADCHNL_RV_SENSING_CATHODE_ELECTRODE_1: NORMAL
MDC_IDC_SET_LEADCHNL_RV_SENSING_CATHODE_LOCATION_1: NORMAL
MDC_IDC_SET_LEADCHNL_RV_SENSING_POLARITY: NORMAL
MDC_IDC_SET_LEADCHNL_RV_SENSING_SENSITIVITY: 0.5 MV
MDC_IDC_STAT_AT_BURDEN_PERCENT: 1 %
MDC_IDC_STAT_AT_DTM_END: NORMAL
MDC_IDC_STAT_AT_DTM_START: NORMAL
MDC_IDC_STAT_AT_MODE_SW_COUNT: 12
MDC_IDC_STAT_AT_MODE_SW_COUNT_PER_DAY: 0
MDC_IDC_STAT_AT_MODE_SW_MAX_DURATION: 86 S
MDC_IDC_STAT_AT_MODE_SW_PERCENT_TIME: 1 %
MDC_IDC_STAT_BRADY_AP_VP_PERCENT: 20 %
MDC_IDC_STAT_BRADY_AP_VS_PERCENT: 8.5 %
MDC_IDC_STAT_BRADY_AS_VP_PERCENT: 48 %
MDC_IDC_STAT_BRADY_AS_VS_PERCENT: 24 %
MDC_IDC_STAT_BRADY_DTM_END: NORMAL
MDC_IDC_STAT_BRADY_DTM_START: NORMAL
MDC_IDC_STAT_BRADY_RA_PERCENT_PACED: 27 %
MDC_IDC_STAT_BRADY_RV_PERCENT_PACED: 67 %
MDC_IDC_STAT_CRT_DTM_END: NORMAL
MDC_IDC_STAT_CRT_DTM_START: NORMAL
MDC_IDC_STAT_HEART_RATE_ATRIAL_MAX: 330 {BEATS}/MIN
MDC_IDC_STAT_HEART_RATE_ATRIAL_MEAN: 78 {BEATS}/MIN
MDC_IDC_STAT_HEART_RATE_ATRIAL_MIN: 50 {BEATS}/MIN
MDC_IDC_STAT_HEART_RATE_DTM_END: NORMAL
MDC_IDC_STAT_HEART_RATE_DTM_START: NORMAL
MDC_IDC_STAT_HEART_RATE_VENTRICULAR_MAX: 220 {BEATS}/MIN
MDC_IDC_STAT_HEART_RATE_VENTRICULAR_MEAN: 78 {BEATS}/MIN
MDC_IDC_STAT_HEART_RATE_VENTRICULAR_MIN: 40 {BEATS}/MIN

## 2020-02-25 ENCOUNTER — RECORDS - HEALTHEAST (OUTPATIENT)
Dept: ADMINISTRATIVE | Facility: OTHER | Age: 85
End: 2020-02-25

## 2020-03-23 ENCOUNTER — ANCILLARY PROCEDURE (OUTPATIENT)
Dept: CARDIOLOGY | Facility: CLINIC | Age: 85
End: 2020-03-23
Attending: INTERNAL MEDICINE
Payer: COMMERCIAL

## 2020-03-23 DIAGNOSIS — Z95.0 PACEMAKER: ICD-10-CM

## 2020-03-23 PROCEDURE — 93296 REM INTERROG EVL PM/IDS: CPT | Performed by: INTERNAL MEDICINE

## 2020-03-23 PROCEDURE — 93294 REM INTERROG EVL PM/LDLS PM: CPT | Performed by: INTERNAL MEDICINE

## 2020-04-03 LAB
MDC_IDC_EPISODE_DTM: NORMAL
MDC_IDC_EPISODE_DURATION: 130 S
MDC_IDC_EPISODE_DURATION: 138 S
MDC_IDC_EPISODE_DURATION: 14 S
MDC_IDC_EPISODE_DURATION: 14 S
MDC_IDC_EPISODE_DURATION: 16 S
MDC_IDC_EPISODE_DURATION: 18 S
MDC_IDC_EPISODE_DURATION: 40 S
MDC_IDC_EPISODE_DURATION: 42 S
MDC_IDC_EPISODE_ID: NORMAL
MDC_IDC_EPISODE_TYPE: NORMAL
MDC_IDC_EPISODE_VENDOR_TYPE: NORMAL
MDC_IDC_LEAD_IMPLANT_DT: NORMAL
MDC_IDC_LEAD_IMPLANT_DT: NORMAL
MDC_IDC_LEAD_LOCATION: NORMAL
MDC_IDC_LEAD_LOCATION: NORMAL
MDC_IDC_LEAD_LOCATION_DETAIL_1: NORMAL
MDC_IDC_LEAD_LOCATION_DETAIL_1: NORMAL
MDC_IDC_LEAD_MFG: NORMAL
MDC_IDC_LEAD_MFG: NORMAL
MDC_IDC_LEAD_MODEL: NORMAL
MDC_IDC_LEAD_MODEL: NORMAL
MDC_IDC_LEAD_SERIAL: NORMAL
MDC_IDC_LEAD_SERIAL: NORMAL
MDC_IDC_MSMT_BATTERY_DTM: NORMAL
MDC_IDC_MSMT_BATTERY_REMAINING_LONGEVITY: 107 MO
MDC_IDC_MSMT_BATTERY_REMAINING_PERCENTAGE: 95.5 %
MDC_IDC_MSMT_BATTERY_RRT_TRIGGER: NORMAL
MDC_IDC_MSMT_BATTERY_STATUS: NORMAL
MDC_IDC_MSMT_BATTERY_VOLTAGE: 3.01 V
MDC_IDC_MSMT_LEADCHNL_RA_IMPEDANCE_VALUE: 530 OHM
MDC_IDC_MSMT_LEADCHNL_RA_LEAD_CHANNEL_STATUS: NORMAL
MDC_IDC_MSMT_LEADCHNL_RA_PACING_THRESHOLD_AMPLITUDE: 0.62 V
MDC_IDC_MSMT_LEADCHNL_RA_PACING_THRESHOLD_PULSEWIDTH: 0.5 MS
MDC_IDC_MSMT_LEADCHNL_RA_SENSING_INTR_AMPL: 5 MV
MDC_IDC_MSMT_LEADCHNL_RV_IMPEDANCE_VALUE: 410 OHM
MDC_IDC_MSMT_LEADCHNL_RV_LEAD_CHANNEL_STATUS: NORMAL
MDC_IDC_MSMT_LEADCHNL_RV_PACING_THRESHOLD_AMPLITUDE: 0.75 V
MDC_IDC_MSMT_LEADCHNL_RV_PACING_THRESHOLD_PULSEWIDTH: 0.5 MS
MDC_IDC_MSMT_LEADCHNL_RV_SENSING_INTR_AMPL: 12 MV
MDC_IDC_PG_IMPLANT_DTM: NORMAL
MDC_IDC_PG_MFG: NORMAL
MDC_IDC_PG_MODEL: NORMAL
MDC_IDC_PG_SERIAL: NORMAL
MDC_IDC_PG_TYPE: NORMAL
MDC_IDC_SESS_CLINIC_NAME: NORMAL
MDC_IDC_SESS_DTM: NORMAL
MDC_IDC_SESS_REPROGRAMMED: NO
MDC_IDC_SESS_TYPE: NORMAL
MDC_IDC_SET_BRADY_AT_MODE_SWITCH_MODE: NORMAL
MDC_IDC_SET_BRADY_AT_MODE_SWITCH_RATE: 170 {BEATS}/MIN
MDC_IDC_SET_BRADY_LOWRATE: 60 {BEATS}/MIN
MDC_IDC_SET_BRADY_MAX_SENSOR_RATE: 130 {BEATS}/MIN
MDC_IDC_SET_BRADY_MAX_TRACKING_RATE: 120 {BEATS}/MIN
MDC_IDC_SET_BRADY_MODE: NORMAL
MDC_IDC_SET_BRADY_PAV_DELAY_LOW: 250 MS
MDC_IDC_SET_BRADY_SAV_DELAY_LOW: 250 MS
MDC_IDC_SET_LEADCHNL_RA_PACING_AMPLITUDE: 2 V
MDC_IDC_SET_LEADCHNL_RA_PACING_ANODE_ELECTRODE_1: NORMAL
MDC_IDC_SET_LEADCHNL_RA_PACING_ANODE_LOCATION_1: NORMAL
MDC_IDC_SET_LEADCHNL_RA_PACING_CAPTURE_MODE: NORMAL
MDC_IDC_SET_LEADCHNL_RA_PACING_CATHODE_ELECTRODE_1: NORMAL
MDC_IDC_SET_LEADCHNL_RA_PACING_CATHODE_LOCATION_1: NORMAL
MDC_IDC_SET_LEADCHNL_RA_PACING_POLARITY: NORMAL
MDC_IDC_SET_LEADCHNL_RA_PACING_PULSEWIDTH: 0.5 MS
MDC_IDC_SET_LEADCHNL_RA_SENSING_ADAPTATION_MODE: NORMAL
MDC_IDC_SET_LEADCHNL_RA_SENSING_ANODE_ELECTRODE_1: NORMAL
MDC_IDC_SET_LEADCHNL_RA_SENSING_ANODE_LOCATION_1: NORMAL
MDC_IDC_SET_LEADCHNL_RA_SENSING_CATHODE_ELECTRODE_1: NORMAL
MDC_IDC_SET_LEADCHNL_RA_SENSING_CATHODE_LOCATION_1: NORMAL
MDC_IDC_SET_LEADCHNL_RA_SENSING_POLARITY: NORMAL
MDC_IDC_SET_LEADCHNL_RA_SENSING_SENSITIVITY: 0.3 MV
MDC_IDC_SET_LEADCHNL_RV_PACING_AMPLITUDE: 2 V
MDC_IDC_SET_LEADCHNL_RV_PACING_ANODE_ELECTRODE_1: NORMAL
MDC_IDC_SET_LEADCHNL_RV_PACING_ANODE_LOCATION_1: NORMAL
MDC_IDC_SET_LEADCHNL_RV_PACING_CAPTURE_MODE: NORMAL
MDC_IDC_SET_LEADCHNL_RV_PACING_CATHODE_ELECTRODE_1: NORMAL
MDC_IDC_SET_LEADCHNL_RV_PACING_CATHODE_LOCATION_1: NORMAL
MDC_IDC_SET_LEADCHNL_RV_PACING_POLARITY: NORMAL
MDC_IDC_SET_LEADCHNL_RV_PACING_PULSEWIDTH: 0.5 MS
MDC_IDC_SET_LEADCHNL_RV_SENSING_ADAPTATION_MODE: NORMAL
MDC_IDC_SET_LEADCHNL_RV_SENSING_ANODE_ELECTRODE_1: NORMAL
MDC_IDC_SET_LEADCHNL_RV_SENSING_ANODE_LOCATION_1: NORMAL
MDC_IDC_SET_LEADCHNL_RV_SENSING_CATHODE_ELECTRODE_1: NORMAL
MDC_IDC_SET_LEADCHNL_RV_SENSING_CATHODE_LOCATION_1: NORMAL
MDC_IDC_SET_LEADCHNL_RV_SENSING_POLARITY: NORMAL
MDC_IDC_SET_LEADCHNL_RV_SENSING_SENSITIVITY: 0.5 MV
MDC_IDC_STAT_AT_BURDEN_PERCENT: 1 %
MDC_IDC_STAT_AT_DTM_END: NORMAL
MDC_IDC_STAT_AT_DTM_START: NORMAL
MDC_IDC_STAT_AT_MODE_SW_COUNT: 35
MDC_IDC_STAT_AT_MODE_SW_COUNT_PER_DAY: 1
MDC_IDC_STAT_AT_MODE_SW_MAX_DURATION: 150 S
MDC_IDC_STAT_AT_MODE_SW_PERCENT_TIME: 1 %
MDC_IDC_STAT_BRADY_AP_VP_PERCENT: 29 %
MDC_IDC_STAT_BRADY_AP_VS_PERCENT: 1 %
MDC_IDC_STAT_BRADY_AS_VP_PERCENT: 68 %
MDC_IDC_STAT_BRADY_AS_VS_PERCENT: 2.4 %
MDC_IDC_STAT_BRADY_DTM_END: NORMAL
MDC_IDC_STAT_BRADY_DTM_START: NORMAL
MDC_IDC_STAT_BRADY_RA_PERCENT_PACED: 28 %
MDC_IDC_STAT_BRADY_RV_PERCENT_PACED: 96 %
MDC_IDC_STAT_CRT_DTM_END: NORMAL
MDC_IDC_STAT_CRT_DTM_START: NORMAL
MDC_IDC_STAT_HEART_RATE_ATRIAL_MAX: 330 {BEATS}/MIN
MDC_IDC_STAT_HEART_RATE_ATRIAL_MEAN: 79 {BEATS}/MIN
MDC_IDC_STAT_HEART_RATE_ATRIAL_MIN: 40 {BEATS}/MIN
MDC_IDC_STAT_HEART_RATE_DTM_END: NORMAL
MDC_IDC_STAT_HEART_RATE_DTM_START: NORMAL
MDC_IDC_STAT_HEART_RATE_VENTRICULAR_MAX: 240 {BEATS}/MIN
MDC_IDC_STAT_HEART_RATE_VENTRICULAR_MEAN: 78 {BEATS}/MIN
MDC_IDC_STAT_HEART_RATE_VENTRICULAR_MIN: 40 {BEATS}/MIN

## 2020-04-07 ENCOUNTER — AMBULATORY - HEALTHEAST (OUTPATIENT)
Dept: LAB | Facility: HOSPITAL | Age: 85
End: 2020-04-07

## 2020-04-07 DIAGNOSIS — Z51.81 ENCOUNTER FOR THERAPEUTIC DRUG LEVEL MONITORING: ICD-10-CM

## 2020-04-07 DIAGNOSIS — I48.91 ATRIAL FIBRILLATION (H): ICD-10-CM

## 2020-04-07 DIAGNOSIS — D69.6 THROMBOCYTOPENIA (H): ICD-10-CM

## 2020-04-07 LAB
ERYTHROCYTE [DISTWIDTH] IN BLOOD BY AUTOMATED COUNT: 12.3 % (ref 11–14.5)
HCT VFR BLD AUTO: 43.6 % (ref 40–54)
HGB BLD-MCNC: 14.4 G/DL (ref 14–18)
INR PPP: 2.73 (ref 0.9–1.1)
MCH RBC QN AUTO: 32.5 PG (ref 27–34)
MCHC RBC AUTO-ENTMCNC: 33 G/DL (ref 32–36)
MCV RBC AUTO: 98 FL (ref 80–100)
PLATELET # BLD AUTO: 142 THOU/UL (ref 140–440)
PMV BLD AUTO: 12.9 FL (ref 8.5–12.5)
RBC # BLD AUTO: 4.43 MILL/UL (ref 4.4–6.2)
WBC: 5 THOU/UL (ref 4–11)

## 2020-05-18 ENCOUNTER — RECORDS - HEALTHEAST (OUTPATIENT)
Dept: ADMINISTRATIVE | Facility: OTHER | Age: 85
End: 2020-05-18

## 2020-06-22 ENCOUNTER — RECORDS - HEALTHEAST (OUTPATIENT)
Dept: ADMINISTRATIVE | Facility: OTHER | Age: 85
End: 2020-06-22

## 2020-07-01 ENCOUNTER — ANCILLARY PROCEDURE (OUTPATIENT)
Dept: CARDIOLOGY | Facility: CLINIC | Age: 85
End: 2020-07-01
Attending: INTERNAL MEDICINE
Payer: COMMERCIAL

## 2020-07-01 DIAGNOSIS — I49.5 SICK SINUS SYNDROME (H): Primary | ICD-10-CM

## 2020-07-01 PROCEDURE — 93294 REM INTERROG EVL PM/LDLS PM: CPT | Performed by: INTERNAL MEDICINE

## 2020-07-01 PROCEDURE — 93296 REM INTERROG EVL PM/IDS: CPT | Performed by: INTERNAL MEDICINE

## 2020-07-23 ENCOUNTER — RECORDS - HEALTHEAST (OUTPATIENT)
Dept: ADMINISTRATIVE | Facility: OTHER | Age: 85
End: 2020-07-23

## 2020-07-28 ENCOUNTER — RECORDS - HEALTHEAST (OUTPATIENT)
Dept: ADMINISTRATIVE | Facility: OTHER | Age: 85
End: 2020-07-28

## 2020-07-29 ENCOUNTER — RECORDS - HEALTHEAST (OUTPATIENT)
Dept: ADMINISTRATIVE | Facility: OTHER | Age: 85
End: 2020-07-29

## 2020-07-30 LAB
MDC_IDC_EPISODE_DTM: NORMAL
MDC_IDC_EPISODE_DTM: NORMAL
MDC_IDC_EPISODE_DURATION: 42 S
MDC_IDC_EPISODE_ID: NORMAL
MDC_IDC_EPISODE_ID: NORMAL
MDC_IDC_EPISODE_TYPE: NORMAL
MDC_IDC_EPISODE_TYPE: NORMAL
MDC_IDC_EPISODE_VENDOR_TYPE: NORMAL
MDC_IDC_LEAD_IMPLANT_DT: NORMAL
MDC_IDC_LEAD_IMPLANT_DT: NORMAL
MDC_IDC_LEAD_LOCATION: NORMAL
MDC_IDC_LEAD_LOCATION: NORMAL
MDC_IDC_LEAD_LOCATION_DETAIL_1: NORMAL
MDC_IDC_LEAD_LOCATION_DETAIL_1: NORMAL
MDC_IDC_LEAD_MFG: NORMAL
MDC_IDC_LEAD_MFG: NORMAL
MDC_IDC_LEAD_MODEL: NORMAL
MDC_IDC_LEAD_MODEL: NORMAL
MDC_IDC_LEAD_SERIAL: NORMAL
MDC_IDC_LEAD_SERIAL: NORMAL
MDC_IDC_MSMT_BATTERY_DTM: NORMAL
MDC_IDC_MSMT_BATTERY_REMAINING_LONGEVITY: 107 MO
MDC_IDC_MSMT_BATTERY_REMAINING_PERCENTAGE: 95.5 %
MDC_IDC_MSMT_BATTERY_RRT_TRIGGER: NORMAL
MDC_IDC_MSMT_BATTERY_STATUS: NORMAL
MDC_IDC_MSMT_BATTERY_VOLTAGE: 2.99 V
MDC_IDC_MSMT_LEADCHNL_RA_IMPEDANCE_VALUE: 490 OHM
MDC_IDC_MSMT_LEADCHNL_RA_LEAD_CHANNEL_STATUS: NORMAL
MDC_IDC_MSMT_LEADCHNL_RA_PACING_THRESHOLD_AMPLITUDE: 0.5 V
MDC_IDC_MSMT_LEADCHNL_RA_PACING_THRESHOLD_PULSEWIDTH: 0.5 MS
MDC_IDC_MSMT_LEADCHNL_RA_SENSING_INTR_AMPL: 5 MV
MDC_IDC_MSMT_LEADCHNL_RV_IMPEDANCE_VALUE: 430 OHM
MDC_IDC_MSMT_LEADCHNL_RV_LEAD_CHANNEL_STATUS: NORMAL
MDC_IDC_MSMT_LEADCHNL_RV_PACING_THRESHOLD_AMPLITUDE: 0.75 V
MDC_IDC_MSMT_LEADCHNL_RV_PACING_THRESHOLD_PULSEWIDTH: 0.5 MS
MDC_IDC_MSMT_LEADCHNL_RV_SENSING_INTR_AMPL: 8.2 MV
MDC_IDC_PG_IMPLANT_DTM: NORMAL
MDC_IDC_PG_MFG: NORMAL
MDC_IDC_PG_MODEL: NORMAL
MDC_IDC_PG_SERIAL: NORMAL
MDC_IDC_PG_TYPE: NORMAL
MDC_IDC_SESS_CLINIC_NAME: NORMAL
MDC_IDC_SESS_DTM: NORMAL
MDC_IDC_SESS_REPROGRAMMED: NO
MDC_IDC_SESS_TYPE: NORMAL
MDC_IDC_SET_BRADY_AT_MODE_SWITCH_MODE: NORMAL
MDC_IDC_SET_BRADY_AT_MODE_SWITCH_RATE: 170 {BEATS}/MIN
MDC_IDC_SET_BRADY_LOWRATE: 60 {BEATS}/MIN
MDC_IDC_SET_BRADY_MAX_SENSOR_RATE: 130 {BEATS}/MIN
MDC_IDC_SET_BRADY_MAX_TRACKING_RATE: 120 {BEATS}/MIN
MDC_IDC_SET_BRADY_MODE: NORMAL
MDC_IDC_SET_BRADY_PAV_DELAY_LOW: 250 MS
MDC_IDC_SET_BRADY_SAV_DELAY_LOW: 250 MS
MDC_IDC_SET_LEADCHNL_RA_PACING_AMPLITUDE: 2 V
MDC_IDC_SET_LEADCHNL_RA_PACING_ANODE_ELECTRODE_1: NORMAL
MDC_IDC_SET_LEADCHNL_RA_PACING_ANODE_LOCATION_1: NORMAL
MDC_IDC_SET_LEADCHNL_RA_PACING_CAPTURE_MODE: NORMAL
MDC_IDC_SET_LEADCHNL_RA_PACING_CATHODE_ELECTRODE_1: NORMAL
MDC_IDC_SET_LEADCHNL_RA_PACING_CATHODE_LOCATION_1: NORMAL
MDC_IDC_SET_LEADCHNL_RA_PACING_POLARITY: NORMAL
MDC_IDC_SET_LEADCHNL_RA_PACING_PULSEWIDTH: 0.5 MS
MDC_IDC_SET_LEADCHNL_RA_SENSING_ADAPTATION_MODE: NORMAL
MDC_IDC_SET_LEADCHNL_RA_SENSING_ANODE_ELECTRODE_1: NORMAL
MDC_IDC_SET_LEADCHNL_RA_SENSING_ANODE_LOCATION_1: NORMAL
MDC_IDC_SET_LEADCHNL_RA_SENSING_CATHODE_ELECTRODE_1: NORMAL
MDC_IDC_SET_LEADCHNL_RA_SENSING_CATHODE_LOCATION_1: NORMAL
MDC_IDC_SET_LEADCHNL_RA_SENSING_POLARITY: NORMAL
MDC_IDC_SET_LEADCHNL_RA_SENSING_SENSITIVITY: 0.3 MV
MDC_IDC_SET_LEADCHNL_RV_PACING_AMPLITUDE: 2 V
MDC_IDC_SET_LEADCHNL_RV_PACING_ANODE_ELECTRODE_1: NORMAL
MDC_IDC_SET_LEADCHNL_RV_PACING_ANODE_LOCATION_1: NORMAL
MDC_IDC_SET_LEADCHNL_RV_PACING_CAPTURE_MODE: NORMAL
MDC_IDC_SET_LEADCHNL_RV_PACING_CATHODE_ELECTRODE_1: NORMAL
MDC_IDC_SET_LEADCHNL_RV_PACING_CATHODE_LOCATION_1: NORMAL
MDC_IDC_SET_LEADCHNL_RV_PACING_POLARITY: NORMAL
MDC_IDC_SET_LEADCHNL_RV_PACING_PULSEWIDTH: 0.5 MS
MDC_IDC_SET_LEADCHNL_RV_SENSING_ADAPTATION_MODE: NORMAL
MDC_IDC_SET_LEADCHNL_RV_SENSING_ANODE_ELECTRODE_1: NORMAL
MDC_IDC_SET_LEADCHNL_RV_SENSING_ANODE_LOCATION_1: NORMAL
MDC_IDC_SET_LEADCHNL_RV_SENSING_CATHODE_ELECTRODE_1: NORMAL
MDC_IDC_SET_LEADCHNL_RV_SENSING_CATHODE_LOCATION_1: NORMAL
MDC_IDC_SET_LEADCHNL_RV_SENSING_POLARITY: NORMAL
MDC_IDC_SET_LEADCHNL_RV_SENSING_SENSITIVITY: 0.5 MV
MDC_IDC_STAT_AT_BURDEN_PERCENT: 1 %
MDC_IDC_STAT_AT_DTM_END: NORMAL
MDC_IDC_STAT_AT_DTM_START: NORMAL
MDC_IDC_STAT_AT_MODE_SW_COUNT: 23
MDC_IDC_STAT_AT_MODE_SW_COUNT_PER_DAY: 0
MDC_IDC_STAT_AT_MODE_SW_MAX_DURATION: 84 S
MDC_IDC_STAT_AT_MODE_SW_PERCENT_TIME: 1 %
MDC_IDC_STAT_BRADY_AP_VP_PERCENT: 28 %
MDC_IDC_STAT_BRADY_AP_VS_PERCENT: 1.4 %
MDC_IDC_STAT_BRADY_AS_VP_PERCENT: 66 %
MDC_IDC_STAT_BRADY_AS_VS_PERCENT: 4.1 %
MDC_IDC_STAT_BRADY_DTM_END: NORMAL
MDC_IDC_STAT_BRADY_DTM_START: NORMAL
MDC_IDC_STAT_BRADY_RA_PERCENT_PACED: 28 %
MDC_IDC_STAT_BRADY_RV_PERCENT_PACED: 94 %
MDC_IDC_STAT_CRT_DTM_END: NORMAL
MDC_IDC_STAT_CRT_DTM_START: NORMAL
MDC_IDC_STAT_HEART_RATE_ATRIAL_MAX: 320 {BEATS}/MIN
MDC_IDC_STAT_HEART_RATE_ATRIAL_MEAN: 82 {BEATS}/MIN
MDC_IDC_STAT_HEART_RATE_ATRIAL_MIN: 40 {BEATS}/MIN
MDC_IDC_STAT_HEART_RATE_DTM_END: NORMAL
MDC_IDC_STAT_HEART_RATE_DTM_START: NORMAL
MDC_IDC_STAT_HEART_RATE_VENTRICULAR_MAX: 270 {BEATS}/MIN
MDC_IDC_STAT_HEART_RATE_VENTRICULAR_MEAN: 80 {BEATS}/MIN
MDC_IDC_STAT_HEART_RATE_VENTRICULAR_MIN: 40 {BEATS}/MIN

## 2020-09-09 ENCOUNTER — RECORDS - HEALTHEAST (OUTPATIENT)
Dept: ADMINISTRATIVE | Facility: OTHER | Age: 85
End: 2020-09-09

## 2020-10-07 ENCOUNTER — ANCILLARY PROCEDURE (OUTPATIENT)
Dept: CARDIOLOGY | Facility: CLINIC | Age: 85
End: 2020-10-07
Attending: INTERNAL MEDICINE
Payer: COMMERCIAL

## 2020-10-07 DIAGNOSIS — I49.5 SICK SINUS SYNDROME (H): ICD-10-CM

## 2020-10-07 PROCEDURE — 93294 REM INTERROG EVL PM/LDLS PM: CPT | Performed by: INTERNAL MEDICINE

## 2020-10-07 PROCEDURE — 93296 REM INTERROG EVL PM/IDS: CPT | Performed by: INTERNAL MEDICINE

## 2020-10-14 LAB
MDC_IDC_EPISODE_DTM: NORMAL
MDC_IDC_EPISODE_DTM: NORMAL
MDC_IDC_EPISODE_DURATION: 16 S
MDC_IDC_EPISODE_ID: NORMAL
MDC_IDC_EPISODE_ID: NORMAL
MDC_IDC_EPISODE_TYPE: NORMAL
MDC_IDC_EPISODE_TYPE: NORMAL
MDC_IDC_EPISODE_VENDOR_TYPE: NORMAL
MDC_IDC_LEAD_IMPLANT_DT: NORMAL
MDC_IDC_LEAD_IMPLANT_DT: NORMAL
MDC_IDC_LEAD_LOCATION: NORMAL
MDC_IDC_LEAD_LOCATION: NORMAL
MDC_IDC_LEAD_LOCATION_DETAIL_1: NORMAL
MDC_IDC_LEAD_LOCATION_DETAIL_1: NORMAL
MDC_IDC_LEAD_MFG: NORMAL
MDC_IDC_LEAD_MFG: NORMAL
MDC_IDC_LEAD_MODEL: NORMAL
MDC_IDC_LEAD_MODEL: NORMAL
MDC_IDC_LEAD_SERIAL: NORMAL
MDC_IDC_LEAD_SERIAL: NORMAL
MDC_IDC_MSMT_BATTERY_DTM: NORMAL
MDC_IDC_MSMT_BATTERY_REMAINING_LONGEVITY: 106 MO
MDC_IDC_MSMT_BATTERY_REMAINING_PERCENTAGE: 95.5 %
MDC_IDC_MSMT_BATTERY_RRT_TRIGGER: NORMAL
MDC_IDC_MSMT_BATTERY_STATUS: NORMAL
MDC_IDC_MSMT_BATTERY_VOLTAGE: 2.99 V
MDC_IDC_MSMT_LEADCHNL_RA_IMPEDANCE_VALUE: 490 OHM
MDC_IDC_MSMT_LEADCHNL_RA_LEAD_CHANNEL_STATUS: NORMAL
MDC_IDC_MSMT_LEADCHNL_RA_PACING_THRESHOLD_AMPLITUDE: 0.5 V
MDC_IDC_MSMT_LEADCHNL_RA_PACING_THRESHOLD_PULSEWIDTH: 0.5 MS
MDC_IDC_MSMT_LEADCHNL_RA_SENSING_INTR_AMPL: 4.9 MV
MDC_IDC_MSMT_LEADCHNL_RV_IMPEDANCE_VALUE: 390 OHM
MDC_IDC_MSMT_LEADCHNL_RV_LEAD_CHANNEL_STATUS: NORMAL
MDC_IDC_MSMT_LEADCHNL_RV_PACING_THRESHOLD_AMPLITUDE: 0.75 V
MDC_IDC_MSMT_LEADCHNL_RV_PACING_THRESHOLD_PULSEWIDTH: 0.5 MS
MDC_IDC_MSMT_LEADCHNL_RV_SENSING_INTR_AMPL: 12 MV
MDC_IDC_PG_IMPLANT_DTM: NORMAL
MDC_IDC_PG_MFG: NORMAL
MDC_IDC_PG_MODEL: NORMAL
MDC_IDC_PG_SERIAL: NORMAL
MDC_IDC_PG_TYPE: NORMAL
MDC_IDC_SESS_CLINIC_NAME: NORMAL
MDC_IDC_SESS_DTM: NORMAL
MDC_IDC_SESS_REPROGRAMMED: NO
MDC_IDC_SESS_TYPE: NORMAL
MDC_IDC_SET_BRADY_AT_MODE_SWITCH_MODE: NORMAL
MDC_IDC_SET_BRADY_AT_MODE_SWITCH_RATE: 170 {BEATS}/MIN
MDC_IDC_SET_BRADY_LOWRATE: 60 {BEATS}/MIN
MDC_IDC_SET_BRADY_MAX_SENSOR_RATE: 130 {BEATS}/MIN
MDC_IDC_SET_BRADY_MAX_TRACKING_RATE: 120 {BEATS}/MIN
MDC_IDC_SET_BRADY_MODE: NORMAL
MDC_IDC_SET_BRADY_PAV_DELAY_LOW: 250 MS
MDC_IDC_SET_BRADY_SAV_DELAY_LOW: 250 MS
MDC_IDC_SET_LEADCHNL_RA_PACING_AMPLITUDE: 2 V
MDC_IDC_SET_LEADCHNL_RA_PACING_ANODE_ELECTRODE_1: NORMAL
MDC_IDC_SET_LEADCHNL_RA_PACING_ANODE_LOCATION_1: NORMAL
MDC_IDC_SET_LEADCHNL_RA_PACING_CAPTURE_MODE: NORMAL
MDC_IDC_SET_LEADCHNL_RA_PACING_CATHODE_ELECTRODE_1: NORMAL
MDC_IDC_SET_LEADCHNL_RA_PACING_CATHODE_LOCATION_1: NORMAL
MDC_IDC_SET_LEADCHNL_RA_PACING_POLARITY: NORMAL
MDC_IDC_SET_LEADCHNL_RA_PACING_PULSEWIDTH: 0.5 MS
MDC_IDC_SET_LEADCHNL_RA_SENSING_ADAPTATION_MODE: NORMAL
MDC_IDC_SET_LEADCHNL_RA_SENSING_ANODE_ELECTRODE_1: NORMAL
MDC_IDC_SET_LEADCHNL_RA_SENSING_ANODE_LOCATION_1: NORMAL
MDC_IDC_SET_LEADCHNL_RA_SENSING_CATHODE_ELECTRODE_1: NORMAL
MDC_IDC_SET_LEADCHNL_RA_SENSING_CATHODE_LOCATION_1: NORMAL
MDC_IDC_SET_LEADCHNL_RA_SENSING_POLARITY: NORMAL
MDC_IDC_SET_LEADCHNL_RA_SENSING_SENSITIVITY: 0.3 MV
MDC_IDC_SET_LEADCHNL_RV_PACING_AMPLITUDE: 2 V
MDC_IDC_SET_LEADCHNL_RV_PACING_ANODE_ELECTRODE_1: NORMAL
MDC_IDC_SET_LEADCHNL_RV_PACING_ANODE_LOCATION_1: NORMAL
MDC_IDC_SET_LEADCHNL_RV_PACING_CAPTURE_MODE: NORMAL
MDC_IDC_SET_LEADCHNL_RV_PACING_CATHODE_ELECTRODE_1: NORMAL
MDC_IDC_SET_LEADCHNL_RV_PACING_CATHODE_LOCATION_1: NORMAL
MDC_IDC_SET_LEADCHNL_RV_PACING_POLARITY: NORMAL
MDC_IDC_SET_LEADCHNL_RV_PACING_PULSEWIDTH: 0.5 MS
MDC_IDC_SET_LEADCHNL_RV_SENSING_ADAPTATION_MODE: NORMAL
MDC_IDC_SET_LEADCHNL_RV_SENSING_ANODE_ELECTRODE_1: NORMAL
MDC_IDC_SET_LEADCHNL_RV_SENSING_ANODE_LOCATION_1: NORMAL
MDC_IDC_SET_LEADCHNL_RV_SENSING_CATHODE_ELECTRODE_1: NORMAL
MDC_IDC_SET_LEADCHNL_RV_SENSING_CATHODE_LOCATION_1: NORMAL
MDC_IDC_SET_LEADCHNL_RV_SENSING_POLARITY: NORMAL
MDC_IDC_SET_LEADCHNL_RV_SENSING_SENSITIVITY: 0.5 MV
MDC_IDC_STAT_AT_BURDEN_PERCENT: 1 %
MDC_IDC_STAT_AT_DTM_END: NORMAL
MDC_IDC_STAT_AT_DTM_START: NORMAL
MDC_IDC_STAT_AT_MODE_SW_COUNT: 8
MDC_IDC_STAT_AT_MODE_SW_COUNT_PER_DAY: 0
MDC_IDC_STAT_AT_MODE_SW_MAX_DURATION: 68 S
MDC_IDC_STAT_AT_MODE_SW_PERCENT_TIME: 1 %
MDC_IDC_STAT_BRADY_AP_VP_PERCENT: 24 %
MDC_IDC_STAT_BRADY_AP_VS_PERCENT: 2.7 %
MDC_IDC_STAT_BRADY_AS_VP_PERCENT: 67 %
MDC_IDC_STAT_BRADY_AS_VS_PERCENT: 5.3 %
MDC_IDC_STAT_BRADY_DTM_END: NORMAL
MDC_IDC_STAT_BRADY_DTM_START: NORMAL
MDC_IDC_STAT_BRADY_RA_PERCENT_PACED: 26 %
MDC_IDC_STAT_BRADY_RV_PERCENT_PACED: 92 %
MDC_IDC_STAT_CRT_DTM_END: NORMAL
MDC_IDC_STAT_CRT_DTM_START: NORMAL
MDC_IDC_STAT_HEART_RATE_ATRIAL_MAX: 330 {BEATS}/MIN
MDC_IDC_STAT_HEART_RATE_ATRIAL_MEAN: 81 {BEATS}/MIN
MDC_IDC_STAT_HEART_RATE_ATRIAL_MIN: 50 {BEATS}/MIN
MDC_IDC_STAT_HEART_RATE_DTM_END: NORMAL
MDC_IDC_STAT_HEART_RATE_DTM_START: NORMAL
MDC_IDC_STAT_HEART_RATE_VENTRICULAR_MAX: 250 {BEATS}/MIN
MDC_IDC_STAT_HEART_RATE_VENTRICULAR_MEAN: 80 {BEATS}/MIN
MDC_IDC_STAT_HEART_RATE_VENTRICULAR_MIN: 40 {BEATS}/MIN

## 2020-11-04 ENCOUNTER — RECORDS - HEALTHEAST (OUTPATIENT)
Dept: ADMINISTRATIVE | Facility: OTHER | Age: 85
End: 2020-11-04

## 2020-11-06 ENCOUNTER — HOSPITAL ENCOUNTER (OUTPATIENT)
Dept: NUCLEAR MEDICINE | Facility: HOSPITAL | Age: 85
Discharge: HOME | End: 2020-11-06
Attending: PHYSICAL MEDICINE & REHABILITATION

## 2020-11-06 ENCOUNTER — AMBULATORY - HEALTHEAST (OUTPATIENT)
Dept: LAB | Facility: HOSPITAL | Age: 85
End: 2020-11-06

## 2020-11-06 DIAGNOSIS — J44.9 CHRONIC OBSTRUCTIVE PULMONARY DISEASE (COPD) (H): ICD-10-CM

## 2020-11-06 DIAGNOSIS — I48.91 ATRIAL FIBRILLATION (H): ICD-10-CM

## 2020-11-06 DIAGNOSIS — D69.6 THROMBOCYTOPENIA (H): ICD-10-CM

## 2020-11-06 DIAGNOSIS — M54.50 LOW BACK PAIN: ICD-10-CM

## 2020-11-06 DIAGNOSIS — E78.5 HYPERLIPEMIA: ICD-10-CM

## 2020-11-06 DIAGNOSIS — Z51.81 ENCOUNTER FOR MONITORING COUMADIN THERAPY: ICD-10-CM

## 2020-11-06 DIAGNOSIS — Z79.01 LONG TERM (CURRENT) USE OF ANTICOAGULANTS: ICD-10-CM

## 2020-11-06 DIAGNOSIS — R10.2 PELVIC AND PERINEAL PAIN: ICD-10-CM

## 2020-11-06 DIAGNOSIS — Z79.01 ENCOUNTER FOR MONITORING COUMADIN THERAPY: ICD-10-CM

## 2020-11-06 DIAGNOSIS — M25.559 HIP PAIN: ICD-10-CM

## 2020-11-06 DIAGNOSIS — M79.606 LEG PAIN: ICD-10-CM

## 2020-11-06 LAB
ALBUMIN SERPL-MCNC: 3.6 G/DL (ref 3.5–5)
ALP SERPL-CCNC: 115 U/L (ref 45–120)
ALT SERPL W P-5'-P-CCNC: 22 U/L (ref 0–45)
ANION GAP SERPL CALCULATED.3IONS-SCNC: 7 MMOL/L (ref 5–18)
AST SERPL W P-5'-P-CCNC: 23 U/L (ref 0–40)
BILIRUB SERPL-MCNC: 0.8 MG/DL (ref 0–1)
BUN SERPL-MCNC: 28 MG/DL (ref 8–28)
CALCIUM SERPL-MCNC: 8.8 MG/DL (ref 8.5–10.5)
CHLORIDE BLD-SCNC: 105 MMOL/L (ref 98–107)
CHOLEST SERPL-MCNC: 156 MG/DL
CO2 SERPL-SCNC: 29 MMOL/L (ref 22–31)
CREAT SERPL-MCNC: 0.92 MG/DL (ref 0.7–1.3)
ERYTHROCYTE [DISTWIDTH] IN BLOOD BY AUTOMATED COUNT: 12 % (ref 11–14.5)
FASTING STATUS PATIENT QL REPORTED: YES
GFR SERPL CREATININE-BSD FRML MDRD: >60 ML/MIN/1.73M2
GLUCOSE BLD-MCNC: 92 MG/DL (ref 70–125)
HCT VFR BLD AUTO: 43.1 % (ref 40–54)
HDLC SERPL-MCNC: 48 MG/DL
HGB BLD-MCNC: 14 G/DL (ref 14–18)
INR PPP: 2.58 (ref 0.9–1.1)
LDLC SERPL CALC-MCNC: 91 MG/DL
MCH RBC QN AUTO: 32.3 PG (ref 27–34)
MCHC RBC AUTO-ENTMCNC: 32.5 G/DL (ref 32–36)
MCV RBC AUTO: 100 FL (ref 80–100)
PLATELET # BLD AUTO: 110 THOU/UL (ref 140–440)
PMV BLD AUTO: 11 FL (ref 8.5–12.5)
POTASSIUM BLD-SCNC: 4.5 MMOL/L (ref 3.5–5)
PROT SERPL-MCNC: 6.1 G/DL (ref 6–8)
RBC # BLD AUTO: 4.33 MILL/UL (ref 4.4–6.2)
SODIUM SERPL-SCNC: 141 MMOL/L (ref 136–145)
TRIGL SERPL-MCNC: 86 MG/DL
WBC: 4.9 THOU/UL (ref 4–11)

## 2020-11-11 ENCOUNTER — HOSPITAL ENCOUNTER (OUTPATIENT)
Dept: CT IMAGING | Facility: CLINIC | Age: 85
Discharge: HOME | End: 2020-11-11
Attending: PHYSICAL MEDICINE & REHABILITATION

## 2020-11-11 ENCOUNTER — RECORDS - HEALTHEAST (OUTPATIENT)
Dept: ADMINISTRATIVE | Facility: OTHER | Age: 85
End: 2020-11-11

## 2020-11-11 DIAGNOSIS — R07.9 CHEST PAIN, UNSPECIFIED TYPE: ICD-10-CM

## 2020-12-21 ENCOUNTER — RECORDS - HEALTHEAST (OUTPATIENT)
Dept: ADMINISTRATIVE | Facility: OTHER | Age: 85
End: 2020-12-21

## 2020-12-28 ENCOUNTER — COMMUNICATION - HEALTHEAST (OUTPATIENT)
Dept: INTERNAL MEDICINE | Facility: CLINIC | Age: 85
End: 2020-12-28

## 2021-01-12 ENCOUNTER — COMMUNICATION - HEALTHEAST (OUTPATIENT)
Dept: ANTICOAGULATION | Facility: CLINIC | Age: 86
End: 2021-01-12

## 2021-01-12 ENCOUNTER — OFFICE VISIT - HEALTHEAST (OUTPATIENT)
Dept: INTERNAL MEDICINE | Facility: CLINIC | Age: 86
End: 2021-01-12

## 2021-01-12 DIAGNOSIS — Z86.718 HISTORY OF DEEP VENOUS THROMBOSIS: ICD-10-CM

## 2021-01-12 DIAGNOSIS — G89.29 CHRONIC RIGHT SHOULDER PAIN: ICD-10-CM

## 2021-01-12 DIAGNOSIS — J45.20 ASTHMA IN ADULT, MILD INTERMITTENT, UNCOMPLICATED: ICD-10-CM

## 2021-01-12 DIAGNOSIS — M25.511 CHRONIC RIGHT SHOULDER PAIN: ICD-10-CM

## 2021-01-12 DIAGNOSIS — Z95.0 HISTORY OF PERMANENT CARDIAC PACEMAKER PLACEMENT: ICD-10-CM

## 2021-01-12 DIAGNOSIS — R53.82 CHRONIC FATIGUE: ICD-10-CM

## 2021-01-12 DIAGNOSIS — E78.5 DYSLIPIDEMIA: ICD-10-CM

## 2021-01-12 DIAGNOSIS — M81.0 AGE-RELATED OSTEOPOROSIS WITHOUT CURRENT PATHOLOGICAL FRACTURE: ICD-10-CM

## 2021-01-12 DIAGNOSIS — D69.6 THROMBOCYTOPENIA (H): ICD-10-CM

## 2021-01-12 DIAGNOSIS — S22.000D COMPRESSION FRACTURE OF THORACIC VERTEBRA WITH ROUTINE HEALING, UNSPECIFIED THORACIC VERTEBRAL LEVEL, SUBSEQUENT ENCOUNTER: ICD-10-CM

## 2021-01-12 DIAGNOSIS — Z92.29 PERSONAL HISTORY OF OTHER DRUG THERAPY: ICD-10-CM

## 2021-01-12 LAB
ALBUMIN SERPL-MCNC: 3.9 G/DL (ref 3.5–5)
ALP SERPL-CCNC: 79 U/L (ref 45–120)
ALT SERPL W P-5'-P-CCNC: 20 U/L (ref 0–45)
ANION GAP SERPL CALCULATED.3IONS-SCNC: 9 MMOL/L (ref 5–18)
AST SERPL W P-5'-P-CCNC: 26 U/L (ref 0–40)
BASOPHILS # BLD AUTO: 0 THOU/UL (ref 0–0.2)
BASOPHILS NFR BLD AUTO: 1 % (ref 0–2)
BILIRUB SERPL-MCNC: 1.8 MG/DL (ref 0–1)
BUN SERPL-MCNC: 23 MG/DL (ref 8–28)
CALCIUM SERPL-MCNC: 9.3 MG/DL (ref 8.5–10.5)
CHLORIDE BLD-SCNC: 106 MMOL/L (ref 98–107)
CHOLEST SERPL-MCNC: 192 MG/DL
CO2 SERPL-SCNC: 26 MMOL/L (ref 22–31)
CREAT SERPL-MCNC: 0.94 MG/DL (ref 0.7–1.3)
EOSINOPHIL # BLD AUTO: 0.3 THOU/UL (ref 0–0.4)
EOSINOPHIL NFR BLD AUTO: 6 % (ref 0–6)
ERYTHROCYTE [DISTWIDTH] IN BLOOD BY AUTOMATED COUNT: 12 % (ref 11–14.5)
FASTING STATUS PATIENT QL REPORTED: NO
GFR SERPL CREATININE-BSD FRML MDRD: >60 ML/MIN/1.73M2
GLUCOSE BLD-MCNC: 87 MG/DL (ref 70–125)
HCT VFR BLD AUTO: 42.1 % (ref 40–54)
HDLC SERPL-MCNC: 60 MG/DL
HGB BLD-MCNC: 14.5 G/DL (ref 14–18)
INR PPP: 1.9 (ref 0.9–1.1)
LDLC SERPL CALC-MCNC: 107 MG/DL
LYMPHOCYTES # BLD AUTO: 1.1 THOU/UL (ref 0.8–4.4)
LYMPHOCYTES NFR BLD AUTO: 22 % (ref 20–40)
MCH RBC QN AUTO: 33.7 PG (ref 27–34)
MCHC RBC AUTO-ENTMCNC: 34.5 G/DL (ref 32–36)
MCV RBC AUTO: 98 FL (ref 80–100)
MONOCYTES # BLD AUTO: 0.5 THOU/UL (ref 0–0.9)
MONOCYTES NFR BLD AUTO: 9 % (ref 2–10)
NEUTROPHILS # BLD AUTO: 3.1 THOU/UL (ref 2–7.7)
NEUTROPHILS NFR BLD AUTO: 62 % (ref 50–70)
PLATELET # BLD AUTO: 132 THOU/UL (ref 140–440)
PMV BLD AUTO: 7.6 FL (ref 7–10)
POTASSIUM BLD-SCNC: 4.9 MMOL/L (ref 3.5–5)
PROT SERPL-MCNC: 6.5 G/DL (ref 6–8)
PTH-INTACT SERPL-MCNC: 41 PG/ML (ref 10–86)
RBC # BLD AUTO: 4.3 MILL/UL (ref 4.4–6.2)
SODIUM SERPL-SCNC: 141 MMOL/L (ref 136–145)
TRIGL SERPL-MCNC: 125 MG/DL
TSH SERPL DL<=0.005 MIU/L-ACNC: 1.23 UIU/ML (ref 0.3–5)
WBC: 5.1 THOU/UL (ref 4–11)

## 2021-01-12 ASSESSMENT — PATIENT HEALTH QUESTIONNAIRE - PHQ9: SUM OF ALL RESPONSES TO PHQ QUESTIONS 1-9: 4

## 2021-01-13 ENCOUNTER — ANCILLARY PROCEDURE (OUTPATIENT)
Dept: CARDIOLOGY | Facility: CLINIC | Age: 86
End: 2021-01-13
Attending: INTERNAL MEDICINE
Payer: COMMERCIAL

## 2021-01-13 DIAGNOSIS — Z95.0 CARDIAC PACEMAKER IN SITU: ICD-10-CM

## 2021-01-13 LAB
25(OH)D3 SERPL-MCNC: 27.9 NG/ML (ref 30–80)
25(OH)D3 SERPL-MCNC: 27.9 NG/ML (ref 30–80)
TESTOST SERPL-MCNC: 894 NG/DL (ref 221–716)

## 2021-01-13 PROCEDURE — 93296 REM INTERROG EVL PM/IDS: CPT | Performed by: INTERNAL MEDICINE

## 2021-01-13 PROCEDURE — 93294 REM INTERROG EVL PM/LDLS PM: CPT | Performed by: INTERNAL MEDICINE

## 2021-01-18 ENCOUNTER — COMMUNICATION - HEALTHEAST (OUTPATIENT)
Dept: INTERNAL MEDICINE | Facility: CLINIC | Age: 86
End: 2021-01-18

## 2021-01-18 ENCOUNTER — AMBULATORY - HEALTHEAST (OUTPATIENT)
Dept: LAB | Facility: CLINIC | Age: 86
End: 2021-01-18

## 2021-01-18 DIAGNOSIS — M81.0 AGE-RELATED OSTEOPOROSIS WITHOUT CURRENT PATHOLOGICAL FRACTURE: ICD-10-CM

## 2021-01-18 DIAGNOSIS — M81.0 OSTEOPOROSIS: ICD-10-CM

## 2021-01-18 LAB
ALBUMIN PERCENT: 66.5 % (ref 51–67)
ALBUMIN SERPL ELPH-MCNC: 4.3 G/DL (ref 3.2–4.7)
ALPHA 1 PERCENT: 2.7 % (ref 2–4)
ALPHA 2 PERCENT: 10 % (ref 5–13)
ALPHA1 GLOB SERPL ELPH-MCNC: 0.2 G/DL (ref 0.1–0.3)
ALPHA2 GLOB SERPL ELPH-MCNC: 0.6 G/DL (ref 0.4–0.9)
B-GLOBULIN SERPL ELPH-MCNC: 0.7 G/DL (ref 0.7–1.2)
BETA PERCENT: 10.2 % (ref 10–17)
CALCIUM 24H UR-MRATE: 144 MG/24HR (ref 25–300)
CALCIUM UR-MCNC: 6.2 MG/DL
GAMMA GLOB SERPL ELPH-MCNC: 0.7 G/DL (ref 0.6–1.4)
GAMMA GLOBULIN PERCENT: 10.6 % (ref 9–20)
MDC_IDC_EPISODE_DTM: NORMAL
MDC_IDC_EPISODE_DURATION: 1064 S
MDC_IDC_EPISODE_DURATION: 18 S
MDC_IDC_EPISODE_DURATION: 28 S
MDC_IDC_EPISODE_DURATION: 44 S
MDC_IDC_EPISODE_ID: NORMAL
MDC_IDC_EPISODE_TYPE: NORMAL
MDC_IDC_EPISODE_VENDOR_TYPE: NORMAL
MDC_IDC_LEAD_IMPLANT_DT: NORMAL
MDC_IDC_LEAD_IMPLANT_DT: NORMAL
MDC_IDC_LEAD_LOCATION: NORMAL
MDC_IDC_LEAD_LOCATION: NORMAL
MDC_IDC_LEAD_LOCATION_DETAIL_1: NORMAL
MDC_IDC_LEAD_LOCATION_DETAIL_1: NORMAL
MDC_IDC_LEAD_MFG: NORMAL
MDC_IDC_LEAD_MFG: NORMAL
MDC_IDC_LEAD_MODEL: NORMAL
MDC_IDC_LEAD_MODEL: NORMAL
MDC_IDC_LEAD_SERIAL: NORMAL
MDC_IDC_LEAD_SERIAL: NORMAL
MDC_IDC_MSMT_BATTERY_DTM: NORMAL
MDC_IDC_MSMT_BATTERY_REMAINING_LONGEVITY: 104 MO
MDC_IDC_MSMT_BATTERY_REMAINING_PERCENTAGE: 95.5 %
MDC_IDC_MSMT_BATTERY_RRT_TRIGGER: NORMAL
MDC_IDC_MSMT_BATTERY_STATUS: NORMAL
MDC_IDC_MSMT_BATTERY_VOLTAGE: 2.99 V
MDC_IDC_MSMT_LEADCHNL_RA_IMPEDANCE_VALUE: 480 OHM
MDC_IDC_MSMT_LEADCHNL_RA_LEAD_CHANNEL_STATUS: NORMAL
MDC_IDC_MSMT_LEADCHNL_RA_PACING_THRESHOLD_AMPLITUDE: 0.62 V
MDC_IDC_MSMT_LEADCHNL_RA_PACING_THRESHOLD_PULSEWIDTH: 0.5 MS
MDC_IDC_MSMT_LEADCHNL_RA_SENSING_INTR_AMPL: 5 MV
MDC_IDC_MSMT_LEADCHNL_RV_IMPEDANCE_VALUE: 390 OHM
MDC_IDC_MSMT_LEADCHNL_RV_LEAD_CHANNEL_STATUS: NORMAL
MDC_IDC_MSMT_LEADCHNL_RV_PACING_THRESHOLD_AMPLITUDE: 0.75 V
MDC_IDC_MSMT_LEADCHNL_RV_PACING_THRESHOLD_PULSEWIDTH: 0.5 MS
MDC_IDC_MSMT_LEADCHNL_RV_SENSING_INTR_AMPL: 12 MV
MDC_IDC_PG_IMPLANT_DTM: NORMAL
MDC_IDC_PG_MFG: NORMAL
MDC_IDC_PG_MODEL: NORMAL
MDC_IDC_PG_SERIAL: NORMAL
MDC_IDC_PG_TYPE: NORMAL
MDC_IDC_SESS_CLINIC_NAME: NORMAL
MDC_IDC_SESS_DTM: NORMAL
MDC_IDC_SESS_REPROGRAMMED: NO
MDC_IDC_SESS_TYPE: NORMAL
MDC_IDC_SET_BRADY_AT_MODE_SWITCH_MODE: NORMAL
MDC_IDC_SET_BRADY_AT_MODE_SWITCH_RATE: 170 {BEATS}/MIN
MDC_IDC_SET_BRADY_LOWRATE: 60 {BEATS}/MIN
MDC_IDC_SET_BRADY_MAX_SENSOR_RATE: 130 {BEATS}/MIN
MDC_IDC_SET_BRADY_MAX_TRACKING_RATE: 120 {BEATS}/MIN
MDC_IDC_SET_BRADY_MODE: NORMAL
MDC_IDC_SET_BRADY_PAV_DELAY_LOW: 250 MS
MDC_IDC_SET_BRADY_SAV_DELAY_LOW: 250 MS
MDC_IDC_SET_LEADCHNL_RA_PACING_AMPLITUDE: 2 V
MDC_IDC_SET_LEADCHNL_RA_PACING_ANODE_ELECTRODE_1: NORMAL
MDC_IDC_SET_LEADCHNL_RA_PACING_ANODE_LOCATION_1: NORMAL
MDC_IDC_SET_LEADCHNL_RA_PACING_CAPTURE_MODE: NORMAL
MDC_IDC_SET_LEADCHNL_RA_PACING_CATHODE_ELECTRODE_1: NORMAL
MDC_IDC_SET_LEADCHNL_RA_PACING_CATHODE_LOCATION_1: NORMAL
MDC_IDC_SET_LEADCHNL_RA_PACING_POLARITY: NORMAL
MDC_IDC_SET_LEADCHNL_RA_PACING_PULSEWIDTH: 0.5 MS
MDC_IDC_SET_LEADCHNL_RA_SENSING_ADAPTATION_MODE: NORMAL
MDC_IDC_SET_LEADCHNL_RA_SENSING_ANODE_ELECTRODE_1: NORMAL
MDC_IDC_SET_LEADCHNL_RA_SENSING_ANODE_LOCATION_1: NORMAL
MDC_IDC_SET_LEADCHNL_RA_SENSING_CATHODE_ELECTRODE_1: NORMAL
MDC_IDC_SET_LEADCHNL_RA_SENSING_CATHODE_LOCATION_1: NORMAL
MDC_IDC_SET_LEADCHNL_RA_SENSING_POLARITY: NORMAL
MDC_IDC_SET_LEADCHNL_RA_SENSING_SENSITIVITY: 0.3 MV
MDC_IDC_SET_LEADCHNL_RV_PACING_AMPLITUDE: 2 V
MDC_IDC_SET_LEADCHNL_RV_PACING_ANODE_ELECTRODE_1: NORMAL
MDC_IDC_SET_LEADCHNL_RV_PACING_ANODE_LOCATION_1: NORMAL
MDC_IDC_SET_LEADCHNL_RV_PACING_CAPTURE_MODE: NORMAL
MDC_IDC_SET_LEADCHNL_RV_PACING_CATHODE_ELECTRODE_1: NORMAL
MDC_IDC_SET_LEADCHNL_RV_PACING_CATHODE_LOCATION_1: NORMAL
MDC_IDC_SET_LEADCHNL_RV_PACING_POLARITY: NORMAL
MDC_IDC_SET_LEADCHNL_RV_PACING_PULSEWIDTH: 0.5 MS
MDC_IDC_SET_LEADCHNL_RV_SENSING_ADAPTATION_MODE: NORMAL
MDC_IDC_SET_LEADCHNL_RV_SENSING_ANODE_ELECTRODE_1: NORMAL
MDC_IDC_SET_LEADCHNL_RV_SENSING_ANODE_LOCATION_1: NORMAL
MDC_IDC_SET_LEADCHNL_RV_SENSING_CATHODE_ELECTRODE_1: NORMAL
MDC_IDC_SET_LEADCHNL_RV_SENSING_CATHODE_LOCATION_1: NORMAL
MDC_IDC_SET_LEADCHNL_RV_SENSING_POLARITY: NORMAL
MDC_IDC_SET_LEADCHNL_RV_SENSING_SENSITIVITY: 0.5 MV
MDC_IDC_STAT_AT_BURDEN_PERCENT: 1 %
MDC_IDC_STAT_AT_DTM_END: NORMAL
MDC_IDC_STAT_AT_DTM_START: NORMAL
MDC_IDC_STAT_AT_MODE_SW_COUNT: 6
MDC_IDC_STAT_AT_MODE_SW_COUNT_PER_DAY: 0
MDC_IDC_STAT_AT_MODE_SW_MAX_DURATION: 1062 S
MDC_IDC_STAT_AT_MODE_SW_PERCENT_TIME: 1 %
MDC_IDC_STAT_BRADY_AP_VP_PERCENT: 48 %
MDC_IDC_STAT_BRADY_AP_VS_PERCENT: 1 %
MDC_IDC_STAT_BRADY_AS_VP_PERCENT: 51 %
MDC_IDC_STAT_BRADY_AS_VS_PERCENT: 1 %
MDC_IDC_STAT_BRADY_DTM_END: NORMAL
MDC_IDC_STAT_BRADY_DTM_START: NORMAL
MDC_IDC_STAT_BRADY_RA_PERCENT_PACED: 47 %
MDC_IDC_STAT_BRADY_RV_PERCENT_PACED: 99 %
MDC_IDC_STAT_CRT_DTM_END: NORMAL
MDC_IDC_STAT_CRT_DTM_START: NORMAL
MDC_IDC_STAT_HEART_RATE_ATRIAL_MAX: 330 {BEATS}/MIN
MDC_IDC_STAT_HEART_RATE_ATRIAL_MEAN: 75 {BEATS}/MIN
MDC_IDC_STAT_HEART_RATE_ATRIAL_MIN: 40 {BEATS}/MIN
MDC_IDC_STAT_HEART_RATE_DTM_END: NORMAL
MDC_IDC_STAT_HEART_RATE_DTM_START: NORMAL
MDC_IDC_STAT_HEART_RATE_VENTRICULAR_MAX: 250 {BEATS}/MIN
MDC_IDC_STAT_HEART_RATE_VENTRICULAR_MEAN: 75 {BEATS}/MIN
MDC_IDC_STAT_HEART_RATE_VENTRICULAR_MIN: 40 {BEATS}/MIN
PATH ICD:: NORMAL
PROT PATTERN SERPL ELPH-IMP: NORMAL
PROT SERPL-MCNC: 6.4 G/DL (ref 6–8)
REVIEWING PATHOLOGIST: NORMAL
SPECIMEN VOL UR: 2325 ML

## 2021-01-21 ENCOUNTER — COMMUNICATION - HEALTHEAST (OUTPATIENT)
Dept: INTERNAL MEDICINE | Facility: CLINIC | Age: 86
End: 2021-01-21

## 2021-01-25 ENCOUNTER — COMMUNICATION - HEALTHEAST (OUTPATIENT)
Dept: INTERNAL MEDICINE | Facility: CLINIC | Age: 86
End: 2021-01-25

## 2021-01-28 ENCOUNTER — AMBULATORY - HEALTHEAST (OUTPATIENT)
Dept: NURSING | Facility: CLINIC | Age: 86
End: 2021-01-28

## 2021-02-08 ENCOUNTER — RECORDS - HEALTHEAST (OUTPATIENT)
Dept: BONE DENSITY | Facility: CLINIC | Age: 86
End: 2021-02-08

## 2021-02-08 ENCOUNTER — RECORDS - HEALTHEAST (OUTPATIENT)
Dept: ADMINISTRATIVE | Facility: OTHER | Age: 86
End: 2021-02-08

## 2021-02-08 DIAGNOSIS — M81.0 AGE-RELATED OSTEOPOROSIS WITHOUT CURRENT PATHOLOGICAL FRACTURE: ICD-10-CM

## 2021-02-09 ENCOUNTER — COMMUNICATION - HEALTHEAST (OUTPATIENT)
Dept: INTERNAL MEDICINE | Facility: CLINIC | Age: 86
End: 2021-02-09

## 2021-02-12 ENCOUNTER — COMMUNICATION - HEALTHEAST (OUTPATIENT)
Dept: ANTICOAGULATION | Facility: CLINIC | Age: 86
End: 2021-02-12

## 2021-02-12 ENCOUNTER — OFFICE VISIT - HEALTHEAST (OUTPATIENT)
Dept: INTERNAL MEDICINE | Facility: CLINIC | Age: 86
End: 2021-02-12

## 2021-02-12 DIAGNOSIS — Z86.718 HISTORY OF DEEP VENOUS THROMBOSIS: ICD-10-CM

## 2021-02-12 DIAGNOSIS — M54.50 CHRONIC MIDLINE LOW BACK PAIN WITHOUT SCIATICA: ICD-10-CM

## 2021-02-12 DIAGNOSIS — M81.0 AGE-RELATED OSTEOPOROSIS WITHOUT CURRENT PATHOLOGICAL FRACTURE: ICD-10-CM

## 2021-02-12 DIAGNOSIS — G89.29 CHRONIC MIDLINE LOW BACK PAIN WITHOUT SCIATICA: ICD-10-CM

## 2021-02-12 LAB — INR PPP: 2.2 (ref 0.9–1.1)

## 2021-03-12 ENCOUNTER — AMBULATORY - HEALTHEAST (OUTPATIENT)
Dept: LAB | Facility: CLINIC | Age: 86
End: 2021-03-12

## 2021-03-12 ENCOUNTER — COMMUNICATION - HEALTHEAST (OUTPATIENT)
Dept: ANTICOAGULATION | Facility: CLINIC | Age: 86
End: 2021-03-12

## 2021-03-12 DIAGNOSIS — Z86.718 HISTORY OF DEEP VENOUS THROMBOSIS: ICD-10-CM

## 2021-03-12 LAB — INR PPP: 1.9 (ref 0.9–1.1)

## 2021-03-31 ENCOUNTER — COMMUNICATION - HEALTHEAST (OUTPATIENT)
Dept: ANTICOAGULATION | Facility: CLINIC | Age: 86
End: 2021-03-31

## 2021-03-31 ENCOUNTER — AMBULATORY - HEALTHEAST (OUTPATIENT)
Dept: LAB | Facility: CLINIC | Age: 86
End: 2021-03-31

## 2021-03-31 DIAGNOSIS — Z86.718 HISTORY OF DEEP VENOUS THROMBOSIS: ICD-10-CM

## 2021-03-31 LAB — INR PPP: 1.9 (ref 0.9–1.1)

## 2021-04-14 ENCOUNTER — AMBULATORY - HEALTHEAST (OUTPATIENT)
Dept: LAB | Facility: CLINIC | Age: 86
End: 2021-04-14

## 2021-04-14 ENCOUNTER — COMMUNICATION - HEALTHEAST (OUTPATIENT)
Dept: ANTICOAGULATION | Facility: CLINIC | Age: 86
End: 2021-04-14

## 2021-04-14 DIAGNOSIS — Z86.718 HISTORY OF DEEP VENOUS THROMBOSIS: ICD-10-CM

## 2021-04-14 LAB — INR PPP: 2.2 (ref 0.9–1.1)

## 2021-04-19 ENCOUNTER — COMMUNICATION - HEALTHEAST (OUTPATIENT)
Dept: INTERNAL MEDICINE | Facility: CLINIC | Age: 86
End: 2021-04-19

## 2021-04-19 DIAGNOSIS — G89.29 CHRONIC MIDLINE LOW BACK PAIN WITHOUT SCIATICA: ICD-10-CM

## 2021-04-19 DIAGNOSIS — M54.50 CHRONIC MIDLINE LOW BACK PAIN WITHOUT SCIATICA: ICD-10-CM

## 2021-04-20 ENCOUNTER — COMMUNICATION - HEALTHEAST (OUTPATIENT)
Dept: INTERNAL MEDICINE | Facility: CLINIC | Age: 86
End: 2021-04-20

## 2021-04-20 ENCOUNTER — COMMUNICATION - HEALTHEAST (OUTPATIENT)
Dept: ANTICOAGULATION | Facility: CLINIC | Age: 86
End: 2021-04-20

## 2021-04-20 DIAGNOSIS — Z79.01 LONG TERM (CURRENT) USE OF ANTICOAGULANTS: ICD-10-CM

## 2021-04-20 DIAGNOSIS — Z86.718 HISTORY OF DEEP VENOUS THROMBOSIS: ICD-10-CM

## 2021-04-28 ENCOUNTER — AMBULATORY - HEALTHEAST (OUTPATIENT)
Dept: LAB | Facility: CLINIC | Age: 86
End: 2021-04-28

## 2021-04-28 ENCOUNTER — COMMUNICATION - HEALTHEAST (OUTPATIENT)
Dept: ANTICOAGULATION | Facility: CLINIC | Age: 86
End: 2021-04-28

## 2021-04-28 DIAGNOSIS — Z86.718 HISTORY OF DEEP VENOUS THROMBOSIS: ICD-10-CM

## 2021-04-28 LAB — INR PPP: 2.9 (ref 0.9–1.1)

## 2021-05-04 ENCOUNTER — RECORDS - HEALTHEAST (OUTPATIENT)
Dept: GENERAL RADIOLOGY | Facility: CLINIC | Age: 86
End: 2021-05-04

## 2021-05-04 ENCOUNTER — OFFICE VISIT - HEALTHEAST (OUTPATIENT)
Dept: INTERNAL MEDICINE | Facility: CLINIC | Age: 86
End: 2021-05-04

## 2021-05-04 DIAGNOSIS — M19.111 POST-TRAUMATIC OSTEOARTHRITIS OF RIGHT SHOULDER: ICD-10-CM

## 2021-05-04 DIAGNOSIS — M81.0 AGE-RELATED OSTEOPOROSIS WITHOUT CURRENT PATHOLOGICAL FRACTURE: ICD-10-CM

## 2021-05-04 DIAGNOSIS — Z96.653 STATUS POST TOTAL BILATERAL KNEE REPLACEMENT: ICD-10-CM

## 2021-05-04 DIAGNOSIS — Z96.653 PRESENCE OF ARTIFICIAL KNEE JOINT, BILATERAL: ICD-10-CM

## 2021-05-04 DIAGNOSIS — M19.111 POST-TRAUMATIC OSTEOARTHRITIS, RIGHT SHOULDER: ICD-10-CM

## 2021-05-05 ENCOUNTER — COMMUNICATION - HEALTHEAST (OUTPATIENT)
Dept: INTERNAL MEDICINE | Facility: CLINIC | Age: 86
End: 2021-05-05

## 2021-05-06 ENCOUNTER — ANCILLARY PROCEDURE (OUTPATIENT)
Dept: CARDIOLOGY | Facility: CLINIC | Age: 86
End: 2021-05-06
Attending: INTERNAL MEDICINE
Payer: COMMERCIAL

## 2021-05-06 DIAGNOSIS — Z95.0 CARDIAC PACEMAKER IN SITU: ICD-10-CM

## 2021-05-06 PROCEDURE — 93296 REM INTERROG EVL PM/IDS: CPT | Performed by: INTERNAL MEDICINE

## 2021-05-06 PROCEDURE — 93294 REM INTERROG EVL PM/LDLS PM: CPT | Performed by: INTERNAL MEDICINE

## 2021-05-10 LAB
MDC_IDC_EPISODE_DTM: NORMAL
MDC_IDC_EPISODE_DURATION: 108 S
MDC_IDC_EPISODE_DURATION: 40 S
MDC_IDC_EPISODE_DURATION: 62 S
MDC_IDC_EPISODE_ID: NORMAL
MDC_IDC_EPISODE_TYPE: NORMAL
MDC_IDC_EPISODE_VENDOR_TYPE: NORMAL
MDC_IDC_LEAD_IMPLANT_DT: NORMAL
MDC_IDC_LEAD_IMPLANT_DT: NORMAL
MDC_IDC_LEAD_LOCATION: NORMAL
MDC_IDC_LEAD_LOCATION: NORMAL
MDC_IDC_LEAD_LOCATION_DETAIL_1: NORMAL
MDC_IDC_LEAD_LOCATION_DETAIL_1: NORMAL
MDC_IDC_LEAD_MFG: NORMAL
MDC_IDC_LEAD_MFG: NORMAL
MDC_IDC_LEAD_MODEL: NORMAL
MDC_IDC_LEAD_MODEL: NORMAL
MDC_IDC_LEAD_POLARITY_TYPE: NORMAL
MDC_IDC_LEAD_POLARITY_TYPE: NORMAL
MDC_IDC_LEAD_SERIAL: NORMAL
MDC_IDC_LEAD_SERIAL: NORMAL
MDC_IDC_MSMT_BATTERY_DTM: NORMAL
MDC_IDC_MSMT_BATTERY_REMAINING_LONGEVITY: 104 MO
MDC_IDC_MSMT_BATTERY_REMAINING_PERCENTAGE: 95.5 %
MDC_IDC_MSMT_BATTERY_RRT_TRIGGER: NORMAL
MDC_IDC_MSMT_BATTERY_STATUS: NORMAL
MDC_IDC_MSMT_BATTERY_VOLTAGE: 2.99 V
MDC_IDC_MSMT_LEADCHNL_RA_IMPEDANCE_VALUE: 460 OHM
MDC_IDC_MSMT_LEADCHNL_RA_LEAD_CHANNEL_STATUS: NORMAL
MDC_IDC_MSMT_LEADCHNL_RA_PACING_THRESHOLD_AMPLITUDE: 0.5 V
MDC_IDC_MSMT_LEADCHNL_RA_PACING_THRESHOLD_PULSEWIDTH: 0.5 MS
MDC_IDC_MSMT_LEADCHNL_RA_SENSING_INTR_AMPL: 4.6 MV
MDC_IDC_MSMT_LEADCHNL_RV_IMPEDANCE_VALUE: 380 OHM
MDC_IDC_MSMT_LEADCHNL_RV_LEAD_CHANNEL_STATUS: NORMAL
MDC_IDC_MSMT_LEADCHNL_RV_PACING_THRESHOLD_AMPLITUDE: 0.75 V
MDC_IDC_MSMT_LEADCHNL_RV_PACING_THRESHOLD_PULSEWIDTH: 0.5 MS
MDC_IDC_MSMT_LEADCHNL_RV_SENSING_INTR_AMPL: 4.8 MV
MDC_IDC_PG_IMPLANT_DTM: NORMAL
MDC_IDC_PG_MFG: NORMAL
MDC_IDC_PG_MODEL: NORMAL
MDC_IDC_PG_SERIAL: NORMAL
MDC_IDC_PG_TYPE: NORMAL
MDC_IDC_SESS_CLINIC_NAME: NORMAL
MDC_IDC_SESS_DTM: NORMAL
MDC_IDC_SESS_REPROGRAMMED: NO
MDC_IDC_SESS_TYPE: NORMAL
MDC_IDC_SET_BRADY_AT_MODE_SWITCH_MODE: NORMAL
MDC_IDC_SET_BRADY_AT_MODE_SWITCH_RATE: 170 {BEATS}/MIN
MDC_IDC_SET_BRADY_LOWRATE: 60 {BEATS}/MIN
MDC_IDC_SET_BRADY_MAX_SENSOR_RATE: 130 {BEATS}/MIN
MDC_IDC_SET_BRADY_MAX_TRACKING_RATE: 120 {BEATS}/MIN
MDC_IDC_SET_BRADY_MODE: NORMAL
MDC_IDC_SET_BRADY_PAV_DELAY_LOW: 250 MS
MDC_IDC_SET_BRADY_SAV_DELAY_LOW: 250 MS
MDC_IDC_SET_LEADCHNL_RA_PACING_AMPLITUDE: 2 V
MDC_IDC_SET_LEADCHNL_RA_PACING_ANODE_ELECTRODE_1: NORMAL
MDC_IDC_SET_LEADCHNL_RA_PACING_ANODE_LOCATION_1: NORMAL
MDC_IDC_SET_LEADCHNL_RA_PACING_CAPTURE_MODE: NORMAL
MDC_IDC_SET_LEADCHNL_RA_PACING_CATHODE_ELECTRODE_1: NORMAL
MDC_IDC_SET_LEADCHNL_RA_PACING_CATHODE_LOCATION_1: NORMAL
MDC_IDC_SET_LEADCHNL_RA_PACING_POLARITY: NORMAL
MDC_IDC_SET_LEADCHNL_RA_PACING_PULSEWIDTH: 0.5 MS
MDC_IDC_SET_LEADCHNL_RA_SENSING_ADAPTATION_MODE: NORMAL
MDC_IDC_SET_LEADCHNL_RA_SENSING_ANODE_ELECTRODE_1: NORMAL
MDC_IDC_SET_LEADCHNL_RA_SENSING_ANODE_LOCATION_1: NORMAL
MDC_IDC_SET_LEADCHNL_RA_SENSING_CATHODE_ELECTRODE_1: NORMAL
MDC_IDC_SET_LEADCHNL_RA_SENSING_CATHODE_LOCATION_1: NORMAL
MDC_IDC_SET_LEADCHNL_RA_SENSING_POLARITY: NORMAL
MDC_IDC_SET_LEADCHNL_RA_SENSING_SENSITIVITY: 0.3 MV
MDC_IDC_SET_LEADCHNL_RV_PACING_AMPLITUDE: 2 V
MDC_IDC_SET_LEADCHNL_RV_PACING_ANODE_ELECTRODE_1: NORMAL
MDC_IDC_SET_LEADCHNL_RV_PACING_ANODE_LOCATION_1: NORMAL
MDC_IDC_SET_LEADCHNL_RV_PACING_CAPTURE_MODE: NORMAL
MDC_IDC_SET_LEADCHNL_RV_PACING_CATHODE_ELECTRODE_1: NORMAL
MDC_IDC_SET_LEADCHNL_RV_PACING_CATHODE_LOCATION_1: NORMAL
MDC_IDC_SET_LEADCHNL_RV_PACING_POLARITY: NORMAL
MDC_IDC_SET_LEADCHNL_RV_PACING_PULSEWIDTH: 0.5 MS
MDC_IDC_SET_LEADCHNL_RV_SENSING_ADAPTATION_MODE: NORMAL
MDC_IDC_SET_LEADCHNL_RV_SENSING_ANODE_ELECTRODE_1: NORMAL
MDC_IDC_SET_LEADCHNL_RV_SENSING_ANODE_LOCATION_1: NORMAL
MDC_IDC_SET_LEADCHNL_RV_SENSING_CATHODE_ELECTRODE_1: NORMAL
MDC_IDC_SET_LEADCHNL_RV_SENSING_CATHODE_LOCATION_1: NORMAL
MDC_IDC_SET_LEADCHNL_RV_SENSING_POLARITY: NORMAL
MDC_IDC_SET_LEADCHNL_RV_SENSING_SENSITIVITY: 0.5 MV
MDC_IDC_STAT_AT_BURDEN_PERCENT: 1 %
MDC_IDC_STAT_AT_DTM_END: NORMAL
MDC_IDC_STAT_AT_DTM_START: NORMAL
MDC_IDC_STAT_AT_MODE_SW_COUNT: 6
MDC_IDC_STAT_AT_MODE_SW_COUNT_PER_DAY: 0
MDC_IDC_STAT_AT_MODE_SW_MAX_DURATION: 104 S
MDC_IDC_STAT_AT_MODE_SW_PERCENT_TIME: 1 %
MDC_IDC_STAT_BRADY_AP_VP_PERCENT: 31 %
MDC_IDC_STAT_BRADY_AP_VS_PERCENT: 1 %
MDC_IDC_STAT_BRADY_AS_VP_PERCENT: 68 %
MDC_IDC_STAT_BRADY_AS_VS_PERCENT: 1 %
MDC_IDC_STAT_BRADY_DTM_END: NORMAL
MDC_IDC_STAT_BRADY_DTM_START: NORMAL
MDC_IDC_STAT_BRADY_RA_PERCENT_PACED: 31 %
MDC_IDC_STAT_BRADY_RV_PERCENT_PACED: 99 %
MDC_IDC_STAT_CRT_DTM_END: NORMAL
MDC_IDC_STAT_CRT_DTM_START: NORMAL
MDC_IDC_STAT_HEART_RATE_ATRIAL_MAX: 330 {BEATS}/MIN
MDC_IDC_STAT_HEART_RATE_ATRIAL_MEAN: 81 {BEATS}/MIN
MDC_IDC_STAT_HEART_RATE_ATRIAL_MIN: 50 {BEATS}/MIN
MDC_IDC_STAT_HEART_RATE_DTM_END: NORMAL
MDC_IDC_STAT_HEART_RATE_DTM_START: NORMAL
MDC_IDC_STAT_HEART_RATE_VENTRICULAR_MAX: 260 {BEATS}/MIN
MDC_IDC_STAT_HEART_RATE_VENTRICULAR_MEAN: 79 {BEATS}/MIN
MDC_IDC_STAT_HEART_RATE_VENTRICULAR_MIN: 40 {BEATS}/MIN

## 2021-05-26 ENCOUNTER — COMMUNICATION - HEALTHEAST (OUTPATIENT)
Dept: ANTICOAGULATION | Facility: CLINIC | Age: 86
End: 2021-05-26

## 2021-05-26 DIAGNOSIS — Z86.718 HISTORY OF DEEP VENOUS THROMBOSIS: ICD-10-CM

## 2021-05-27 VITALS
BODY MASS INDEX: 25.8 KG/M2 | TEMPERATURE: 97.5 F | DIASTOLIC BLOOD PRESSURE: 62 MMHG | OXYGEN SATURATION: 96 % | WEIGHT: 190.2 LBS | HEART RATE: 61 BPM | SYSTOLIC BLOOD PRESSURE: 108 MMHG

## 2021-05-27 ASSESSMENT — PATIENT HEALTH QUESTIONNAIRE - PHQ9: SUM OF ALL RESPONSES TO PHQ QUESTIONS 1-9: 4

## 2021-05-28 ASSESSMENT — ASTHMA QUESTIONNAIRES: ACT_TOTALSCORE: 23

## 2021-06-05 VITALS
OXYGEN SATURATION: 94 % | SYSTOLIC BLOOD PRESSURE: 116 MMHG | WEIGHT: 184.2 LBS | DIASTOLIC BLOOD PRESSURE: 68 MMHG | HEART RATE: 78 BPM | BODY MASS INDEX: 24.98 KG/M2

## 2021-06-05 VITALS
BODY MASS INDEX: 23.75 KG/M2 | OXYGEN SATURATION: 98 % | SYSTOLIC BLOOD PRESSURE: 100 MMHG | DIASTOLIC BLOOD PRESSURE: 72 MMHG | RESPIRATION RATE: 14 BRPM | WEIGHT: 175.13 LBS | HEART RATE: 81 BPM

## 2021-06-09 ENCOUNTER — COMMUNICATION - HEALTHEAST (OUTPATIENT)
Dept: ANTICOAGULATION | Facility: CLINIC | Age: 86
End: 2021-06-09

## 2021-06-09 ENCOUNTER — AMBULATORY - HEALTHEAST (OUTPATIENT)
Dept: LAB | Facility: CLINIC | Age: 86
End: 2021-06-09

## 2021-06-09 DIAGNOSIS — Z86.718 HISTORY OF DEEP VENOUS THROMBOSIS: ICD-10-CM

## 2021-06-09 LAB — INR PPP: 2.7 (ref 0.9–1.1)

## 2021-06-14 ENCOUNTER — COMMUNICATION - HEALTHEAST (OUTPATIENT)
Dept: SCHEDULING | Facility: CLINIC | Age: 86
End: 2021-06-14

## 2021-06-14 ENCOUNTER — HOSPITAL ENCOUNTER (EMERGENCY)
Dept: EMERGENCY MEDICINE | Facility: HOSPITAL | Age: 86
Discharge: HOME | End: 2021-06-14
Attending: EMERGENCY MEDICINE
Payer: COMMERCIAL

## 2021-06-14 DIAGNOSIS — S22.32XA CLOSED FRACTURE OF ONE RIB OF LEFT SIDE, INITIAL ENCOUNTER: ICD-10-CM

## 2021-06-14 LAB
ANION GAP SERPL CALCULATED.3IONS-SCNC: 8 MMOL/L (ref 5–18)
ATRIAL RATE - MUSE: 72 BPM
BUN SERPL-MCNC: 23 MG/DL (ref 8–28)
CALCIUM SERPL-MCNC: 9 MG/DL (ref 8.5–10.5)
CHLORIDE BLD-SCNC: 108 MMOL/L (ref 98–107)
CO2 SERPL-SCNC: 25 MMOL/L (ref 22–31)
CREAT SERPL-MCNC: 0.98 MG/DL (ref 0.7–1.3)
DIASTOLIC BLOOD PRESSURE - MUSE: NORMAL
ERYTHROCYTE [DISTWIDTH] IN BLOOD BY AUTOMATED COUNT: 12.9 % (ref 11–14.5)
GFR SERPL CREATININE-BSD FRML MDRD: >60 ML/MIN/1.73M2
GLUCOSE BLD-MCNC: 98 MG/DL (ref 70–125)
HCT VFR BLD AUTO: 47.6 % (ref 40–54)
HGB BLD-MCNC: 15.3 G/DL (ref 14–18)
INR PPP: 2.16 (ref 0.9–1.1)
INTERPRETATION ECG - MUSE: NORMAL
MCH RBC QN AUTO: 33.2 PG (ref 27–34)
MCHC RBC AUTO-ENTMCNC: 32.1 G/DL (ref 32–36)
MCV RBC AUTO: 103 FL (ref 80–100)
P AXIS - MUSE: 62 DEGREES
PLATELET # BLD AUTO: 141 THOU/UL (ref 140–440)
PMV BLD AUTO: 10.3 FL (ref 8.5–12.5)
POTASSIUM BLD-SCNC: 4.5 MMOL/L (ref 3.5–5)
PR INTERVAL - MUSE: 270 MS
QRS DURATION - MUSE: 168 MS
QT - MUSE: 440 MS
QTC - MUSE: 481 MS
R AXIS - MUSE: -78 DEGREES
RBC # BLD AUTO: 4.61 MILL/UL (ref 4.4–6.2)
SODIUM SERPL-SCNC: 141 MMOL/L (ref 136–145)
SYSTOLIC BLOOD PRESSURE - MUSE: NORMAL
T AXIS - MUSE: 92 DEGREES
TROPONIN I SERPL-MCNC: 0.04 NG/ML (ref 0–0.29)
VENTRICULAR RATE- MUSE: 72 BPM
WBC: 5.1 THOU/UL (ref 4–11)

## 2021-06-14 ASSESSMENT — MIFFLIN-ST. JEOR: SCORE: 1558.96

## 2021-06-14 NOTE — TELEPHONE ENCOUNTER
Who is calling?:Venessa  Reason for call: At office visit of 1/12/21, Dr Jack said patient should have a DXA scan done.  There is no order for it.   Have you been seen recently?: 1/12/21  Okay to leave a detailed message?:Yes

## 2021-06-14 NOTE — TELEPHONE ENCOUNTER
New Appointment Needed  What is the reason for the visit:    Same Date/Next Day Appt Request  What is the reason for your visit?: New patient, Dr Jane, from [place], referred Dr Jack. Would like to establish care with Dr Jack.     Provider Preference: Dr Jack   How soon do you need to be seen?: When he can get in.  Waitlist offered?: No  Okay to leave a detailed message:  Yes

## 2021-06-14 NOTE — PROGRESS NOTES
ASSESSMENT:  1.  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.  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  He was 6 foot 4 inches at that time.  He is now 5 feet 10 inches.  He sustained a pelvic fracture about 10 years ago, and has had 3 previous vertebroplasty's for compression fractures of the thoracic spine..  He cannot recall a previous bone density test.  He was treated with alendronate for 2 years many years ago.  He will be evaluated for secondary causes of low bone density.  Treatment options were discussed.  I have not been able to get coverage for males to take Evenity to this point.  He is not interested in Forteo or Tymlos due to daily injections.  He has a past history of Nissen fundoplication for large paraesophageal hernia.  I would be somewhat uncomfortable with oral bisphosphonates.  Prolia would be a good option.    2.  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  He is not aware of any work-up for underlying clotting disorders.    3.  History of thrombocytopenia:  He reports this has been monitored.  He has not had bleeding issues.    4.  History of pacemaker placement for sick sinus syndrome    5.  Osteoarthritis with previous left and right total knee replacements:  He reports that knees are doing well.    6.  Mild intermittent asthma:  He does not have any major issues with this.    7.  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  No further evaluation has been advised.    8.  Health maintenance:  Health maintenance and screening issues were discussed    9.  Shoulder pain:  He is interested in physical therapy for this.  If symptoms persist, an x-ray and further evaluation would be advised    PLAN:  1.  Laboratory evaluation for secondary causes of  osteoporosis.    2.  Schedule DEXA scan    3.  Check insurance for Prolia.    4.  Desired calcium intake 1200 to 1500 mg daily between diet and supplement.    5.  Covid vaccine when available    6.  See in 1 month for follow-up    7.  Check INR.  Arrange follow-up through anticoagulation clinic.      Orders Placed This Encounter   Procedures     INR     Electrophoresis, Protein, Serum     Vitamin D, Total (25-Hydroxy)     Testosterone, Total     Parathyroid Hormone Intact     Thyroid Stimulating Hormone (TSH)     Comprehensive Metabolic Panel     Lipid Cascade     Order Specific Question:   Fasting is required?     Answer:   Unknown     HM1 (CBC with Diff)     Calcium, 24 Hour Urine     Standing Status:   Future     Standing Expiration Date:   1/12/2022     Ambulatory referral to Anticoagulation Monitoring     Referral Priority:   Routine     Referral Type:   Health Education     Referral Reason:   Evaluation and Treatment     Requested Specialty:   Hematology     Number of Visits Requested:   1     Ambulatory referral to PT/OT     Referral Priority:   Routine     Referral Type:   Physical Therapy or Occupational Therapy     Referral Reason:   Evaluation and Treatment     Requested Specialty:   Physical Therapy     Number of Visits Requested:   1     Medications Discontinued During This Encounter   Medication Reason     denosumab 60 mg (PROLIA 60 mg/ml)        Return in about 4 weeks (around 2/9/2021) for Recheck.    ASSESSED PROBLEMS:  1. Age-related osteoporosis without current pathological fracture  Electrophoresis, Protein, Serum    Vitamin D, Total (25-Hydroxy)    Testosterone, Total    Parathyroid Hormone Intact    Comprehensive Metabolic Panel    Calcium, 24 Hour Urine    denosumab 60 mg (PROLIA 60 mg/ml)    DISCONTINUED: denosumab 60 mg (PROLIA 60 mg/ml)   2. Thrombocytopenia (H)  HM1(CBC and Differential)   3. Chronic fatigue  Thyroid Stimulating Hormone (TSH)   4. History of deep venous thrombosis   "Ambulatory referral to Anticoagulation Monitoring    INR   5. Dyslipidemia  Lipid Cascade   6. Chronic right shoulder pain  Ambulatory referral to PT/OT   7. Personal history of other drug therapy  denosumab 60 mg (PROLIA 60 mg/ml)   8. Compression fracture of thoracic vertebra with routine healing, unspecified thoracic vertebral level, subsequent encounter     9. Asthma in adult, mild intermittent, uncomplicated     10. History of permanent cardiac pacemaker placement         CHIEF COMPLAINT:  Chief Complaint   Patient presents with     Establish Care     Pt states just moved form [place].        HISTORY OF PRESENT ILLNESS:  Victorino is a 86 y.o. male seen for first visit, and to assume care.  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  He is moving to town to be near his \"significant other \".  He has already seen a pulmonary physician in the area for mild intermittent asthma, and has cardiology follow-up due to pacemaker placement for sick sinus syndrome.    His history is remarkable for multiple fragility fractures on the spine with 5 inch height loss since young adulthood.  He reports he previously played center for the [employer] basketball team.  He is now 5 foot 10 inches.  He has also had a pelvic fracture.  He was treated with alendronate for 2 years many years ago.  He does not recall having a bone density test done.  He is a non-smoker.  He denies chronic steroid therapy.  There is no history of nephrolithiasis.  He denies any recent issues with excessive alcohol intake.    He had a Nissen fundoplication for large paraesophageal hernia in 2011.    He has had bilateral total knee replacements with good response.    He does note chronic back pain.  He also more recently has had shoulder pain aggravated by abduction.    REVIEW OF SYSTEMS:    Comprehensive review of systems is otherwise negative.    PFSH:  Seen with his significant other.  [place]    TOBACCO " USE:  Social History     Tobacco Use   Smoking Status Former Smoker   Smokeless Tobacco Never Used       VITALS:  Vitals:    01/12/21 1246   BP: 100/72   Patient Site: Left Arm   Patient Position: Sitting   Cuff Size: Adult Regular   Pulse: 81   Resp: 14   SpO2: 98%   Weight: 175 lb 2 oz (79.4 kg)     Wt Readings from Last 3 Encounters:   01/12/21 175 lb 2 oz (79.4 kg)       PHYSICAL EXAM:  Constitutional:   Reveals an alert pleasant talkative man with appropriate affect.  Vitals: per nursing notes.  HEENT:  Ears:  External canals, TMs clear.    Eyes:  EOMs full, PERRL.  Oropharynx:   Mouth and throat clear, no thrush or exudate.  Neck:  Supple, no carotid bruits or adenopathy.  Back: Prominent thoracic kyphosis.  Limited extension and rotation  Thorax:  No bony deformities.  Pacer left chest  Lungs: Clear to A&P without rales or wheezes.  Respiratory effort normal.  Cardiac:   Regular rate and rhythm, normal S1, S2, no murmur or gallop.  Breasts:   No masses, no axillary adenopathy.  Abdomen:  Soft, active bowel sounds without bruits, mass, or tenderness.  : Exam deferred  Extremities:   No peripheral edema, pulses in the feet intact.  Scars over knees consistent with total knee replacements.  Right shoulder discomfort with abduction  Skin:  No jaundice, peripheral cyanosis or lesions to suggest malignancy.  Neuro:  Alert and oriented. Cranial nerves, motor, sensory exams are intact.  No gross focal deficits.  Psychiatric:  Memory intact, mood appropriate.    DATA REVIEWED:  Additional History from Old Records Summarized (2):  care everywhere records were extensively reviewed  Decision to Obtain Records (1): None.   Radiology Tests Summarized or Ordered (1): Prior x-rays, CTs and MRIs reviewed.  DEXA scan ordered  Labs Reviewed or Ordered (1): Labs reviewed and ordered   medicine Test Summarized or Ordered (1):  echocardiogram from 2017 was reviewed.  Ejection fraction was 55 to 60%    Independent Review of EKG  or X-RAY(2 each): None.    The visit lasted a total of 40 minutes face to face with the patient. Over 50% of the time was spent counseling and educating the patient about management of osteoporosis.    Dragon dictation was used for this note. Speech recognition errors are a possibility.     MEDICATIONS:  Current Outpatient Medications   Medication Sig Dispense Refill     fluticasone propion-salmeteroL (ADVAIR HFA) 230-21 mcg/actuation inhaler Inhale 2 puffs.       acetaminophen-codeine (TYLENOL #3) 300-30 mg per tablet Take 1 tablet by mouth every 6 (six) hours as needed.       aspirin 81 MG EC tablet Take 81 mg by mouth.       calcium-vitamin D3-vitamin K 500 mg-1,000 unit-40 mcg Chew        cholecalciferol, vitamin D3, 10 mcg (400 unit) cap Daily.       fluticasone propionate (FLONASE) 50 mcg/actuation nasal spray Daily.       multivitamin (ONE A DAY) per tablet Daily.       warfarin ANTICOAGULANT (COUMADIN/JANTOVEN) 4 MG tablet Daily.       Current Facility-Administered Medications   Medication Dose Route Frequency Provider Last Rate Last Admin     [START ON 1/26/2021] denosumab 60 mg (PROLIA 60 mg/ml)  60 mg Subcutaneous Once Jez Jack MD

## 2021-06-14 NOTE — PATIENT INSTRUCTIONS - HE
1.  CHECK INSURANCE FOR PROLIA    2. CHECK 24 HOUR URINE CALCIUM.  SKIP CALCIUM SUPPLEMENT FOR THREE DAYS PRIOR TO CHECKING    3. I WILL NOTIFY YOU OF TEST RESULTS    4.  SCHEDULE DXA SCAN.    5.  COVID VACCINE WHEN AVAILABLE    6.  INR TODAY.  ANTICOAGULATION CLINIC WILL CONTACT YOU.     7.  PHYSICAL THERAPY FOR SHOULDER PAIN    8.  SEE IN ONE MONTH

## 2021-06-14 NOTE — PROGRESS NOTES
Eddy: Your vitamin D level is slightly low at 27.9.  Increase your supplement by 2000 IU daily.  This may be purchased without a prescription.  Thyroid, parathyroid hormone levels, and  Testosterone are in the normal range.  Platelet count is slightly low at 132,000.  This is not low enough to cause any bleeding issues.  Lipids are good with total cholesterol of 192 and a LDL fraction of 107.  Other labs including your potassium, blood sugar, liver and kidney tests are good.  It was nice to meet you.  I will notify you of your bone density results when available.    Jez Jack MD

## 2021-06-15 NOTE — TELEPHONE ENCOUNTER
ANTICOAGULATION  MANAGEMENT    Assessment     Today's INR result of 2.2 is Therapeutic (goal INR of 2.0-3.0)        Warfarin taken as previously instructed    No new diet changes affecting INR    No new medication/supplements affecting INR    Continues to tolerate warfarin with no reported s/s of bleeding or thromboembolism     Previous INR was Subtherapeutic    Plan:     Spoke on phone with Victorino regarding INR result and instructed:      Warfarin Dosing Instructions:  Continue current warfarin dose 2 mg daily on tue/thu/sat; and 4 mg daily rest of week  (0 % change)    Instructed patient to follow up no later than: one month    Eddy verbalizes understanding and agrees to warfarin dosing plan.    Instructed to call the [facility] for any changes, questions or concerns. (#[phone])   ?   Luís Williamson RN    Subjective/Objective:      Victorino MCGRATH Montymeka, a 86 y.o. male is on warfarin. Victorino Gleason reports:     Home warfarin dose: as updated on anticoagulation calendar per template     Missed doses: No     Medication changes:  No     S/S of bleeding or thromboembolism:  No     New Injury or illness:  No     Changes in diet or alcohol consumption:  No     Upcoming surgery, procedure or cardioversion:  No    Anticoagulation Episode Summary     Current INR goal:  2.0-3.0   TTR:  95.9 % (3 wk)   Next INR check:  3/12/2021   INR from last check:  2.20 (2/12/2021)   Weekly max warfarin dose:     Target end date:  Indefinite   INR check location:     Preferred lab:     Send INR reminders to:  JOSEFINA MIDWAY    Indications    History of deep venous thrombosis [Z86.718]           Comments:           Anticoagulation Care Providers     Provider Role Specialty Phone number    Jez Jack MD Referring Internal Medicine [phone]

## 2021-06-15 NOTE — PROGRESS NOTES
ASSESSMENT:  1.  Osteoporosis:  Eddy has started Prolia with the first injection on 1/28.  He has tolerated this without adverse effects.  Bone density should be rechecked in 1 year.  Labs to screen for secondary causes of low bone density were reviewed.  Aside from a slightly low vitamin D level, labs were fairly unremarkable.    2.  Chronic back pain:  This is related to his multiple vertebral fractures.  He should not use NSAIDs since he is on chronic anticoagulation with warfarin.  Tylenol 1000 mg 3 times daily was advised.  He is also interested in a trial of gabapentin.    3.  History of deep venous thrombosis:  On chronic anticoagulation with warfarin    PLAN:  1.  Continue Prolia every 6 months.  Recheck bone density in 1 year.    2.  Double current vitamin D supplement.    3.  Take Tylenol 1000 mg 3 times daily    4.  Add gabapentin 100 mg at bedtime for 2 days, twice daily for 2 days, then 3 times daily.  Gradually work up to 300 mg 3 times daily.  Report effect on back pain 1 on this dose.    5.  Annual wellness visit next January    Orders Placed This Encounter   Procedures     DXA Bone Density Scan     Standing Status:   Future     Standing Expiration Date:   8/12/2022     Order Specific Question:   Can the procedure be changed per Radiologist protocol?     Answer:   Yes     Medications Discontinued During This Encounter   Medication Reason     acetaminophen-codeine (TYLENOL #3) 300-30 mg per tablet Therapy completed           ASSESSED PROBLEMS:  1. Chronic midline low back pain without sciatica  gabapentin (NEURONTIN) 100 MG capsule   2. Age-related osteoporosis without current pathological fracture  DXA Bone Density Scan   3. History of deep venous thrombosis  INR       CHIEF COMPLAINT:  Chief Complaint   Patient presents with     Follow-up       HISTORY OF PRESENT ILLNESS:  Victorino is a 87 y.o. male seen for follow-up of osteoporosis and other concerns.  Labs done to screen for secondary causes of low  bone density were reviewed.  All were unremarkable aside from a slightly low vitamin D level.  He has received his firstProlia injection without adverse effects.  He has been advised to increase his vitamin D supplement.  Appropriate levels of light weightbearing exercise were discussed    He has noted chronic issues with low and mid back pain.  This is nonradicular.  It is undoubtedly aggravated by his kyphosis.  Management options were reviewed.    He remains on warfarin due to recurrent DVT    REVIEW OF SYSTEMS:    Comprehensive review of systems is otherwise negative.    PFSH:  Seen with his significant other.  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    Vitals: per nursing notes.  HEENT: Atraumatic.  Neck:  Supple, no carotid bruits or adenopathy.  Back: Prominent thoracic kyphosis with limited extension  Thorax:  No bony deformities.  Lungs: Clear to A&P without rales or wheezes.  Respiratory effort normal.  Cardiac:   Regular rate and rhythm, normal S1, S2, no murmur or gallop.  Abdomen:  Soft, active bowel sounds without bruits, mass, or tenderness..  Extremities:   No peripheral edema.    Skin:  No jaundice, peripheral cyanosis or lesions to suggest malignancy.  Neuro:  Alert and oriented.   No gross focal deficits.  Psychiatric:  Memory intact, mood  appropriate.    DATA REVIEWED:  Additional History from Old Records Summarized (2): Old records reviewed  Decision to Obtain Records (1): None.   Radiology Tests Summarized or Ordered (1): DEXA reviewed  Labs Reviewed or Ordered (1): Labs reviewed  Medicine Test Summarized or Ordered (1): None.   Independent Review of EKG or X-RAY(2 each): None.    The visit lasted a total of 28 minutes face to face with the patient. Over 50% of the time was spent counseling and educating the patient about management of osteoporosis and chronic back pain    Dragon dictation was used for this note. Speech recognition errors are a possibility.     MEDICATIONS:  Current Outpatient Medications   Medication Sig Dispense Refill     aspirin 81 MG EC tablet Take 81 mg by mouth.       calcium-vitamin D3-vitamin K 500 mg-1,000 unit-40 mcg Chew        cholecalciferol, vitamin D3, 10 mcg (400 unit) cap Daily.       fluticasone propionate (FLONASE) 50 mcg/actuation nasal spray Daily.       multivitamin (ONE A DAY) per tablet Daily.       warfarin ANTICOAGULANT (COUMADIN/JANTOVEN) 4 MG tablet Daily.       fluticasone propion-salmeteroL (ADVAIR HFA) 230-21 mcg/actuation inhaler Inhale 2 puffs.       gabapentin (NEURONTIN) 100 MG capsule One at bedtime for two days, twice daily for two days, then three times daily.  Work up to three tabs three times daily 120 capsule 6     No current facility-administered medications for this visit.

## 2021-06-15 NOTE — PATIENT INSTRUCTIONS - HE
1.  Start Gabapentin 100 mg at bedtime for two days, twice daily for two days, then three times daily.  Gradually work up to three tabs three times daily.    2.  Prolia every six months    3.  Double the current vitamin D supplement to two tabs daily.  (Desired is 2000 international unit(s) daily).    4.  Recheck bone density in one year    5.  Take tylenol 1000 mg three times daily for back pain    6.  Annual wellness exam next January

## 2021-06-15 NOTE — PROGRESS NOTES
Eddy: Please review your bone density test result.  I would advise rechecking this after 1 year on Prolia.    Jez Jack MD

## 2021-06-16 ENCOUNTER — AMBULATORY - HEALTHEAST (OUTPATIENT)
Dept: ANTICOAGULATION | Facility: CLINIC | Age: 86
End: 2021-06-16

## 2021-06-16 DIAGNOSIS — Z86.718 HISTORY OF DEEP VENOUS THROMBOSIS: ICD-10-CM

## 2021-06-16 NOTE — TELEPHONE ENCOUNTER
Requested Prescriptions     Pending Prescriptions Disp Refills     warfarin ANTICOAGULANT (COUMADIN/JANTOVEN) 4 MG tablet 90 tablet 1     Sig: Take 1/2 tablet (2mg) to 1 tablet (4mg) by mouth daily, ad directes. Adjust dose based on INR results.

## 2021-06-16 NOTE — TELEPHONE ENCOUNTER
ANTICOAGULATION  MANAGEMENT    Assessment     Today's INR result of 2.2 is Therapeutic (goal INR of 2.0-3.0)        Warfarin taken as previously instructed. Wrote that he takes on 2mg on Thursday instead of Tuesdays, states he always makes sure he takes 2mg one day a week and 4mg all the others to make 26mg/week.  No change in weekly dosing.     No new diet changes affecting INR    No new medication/supplements affecting INR    Continues to tolerate warfarin with no reported s/s of bleeding or thromboembolism     Previous INR was Therapeutic    Plan:     Spoke on phone with Victorino regarding INR result and instructed:      Warfarin Dosing Instructions:  Continue current warfarin dose 2 mg daily on Thurs; and 4 mg daily rest of week  (0 % change)    Instructed patient to follow up no later than: 2 weeks     Education provided: importance of therapeutic range, target INR goal and significance of current INR result and importance of taking warfarin as instructed    Victorino verbalizes understanding and agrees to warfarin dosing plan.    Instructed to call the [facility] for any changes, questions or concerns. (#[phone])   ?   Laurence Sheridan RN    Subjective/Objective:      Victorino Khan, a 87 y.o. male is on warfarin. Victorino Gleason reports:     Home warfarin dose: verbally confirmed home dose with Victorino and updated on anticoagulation calendar     Missed doses: No     Medication changes:  No     S/S of bleeding or thromboembolism:  No     New Injury or illness:  No     Changes in diet or alcohol consumption:  No     Upcoming surgery, procedure or cardioversion:  No    Anticoagulation Episode Summary     Current INR goal:  2.0-3.0   TTR:  59.0 % (2.7 mo)   Next INR check:  4/28/2021   INR from last check:  2.20 (4/14/2021)   Weekly max warfarin dose:     Target end date:  Indefinite   INR check location:     Preferred lab:     Send INR reminders to:  JOSEFINA [facility]    Indications    History of deep venous thrombosis  [N23.643]           Comments:           Anticoagulation Care Providers     Provider Role Specialty Phone number    Jez Jack MD Referring Internal Medicine [phone]

## 2021-06-16 NOTE — TELEPHONE ENCOUNTER
Reason for Call:  Medication or medication refill:    Do you use a [facility]?  Name of the pharmacy and phone number for the current request:   [facility]    Name of the medication requested:   gabapentine  Coumadin 4mg #100    Other request: n/a    Can we leave a detailed message on this number? Yes    Phone number patient can be reached at: Home number on file [phone] (home)    Best Time: anytime    Call taken on 4/19/2021 at 12:57 PM by Darlene Waller

## 2021-06-16 NOTE — TELEPHONE ENCOUNTER
[facility] just called. They got the script for the gabapentin but not the Warfarin. Patient was yelling at the pharmacy because the script is not there.     Need this sent ASAP for the Warfarin.

## 2021-06-17 NOTE — PATIENT INSTRUCTIONS - HE
1.  Injection right shoulder today.  If helpful, this could be done two to three times yearly.    2.  Next Prolia injection due about 7/28.    3.  Annual wellness visit due in January.

## 2021-06-17 NOTE — PROGRESS NOTES
Eddy: Please review your shoulder x-ray report.  I hope the injection is of some benefit.    Jez Jack MD

## 2021-06-17 NOTE — TELEPHONE ENCOUNTER
Telephone Encounter by Luís Williamson RN at 3/31/2021 11:02 AM     Author: Luís Williamson RN Service: -- Author Type: Registered Nurse    Filed: 3/31/2021 12:29 PM Encounter Date: 3/31/2021 Status: Signed    : Luís Williamson RN (Registered Nurse)       ANTICOAGULATION  MANAGEMENT    Assessment     Today's INR result of 1.9 is Subtherapeutic (goal INR of 2.0-3.0)        Warfarin taken as previously instructed    No new diet changes affecting INR    No new medication/supplements affecting INR    Continues to tolerate warfarin with no reported s/s of bleeding or thromboembolism     Previous INR was Subtherapeutic    Plan:     Spoke on phone with Victorino regarding INR result and instructed:      Warfarin Dosing Instructions:  Change warfarin dose to 2 mg daily on tue; and 4 mg daily rest of week  (8 % change)    Instructed patient to follow up no later than: two weeks        Eddy verbalizes understanding and agrees to warfarin dosing plan.    Instructed to call the [facility] for any changes, questions or concerns. (#[phone])   ?   Luís Williamson RN    Subjective/Objective:      Victorino Khan, a 87 y.o. male is on warfarin. Victorino Gleason reports:     Home warfarin dose: verbally confirmed home dose with Eddy and updated on anticoagulation calendar     Missed doses: No     Medication changes:  No     S/S of bleeding or thromboembolism:  No     New Injury or illness:  No     Changes in diet or alcohol consumption:  No     Upcoming surgery, procedure or cardioversion:  No    Anticoagulation Episode Summary     Current INR goal:  2.0-3.0   TTR:  57.4 % (2.3 mo)   Next INR check:  4/14/2021   INR from last check:  1.90 (3/31/2021)   Weekly max warfarin dose:     Target end date:  Indefinite   INR check location:     Preferred lab:     Send INR reminders to:  JOSEFINA MIDWAY    Indications    History of deep venous thrombosis [Z86.718]           Comments:           Anticoagulation Care Providers     Provider  Role Specialty Phone number    Jez Jack MD Referring Internal Medicine [phone]

## 2021-06-17 NOTE — TELEPHONE ENCOUNTER
ANTICOAGULATION  MANAGEMENT    Assessment     Today's INR result of 2.9 is Therapeutic (goal INR of 2.0-3.0)        Warfarin taken as previously instructed    No new diet changes affecting INR    No new medication/supplements affecting INR    Continues to tolerate warfarin with no reported s/s of bleeding or thromboembolism     Previous INR was Therapeutic    Plan:     Left detailed message for Victorino regarding INR result and instructed:      Warfarin Dosing Instructions:  Continue current warfarin dose 2 mg daily on thu; and 4 mg daily rest of week  (0 % change)    Instructed patient to follow up no later than: one month      Instructed to call the [facility] for any changes, questions or concerns. (#[phone])   ?   Luís Williamson RN    Subjective/Objective:      Victorino Khan, a 87 y.o. male is on warfarin. Victorino Gleason reports:     Home warfarin dose: as updated on anticoagulation calendar per template     Missed doses: No     Medication changes:  No     S/S of bleeding or thromboembolism:  No     New Injury or illness:  No     Changes in diet or alcohol consumption:  No     Upcoming surgery, procedure or cardioversion:  No    Anticoagulation Episode Summary     Current INR goal:  2.0-3.0   TTR:  64.9 % (3.2 mo)   Next INR check:  5/26/2021   INR from last check:  2.90 (4/28/2021)   Weekly max warfarin dose:     Target end date:  Indefinite   INR check location:     Preferred lab:     Send INR reminders to:  JOSEFINA MIDWAY    Indications    History of deep venous thrombosis [Z86.718]           Comments:           Anticoagulation Care Providers     Provider Role Specialty Phone number    Jez Jack MD Referring Internal Medicine [phone]

## 2021-06-17 NOTE — PROGRESS NOTES
ASSESSMENT:  1.  Osteoarthritis of knees:  Eddy has had previous knee replacements done through the [facility].  Follow-up x-rays were requested.  He seems to be doing fairly well.  No loosening of prostheses are noted.    2.  Right shoulder pain:  He had previous rotator cuff surgery.  X-ray today shows findings consistent with severe rotator cuff disease and degenerative arthritis.  Management options were discussed.  He is not interested in further surgical intervention.  After discussion, he is interested in a steroid injection.    3.  Osteoporosis with multiple vertebral fractures:  He is now on Prolia.  He takes gabapentin which she feels may be of some benefit.  Much of his discomfort is positional and related to the kyphosis.    PLAN:  1.  Bilateral knee x-rays were done and reviewed.  No loosening of knee prosthesis is noted    2.  Right shoulder x-ray was done and reviewed    3.  After informed consent and sterilely prepping, the right shoulder was injected with 80 mg of Depo-Medrol and 2 cc of 1% lidocaine.  He tolerated the injection well.    4.  If the shoulder injection is helpful, it could be repeated 2-3 times yearly.    5.  Next Prolia injection is due about 7/28/2021    6.  Annual wellness visit due in January    Orders Placed This Encounter   Procedures     XR Knees Bilateral 1 Or 2 VWS     Standing Status:   Future     Number of Occurrences:   1     Standing Expiration Date:   5/4/2022     Order Specific Question:   Can the procedure be changed per Radiologist protocol?     Answer:   Yes     XR Shoulder Right 2 or More VWS     Standing Status:   Future     Number of Occurrences:   1     Standing Expiration Date:   5/4/2022     Order Specific Question:   Can the procedure be changed per Radiologist protocol?     Answer:   Yes     Medications Discontinued During This Encounter   Medication Reason     gabapentin (NEURONTIN) 100 MG capsule Dose adjustment       Return in about 9 months (around  2/4/2022) for Annual physical.    ASSESSED PROBLEMS:  1. Status post total bilateral knee replacement  gabapentin (NEURONTIN) 300 MG capsule    XR Knees Bilateral 1 Or 2 VWS    XR Shoulder Right 2 or More VWS   2. Post-traumatic osteoarthritis of right shoulder  XR Shoulder Right 2 or More VWS    lidocaine 10 mg/mL (1 %) injection 2 mL    methylPREDNISolone acetate injection 80 mg (DEPO-MEDROL)   3. Age-related osteoporosis without current pathological fracture         CHIEF COMPLAINT:  Chief Complaint   Patient presents with     Shoulder Injury     f/u      Labs Only     x ray requested x ray of both knees from knee replacement        HISTORY OF PRESENT ILLNESS:  Victorino is a 87 y.o. male seen with multiple concerns.  He had previous bilateral total knee arthroplasties done at [facility].  He was requested to have follow-up x-rays.  He feels the knees are doing well.    He does note right shoulder pain.  This is aggravated by abduction.  He did have a previous rotator cuff repair.  He is not interested in surgical intervention for this but wonders about other options for pain    He started Prolia for osteoporosis.  He tolerated his first injection without adverse effects.  He does note chronic back pain.  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  He is on gabapentin.    REVIEW OF SYSTEMS:    Comprehensive review of systems is negative.    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  Hoping to resume ballroom dancing later this year    TOBACCO USE:  Social History     Tobacco Use   Smoking Status Former Smoker   Smokeless Tobacco Never Used       VITALS:  Vitals:    05/04/21 1206   BP: 108/62   Patient Site: Left Arm   Patient Position: Sitting   Cuff Size: Adult Large   Pulse: 61   Temp: 97.5  F (36.4  C)   TempSrc: Oral   SpO2: 96%   Weight: 190 lb 3.2 oz (86.3 kg)     Wt Readings from Last 3 Encounters:   05/04/21 190 lb 3.2 oz (86.3 kg)    02/12/21 184 lb 3.2 oz (83.6 kg)   01/12/21 175 lb 2 oz (79.4 kg)       PHYSICAL EXAM:  Constitutional:   Reveals an alert pleasant man with prominent thoracic kyphosis.   Vitals: per nursing notes.  HEENT: Atraumatic  Eyes: No conjunctival hyperemia.  Neck:  Supple, no carotid bruits or adenopathy.  Back: Prominent thoracic kyphosis  Thorax:  No bony deformities.  Barrel chested   lungs: Clear to A&P without rales or wheezes.  Respiratory effort normal.  Cardiac:   Regular rate and rhythm, normal S1, S2, no murmur or gallop.  Abdomen:  Soft, active bowel sounds without bruits, mass, or tenderness..  Extremities:   No peripheral edema, pulses in the feet intact.  Scars over knees consistent with total knee replacements.  Severe crepitance to range of motion right shoulder.  Range of motion fairly well-preserved but with difficulty.  Skin:  No jaundice, peripheral cyanosis or lesions to suggest malignancy.  Neuro:  Alert and oriented.  No gross focal deficits.  Psychiatric:  Memory intact, mood appropriate.    DATA REVIEWED:  Additional History from Old Records Summarized (2): Care everywhere records reviewed from [facility]  Decision to Obtain Records (1): None.   Radiology Tests Summarized or Ordered (1): None.  Labs Reviewed or Ordered (1): None.  Medicine Test Summarized or Ordered (1): None.   Independent Review of EKG or X-RAY(2 each): Bilateral knee and right shoulder x-rays ordered and reviewed.    The visit lasted a total of 25 minutes face to face with the patient. Over 50% of the time was spent counseling and educating the patient about management of shoulder pain due to rotator cuff disease and degenerative arthritis.  Management of osteoporosis and back pain related to thoracic kyphosis.    Dragon dictation was used for this note. Speech recognition errors are a possibility.     MEDICATIONS:  Current Outpatient Medications   Medication Sig Dispense Refill     aspirin 81 MG EC tablet Take 81 mg by  mouth.       calcium-vitamin D3-vitamin K 500 mg-1,000 unit-40 mcg Chew        cholecalciferol, vitamin D3, 10 mcg (400 unit) cap Daily.       fluticasone propionate (FLONASE) 50 mcg/actuation nasal spray Daily.       multivitamin (ONE A DAY) per tablet Daily.       warfarin ANTICOAGULANT (COUMADIN/JANTOVEN) 4 MG tablet Take 1/2 tablet (2mg) to 1 tablet (4mg) by mouth daily, ad directes. Adjust dose based on INR results. 90 tablet 3     fluticasone propion-salmeteroL (ADVAIR HFA) 230-21 mcg/actuation inhaler Inhale 2 puffs.       gabapentin (NEURONTIN) 300 MG capsule Take 1 capsule (300 mg total) by mouth 3 (three) times a day. 30 capsule 0     Current Facility-Administered Medications   Medication Dose Route Frequency Provider Last Rate Last Admin     lidocaine 10 mg/mL (1 %) injection 2 mL  2 mL Intra-articular Once Jez Jack MD         methylPREDNISolone acetate injection 80 mg (DEPO-MEDROL)  80 mg Intra-articular Once Jez Jack MD

## 2021-06-17 NOTE — TELEPHONE ENCOUNTER
Telephone Encounter by Luís Williamson RN at 3/12/2021 12:48 PM     Author: Luís Williamson RN Service: -- Author Type: Registered Nurse    Filed: 3/12/2021  2:18 PM Encounter Date: 3/12/2021 Status: Signed    : Luís Williamson RN (Registered Nurse)       ANTICOAGULATION  MANAGEMENT    Assessment     Today's INR result of 1.9 is Subtherapeutic (goal INR of 2.0-3.0)        Warfarin taken as previously instructed    No new diet changes affecting INR    No new medication/supplements affecting INR    Continues to tolerate warfarin with no reported s/s of bleeding or thromboembolism     Previous INR was low Therapeutic    Plan:     Spoke on phone with Victorino regarding INR result and instructed:      Warfarin Dosing Instructions:  Change warfarin dose to 2 mg daily on tue/thu; and 4 mg daily rest of week  (0 % change)    Instructed patient to follow up no later than: two weeks    Victorino verbalizes understanding and agrees to warfarin dosing plan.    Instructed to call the [facility] for any changes, questions or concerns. (#[phone])   ?   Luís Williamson RN    Subjective/Objective:      Victorino Khan, a 87 y.o. male is on warfarin. Victorino Gleason reports:     Home warfarin dose: verbally confirmed home dose with Victorino and updated on anticoagulation calendar     Missed doses: No     Medication changes:  No     S/S of bleeding or thromboembolism:  No     New Injury or illness:  No     Changes in diet or alcohol consumption:  No     Upcoming surgery, procedure or cardioversion:  No    Anticoagulation Episode Summary     Current INR goal:  2.0-3.0   TTR:  79.4 % (1.6 mo)   Next INR check:  3/26/2021   INR from last check:  1.90 (3/12/2021)   Weekly max warfarin dose:     Target end date:  Indefinite   INR check location:     Preferred lab:     Send INR reminders to:  JOSEFINA MIDWAY    Indications    History of deep venous thrombosis [Z86.718]           Comments:           Anticoagulation Care Providers      Provider Role Specialty Phone number    Jez Jack MD Referring Internal Medicine [phone]

## 2021-06-17 NOTE — TELEPHONE ENCOUNTER
Telephone Encounter by Luís Williamson RN at 1/12/2021  4:53 PM     Author: Luís Williamson RN Service: -- Author Type: Registered Nurse    Filed: 1/12/2021  5:37 PM Encounter Date: 1/12/2021 Status: Signed    : Luís Williamson RN (Registered Nurse)       ANTICOAGULATION  MANAGEMENT    Assessment     Today's INR result of 1.9 is Subtherapeutic (goal INR of 2.0-3.0)        Warfarin taken as previously instructed has been taking this dose for about 2 months    No new diet changes affecting INR    No new medication/supplements affecting INR    Continues to tolerate warfarin with no reported s/s of bleeding or thromboembolism     Previous INR was Therapeutic     New pt of Dr Jack has been on warfarin > 10 years    For hx dvt    Plan:     Spoke on phone with Victorino regarding INR result and instructed:     Warfarin Dosing Instructions:  have 4 mg tonight to boost then continue current warfarin dose 2 mg daily on tue/thu/sat; and 4 mg daily rest of week  (0 % change)    Instructed patient to follow up no later than: two weeks      Victorino verbalizes understanding and agrees to warfarin dosing plan.    Instructed to call the [facility] for any changes, questions or concerns. (#[phone])   ?   Luís Williamson RN    Subjective/Objective:      Victorino Khan, a 86 y.o. male is on warfarin.     Victorino reports:     Home warfarin dose: verbally confirmed home dose with Victorino and updated on anticoagulation calendar     Missed doses: No     Medication changes:  No     S/S of bleeding or thromboembolism:  No     New Injury or illness:  No     Changes in diet or alcohol consumption:  No     Upcoming surgery, procedure or cardioversion:  No    Anticoagulation Episode Summary     Current INR goal:  2.0-3.0   TTR:  --   Next INR check:  1/26/2021   INR from last check:  1.90 (1/12/2021)   Weekly max warfarin dose:     Target end date:  Indefinite   INR check location:     Preferred lab:     Send INR reminders to:  [facility]    Indications    History of deep venous thrombosis [Z86.718]           Comments:           Anticoagulation Care Providers     Provider Role Specialty Phone number    Jez Jack MD Referring Internal Medicine [phone]

## 2021-06-21 NOTE — LETTER
Letter by Jez Jack MD at      Author: Jez Jack MD Service: -- Author Type: --    Filed:  Encounter Date: 5/5/2021 Status: (Other)         Victorino Khan  [address]  [place] [address]             May 5, 2021         Dear Mr. Khan,    Below are the results from your recent visit:    Resulted Orders   XR Shoulder Right 2 or More VWS    Narrative    EXAM: XR SHOULDER RIGHT 2 OR MORE VWS  LOCATION: [facility]  DATE/TIME: 5/4/2021 12:55 PM    INDICATION: Posttraumatic osteoarthritis right shoulder.  COMPARISON: CT chest 12/10/2020      Impression    Tendon anchors present right greater tuberosity. Moderate degenerative changes right acromioclavicular joint and glenohumeral joint. Severe narrowing of subacromial space redemonstrated compatible with chronic rotator cuff tear.        Eddy: Please review your shoulder x-ray report.  I hope the injection is of some benefit.     Please call with questions or contact us using [employer].    Sincerely,        Electronically signed by Jez Jack MD

## 2021-06-21 NOTE — LETTER
Letter by Jez Jack MD at      Author: Jez Jack MD Service: -- Author Type: --    Filed:  Encounter Date: 5/5/2021 Status: (Other)         Victorino Khan  [address]  [place] [address]             May 5, 2021         Dear Mr. Khan,    Below are the results from your recent visit:    Resulted Orders   XR Knees Bilateral 1 Or 2 VWS    Narrative    EXAM: XR KNEES BILATERAL 1 OR 2 VWS  LOCATION: [facility]  DATE/TIME: 5/4/2021 1:04 PM    INDICATION: Presence of artificial knee joint, bilateral.  COMPARISON: None.      Impression    RIGHT KNEE: Moderate suprapatellar joint effusion. The knee arthroplasty is normally aligned. No fracture or dislocation.    LEFT KNEE: Small suprapatellar joint effusion. Knee arthroplasty normally aligned. No fracture or dislocation.       Eddy: Knee x-rays reveal normal alignment of total knee arthroplasties.     Please call with questions or contact us using [employer].    Sincerely,        Electronically signed by Jez Jack MD

## 2021-06-21 NOTE — LETTER
Letter by Jez Jack MD at      Author: Jez Jack MD Service: -- Author Type: --    Filed:  Encounter Date: 2/9/2021 Status: (Other)         Victorino Khan  [address]  [place] [address]             February 9, 2021         Dear Mr. Khan,    Below are the results from your recent visit:    Resulted Orders   DXA Bone Density Scan    Narrative    2/8/2021      RE: Victorino Khan  YOB: 1934        Dear Jez Jack,    Patient Profile:  86 y.o. male,, is here for the first bone density test at this site.  History of fractures - Yes;  Vertebra. Family history of osteoporosis -   Yes;  mother.  Family history of hip fracture: None. Smoking history - No.   Osteoporosis treatment past -  No. Osteoporosis treatment current - Yes;    Prolia.  Chronic medical problems - Spine surgery. High risk medications -    Blood thinner (Coumadin or Heparin);  Yes, Currently and Steroids;  Yes,   Currently.    Assessment:    1. The spine bone density could not be reliably assessed due to previous   vertebroplasty's and degenerative change   2. Femoral bone densities show left total hip T- score of -2.5, and right   femoral neck T-score of -2.9.  3.  Bone density was also checked in the wrist as an alternate site since   the spine could not be accurately assessed.  In the right 33% radius, T   score is -3.0.  4.  The trabecular bone score suggests poor micro architecture.    86 y.o. male with OSTEOPOROSIS and HIGH predicted future fracture risk.    Given his history of multiple fragility fractures of the spine,   osteoporosis is judged as severe.      Recommendations:  He has just started therapy with Prolia.  Rechecking bone density in 1   year is advised.      Bone densitometry was performed on your patient using our Arrayent   densitometer. The results are summarized and a copy of the actual scans   are included for your review. In conformity with the International Society   of Clinical  Densitometry's most recent position statement for DXA   interpretation (2015), the diagnosis will be made on the lowest measured   T-score of the lumbar spine, femoral neck, total proximal femur or 33%   radius. Note the change in terminology for diagnostic classification from   OSTEOPENIA to LOW BONE MASS. All trending for sequential exams will be   done using multiple vertebrae or the total proximal femur. Fracture risk   is based on the WHO Fracture Risk Assessment Tool (FRAX). If additional   information is needed or if you would like to discuss the results, please   do not hesitate to call me.       Thank you for referring this patient to [facility]. We are happy to be of service in support of you and your   practice. If you have any questions or suggestions to improve our service,   please call me at [phone].     Sincerely,     Jez Jack M.D. ANJALI.  [facility]       Eddy: Please review your bone density test result.  I would advise rechecking this after 1 year on Prolia.     Please call with questions or contact us using [employer].    Sincerely,        Electronically signed by Jez Jack MD

## 2021-06-25 NOTE — TELEPHONE ENCOUNTER
ANTICOAGULATION  MANAGEMENT    Assessment     Today's INR result of 2.7 is Therapeutic (goal INR of 2.0-3.0)        Warfarin taken as previously instructed    No new diet changes affecting INR    No new medication/supplements affecting INR    Continues to tolerate warfarin with no reported s/s of bleeding or thromboembolism     Previous INR was Therapeutic    Plan:     Left detailed message for Victorino regarding INR result and instructed:      Warfarin Dosing Instructions:  Continue current warfarin dose 2 mg daily on thu; and 4 mg daily rest of week  (0 % change)    Instructed patient to follow up no later than: 4-5 weeks        Instructed to call the [facility] for any changes, questions or concerns. (#[phone])   ?   Luís Williamson RN    Subjective/Objective:      Victorino Khan, a 87 y.o. male is on warfarin. Victorino Gleason reports:     Home warfarin dose: as updated on anticoagulation calendar per template     Missed doses: No     Medication changes:  No     S/S of bleeding or thromboembolism:  No     New Injury or illness:  No     Changes in diet or alcohol consumption:  No     Upcoming surgery, procedure or cardioversion:  No    Anticoagulation Episode Summary     Current INR goal:  2.0-3.0   TTR:  75.5 % (4.6 mo)   Next INR check:  7/14/2021   INR from last check:  2.70 (6/9/2021)   Weekly max warfarin dose:     Target end date:  Indefinite   INR check location:     Preferred lab:     Send INR reminders to:  [facility]    Indications    History of deep venous thrombosis [Z86.718]           Comments:           Anticoagulation Care Providers     Provider Role Specialty Phone number    Jez Jack MD Referring Internal Medicine [phone]

## 2021-06-25 NOTE — TELEPHONE ENCOUNTER
ANTICOAGULATION  MANAGEMENT PROGRAM    Victorino Khan is overdue for INR check.     Spoke with Victorino and scheduled INR appointment on 6/9. when returned from [place], leaves tomorrow am      Luís Williamson RN

## 2021-06-25 NOTE — TELEPHONE ENCOUNTER
Patient calling and states he has Pain on left side of chest, and has had this going on three to 4 days.    He reports this pain could be because of his ribs, because he had this before.  He reports he wants to check this out today.  He was advised to go to the ER at [facility] in [place].    Milly Tavares RN  [facility] Triage/refill nurse    Reason for Disposition    Chest pain lasting longer than 5 minutes and occurred in last 3 days (72 hours) (Exception: feels exactly the same as previously diagnosed heartburn and has accompanying sour taste in mouth)    Chest pain or 'angina' comes and goes and is happening more often (increasing in frequency) or getting worse (increasing in severity) (Exception: chest pains that last only a few seconds)    Protocols used: CHEST PAIN-A-OH

## 2021-06-25 NOTE — ED TRIAGE NOTES
Here for pain in his left rib area. Started on Friday. No injury. Fell last Wednesday. No loc.Is on thinners. Has bruises present.

## 2021-06-26 NOTE — PROGRESS NOTES
ANTICOAGULATION  MANAGEMENT: Discharge Review    Victorino Khan chart reviewed for anticoagulation continuity of care    Emergency room visit on 6/14 for rib contusion.    Discharge disposition: Home    INR Results:       Recent labs: (last 7 days)     06/09/21  1004 06/14/21  1047   INR 2.70* 2.16*       Warfarin inpatient management: home regimen continued    Warfarin discharge instructions:     Warfarin dosing: home regimen continued  Bridging: No  INR goal Change: No     Medication Changes Affecting Anticoagulation: No    Additional Factors Affecting Anticoagulation: No    Plan     No adjustment to anticoagulation plan needed      Recommended follow up is scheduled    Anticoagulation Calendar updated    Luís Williamson RN

## 2021-06-26 NOTE — ED PROVIDER NOTES
"EMERGENCY DEPARTMENT ENCOUNTER      NAME: Victorino Khan  AGE: 87 y.o. male  YOB: 1934  MRN: [id number]  EVALUATION DATE & TIME: 6/14/2021 10:45 AM    PCP: Vaughn Evans DO    ED PROVIDER: Isabel Alston MD    Chief Complaint   Patient presents with     Chest Pain         FINAL IMPRESSION:  1. Closed fracture of one rib of left side, initial encounter          ED COURSE & MEDICAL DECISION MAKING:    Pertinent Labs & Imaging studies reviewed. (See chart for details)  87 y.o. male with history of previous DVT on Coumadin, status post PPM who presents to the Emergency Department for evaluation of left-sided rib pain x4 days after fall last week.  Pain is reproducible at the left lower rib margin.  There is no crepitus, subcutaneous emphysema or pain with palpation of the spleen or kidney.  Overall favor chest wall contusion versus rib fracture.  He really does not have any pain in the chest, and my suspicion that this is anginal equivalent given how reproducible it is with palpation and movement, laying on that side etc. is low.    Patient placed on monitor, IV established and blood obtained.  Twelve-lead EKG shows atrially sensed ventricularly paced rhythm.  Patient offered dose of oxycodone but declines wanting Tylenol instead.  X-ray of the chest and left rib cage notable for a mildly displaced left anterior seventh rib fracture.  CBC, BMP, troponin, INR unremarkable with therapeutic INR.  Patient pleased with himself stating \"I knew I broke a rib\".  He is comfortable going home, states he has some Tylenol and Tylenol 3 if he needs it for breakthrough pain.    10:49 AM Met with patient to gather history and perform exam. ED course and treatment plan was discussed.  11:10 AM RN states that patient refused Oxy, would prefer Tylenol.     At the conclusion of the encounter I discussed the results of all of the tests and the disposition. The questions were answered. The patient or family acknowledged " understanding and was agreeable with the care plan.     MEDICATIONS GIVEN IN THE EMERGENCY:  Medications   sodium chloride flush 10 mL (NS) (has no administration in time range)   acetaminophen tablet 1,000 mg (TYLENOL) (1,000 mg Oral Given 6/14/21 1113)       NEW PRESCRIPTIONS STARTED AT TODAY'S ER VISIT  Current Discharge Medication List      CONTINUE these medications which have NOT CHANGED    Details   aspirin 81 MG EC tablet Take 81 mg by mouth.      calcium-vitamin D3-vitamin K 500 mg-1,000 unit-40 mcg Chew       cholecalciferol, vitamin D3, 10 mcg (400 unit) cap Daily.      fluticasone propion-salmeteroL (ADVAIR HFA) 230-21 mcg/actuation inhaler Inhale 2 puffs.      fluticasone propionate (FLONASE) 50 mcg/actuation nasal spray Daily.      gabapentin (NEURONTIN) 300 MG capsule Take 1 capsule (300 mg total) by mouth 3 (three) times a day.  Qty: 30 capsule, Refills: 0    Associated Diagnoses: Status post total bilateral knee replacement      multivitamin (ONE A DAY) per tablet Daily.      warfarin ANTICOAGULANT (COUMADIN/JANTOVEN) 4 MG tablet Take 1/2 tablet (2mg) to 1 tablet (4mg) by mouth daily, ad directes. Adjust dose based on INR results.  Qty: 90 tablet, Refills: 3    Associated Diagnoses: History of deep venous thrombosis; Long term (current) use of anticoagulants                =================================================================    HPI    Patient information was obtained from: patient    Use of Intrepreter: N/A         Victorino Khan is a 87 y.o. male who presents left ribcage pain.    Patient reports that he developed left ribcage pain since 6/11 (3 days ago). He says the pain ifs constant and worsens when he lays down on the left side and moves. He can't sleep well and has to sleep on his right side. He tripped and fell on 6/9 (5 days ago). Landed on his elbow. Denies any injury to chest that he can recall.  However, does have previous rib fracture and says it feels similar to it.  Denies  any injury to the head, LOC, headache, nausea, vomiting midline neck or back pain.  He has taken Tylenol and had minimal relief. No other complaints at this time.       REVIEW OF SYSTEMS   Constitutional:  Denies fever, chills, weight loss or weakness  Respiratory: No SOB, wheeze or cough  Cardiovascular:  No palpitations. Positive for left ribcage pain.   GI:  Denies abdominal pain, nausea, vomiting, diarrhea  : Denies dysuria, denies hematuria  Musculoskeletal:  Denies any new muscle/joint pain, swelling or loss of function.   Neurologic:  Denies headache, focal weakness or sensory changes  All other systems negative unless noted in HPI.      PAST MEDICAL HISTORY:  Past Medical History:   Diagnosis Date     DVT (deep venous thrombosis) (H)      Osteoporosis      Vertebral compression fracture (H)        PAST SURGICAL HISTORY:  Past Surgical History:   Procedure Laterality Date     CARDIAC PACEMAKER PLACEMENT Left     For sick sinus syndrome     LAPAROSCOPIC NISSEN FUNDOPLICATION  11/2011    For large paraesophageal hernia     TOTAL KNEE ARTHROPLASTY Right 2000     TOTAL KNEE ARTHROPLASTY Left 2010           CURRENT MEDICATIONS:    No current facility-administered medications on file prior to encounter.      Current Outpatient Medications on File Prior to Encounter   Medication Sig     aspirin 81 MG EC tablet Take 81 mg by mouth.     calcium-vitamin D3-vitamin K 500 mg-1,000 unit-40 mcg Chew      cholecalciferol, vitamin D3, 10 mcg (400 unit) cap Daily.     fluticasone propion-salmeteroL (ADVAIR HFA) 230-21 mcg/actuation inhaler Inhale 2 puffs.     fluticasone propionate (FLONASE) 50 mcg/actuation nasal spray Daily.     gabapentin (NEURONTIN) 300 MG capsule Take 1 capsule (300 mg total) by mouth 3 (three) times a day.     multivitamin (ONE A DAY) per tablet Daily.     warfarin ANTICOAGULANT (COUMADIN/JANTOVEN) 4 MG tablet Take 1/2 tablet (2mg) to 1 tablet (4mg) by mouth daily, ad directes. Adjust dose based on  "INR results.       ALLERGIES:  No Known Allergies    FAMILY HISTORY:  History reviewed. No pertinent family history.    SOCIAL HISTORY:   Social History     Socioeconomic History     Marital status: Single     Spouse name: None     Number of children: None     Years of education: None     Highest education level: None   Occupational History     None   Social Needs     Financial resource strain: None     Food insecurity     Worry: None     Inability: None     Transportation needs     Medical: None     Non-medical: None   Tobacco Use     Smoking status: Former Smoker     Smokeless tobacco: Never Used   Substance and Sexual Activity     Alcohol use: None     Drug use: None     Sexual activity: None   Lifestyle     Physical activity     Days per week: None     Minutes per session: None     Stress: None   Relationships     Social connections     Talks on phone: None     Gets together: None     Attends Denominational service: None     Active member of club or organization: None     Attends meetings of clubs or organizations: None     Relationship status: None     Intimate partner violence     Fear of current or ex partner: None     Emotionally abused: None     Physically abused: None     Forced sexual activity: None   Other Topics Concern     None   Social History Narrative     None       VITALS:  Patient Vitals for the past 24 hrs:   BP Temp Temp src Pulse Resp SpO2 Height Weight   06/14/21 1145 115/62 -- -- 60 22 93 % -- --   06/14/21 1130 118/73 -- -- 61 14 95 % -- --   06/14/21 1100 123/79 -- -- 63 -- 94 % -- --   06/14/21 1052 (!) 171/98 -- -- 85 -- -- -- --   06/14/21 1030 139/72 98.2  F (36.8  C) Oral 84 14 97 % 5' 11\" (1.803 m) 190 lb (86.2 kg)       PHYSICAL EXAM    General Appearance: Well-appearing, well-nourished, no acute distress.   Head:  Normocephalic, atraumatic  Eyes:   conjunctiva/corneas clear  ENT:  membranes are moist without pallor  Neck:  Supple, no midline tenderness to palpation  Chest:  No tenderness " or deformity, no crepitus. Pacemaker on left upper chest wall.   Cardio:  Regular rate and rhythm, no murmur/gallop/rub, 2+ pulses symmetric in all extremities  Pulm:  No respiratory distress, clear to auscultation bilaterally  Back:  No midline tenderness to palpation, no paraspinal tenderness, No CVA tenderness, normal ROM  Abdomen:  Soft, non-tender, non distended,no rebound or guarding. Patient did not have chest pain, but was more to his left ribcage, tenderness to palpation. No upper quadrant tenderness.    Extremities: Moves all extremities normally, normal gait  Skin:  Skin warm, dry, no rashes. Old bruising on elbow.   Neuro:  Alert and oriented ×3, moving all extremities, no gross sensory defects       LAB:  All pertinent labs reviewed and interpreted.  Results for orders placed or performed during the hospital encounter of 06/14/21   Basic metabolic panel   Result Value Ref Range    Sodium 141 136 - 145 mmol/L    Potassium 4.5 3.5 - 5.0 mmol/L    Chloride 108 (H) 98 - 107 mmol/L    CO2 25 22 - 31 mmol/L    Anion Gap, Calculation 8 5 - 18 mmol/L    Glucose 98 70 - 125 mg/dL    Calcium 9.0 8.5 - 10.5 mg/dL    BUN 23 8 - 28 mg/dL    Creatinine 0.98 0.70 - 1.30 mg/dL    GFR MDRD Af Amer >60 >60 mL/min/1.73m2    GFR MDRD Non Af Amer >60 >60 mL/min/1.73m2   CBC   Result Value Ref Range    WBC 5.1 4.0 - 11.0 thou/uL    RBC 4.61 4.40 - 6.20 mill/uL    Hemoglobin 15.3 14.0 - 18.0 g/dL    Hematocrit 47.6 40.0 - 54.0 %     (H) 80 - 100 fL    MCH 33.2 27.0 - 34.0 pg    MCHC 32.1 32.0 - 36.0 g/dL    RDW 12.9 11.0 - 14.5 %    Platelets 141 140 - 440 thou/uL    MPV 10.3 8.5 - 12.5 fL   Troponin I   Result Value Ref Range    Troponin I 0.04 0.00 - 0.29 ng/mL   INR   Result Value Ref Range    INR 2.16 (H) 0.90 - 1.10       RADIOLOGY:  Reviewed all pertinent imaging. Please see official radiology report.  Xr Ribs Left W Pa Chest    Result Date: 6/14/2021  EXAM: XR RIBS LEFT W PA CHEST LOCATION: [facility]  [facility] DATE/TIME: 6/14/2021 12:11 PM INDICATION: chest wall pain lower left rib margin COMPARISON: CT of the chest without contrast 11/10/2020     Left subclavian approach dual chamber pacemaker has right atrial appendage and right ventricular leads. The cardiac silhouette is not enlarged. Thoracic aorta is mildly tortuous. The lungs are symmetrically inflated. A few bands of subsegmental atelectasis are present in the lateral costophrenic sulci. The lungs are otherwise clear. No pleural fluid or pneumothorax. Additional coned-down images of the left ribs shows a mildly displaced fracture of the left anterior 7th rib. Radiopaque cement is present within several adjacent vertebra at the thoracolumbar junction and one mid thoracic vertebra. Accentuated thoracic kyphosis and anterior wedge deformities of several upper and mid thoracic vertebrae.      EKG:    Performed at: 10:51    Impression: Atrially sensed, ventriculary paced rhythm    Rate: 72 BPM  Rhythm: Atrially sensed, ventriculary paced rhythm  KS Interval: 270 ms  QRS Interval: 168 ms  QTc Interval: 481 ms  ST Changes: No significant changes  Comparison: No previous ECGs availiable    I have independently reviewed and interpreted the EKG(s) documented above.      I, Jeanie Soriano, am serving as a scribe to document services personally performed by Dr. Alston based on my observation and the provider's statements to me. I, Isabel Alston MD attest that Jeanie Soriano is acting in a scribe capacity, has observed my performance of the services and has documented them in accordance with my direction.    Isabel Alston MD  Emergency Medicine  [facility] EMERGENCY DEPARTMENT  [address]  [facility] [address]  Dept: [phone]  Loc: [phone]       Isabel Alston MD  06/14/21 7013

## 2021-07-02 ENCOUNTER — RECORDS - HEALTHEAST (OUTPATIENT)
Dept: INTERNAL MEDICINE | Facility: CLINIC | Age: 86
End: 2021-07-02

## 2021-07-03 NOTE — ADDENDUM NOTE
Addendum Note by Costa Hummel MLT at 1/12/2021 12:30 PM     Author: Costa Hummel MLT Service: -- Author Type:     Filed: 1/14/2021  2:38 PM Encounter Date: 1/12/2021 Status: Signed    : Costa Hummel MLT ()    Addended by: COSTA HUMMEL on: 1/14/2021 02:38 PM        Modules accepted: Orders        
Addendum Note by Jez Borjas MD at 1/12/2021 12:30 PM     Author: Jez Borjas MD Service: -- Author Type: Physician    Filed: 1/14/2021  3:23 PM Encounter Date: 1/12/2021 Status: Signed    : Jez Borjas MD (Physician)    Addended by: JEZ BORJAS on: 1/14/2021 03:23 PM        Modules accepted: Orders        
N/A

## 2021-07-04 NOTE — TELEPHONE ENCOUNTER
Telephone Encounter by Darlene Waller at 7/2/2021 10:49 AM     Author: Darlene Waller Service: -- Author Type: Patient Access    Filed: 7/2/2021 10:51 AM Encounter Date: 7/2/2021 Status: Signed    : Darlene Waller (Patient Access)       Reason for Call:  Other call back      Detailed comments: Pt needing a refill   [facility]  Gabepentin  - pt is out of this medication  Warfarin    Phone Number Patient can be reached at:   Cell number on file:    Telephone Information:   Mobile [phone]       Best Time: anytime    Can we leave a detailed message on this number?: Yes    Call taken on 7/2/2021 at 10:50 AM by Darlene Waller

## 2021-07-06 VITALS — HEIGHT: 71 IN | WEIGHT: 190 LBS | BODY MASS INDEX: 26.6 KG/M2

## 2021-07-08 DIAGNOSIS — Z79.01 LONG TERM (CURRENT) USE OF ANTICOAGULANTS: ICD-10-CM

## 2021-07-08 DIAGNOSIS — Z86.718 HISTORY OF DEEP VENOUS THROMBOSIS: Primary | ICD-10-CM

## 2021-07-11 ENCOUNTER — TELEPHONE (OUTPATIENT)
Dept: INTERNAL MEDICINE | Facility: CLINIC | Age: 86
End: 2021-07-11

## 2021-07-11 DIAGNOSIS — Z92.29 PERSONAL HISTORY OF OTHER DRUG THERAPY: ICD-10-CM

## 2021-07-11 DIAGNOSIS — M81.0 OSTEOPOROSIS: Primary | ICD-10-CM

## 2021-07-11 DIAGNOSIS — M81.0 AGE-RELATED OSTEOPOROSIS WITHOUT CURRENT PATHOLOGICAL FRACTURE: ICD-10-CM

## 2021-07-11 PROBLEM — Z86.718 HISTORY OF DEEP VENOUS THROMBOSIS: Status: ACTIVE | Noted: 2021-07-11

## 2021-07-12 ENCOUNTER — OFFICE VISIT (OUTPATIENT)
Dept: INTERNAL MEDICINE | Facility: CLINIC | Age: 86
End: 2021-07-12
Payer: COMMERCIAL

## 2021-07-12 ENCOUNTER — ALLIED HEALTH/NURSE VISIT (OUTPATIENT)
Dept: FAMILY MEDICINE | Facility: CLINIC | Age: 86
End: 2021-07-12
Payer: COMMERCIAL

## 2021-07-12 VITALS
SYSTOLIC BLOOD PRESSURE: 102 MMHG | WEIGHT: 185.3 LBS | RESPIRATION RATE: 18 BRPM | DIASTOLIC BLOOD PRESSURE: 70 MMHG | BODY MASS INDEX: 25.84 KG/M2 | HEART RATE: 62 BPM

## 2021-07-12 DIAGNOSIS — R29.3 POSTURAL INSTABILITY: ICD-10-CM

## 2021-07-12 DIAGNOSIS — M81.0 AGE-RELATED OSTEOPOROSIS WITHOUT CURRENT PATHOLOGICAL FRACTURE: ICD-10-CM

## 2021-07-12 DIAGNOSIS — M15.4 EROSIVE OSTEOARTHRITIS OF MULTIPLE SITES: ICD-10-CM

## 2021-07-12 DIAGNOSIS — Z86.718 HISTORY OF DEEP VENOUS THROMBOSIS: Primary | ICD-10-CM

## 2021-07-12 DIAGNOSIS — M81.0 OSTEOPOROSIS: Primary | ICD-10-CM

## 2021-07-12 DIAGNOSIS — S22.32XD CLOSED FRACTURE OF ONE RIB OF LEFT SIDE WITH ROUTINE HEALING, SUBSEQUENT ENCOUNTER: ICD-10-CM

## 2021-07-12 PROCEDURE — 99207 PR NO CHARGE NURSE ONLY: CPT | Performed by: INTERNAL MEDICINE

## 2021-07-12 PROCEDURE — 96372 THER/PROPH/DIAG INJ SC/IM: CPT | Performed by: INTERNAL MEDICINE

## 2021-07-12 PROCEDURE — 99214 OFFICE O/P EST MOD 30 MIN: CPT | Mod: 25 | Performed by: INTERNAL MEDICINE

## 2021-07-12 RX ORDER — GABAPENTIN 300 MG/1
CAPSULE ORAL
COMMUNITY
Start: 2021-07-02 | End: 2021-07-14

## 2021-07-12 RX ORDER — FLUTICASONE PROPIONATE 50 MCG
1 SPRAY, SUSPENSION (ML) NASAL DAILY
COMMUNITY

## 2021-07-12 NOTE — PROGRESS NOTES
"Prolia Injection Phone Screen      Screening questions have been asked 2-3 days prior to administration visit for Prolia. If any questions are answered with \"Yes,\" this phone encounter were will routed to ordering provider for further evaluation.     1.  When was the last injection?  01/12/2021    2.  Has insurance for this injection been verified?  Yes    3.  Did you experience any new onset achiness or rashes that lasted for over a month with your previous Prolia injection?   No    4.  Do you have a fever over 101?F or a new deep cough that is unusual for you today? No    5.  Have you started any new medications in the last 6 months that you were told could affect your immune system? These may have been prescribed by oncologist, transplant, rheumatology, or dermatology.   No    6.  In the last 6 months have you have gastric bypass or parathyroid surgery?   No     7.  Do you plan dental work requiring drilling into the bone such as implants/extractions or oral surgery in the next 2-3 months?   Yes    8. Do you have new insurance since the last injection?    9. Have you received the Covid-19 vaccine? No  If No - Proceed with Prolia injection  If Yes - Date of vaccination 01/28/2021 and 02/25/2021. Will need to wait until 2 weeks after 2nd dose of Covid-19 vaccine before administering Prolia       Patient informed if symptoms discussed above present prior to their administration appointment, they are to notify clinic immediately.     Jackelin Dunham MA            "

## 2021-07-12 NOTE — PROGRESS NOTES
"    Assessment & Plan   Problem List Items Addressed This Visit     1.  Osteoporosis:  Eddy has a history of multiple previous vertebral fractures.  Has been started on Prolia with good tolerance.  This will be continued       Other    History of deep venous thrombosis - Primary    Is heterozygous for factor V Leiden and has a past history of deep venous thrombosis.  He will continue current    Relevant Orders    INR      Other Visit Diagnoses     Postural instability        He had a recent fall sustaining a rib fracture.  He is recovering well from this.  Is interested in physical therapy for postural instability.  A vitamin B12 level will also be checked  Relevant Orders    Vitamin B12    Physical Therapy Referral         Osteoarthritis of multiple sites:  Notes chronic shoulder pain aggravated by abduction and rotation.  He has multiple other joint issues related to severe osteoarthritis.  Management options were discussed.  Would favor use of Tylenol as his primary agent for pain.  Has used gabapentin in the past.  He was advised to look at this critically and only continue if helpful.  He should avoid NSAIDs since he is on warfarin.             30 minutes spent on the date of the encounter doing chart review, history and exam, documentation and further activities per the note  {   BMI:   Estimated body mass index is 25.84 kg/m  as calculated from the following:    Height as of 6/14/21: 1.803 m (5' 11\").    Weight as of this encounter: 84.1 kg (185 lb 4.8 oz).   /70 (BP Location: Left arm, Patient Position: Sitting, Cuff Size: Adult Regular)   Pulse 62   Resp 18   Wt 84.1 kg (185 lb 4.8 oz)   BMI 25.84 kg/m      General Appearance:  Alert, cooperative, no distress, appears stated age   Head:  Normocephalic, without obvious abnormality, atraumatic   Eyes:   Conjunctival hyperemia   Ears:   Not examined   Nose: Nares normal, septum midline, mucosa normal, no drainage   Throat: Lips, mucosa, and tongue " normal; teeth and gums normal   Neck: Supple, symmetrical, trachea midline, no adenopathy, thyroid: not enlarged, symmetric, no tenderness/mass/nodules, no carotid bruit or JVD   Back:    Thoracic kyphosis   Lungs:   Clear to auscultation bilaterally, respirations unlabored   Chest Wall:   Barrel chested.  Pacer on the left   Heart:  Regular rate and rhythm, S1, S2 normal, no murmur, rub or gallop   Abdomen:   Soft, non-tender, bowel sounds active all four quadrants,  no masses, no organomegaly   Genitalia:  Not examined   Rectal:  Not them   Extremities:  Crepitance to range of motion of shoulders, but range of motion fairly well-preserved   Skin: Skin color, texture, turgor normal, no rashes or lesions   Lymph nodes: Cervical and supraclavicular normal   Neurologic: No 3 or aphasia.  Cranial nerves, motor or sensory exams grossly intact         See Patient Instructions    Return in about 6 months (around 1/12/2022) for Routine preventive.    Jez Jack MD  [facility]   Victorino is a 87 year old who presents for a follow up regarding his osteoporosis and shoulder pain. He also has new concerns of postural instability.    Osteoporosis: He received his second Prolia injection today. He reports no side effects from the first injection.     He has been continuing physical therapy for his shoulder, and notes that he is adamant about continuing the exercises and stretches on his own in between physical therapy appointments. He still notes pain in both shoulders, and had been taking 2,000 mg Tylenol previously but stopped 2 weeks ago as he was concerned about taking Tylenol regularly for an extended period of time. He continues to take gabapentin.    Postural instability: He used to enjoy riding his bike for exercise, but noticed feeling off balance within the last several months. He now reports some difficulty with balance with walking, and notes having a wider stride to help  feel more stable.         Review of Systems   Negative for headache or dizziness. All others negative.       Objective    /70 (BP Location: Left arm, Patient Position: Sitting, Cuff Size: Adult Regular)   Pulse 62   Resp 18   Wt 84.1 kg (185 lb 4.8 oz)   BMI 25.84 kg/m    Body mass index is 25.84 kg/m .  Physical Exam   General: Alert, pleasant, talkative male.  HEENT: Atraumatic.  Heart: Regular rate and rhythm, no murmur.  Lungs: Clear to auscultation bilaterally.  MSK: Decreased range of motion of the right shoulder with flexion of 150 degrees. Strength +5/5 in upper extremities bilaterally.  Gait: Walks with a wide-based gait.   Psych: Mood and affect appropriate.       ----- Services Performed by a MEDICAL STUDENT in Presence of ATTENDING Physician-------    Christin Santoyo [facility]    Patient seen and examined.  Note was addended.    Jez Jack MD

## 2021-07-12 NOTE — PATIENT INSTRUCTIONS
1.  Prolia today.  Repeat in six months    2.  Tylenol for pain as needed.    3.  Physical therapy.  ([facility]).    4.  Annual Wellness visit next January.

## 2021-07-13 PROBLEM — M81.0 AGE-RELATED OSTEOPOROSIS WITHOUT CURRENT PATHOLOGICAL FRACTURE: Status: ACTIVE | Noted: 2021-07-13

## 2021-07-13 PROBLEM — J45.20 ASTHMA IN ADULT, MILD INTERMITTENT, UNCOMPLICATED: Status: ACTIVE | Noted: 2021-01-14

## 2021-07-14 ENCOUNTER — ANTICOAGULATION THERAPY VISIT (OUTPATIENT)
Dept: LAB | Facility: CLINIC | Age: 86
End: 2021-07-14

## 2021-07-14 ENCOUNTER — ALLIED HEALTH/NURSE VISIT (OUTPATIENT)
Dept: LAB | Facility: CLINIC | Age: 86
End: 2021-07-14
Payer: COMMERCIAL

## 2021-07-14 DIAGNOSIS — M15.4 EROSIVE OSTEOARTHRITIS OF MULTIPLE SITES: Primary | ICD-10-CM

## 2021-07-14 DIAGNOSIS — R29.3 POSTURAL INSTABILITY: ICD-10-CM

## 2021-07-14 DIAGNOSIS — Z86.718 HISTORY OF DEEP VENOUS THROMBOSIS: ICD-10-CM

## 2021-07-14 DIAGNOSIS — Z86.718 HISTORY OF DEEP VENOUS THROMBOSIS: Primary | ICD-10-CM

## 2021-07-14 LAB
INR PPP: 1.7 (ref 0.85–1.15)
VIT B12 SERPL-MCNC: 632 PG/ML (ref 213–816)

## 2021-07-14 PROCEDURE — 36415 COLL VENOUS BLD VENIPUNCTURE: CPT

## 2021-07-14 PROCEDURE — 99207 PR NO CHARGE NURSE ONLY: CPT

## 2021-07-14 PROCEDURE — 82607 VITAMIN B-12: CPT

## 2021-07-14 PROCEDURE — 85610 PROTHROMBIN TIME: CPT

## 2021-07-14 RX ORDER — GABAPENTIN 300 MG/1
300 CAPSULE ORAL DAILY
Qty: 90 CAPSULE | Refills: 3 | Status: SHIPPED | OUTPATIENT
Start: 2021-07-14 | End: 2023-04-19

## 2021-07-14 NOTE — PROGRESS NOTES
ANTICOAGULATION MANAGEMENT     Victorino Khan 87 year old male is on warfarin with subtherapeutic INR result. (Goal INR 2.0-3.0)    Recent labs: (last 7 days)     07/14/21  1355   INR 1.70*       ASSESSMENT     Source(s): Chart Review and Template       Warfarin doses taken: Warfarin taken as instructed    Diet: No new diet changes identified    New illness, injury, or hospitalization: No    Medication/supplement changes: None noted    Signs or symptoms of bleeding or clotting: No    Previous INR: Therapeutic last 2(+) visits    Additional findings: None     PLAN     Recommended plan for no diet, medication or health factor changes affecting INR     Dosing Instructions: Booster dose then continue your current warfarin dose with next INR in 2 weeks       Summary  As of 7/14/2021    Full warfarin instructions:  7/14: 8 mg; Otherwise 2 mg every Thu; 4 mg all other days   Next INR check:  7/28/2021             Detailed voice message left for Victorino with dosing instructions and follow up date.     Contact [phone] to schedule and with any changes, questions or concerns.     Education provided: None required    Plan made per [facility] anticoagulation protocol    Luís Williamson RN  Anticoagulation Clinic  7/14/2021    _______________________________________________________________________     Anticoagulation Episode Summary     Current INR goal:  2.0-3.0   TTR:  69.4 % (5.8 mo)   Target end date:  Indefinite   Send INR reminders to:  JOSEFINA MIDWAY    Indications    History of deep venous thrombosis [Z86.718]           Comments:           Anticoagulation Care Providers     Provider Role Specialty Phone number    Jez Jack MD Referring Family Medicine [phone]

## 2021-07-14 NOTE — TELEPHONE ENCOUNTER
Pt requesting refill of gabapentin 300mg  To on-line pharmacy in chart    Please call pt back , if any  Questions: [phone] [place]

## 2021-08-06 ENCOUNTER — ANCILLARY PROCEDURE (OUTPATIENT)
Dept: CARDIOLOGY | Facility: CLINIC | Age: 86
End: 2021-08-06
Attending: INTERNAL MEDICINE
Payer: COMMERCIAL

## 2021-08-06 DIAGNOSIS — I49.5 SICK SINUS SYNDROME (H): Primary | ICD-10-CM

## 2021-08-06 DIAGNOSIS — Z95.0 CARDIAC PACEMAKER IN SITU: ICD-10-CM

## 2021-08-06 PROCEDURE — 93280 PM DEVICE PROGR EVAL DUAL: CPT | Performed by: INTERNAL MEDICINE

## 2021-08-18 ENCOUNTER — ANTICOAGULATION THERAPY VISIT (OUTPATIENT)
Dept: ANTICOAGULATION | Facility: CLINIC | Age: 86
End: 2021-08-18

## 2021-08-18 ENCOUNTER — LAB (OUTPATIENT)
Dept: LAB | Facility: CLINIC | Age: 86
End: 2021-08-18
Payer: COMMERCIAL

## 2021-08-18 DIAGNOSIS — Z79.01 LONG TERM (CURRENT) USE OF ANTICOAGULANTS: ICD-10-CM

## 2021-08-18 DIAGNOSIS — Z86.718 HISTORY OF DEEP VENOUS THROMBOSIS: ICD-10-CM

## 2021-08-18 DIAGNOSIS — Z86.718 HISTORY OF DEEP VENOUS THROMBOSIS: Primary | ICD-10-CM

## 2021-08-18 LAB — INR BLD: 2.4 (ref 0.9–1.1)

## 2021-08-18 PROCEDURE — 85610 PROTHROMBIN TIME: CPT

## 2021-08-18 PROCEDURE — 36416 COLLJ CAPILLARY BLOOD SPEC: CPT

## 2021-08-18 NOTE — PROGRESS NOTES
ANTICOAGULATION MANAGEMENT     Victorino Khan 87 year old male is on warfarin with therapeutic INR result. (Goal INR 2.0-3.0)    Recent labs: (last 7 days)     08/18/21  1144   INR 2.4*       ASSESSMENT     Source(s): Chart Review and Patient/Caregiver Call       Warfarin doses taken: Warfarin taken as instructed    Diet: No new diet changes identified    New illness, injury, or hospitalization: No    Medication/supplement changes: None noted    Signs or symptoms of bleeding or clotting: No    Previous INR: Subtherapeutic but therapeutic 6 month prior    Additional findings: None     PLAN     Recommended plan for no diet, medication or health factor changes affecting INR     Dosing Instructions: Continue your current warfarin dose with next INR in 4 weeks . Eddy agrees to recheck in 6 weeks      Summary  As of 8/18/2021    Full warfarin instructions:  2 mg every Thu; 4 mg all other days   Next INR check:  9/29/2021             Telephone call with Victorino who verbalizes understanding and agrees to plan    Lab visit scheduled    Education provided: None required    Plan made per [facility] anticoagulation protocol    Luís Williamson RN  Anticoagulation Clinic  8/18/2021    _______________________________________________________________________     Anticoagulation Episode Summary     Current INR goal:  2.0-3.0   TTR:  67.1 % (6.9 mo)   Target end date:  Indefinite   Send INR reminders to:  JOSEFINA MIDWAY    Indications    History of deep venous thrombosis [Z86.718]           Comments:           Anticoagulation Care Providers     Provider Role Specialty Phone number    Jez Jack MD Referring Family Medicine [phone]

## 2021-08-19 LAB
MDC_IDC_LEAD_IMPLANT_DT: NORMAL
MDC_IDC_LEAD_IMPLANT_DT: NORMAL
MDC_IDC_LEAD_LOCATION: NORMAL
MDC_IDC_LEAD_LOCATION: NORMAL
MDC_IDC_LEAD_LOCATION_DETAIL_1: NORMAL
MDC_IDC_LEAD_LOCATION_DETAIL_1: NORMAL
MDC_IDC_LEAD_MFG: NORMAL
MDC_IDC_LEAD_MFG: NORMAL
MDC_IDC_LEAD_MODEL: NORMAL
MDC_IDC_LEAD_MODEL: NORMAL
MDC_IDC_LEAD_POLARITY_TYPE: NORMAL
MDC_IDC_LEAD_POLARITY_TYPE: NORMAL
MDC_IDC_LEAD_SERIAL: NORMAL
MDC_IDC_LEAD_SERIAL: NORMAL
MDC_IDC_MSMT_BATTERY_REMAINING_LONGEVITY: 102 MO
MDC_IDC_MSMT_BATTERY_STATUS: NORMAL
MDC_IDC_MSMT_BATTERY_VOLTAGE: 2.99 V
MDC_IDC_MSMT_LEADCHNL_RA_IMPEDANCE_VALUE: 487.5 OHM
MDC_IDC_MSMT_LEADCHNL_RA_PACING_THRESHOLD_AMPLITUDE: 0.38 V
MDC_IDC_MSMT_LEADCHNL_RA_PACING_THRESHOLD_PULSEWIDTH: 0.5 MS
MDC_IDC_MSMT_LEADCHNL_RA_SENSING_INTR_AMPL: 5 MV
MDC_IDC_MSMT_LEADCHNL_RV_IMPEDANCE_VALUE: 412.5 OHM
MDC_IDC_MSMT_LEADCHNL_RV_PACING_THRESHOLD_AMPLITUDE: 0.75 V
MDC_IDC_MSMT_LEADCHNL_RV_PACING_THRESHOLD_AMPLITUDE: 0.75 V
MDC_IDC_MSMT_LEADCHNL_RV_PACING_THRESHOLD_PULSEWIDTH: 0.5 MS
MDC_IDC_MSMT_LEADCHNL_RV_PACING_THRESHOLD_PULSEWIDTH: 0.5 MS
MDC_IDC_MSMT_LEADCHNL_RV_SENSING_INTR_AMPL: 6.1 MV
MDC_IDC_PG_IMPLANT_DTM: NORMAL
MDC_IDC_PG_MFG: NORMAL
MDC_IDC_PG_MODEL: NORMAL
MDC_IDC_PG_SERIAL: NORMAL
MDC_IDC_PG_TYPE: NORMAL
MDC_IDC_SESS_CLINIC_NAME: NORMAL
MDC_IDC_SESS_DTM: NORMAL
MDC_IDC_SESS_TYPE: NORMAL
MDC_IDC_SET_BRADY_AT_MODE_SWITCH_MODE: NORMAL
MDC_IDC_SET_BRADY_AT_MODE_SWITCH_RATE: 170 {BEATS}/MIN
MDC_IDC_SET_BRADY_HYSTRATE: NORMAL
MDC_IDC_SET_BRADY_LOWRATE: 60 {BEATS}/MIN
MDC_IDC_SET_BRADY_MAX_SENSOR_RATE: 130 {BEATS}/MIN
MDC_IDC_SET_BRADY_MAX_TRACKING_RATE: 120 {BEATS}/MIN
MDC_IDC_SET_BRADY_MODE: NORMAL
MDC_IDC_SET_BRADY_NIGHT_RATE: NORMAL
MDC_IDC_SET_BRADY_PAV_DELAY_LOW: 250 MS
MDC_IDC_SET_BRADY_SAV_DELAY_LOW: 250 MS
MDC_IDC_SET_LEADCHNL_RA_PACING_AMPLITUDE: 2 V
MDC_IDC_SET_LEADCHNL_RA_PACING_CAPTURE_MODE: NORMAL
MDC_IDC_SET_LEADCHNL_RA_PACING_POLARITY: NORMAL
MDC_IDC_SET_LEADCHNL_RA_PACING_PULSEWIDTH: 0.5 MS
MDC_IDC_SET_LEADCHNL_RA_SENSING_ADAPTATION_MODE: NORMAL
MDC_IDC_SET_LEADCHNL_RA_SENSING_POLARITY: NORMAL
MDC_IDC_SET_LEADCHNL_RA_SENSING_SENSITIVITY: 0.3 MV
MDC_IDC_SET_LEADCHNL_RV_PACING_AMPLITUDE: 2 V
MDC_IDC_SET_LEADCHNL_RV_PACING_CAPTURE_MODE: NORMAL
MDC_IDC_SET_LEADCHNL_RV_PACING_POLARITY: NORMAL
MDC_IDC_SET_LEADCHNL_RV_PACING_PULSEWIDTH: 0.5 MS
MDC_IDC_SET_LEADCHNL_RV_SENSING_POLARITY: NORMAL
MDC_IDC_SET_LEADCHNL_RV_SENSING_SENSITIVITY: 0.5 MV
MDC_IDC_STAT_AT_MODE_SW_COUNT: 0
MDC_IDC_STAT_BRADY_RA_PERCENT_PACED: 35 %
MDC_IDC_STAT_BRADY_RV_PERCENT_PACED: 98 %

## 2021-09-11 ENCOUNTER — HEALTH MAINTENANCE LETTER (OUTPATIENT)
Age: 86
End: 2021-09-11

## 2021-09-29 ENCOUNTER — ANTICOAGULATION THERAPY VISIT (OUTPATIENT)
Dept: ANTICOAGULATION | Facility: CLINIC | Age: 86
End: 2021-09-29

## 2021-09-29 ENCOUNTER — LAB (OUTPATIENT)
Dept: LAB | Facility: CLINIC | Age: 86
End: 2021-09-29
Payer: COMMERCIAL

## 2021-09-29 DIAGNOSIS — Z86.718 HISTORY OF DEEP VENOUS THROMBOSIS: ICD-10-CM

## 2021-09-29 DIAGNOSIS — Z79.01 LONG TERM (CURRENT) USE OF ANTICOAGULANTS: ICD-10-CM

## 2021-09-29 DIAGNOSIS — Z86.718 HISTORY OF DEEP VENOUS THROMBOSIS: Primary | ICD-10-CM

## 2021-09-29 LAB — INR BLD: 2.1 (ref 0.9–1.1)

## 2021-09-29 PROCEDURE — 36416 COLLJ CAPILLARY BLOOD SPEC: CPT

## 2021-09-29 PROCEDURE — 85610 PROTHROMBIN TIME: CPT

## 2021-09-29 NOTE — PROGRESS NOTES
ANTICOAGULATION MANAGEMENT     Victorino Khan 87 year old male is on warfarin with therapeutic INR result. (Goal INR 2.0-3.0)    Recent labs: (last 7 days)     09/29/21  1353   INR 2.1*       ASSESSMENT     Source(s): Chart Review and Template       Warfarin doses taken: Warfarin taken as instructed    Diet: No new diet changes identified    New illness, injury, or hospitalization: No    Medication/supplement changes: None noted    Signs or symptoms of bleeding or clotting: No    Previous INR: Therapeutic last 2(+) visits    Additional findings: None     PLAN     Recommended plan for no diet, medication or health factor changes affecting INR     Dosing Instructions: Continue your current warfarin dose with next INR in 6 weeks       Summary  As of 9/29/2021    Full warfarin instructions:  2 mg every Thu; 4 mg all other days   Next INR check:  11/10/2021             Detailed voice message left for Victorino with dosing instructions and follow up date.     Contact [phone] to schedule and with any changes, questions or concerns.     Education provided: None required    Plan made per [facility] anticoagulation protocol    Luís Williamson RN  Anticoagulation Clinic  9/29/2021    _______________________________________________________________________     Anticoagulation Episode Summary     Current INR goal:  2.0-3.0   TTR:  72.7 % (8.3 mo)   Target end date:  Indefinite   Send INR reminders to:  JOSEFINA MIDWAY    Indications    History of deep venous thrombosis [Z86.718]           Comments:           Anticoagulation Care Providers     Provider Role Specialty Phone number    Jez Jack MD Referring Family Medicine [phone]

## 2021-11-01 ENCOUNTER — OFFICE VISIT (OUTPATIENT)
Dept: FAMILY MEDICINE | Facility: CLINIC | Age: 86
End: 2021-11-01
Payer: COMMERCIAL

## 2021-11-01 VITALS
DIASTOLIC BLOOD PRESSURE: 83 MMHG | HEIGHT: 69 IN | BODY MASS INDEX: 29.18 KG/M2 | OXYGEN SATURATION: 97 % | HEART RATE: 59 BPM | SYSTOLIC BLOOD PRESSURE: 133 MMHG | TEMPERATURE: 97.6 F | WEIGHT: 197 LBS | RESPIRATION RATE: 14 BRPM

## 2021-11-01 DIAGNOSIS — M25.572 PAIN IN JOINT, ANKLE AND FOOT, LEFT: Primary | ICD-10-CM

## 2021-11-01 PROCEDURE — 99213 OFFICE O/P EST LOW 20 MIN: CPT | Performed by: FAMILY MEDICINE

## 2021-11-01 ASSESSMENT — ASTHMA QUESTIONNAIRES
ACT_TOTALSCORE: 24
QUESTION_1 LAST FOUR WEEKS HOW MUCH OF THE TIME DID YOUR ASTHMA KEEP YOU FROM GETTING AS MUCH DONE AT WORK, SCHOOL OR AT HOME: NONE OF THE TIME
QUESTION_2 LAST FOUR WEEKS HOW OFTEN HAVE YOU HAD SHORTNESS OF BREATH: ONCE OR TWICE A WEEK
QUESTION_3 LAST FOUR WEEKS HOW OFTEN DID YOUR ASTHMA SYMPTOMS (WHEEZING, COUGHING, SHORTNESS OF BREATH, CHEST TIGHTNESS OR PAIN) WAKE YOU UP AT NIGHT OR EARLIER THAN USUAL IN THE MORNING: NOT AT ALL
QUESTION_4 LAST FOUR WEEKS HOW OFTEN HAVE YOU USED YOUR RESCUE INHALER OR NEBULIZER MEDICATION (SUCH AS ALBUTEROL): NOT AT ALL
QUESTION_5 LAST FOUR WEEKS HOW WOULD YOU RATE YOUR ASTHMA CONTROL: COMPLETELY CONTROLLED

## 2021-11-01 ASSESSMENT — MIFFLIN-ST. JEOR: SCORE: 1566.71

## 2021-11-01 NOTE — PROGRESS NOTES
"OFFICE VISIT - FAMILY MEDICINE     ASSESSMENT AND PLAN       ICD-10-CM    1. Pain in joint, ankle and foot, left  M25.572 Ankle/Foot Bracing Supplies Order for DME - ONLY FOR DME   Left ankle pain differential diagnosis discussed included sprain, osteoarthritis etc., has had relief with ankle brace in the past, he was given 1 year the clinic today, was instructed to keep his leg elevated, use ice as needed and to follow-up with PCP if not improving next 3 to 4 weeks.  Sooner if symptoms get worse.     CHIEF COMPLAINT   Joint Swelling       HPI   Victorino Khan is a 87 year old male.  No Patient Care Coordination Note on file.    He is here today with exacerbation of his left ankle pain, has had some issue in the past, he did use an ankle brace with improvement.  Denies any trauma or significant swelling.  Pain is localized on the lateral aspect of the ankle.  No numbness tingling or bruits.    Review of Systems As per HPI, otherwise negative.    OBJECTIVE   /83 (BP Location: Left arm, Patient Position: Sitting, Cuff Size: Adult Regular)   Pulse 59   Temp 97.6  F (36.4  C) (Oral)   Resp 14   Ht 1.765 m (5' 9.49\")   Wt 89.4 kg (197 lb)   SpO2 97%   BMI 28.68 kg/m    Physical Exam  Constitutional:       Appearance: Normal appearance.   HENT:      Head: Normocephalic and atraumatic.   Cardiovascular:      Rate and Rhythm: Normal rate and regular rhythm.   Pulmonary:      Effort: Pulmonary effort is normal.      Breath sounds: Normal breath sounds.   Musculoskeletal:         General: Tenderness (Left lateral ankle.) present. No swelling or deformity.      Cervical back: Normal range of motion and neck supple.   Neurological:      General: No focal deficit present.      Mental Status: He is alert and oriented to person, place, and time.   Psychiatric:         Behavior: Behavior normal.         Thought Content: Thought content normal.         Judgment: Judgment normal.         [facility]     Family History   Problem " Relation Age of Onset     Coronary Artery Disease Early Onset Father      Coronary Artery Disease Early Onset Brother      Social History     Socioeconomic History     Marital status: Single     Spouse name: Not on file     Number of children: Not on file     Years of education: Not on file     Highest education level: Not on file   Occupational History     Not on file   Tobacco Use     Smoking status: Never Smoker     Smokeless tobacco: Never Used   Substance and Sexual Activity     Alcohol use: No     Drug use: Not on file     Sexual activity: Not on file   Other Topics Concern     Parent/sibling w/ CABG, MI or angioplasty before 65F 55M? Not Asked   Social History Narrative     Not on file     Social Determinants of Health     Financial Resource Strain:      Difficulty of Paying Living Expenses:    Food Insecurity:      Worried About Running Out of Food in the Last Year:      Ran Out of Food in the Last Year:    Transportation Needs:      Lack of Transportation (Medical):      Lack of Transportation (Non-Medical):    Physical Activity:      Days of Exercise per Week:      Minutes of Exercise per Session:    Stress:      Feeling of Stress :    Social Connections:      Frequency of Communication with Friends and Family:      Frequency of Social Gatherings with Friends and Family:      Attends Alevism Services:      Active Member of Clubs or Organizations:      Attends Club or Organization Meetings:      Marital Status:    Intimate Partner Violence:      Fear of Current or Ex-Partner:      Emotionally Abused:      Physically Abused:      Sexually Abused:        PMSH   [unfilled]  Past Surgical History:   Procedure Laterality Date     IMPLANT PACEMAKER Left     For sick sinus syndrome     LAPAROSCOPIC NISSEN FUNDOPLICATION  11/2011    For large paraesophageal hernia     TOTAL KNEE ARTHROPLASTY Right 2000     TOTAL KNEE ARTHROPLASTY Left 2010       RESULTS/CONSULTS (Lab/Rad)   No results found for this or any  previous visit (from the past 168 hour(s)).  Cardiac Device Check - In Clinic  [employer] (D) Pacemaker Device Check  Patient seen in clinic for device evaluation and iterative programming.   AP: 35    %    : 98 %    Mode: DDD     Underlying Rhythm: SB 50-60 bpm with a 1st degree AV block  Heart Rate: Stable with good variability.     Sensing: WNL    Pacing Threshold: Stable    Impedance: Stable    Battery Status: Estimated at 8.4-8.9 years    Device Site: Well healed    Atrial Arrhythmia: 10 mode switches logged with 3 EGMs available. EGMs   show AS=VS for AT/AF. The longest episode lasted [id number]. Total AT/AF   burden is <1%. Patient denies symptoms with episodes. Patient is on   Coumadin.  Ventricular Arrhythmia: 2 High Ventricular Rate episodes logged with 2   EGMs available. EGMs show 8-13 beats of NSVT with V rates of 160-180 bpm.   EF=55-60%(2017).   Setting Change: none    Care Plan: Remote device check on 11/5/21. Scheduled to see Allie Frazier   11/8/21.   KF RN      I have reviewed and interpreted the device interrogation, settings,   programming and nurse's summary. The device is functioning within normal   device parameters. I agree with the current findings, assessment and plan.     [unfilled]    HEALTH MAINTENANCE / SCREENING   [unfilled], [unfilled],[unfilled]  Immunization History   Administered Date(s) Administered     COVID-19,PF,Moderna 01/28/2020, 01/28/2021, 02/25/2021     Flu, Unspecified 11/14/2012     Influenza (H1N1) 01/28/2010     Influenza (High Dose) 3 valent vaccine 01/15/2020, 01/09/2021     Influenza (IIV3) PF 10/31/2011, 11/19/2014     Influenza, Quad, High Dose, Pf, 65yr+ (Fluzone HD) 01/05/2021     Pneumococcal 23 valent 11/30/2012     Zoster vaccine, live 01/04/2011     Health Maintenance   Topic     SPIROMETRY      ANNUAL REVIEW OF HM ORDERS      ADVANCE CARE PLANNING      COPD ACTION PLAN      DTAP/TDAP/TD IMMUNIZATION (1 - Tdap)      MEDICARE ANNUAL WELLNESS VISIT      ZOSTER IMMUNIZATION (2 of 3)     INFLUENZA VACCINE (1)     FALL RISK ASSESSMENT      PHQ-2      Pneumococcal Vaccine: 65+ Years      COVID-19 Vaccine      IPV IMMUNIZATION      MENINGITIS IMMUNIZATION      HEPATITIS B IMMUNIZATION      Review of external notes as documented elsewhere in note  25 minutes spent on the date of the encounter doing chart review, review of outside records, review of test results, interpretation of tests, patient visit and documentation          Porsche Pitts MD  [facility]   This transcription uses voice recognition software, which may contain typographical errors.

## 2021-11-02 ASSESSMENT — ASTHMA QUESTIONNAIRES: ACT_TOTALSCORE: 24

## 2021-11-05 ENCOUNTER — OFFICE VISIT (OUTPATIENT)
Dept: INTERNAL MEDICINE | Facility: CLINIC | Age: 86
End: 2021-11-05
Payer: COMMERCIAL

## 2021-11-05 ENCOUNTER — ANCILLARY PROCEDURE (OUTPATIENT)
Dept: GENERAL RADIOLOGY | Facility: CLINIC | Age: 86
End: 2021-11-05
Attending: INTERNAL MEDICINE
Payer: COMMERCIAL

## 2021-11-05 VITALS
BODY MASS INDEX: 25.77 KG/M2 | SYSTOLIC BLOOD PRESSURE: 100 MMHG | OXYGEN SATURATION: 99 % | DIASTOLIC BLOOD PRESSURE: 80 MMHG | HEART RATE: 98 BPM | WEIGHT: 177 LBS

## 2021-11-05 DIAGNOSIS — M25.572 PAIN IN JOINT INVOLVING ANKLE AND FOOT, LEFT: Primary | ICD-10-CM

## 2021-11-05 DIAGNOSIS — M15.3 POST-TRAUMATIC OSTEOARTHRITIS OF MULTIPLE JOINTS: ICD-10-CM

## 2021-11-05 DIAGNOSIS — M25.572 PAIN IN JOINT INVOLVING ANKLE AND FOOT, LEFT: ICD-10-CM

## 2021-11-05 PROCEDURE — 99214 OFFICE O/P EST MOD 30 MIN: CPT | Performed by: INTERNAL MEDICINE

## 2021-11-05 PROCEDURE — 73610 X-RAY EXAM OF ANKLE: CPT | Mod: TC | Performed by: RADIOLOGY

## 2021-11-05 NOTE — PATIENT INSTRUCTIONS
1.  Air stirrup for ankle pain.    2.  Try topical diclofenac gel four times daily as needed for pain.  May also use tylenol pills as needed    3.  Consider steroid injection for severe ankle pain.    4.  Annual wellness visit due in about January

## 2021-11-06 ENCOUNTER — ANCILLARY PROCEDURE (OUTPATIENT)
Dept: CARDIOLOGY | Facility: CLINIC | Age: 86
End: 2021-11-06
Attending: INTERNAL MEDICINE
Payer: COMMERCIAL

## 2021-11-06 DIAGNOSIS — Z95.0 CARDIAC PACEMAKER IN SITU: ICD-10-CM

## 2021-11-06 DIAGNOSIS — I49.5 SICK SINUS SYNDROME (H): ICD-10-CM

## 2021-11-06 PROCEDURE — 93296 REM INTERROG EVL PM/IDS: CPT | Performed by: INTERNAL MEDICINE

## 2021-11-06 PROCEDURE — 93294 REM INTERROG EVL PM/LDLS PM: CPT | Performed by: INTERNAL MEDICINE

## 2021-11-07 PROBLEM — M15.3 POST-TRAUMATIC OSTEOARTHRITIS OF MULTIPLE JOINTS: Status: ACTIVE | Noted: 2021-11-07

## 2021-11-07 NOTE — PROGRESS NOTES
ASSESSMENT:  1. Pain in joint involving ankle and foot, left  X-ray reveals severe DJD at the tibiotalar joint with marked lateral joint space narrowing.  Eddy has noted some improvement with use of a ankle splint in the past which seems a good option.  He also was encouraged to try topical diclofenac.  - XR Ankle Left G/E 3 Views; Future  - diclofenac (VOLTAREN) 1 % topical gel; Apply 4 g topically 4 times daily  Dispense: 100 g; Refill: 4    2. Post-traumatic osteoarthritis of multiple joints  He has severe DJD of multiple sites and also advanced osteoporosis.  He has preferred a physical therapy type approach in the past    PLAN:  Patient Instructions   1.  Air stirrup for ankle pain.    2.  Try topical diclofenac gel four times daily as needed for pain.  May also use tylenol pills as needed    3.  Consider steroid injection for severe ankle pain.    4.  Annual wellness visit due in about January 5.  He has already received the Moderna Covid booster    Orders Placed This Encounter   Procedures     REVIEW OF HEALTH MAINTENANCE PROTOCOL ORDERS     XR Ankle Left G/E 3 Views     There are no discontinued medications.    Return in about 4 weeks (around 12/3/2021) for Routine preventive.    ASSESSED PROBLEMS:  See above    CHIEF COMPLAINT:   left ankle pain    HISTORY OF PRESENT ILLNESS:  Victorino is a 87 year old male who presents complaining of left ankle pain.  He states ankle does not bother him at rest but he notes pain with standing and ambulation.  He has noted improvement with splinting in the past.  He previously played football and basketball, and worked a farm.  He has had previous ankle injuries.    He is also treated for osteoporosis    REVIEW OF SYSTEMS:  He has received the Moderna Covid booster and influenza vaccine  Comprehensive review of systems is otherwise negative.    PFSH:   and retired.  He has enjoyed dancing in the past    TOBACCO USE:  History   Smoking Status     Never Smoker    Smokeless Tobacco     Never Used       VITALS:  Vitals:    11/05/21 1343   BP: 100/80   BP Location: Left arm   Patient Position: Sitting   Cuff Size: Adult Regular   Pulse: 98   SpO2: 99%   Weight: 80.3 kg (177 lb)     Wt Readings from Last 3 Encounters:   11/05/21 80.3 kg (177 lb)   11/01/21 89.4 kg (197 lb)   07/12/21 84.1 kg (185 lb 4.8 oz)       PHYSICAL EXAM:  Constitutional:   Reveals an alert pleasant talkative man with severe thoracic kyphosis.   Vitals: per nursing notes.  HEENT: Atraumatic  Eyes: No conjunctival hyperemia  Neck:  Supple, no carotid bruits or adenopathy.  Back:   severe thoracic kyphosis  Thorax:  No bony deformities.  Lungs: Clear to A&P without rales or wheezes.  Respiratory effort normal.  Cardiac:   Regular rate and rhythm, normal S1, S2, no murmur or gallop..  Extremities: He notes discomfort with medial and lateral rotation of ankle trace to 1+ edema left ankle.  Stasis changes.  Pulses palpable.  No foot ulcers  Skin:  No jaundice, peripheral cyanosis or lesions to suggest malignancy.  Neuro:  Alert and oriented.   No gross focal deficits.  Psychiatric:  Memory intact, mood appropriate.      INDICATION:  Pain in joint involving ankle and foot, left.  COMPARISON: None.                                                                      IMPRESSION: Advanced degenerative change at the left tibiotalar joint with slight medial talar tilt and marked joint space narrowing along the lateral aspect. Soft tissue swelling about the ankle. No evidence for acute fracture. Plantar calcaneal   spurring. Vascular calcifications.      DATA REVIEWED:  Additional History from Old Records Summarized (2): None.  Decision to Obtain Records (1): None.   Radiology Tests Summarized or Ordered (1): None.  Labs Reviewed or Ordered (1): None.  Medicine Test Summarized or Ordered (1): None.   Independent Review of EKG or X-RAY(2 each): Ankle x-ray done and reviewed    The visit lasted a total of 25 minutes  face to face with the patient. Over 50% of the time was spent counseling and educating the patient about management of his severe osteoarthritis of the ankle      Dragon dictation was used for this note. Speech recognition errors are a possibility.     MEDICATIONS:  Current Outpatient Medications   Medication Sig Dispense Refill     aspirin (ASA) 81 MG EC tablet Take 81 mg by mouth daily       calcium carbonate (OS- MG [place]. [place]) 500 MG tablet Take 500 mg by mouth daily        Cholecalciferol (VITAMIN D3 PO) Take 2,000 Units by mouth daily        diclofenac (VOLTAREN) 1 % topical gel Apply 4 g topically 4 times daily 100 g 4     fluticasone (FLONASE) 50 MCG/ACT nasal spray Daily.       fluticasone-salmeterol (ADVAIR-HFA) 115-21 MCG/ACT inhaler Inhale 2 puffs into the lungs 2 times daily       gabapentin (NEURONTIN) 300 MG capsule Take 1 capsule (300 mg) by mouth daily 90 capsule 3     multivitamin, therapeutic with minerals (MULTI-VITAMIN) TABS Take 1 tablet by mouth daily       warfarin (COUMADIN) 1 MG tablet Take 2.5 tablets (2.5 mg) by mouth daily 30 tablet 3     WARFARIN SODIUM PO Take 5 mg by mouth three times a week Monday, Wednesday & Friday

## 2021-11-08 ENCOUNTER — VIRTUAL VISIT (OUTPATIENT)
Dept: CARDIOLOGY | Facility: CLINIC | Age: 86
End: 2021-11-08
Attending: INTERNAL MEDICINE
Payer: COMMERCIAL

## 2021-11-08 DIAGNOSIS — I49.5 SICK SINUS SYNDROME (H): Primary | ICD-10-CM

## 2021-11-08 DIAGNOSIS — Z95.0 CARDIAC PACEMAKER IN SITU: ICD-10-CM

## 2021-11-08 PROCEDURE — 99213 OFFICE O/P EST LOW 20 MIN: CPT | Mod: 95 | Performed by: NURSE PRACTITIONER

## 2021-11-08 RX ORDER — WARFARIN SODIUM 4 MG/1
5 TABLET ORAL
Status: CANCELLED | OUTPATIENT
Start: 2021-11-08

## 2021-11-08 NOTE — PROGRESS NOTES
Victorino is a 87 year old who is being evaluated via a billable telephone visit.      What phone number would you like to be contacted at? [phone]  How would you like to obtain your AVS? MyChart    Review Of Systems  Skin: negative  Eyes: negative  Ears/Nose/Throat: negative  Respiratory: Dyspnea on exertion  Cardiovascular: negative  Gastrointestinal: negative  Genitourinary: negative  Musculoskeletal: negative  Neurologic: negative  Psychiatric: negative  Hematologic/Lymphatic/Immunologic: negative  Endocrine: negative    Blood Pressure: Unable to take at home  Pulse: unknown  Weight: 190 lbs  Height: 5 ft 8 in    Cheryl Easley [facility] ([facility])    Telephone call lasted 5 minutes    Victorino Khan is a 87 year old male who is following up for annual visit.   This visit is being conducted as a virtual visit due to the emphasis on mitigation of the COVID-19 virus pandemic. The clinician has decided that the risk of an in-office visit outweighs the benefit for this patient.   He is a patient of Dr. Carrera.  His medical history includes     1. Mobitz II 2nd degree AVB and episodes of complete s/p St Walter dual-chamber permanent pacemaker implantation on 10-.    2. NSVT  3. Intolerance to simvastatin.     4. Factor V Leiden deficiency.  History of DVT  5. Raynaud's     Diagnostics:  Device check (11/2021) revealed A-paced 30%  : 92 %.   Stable leads.  Battery life 8 years.   Atrial Arrhythmia: 5 mode switch episodes logged comprising <1% of the time. No EGMs for review   Ventricular Arrhthymias:  2 ventricular high rates logged. 2 EGMs for review show Vs>As for NSVT lasting 8-17 beats, rates 160-180bpm. Episodes occurred 9/8/2021 at 2:10am and 8/9/2021 at 10:42pm.       Today he reports feeling good. He does not take his BP at home.  He is exercising regularly and denies chest pain or pressure, dizziness, syncope, angina, dyspnea at rest or with exertion, palpitations, orthopnea, PND, or edema.  Reports taking medications  as prescribed including warfarin and aspirin; denies bleeding, hematuria, melena, epistaxis and signs/symptoms of stroke.      The remainder of the physical exam was deferred due to public health crisis.   Acknowledge and reviewed ROS completed by Cheryl Easley CMA (AAMA    ASSESSMENT AND PLAN    Mobitz II 2nd degree AVB and episodes of complete     s/p St Walter dual-chamber permanent pacemaker implantation on 10-.     I reviewed the last device check which revealed normal device function and stable leads    Follow up with Dr. Carrera in a year and device check on a quarterly basis.         JOHN Renee CNP on 11/8/2021 at 1:43 PM

## 2021-11-08 NOTE — PROGRESS NOTES
Victorino Khan is a 87 year old male who is following up Annual visit.   This visit is being conducted as a virtual visit due to the emphasis on mitigation of the COVID-19 virus pandemic. The clinician has decided that the risk of an in-office visit outweighs the benefit for this patient.   He is a patient of Dr. Carrera.  His medical history includes     1. Mobitz II 2nd degree AVB and episodes of complete s/p [employer] Walter dual-chamber permanent pacemaker implantation on 10-.    2. NSVT  3. Intolerance to simvasatin.  Mylasia.   4. Factor V Leiden deficiency.  history of DVT  5. Raynaund's     Diagnostics:  Device check (8/2021) revealed A-paced 35%  : 98 %.   Stable leads.  Battery life 8 years.   Atrial Arrhythmia: 10 mode switches logged with 3 EGMs available. EGMs show AS=VS for AT/AF. The longest episode lasted [id number]. Total AT/AF burden is <1%.   Ventricular Arrhthymia:   2 High Ventricular Rate episodes logged with 2 EGMs available. EGMs show 8-13 beats of NSVT with V rates of 160-180 bpm.        Today he reports feeling good. He does not take his BP at home.  He is exercising regularly and denies chest pain or pressure, dizziness, syncope, angina, dyspnea at rest or with exertion, palpitations, orthopnea, PND, or edema.  Reports taking medications as prescribed including warfarin and aspirin; denies bleeding, hematuria, melena, epistaxis and signs/symptoms of stroke.      The remainder of the physical exam was deferred due to public health crisis.   Acknowledge and reviewed ROS completed by Cheryl Easley CMA (AAMA    ASSESSMENT AND PLAN    Mobitz II 2nd degree AVB and episodes of complete   s/p St Walter dual-chamber permanent pacemaker implantation on 10-.   Normal function     Plan:  Follow up ***

## 2021-11-08 NOTE — LETTER
11/8/2021    Jez Jack MD  [address] [facility] [address]    RE: Victorino Khan       Dear Colleague,    I had the pleasure of seeing Victorino Khan in the [facility].    Victorino is a 87 year old who is being evaluated via a billable telephone visit.      What phone number would you like to be contacted at? [phone]  How would you like to obtain your AVS? MyChart    Review Of Systems  Skin: negative  Eyes: negative  Ears/Nose/Throat: negative  Respiratory: Dyspnea on exertion  Cardiovascular: negative  Gastrointestinal: negative  Genitourinary: negative  Musculoskeletal: negative  Neurologic: negative  Psychiatric: negative  Hematologic/Lymphatic/Immunologic: negative  Endocrine: negative    Blood Pressure: Unable to take at home  Pulse: unknown  Weight: 190 lbs  Height: 5 ft 8 in    Cheryl Easley [facility] ([facility])    Telephone call lasted 5 minutes    Victorino Khan is a 87 year old male who is following up for annual visit.   This visit is being conducted as a virtual visit due to the emphasis on mitigation of the COVID-19 virus pandemic. The clinician has decided that the risk of an in-office visit outweighs the benefit for this patient.   He is a patient of Dr. Carrera.  His medical history includes     1. Mobitz II 2nd degree AVB and episodes of complete s/p St Walter dual-chamber permanent pacemaker implantation on 10-.    2. NSVT  3. Intolerance to simvastatin.     4. Factor V Leiden deficiency.  History of DVT  5. Raynaud's     Diagnostics:  Device check (11/2021) revealed A-paced 30%  : 92 %.   Stable leads.  Battery life 8 years.   Atrial Arrhythmia: 5 mode switch episodes logged comprising <1% of the time. No EGMs for review   Ventricular Arrhthymias:  2 ventricular high rates logged. 2 EGMs for review show Vs>As for NSVT lasting 8-17 beats, rates 160-180bpm. Episodes occurred 9/8/2021 at 2:10am and 8/9/2021 at 10:42pm.       Today he  reports feeling good. He does not take his BP at home.  He is exercising regularly and denies chest pain or pressure, dizziness, syncope, angina, dyspnea at rest or with exertion, palpitations, orthopnea, PND, or edema.  Reports taking medications as prescribed including warfarin and aspirin; denies bleeding, hematuria, melena, epistaxis and signs/symptoms of stroke.      The remainder of the physical exam was deferred due to public health crisis.   Acknowledge and reviewed ROS completed by Cheryl Easley CMA (AAMA    ASSESSMENT AND PLAN    Mobitz II 2nd degree AVB and episodes of complete     s/p [employer] Walter dual-chamber permanent pacemaker implantation on 10-.     I reviewed the last device check which revealed normal device function and stable leads    Follow up with Dr. Carrera in a year and device check on a quarterly basis.         JOHN Renee CNP on 11/8/2021 at 1:43 PM          Thank you for allowing me to participate in the care of your patient.      Sincerely,     JOHN Renee CNP     [facility]  cc:   Shayan Santoro MD  [address]  [address]  [place] [address]

## 2021-11-15 LAB
MDC_IDC_EPISODE_DTM: NORMAL
MDC_IDC_EPISODE_ID: NORMAL
MDC_IDC_EPISODE_TYPE: NORMAL
MDC_IDC_LEAD_IMPLANT_DT: NORMAL
MDC_IDC_LEAD_IMPLANT_DT: NORMAL
MDC_IDC_LEAD_LOCATION: NORMAL
MDC_IDC_LEAD_LOCATION: NORMAL
MDC_IDC_LEAD_LOCATION_DETAIL_1: NORMAL
MDC_IDC_LEAD_LOCATION_DETAIL_1: NORMAL
MDC_IDC_LEAD_MFG: NORMAL
MDC_IDC_LEAD_MFG: NORMAL
MDC_IDC_LEAD_MODEL: NORMAL
MDC_IDC_LEAD_MODEL: NORMAL
MDC_IDC_LEAD_POLARITY_TYPE: NORMAL
MDC_IDC_LEAD_POLARITY_TYPE: NORMAL
MDC_IDC_LEAD_SERIAL: NORMAL
MDC_IDC_LEAD_SERIAL: NORMAL
MDC_IDC_MSMT_BATTERY_DTM: NORMAL
MDC_IDC_MSMT_BATTERY_REMAINING_LONGEVITY: 103 MO
MDC_IDC_MSMT_BATTERY_REMAINING_PERCENTAGE: 95.5 %
MDC_IDC_MSMT_BATTERY_RRT_TRIGGER: NORMAL
MDC_IDC_MSMT_BATTERY_STATUS: NORMAL
MDC_IDC_MSMT_BATTERY_VOLTAGE: 2.98 V
MDC_IDC_MSMT_LEADCHNL_RA_IMPEDANCE_VALUE: 440 OHM
MDC_IDC_MSMT_LEADCHNL_RA_LEAD_CHANNEL_STATUS: NORMAL
MDC_IDC_MSMT_LEADCHNL_RA_PACING_THRESHOLD_AMPLITUDE: 0.38 V
MDC_IDC_MSMT_LEADCHNL_RA_PACING_THRESHOLD_PULSEWIDTH: 0.5 MS
MDC_IDC_MSMT_LEADCHNL_RA_SENSING_INTR_AMPL: 4.5 MV
MDC_IDC_MSMT_LEADCHNL_RV_IMPEDANCE_VALUE: 390 OHM
MDC_IDC_MSMT_LEADCHNL_RV_LEAD_CHANNEL_STATUS: NORMAL
MDC_IDC_MSMT_LEADCHNL_RV_PACING_THRESHOLD_AMPLITUDE: 0.75 V
MDC_IDC_MSMT_LEADCHNL_RV_PACING_THRESHOLD_PULSEWIDTH: 0.5 MS
MDC_IDC_MSMT_LEADCHNL_RV_SENSING_INTR_AMPL: 5.2 MV
MDC_IDC_PG_IMPLANT_DTM: NORMAL
MDC_IDC_PG_MFG: NORMAL
MDC_IDC_PG_MODEL: NORMAL
MDC_IDC_PG_SERIAL: NORMAL
MDC_IDC_PG_TYPE: NORMAL
MDC_IDC_SESS_CLINIC_NAME: NORMAL
MDC_IDC_SESS_DTM: NORMAL
MDC_IDC_SESS_REPROGRAMMED: NO
MDC_IDC_SESS_TYPE: NORMAL
MDC_IDC_SET_BRADY_AT_MODE_SWITCH_MODE: NORMAL
MDC_IDC_SET_BRADY_AT_MODE_SWITCH_RATE: 170 {BEATS}/MIN
MDC_IDC_SET_BRADY_LOWRATE: 60 {BEATS}/MIN
MDC_IDC_SET_BRADY_MAX_SENSOR_RATE: 130 {BEATS}/MIN
MDC_IDC_SET_BRADY_MAX_TRACKING_RATE: 120 {BEATS}/MIN
MDC_IDC_SET_BRADY_MODE: NORMAL
MDC_IDC_SET_BRADY_PAV_DELAY_LOW: 250 MS
MDC_IDC_SET_BRADY_SAV_DELAY_LOW: 250 MS
MDC_IDC_SET_LEADCHNL_RA_PACING_AMPLITUDE: 2 V
MDC_IDC_SET_LEADCHNL_RA_PACING_ANODE_ELECTRODE_1: NORMAL
MDC_IDC_SET_LEADCHNL_RA_PACING_ANODE_LOCATION_1: NORMAL
MDC_IDC_SET_LEADCHNL_RA_PACING_CAPTURE_MODE: NORMAL
MDC_IDC_SET_LEADCHNL_RA_PACING_CATHODE_ELECTRODE_1: NORMAL
MDC_IDC_SET_LEADCHNL_RA_PACING_CATHODE_LOCATION_1: NORMAL
MDC_IDC_SET_LEADCHNL_RA_PACING_POLARITY: NORMAL
MDC_IDC_SET_LEADCHNL_RA_PACING_PULSEWIDTH: 0.5 MS
MDC_IDC_SET_LEADCHNL_RA_SENSING_ADAPTATION_MODE: NORMAL
MDC_IDC_SET_LEADCHNL_RA_SENSING_ANODE_ELECTRODE_1: NORMAL
MDC_IDC_SET_LEADCHNL_RA_SENSING_ANODE_LOCATION_1: NORMAL
MDC_IDC_SET_LEADCHNL_RA_SENSING_CATHODE_ELECTRODE_1: NORMAL
MDC_IDC_SET_LEADCHNL_RA_SENSING_CATHODE_LOCATION_1: NORMAL
MDC_IDC_SET_LEADCHNL_RA_SENSING_POLARITY: NORMAL
MDC_IDC_SET_LEADCHNL_RA_SENSING_SENSITIVITY: 0.3 MV
MDC_IDC_SET_LEADCHNL_RV_PACING_AMPLITUDE: 2 V
MDC_IDC_SET_LEADCHNL_RV_PACING_ANODE_ELECTRODE_1: NORMAL
MDC_IDC_SET_LEADCHNL_RV_PACING_ANODE_LOCATION_1: NORMAL
MDC_IDC_SET_LEADCHNL_RV_PACING_CAPTURE_MODE: NORMAL
MDC_IDC_SET_LEADCHNL_RV_PACING_CATHODE_ELECTRODE_1: NORMAL
MDC_IDC_SET_LEADCHNL_RV_PACING_CATHODE_LOCATION_1: NORMAL
MDC_IDC_SET_LEADCHNL_RV_PACING_POLARITY: NORMAL
MDC_IDC_SET_LEADCHNL_RV_PACING_PULSEWIDTH: 0.5 MS
MDC_IDC_SET_LEADCHNL_RV_SENSING_ADAPTATION_MODE: NORMAL
MDC_IDC_SET_LEADCHNL_RV_SENSING_ANODE_ELECTRODE_1: NORMAL
MDC_IDC_SET_LEADCHNL_RV_SENSING_ANODE_LOCATION_1: NORMAL
MDC_IDC_SET_LEADCHNL_RV_SENSING_CATHODE_ELECTRODE_1: NORMAL
MDC_IDC_SET_LEADCHNL_RV_SENSING_CATHODE_LOCATION_1: NORMAL
MDC_IDC_SET_LEADCHNL_RV_SENSING_POLARITY: NORMAL
MDC_IDC_SET_LEADCHNL_RV_SENSING_SENSITIVITY: 0.5 MV
MDC_IDC_STAT_AT_BURDEN_PERCENT: 0 %
MDC_IDC_STAT_AT_DTM_END: NORMAL
MDC_IDC_STAT_AT_DTM_START: NORMAL
MDC_IDC_STAT_AT_MODE_SW_COUNT: 5
MDC_IDC_STAT_AT_MODE_SW_COUNT_PER_DAY: 0
MDC_IDC_STAT_AT_MODE_SW_MAX_DURATION: 26 S
MDC_IDC_STAT_AT_MODE_SW_PERCENT_TIME: 1 %
MDC_IDC_STAT_BRADY_AP_VP_PERCENT: 28 %
MDC_IDC_STAT_BRADY_AP_VS_PERCENT: 2.2 %
MDC_IDC_STAT_BRADY_AS_VP_PERCENT: 64 %
MDC_IDC_STAT_BRADY_AS_VS_PERCENT: 5.1 %
MDC_IDC_STAT_BRADY_DTM_END: NORMAL
MDC_IDC_STAT_BRADY_DTM_START: NORMAL
MDC_IDC_STAT_BRADY_RA_PERCENT_PACED: 30 %
MDC_IDC_STAT_BRADY_RV_PERCENT_PACED: 92 %
MDC_IDC_STAT_CRT_DTM_END: NORMAL
MDC_IDC_STAT_CRT_DTM_START: NORMAL
MDC_IDC_STAT_HEART_RATE_ATRIAL_MAX: 290 {BEATS}/MIN
MDC_IDC_STAT_HEART_RATE_ATRIAL_MEAN: 85 {BEATS}/MIN
MDC_IDC_STAT_HEART_RATE_ATRIAL_MIN: 50 {BEATS}/MIN
MDC_IDC_STAT_HEART_RATE_DTM_END: NORMAL
MDC_IDC_STAT_HEART_RATE_DTM_START: NORMAL
MDC_IDC_STAT_HEART_RATE_VENTRICULAR_MAX: 240 {BEATS}/MIN
MDC_IDC_STAT_HEART_RATE_VENTRICULAR_MEAN: 84 {BEATS}/MIN
MDC_IDC_STAT_HEART_RATE_VENTRICULAR_MIN: 40 {BEATS}/MIN

## 2021-11-17 ENCOUNTER — TELEPHONE (OUTPATIENT)
Dept: ANTICOAGULATION | Facility: CLINIC | Age: 86
End: 2021-11-17
Payer: COMMERCIAL

## 2021-11-17 NOTE — TELEPHONE ENCOUNTER
ANTICOAGULATION     Victorino Khan is overdue for INR check.      Left message for patient to call and schedule lab appointment as soon as possible. If returning call, please schedule.     Luís Williamson RN

## 2021-11-19 ENCOUNTER — TRANSFERRED RECORDS (OUTPATIENT)
Dept: HEALTH INFORMATION MANAGEMENT | Facility: CLINIC | Age: 86
End: 2021-11-19
Payer: COMMERCIAL

## 2021-11-22 ENCOUNTER — ANTICOAGULATION THERAPY VISIT (OUTPATIENT)
Dept: ANTICOAGULATION | Facility: CLINIC | Age: 86
End: 2021-11-22

## 2021-11-22 ENCOUNTER — LAB (OUTPATIENT)
Dept: LAB | Facility: CLINIC | Age: 86
End: 2021-11-22
Payer: COMMERCIAL

## 2021-11-22 DIAGNOSIS — Z86.718 HISTORY OF DEEP VENOUS THROMBOSIS: ICD-10-CM

## 2021-11-22 DIAGNOSIS — Z86.718 HISTORY OF DEEP VENOUS THROMBOSIS: Primary | ICD-10-CM

## 2021-11-22 DIAGNOSIS — Z79.01 LONG TERM (CURRENT) USE OF ANTICOAGULANTS: ICD-10-CM

## 2021-11-22 LAB — INR BLD: 2.1 (ref 0.9–1.1)

## 2021-11-22 PROCEDURE — 36416 COLLJ CAPILLARY BLOOD SPEC: CPT

## 2021-11-22 PROCEDURE — 85610 PROTHROMBIN TIME: CPT

## 2021-11-22 NOTE — PROGRESS NOTES
ANTICOAGULATION MANAGEMENT     Victorino Khan 87 year old male is on warfarin with therapeutic INR result. (Goal INR 2.0-3.0)    Recent labs: (last 7 days)     11/22/21  1516   INR 2.1*       ASSESSMENT     Source(s): Chart Review and Patient/Caregiver Call       Warfarin doses taken: Warfarin taken as instructed    Diet: No new diet changes identified    New illness, injury, or hospitalization: No    Medication/supplement changes: None noted    Signs or symptoms of bleeding or clotting: No    Previous INR: Therapeutic last 2(+) visits    Additional findings: None     PLAN     Recommended plan for no diet, medication or health factor changes affecting INR     Dosing Instructions: Continue your current warfarin dose with next INR in 6 weeks       Summary  As of 11/22/2021    Full warfarin instructions:  2 mg every Thu; 4 mg all other days   Next INR check:  1/3/2022             Telephone call with Victorino who verbalizes understanding and agrees to plan    Lab visit scheduled    Education provided: None required    Plan made per [facility] anticoagulation protocol    Luís Williamson RN  Anticoagulation Clinic  11/22/2021    _______________________________________________________________________     Anticoagulation Episode Summary     Current INR goal:  2.0-3.0   TTR:  77.5 % (10.1 mo)   Target end date:  Indefinite   Send INR reminders to:  JOSEFINA MIDWAY    Indications    History of deep venous thrombosis [Z86.718]           Comments:           Anticoagulation Care Providers     Provider Role Specialty Phone number    Jez Jack MD Referring Internal Medicine [phone]

## 2022-01-13 ENCOUNTER — DOCUMENTATION ONLY (OUTPATIENT)
Dept: ANTICOAGULATION | Facility: CLINIC | Age: 87
End: 2022-01-13

## 2022-01-13 ENCOUNTER — ALLIED HEALTH/NURSE VISIT (OUTPATIENT)
Dept: FAMILY MEDICINE | Facility: CLINIC | Age: 87
End: 2022-01-13
Payer: COMMERCIAL

## 2022-01-13 ENCOUNTER — ANTICOAGULATION THERAPY VISIT (OUTPATIENT)
Dept: ANTICOAGULATION | Facility: CLINIC | Age: 87
End: 2022-01-13

## 2022-01-13 ENCOUNTER — LAB (OUTPATIENT)
Dept: LAB | Facility: CLINIC | Age: 87
End: 2022-01-13
Payer: COMMERCIAL

## 2022-01-13 DIAGNOSIS — Z86.718 HISTORY OF DEEP VENOUS THROMBOSIS: Primary | ICD-10-CM

## 2022-01-13 DIAGNOSIS — Z86.718 HISTORY OF DEEP VENOUS THROMBOSIS: ICD-10-CM

## 2022-01-13 DIAGNOSIS — M81.0 OSTEOPOROSIS: Primary | ICD-10-CM

## 2022-01-13 DIAGNOSIS — Z79.01 LONG TERM (CURRENT) USE OF ANTICOAGULANTS: ICD-10-CM

## 2022-01-13 LAB — INR BLD: 2.5 (ref 0.9–1.1)

## 2022-01-13 PROCEDURE — 36416 COLLJ CAPILLARY BLOOD SPEC: CPT

## 2022-01-13 PROCEDURE — 85610 PROTHROMBIN TIME: CPT

## 2022-01-13 PROCEDURE — 96372 THER/PROPH/DIAG INJ SC/IM: CPT | Performed by: INTERNAL MEDICINE

## 2022-01-13 PROCEDURE — 99207 PR NO CHARGE NURSE ONLY: CPT

## 2022-01-13 NOTE — PROGRESS NOTES
ANTICOAGULATION MANAGEMENT     Victorino Khan 87 year old male is on warfarin with therapeutic INR result. (Goal INR 2.0-3.0)    Recent labs: (last 7 days)     01/13/22  0913   INR 2.5*       ASSESSMENT     Source(s): Chart Review and Template       Warfarin doses taken: Warfarin taken as instructed    Diet: No new diet changes identified    New illness, injury, or hospitalization: No    Medication/supplement changes: None noted    Signs or symptoms of bleeding or clotting: No    Previous INR: Therapeutic last 2(+) visits    Additional findings: None     PLAN     Recommended plan for no diet, medication or health factor changes affecting INR     Dosing Instructions: Continue your current warfarin dose with next INR in 6 weeks       Summary  As of 1/13/2022    Full warfarin instructions:  2 mg every Thu; 4 mg all other days   Next INR check:  2/24/2022             Detailed voice message left for Victorino with dosing instructions and follow up date.     Contact [phone] to schedule and with any changes, questions or concerns.     Education provided: None required    Plan made per [facility] anticoagulation protocol    Luís Williamson RN  Anticoagulation Clinic  1/13/2022    _______________________________________________________________________     Anticoagulation Episode Summary     Current INR goal:  2.0-3.0   TTR:  80.8 % (11.9 mo)   Target end date:  Indefinite   Send INR reminders to:  JOSEFINA MIDWAY    Indications    History of deep venous thrombosis [Z86.718]           Comments:           Anticoagulation Care Providers     Provider Role Specialty Phone number    eJz Jack MD Referring Internal Medicine [phone]

## 2022-01-13 NOTE — PROGRESS NOTES
ANTICOAGULATION MANAGEMENT      Victorino Khan due for annual renewal of referral to anticoagulation monitoring. Order pended for your review and signature.      ANTICOAGULATION SUMMARY      Warfarin indication(s)     DVT    Heart valve present?  NO       Current goal range   INR: 2.0-3.0     Goal appropriate for indication? Yes, INR 2-3 appropriate for hx of DVT, PE, hypercoagulable state, Afib, LVAD, or bileaflet AVR without risk factors     Current duration of therapy Indefinite/long term therapy   Time in Therapeutic Range (TTR)  (Goal > 60%) 88.8%       Office visit with referring provider's group within last year yes on 11/5/21       Luís Williamson RN

## 2022-02-10 ENCOUNTER — TELEPHONE (OUTPATIENT)
Dept: INTERNAL MEDICINE | Facility: CLINIC | Age: 87
End: 2022-02-10

## 2022-02-10 DIAGNOSIS — M25.511 CHRONIC RIGHT SHOULDER PAIN: ICD-10-CM

## 2022-02-10 DIAGNOSIS — G89.29 CHRONIC RIGHT SHOULDER PAIN: ICD-10-CM

## 2022-02-10 DIAGNOSIS — M54.50 CHRONIC MIDLINE LOW BACK PAIN WITHOUT SCIATICA: Primary | ICD-10-CM

## 2022-02-10 DIAGNOSIS — G89.29 CHRONIC MIDLINE LOW BACK PAIN WITHOUT SCIATICA: Primary | ICD-10-CM

## 2022-02-10 NOTE — TELEPHONE ENCOUNTER
Reason for Call: Request for an order or referral:    Order or referral being requested:   Physical Therapy  Eval and Treat  For:  Lumbar and Right shoulder    Fax:  [phone]    Date needed: as soon as possible    Has the patient been seen by the PCP for this problem? YES    Additional comments: n/a    Phone number Patient can be reached at:  Other phone number:  [phone]    Best Time:  anytime    Can we leave a detailed message on this number?  YES    Call taken on 2/10/2022 at 11:24 AM by Darlene Waller

## 2022-02-22 ENCOUNTER — ANCILLARY PROCEDURE (OUTPATIENT)
Dept: CARDIOLOGY | Facility: CLINIC | Age: 87
End: 2022-02-22
Attending: INTERNAL MEDICINE
Payer: COMMERCIAL

## 2022-02-22 ENCOUNTER — TRANSFERRED RECORDS (OUTPATIENT)
Dept: HEALTH INFORMATION MANAGEMENT | Facility: CLINIC | Age: 87
End: 2022-02-22

## 2022-02-22 DIAGNOSIS — Z95.0 CARDIAC PACEMAKER IN SITU: ICD-10-CM

## 2022-02-22 PROCEDURE — 93296 REM INTERROG EVL PM/IDS: CPT | Performed by: INTERNAL MEDICINE

## 2022-02-22 PROCEDURE — 93294 REM INTERROG EVL PM/LDLS PM: CPT | Performed by: INTERNAL MEDICINE

## 2022-02-23 ENCOUNTER — LAB (OUTPATIENT)
Dept: LAB | Facility: CLINIC | Age: 87
End: 2022-02-23
Payer: COMMERCIAL

## 2022-02-23 ENCOUNTER — ANTICOAGULATION THERAPY VISIT (OUTPATIENT)
Dept: ANTICOAGULATION | Facility: CLINIC | Age: 87
End: 2022-02-23

## 2022-02-23 DIAGNOSIS — Z86.718 HISTORY OF DEEP VENOUS THROMBOSIS: Primary | ICD-10-CM

## 2022-02-23 DIAGNOSIS — Z86.718 HISTORY OF DEEP VENOUS THROMBOSIS: ICD-10-CM

## 2022-02-23 LAB
INR BLD: 2.2 (ref 0.9–1.1)
MDC_IDC_EPISODE_DTM: NORMAL
MDC_IDC_EPISODE_DURATION: 14 S
MDC_IDC_EPISODE_DURATION: 32 S
MDC_IDC_EPISODE_DURATION: 34 S
MDC_IDC_EPISODE_DURATION: 60 S
MDC_IDC_EPISODE_ID: NORMAL
MDC_IDC_EPISODE_TYPE: NORMAL
MDC_IDC_EPISODE_VENDOR_TYPE: NORMAL
MDC_IDC_LEAD_IMPLANT_DT: NORMAL
MDC_IDC_LEAD_IMPLANT_DT: NORMAL
MDC_IDC_LEAD_LOCATION: NORMAL
MDC_IDC_LEAD_LOCATION: NORMAL
MDC_IDC_LEAD_LOCATION_DETAIL_1: NORMAL
MDC_IDC_LEAD_LOCATION_DETAIL_1: NORMAL
MDC_IDC_LEAD_MFG: NORMAL
MDC_IDC_LEAD_MFG: NORMAL
MDC_IDC_LEAD_MODEL: NORMAL
MDC_IDC_LEAD_MODEL: NORMAL
MDC_IDC_LEAD_POLARITY_TYPE: NORMAL
MDC_IDC_LEAD_POLARITY_TYPE: NORMAL
MDC_IDC_LEAD_SERIAL: NORMAL
MDC_IDC_LEAD_SERIAL: NORMAL
MDC_IDC_MSMT_BATTERY_DTM: NORMAL
MDC_IDC_MSMT_BATTERY_REMAINING_LONGEVITY: 101 MO
MDC_IDC_MSMT_BATTERY_REMAINING_PERCENTAGE: 95.5 %
MDC_IDC_MSMT_BATTERY_RRT_TRIGGER: NORMAL
MDC_IDC_MSMT_BATTERY_STATUS: NORMAL
MDC_IDC_MSMT_BATTERY_VOLTAGE: 2.98 V
MDC_IDC_MSMT_LEADCHNL_RA_IMPEDANCE_VALUE: 450 OHM
MDC_IDC_MSMT_LEADCHNL_RA_LEAD_CHANNEL_STATUS: NORMAL
MDC_IDC_MSMT_LEADCHNL_RA_PACING_THRESHOLD_AMPLITUDE: 0.5 V
MDC_IDC_MSMT_LEADCHNL_RA_PACING_THRESHOLD_PULSEWIDTH: 0.5 MS
MDC_IDC_MSMT_LEADCHNL_RA_SENSING_INTR_AMPL: 3.7 MV
MDC_IDC_MSMT_LEADCHNL_RV_IMPEDANCE_VALUE: 380 OHM
MDC_IDC_MSMT_LEADCHNL_RV_LEAD_CHANNEL_STATUS: NORMAL
MDC_IDC_MSMT_LEADCHNL_RV_PACING_THRESHOLD_AMPLITUDE: 0.75 V
MDC_IDC_MSMT_LEADCHNL_RV_PACING_THRESHOLD_PULSEWIDTH: 0.5 MS
MDC_IDC_MSMT_LEADCHNL_RV_SENSING_INTR_AMPL: 5.2 MV
MDC_IDC_PG_IMPLANT_DTM: NORMAL
MDC_IDC_PG_MFG: NORMAL
MDC_IDC_PG_MODEL: NORMAL
MDC_IDC_PG_SERIAL: NORMAL
MDC_IDC_PG_TYPE: NORMAL
MDC_IDC_SESS_CLINIC_NAME: NORMAL
MDC_IDC_SESS_DTM: NORMAL
MDC_IDC_SESS_REPROGRAMMED: NO
MDC_IDC_SESS_TYPE: NORMAL
MDC_IDC_SET_BRADY_AT_MODE_SWITCH_MODE: NORMAL
MDC_IDC_SET_BRADY_AT_MODE_SWITCH_RATE: 170 {BEATS}/MIN
MDC_IDC_SET_BRADY_LOWRATE: 60 {BEATS}/MIN
MDC_IDC_SET_BRADY_MAX_SENSOR_RATE: 130 {BEATS}/MIN
MDC_IDC_SET_BRADY_MAX_TRACKING_RATE: 120 {BEATS}/MIN
MDC_IDC_SET_BRADY_MODE: NORMAL
MDC_IDC_SET_BRADY_PAV_DELAY_LOW: 250 MS
MDC_IDC_SET_BRADY_SAV_DELAY_LOW: 250 MS
MDC_IDC_SET_LEADCHNL_RA_PACING_AMPLITUDE: 2 V
MDC_IDC_SET_LEADCHNL_RA_PACING_ANODE_ELECTRODE_1: NORMAL
MDC_IDC_SET_LEADCHNL_RA_PACING_ANODE_LOCATION_1: NORMAL
MDC_IDC_SET_LEADCHNL_RA_PACING_CAPTURE_MODE: NORMAL
MDC_IDC_SET_LEADCHNL_RA_PACING_CATHODE_ELECTRODE_1: NORMAL
MDC_IDC_SET_LEADCHNL_RA_PACING_CATHODE_LOCATION_1: NORMAL
MDC_IDC_SET_LEADCHNL_RA_PACING_POLARITY: NORMAL
MDC_IDC_SET_LEADCHNL_RA_PACING_PULSEWIDTH: 0.5 MS
MDC_IDC_SET_LEADCHNL_RA_SENSING_ADAPTATION_MODE: NORMAL
MDC_IDC_SET_LEADCHNL_RA_SENSING_ANODE_ELECTRODE_1: NORMAL
MDC_IDC_SET_LEADCHNL_RA_SENSING_ANODE_LOCATION_1: NORMAL
MDC_IDC_SET_LEADCHNL_RA_SENSING_CATHODE_ELECTRODE_1: NORMAL
MDC_IDC_SET_LEADCHNL_RA_SENSING_CATHODE_LOCATION_1: NORMAL
MDC_IDC_SET_LEADCHNL_RA_SENSING_POLARITY: NORMAL
MDC_IDC_SET_LEADCHNL_RA_SENSING_SENSITIVITY: 0.3 MV
MDC_IDC_SET_LEADCHNL_RV_PACING_AMPLITUDE: 2 V
MDC_IDC_SET_LEADCHNL_RV_PACING_ANODE_ELECTRODE_1: NORMAL
MDC_IDC_SET_LEADCHNL_RV_PACING_ANODE_LOCATION_1: NORMAL
MDC_IDC_SET_LEADCHNL_RV_PACING_CAPTURE_MODE: NORMAL
MDC_IDC_SET_LEADCHNL_RV_PACING_CATHODE_ELECTRODE_1: NORMAL
MDC_IDC_SET_LEADCHNL_RV_PACING_CATHODE_LOCATION_1: NORMAL
MDC_IDC_SET_LEADCHNL_RV_PACING_POLARITY: NORMAL
MDC_IDC_SET_LEADCHNL_RV_PACING_PULSEWIDTH: 0.5 MS
MDC_IDC_SET_LEADCHNL_RV_SENSING_ADAPTATION_MODE: NORMAL
MDC_IDC_SET_LEADCHNL_RV_SENSING_ANODE_ELECTRODE_1: NORMAL
MDC_IDC_SET_LEADCHNL_RV_SENSING_ANODE_LOCATION_1: NORMAL
MDC_IDC_SET_LEADCHNL_RV_SENSING_CATHODE_ELECTRODE_1: NORMAL
MDC_IDC_SET_LEADCHNL_RV_SENSING_CATHODE_LOCATION_1: NORMAL
MDC_IDC_SET_LEADCHNL_RV_SENSING_POLARITY: NORMAL
MDC_IDC_SET_LEADCHNL_RV_SENSING_SENSITIVITY: 0.5 MV
MDC_IDC_STAT_AT_BURDEN_PERCENT: 1 %
MDC_IDC_STAT_AT_DTM_END: NORMAL
MDC_IDC_STAT_AT_DTM_START: NORMAL
MDC_IDC_STAT_AT_MODE_SW_COUNT: 10
MDC_IDC_STAT_AT_MODE_SW_COUNT_PER_DAY: 0
MDC_IDC_STAT_AT_MODE_SW_MAX_DURATION: 160 S
MDC_IDC_STAT_AT_MODE_SW_PERCENT_TIME: 1 %
MDC_IDC_STAT_BRADY_AP_VP_PERCENT: 22 %
MDC_IDC_STAT_BRADY_AP_VS_PERCENT: 1 %
MDC_IDC_STAT_BRADY_AS_VP_PERCENT: 74 %
MDC_IDC_STAT_BRADY_AS_VS_PERCENT: 2.6 %
MDC_IDC_STAT_BRADY_DTM_END: NORMAL
MDC_IDC_STAT_BRADY_DTM_START: NORMAL
MDC_IDC_STAT_BRADY_RA_PERCENT_PACED: 22 %
MDC_IDC_STAT_BRADY_RV_PERCENT_PACED: 97 %
MDC_IDC_STAT_CRT_DTM_END: NORMAL
MDC_IDC_STAT_CRT_DTM_START: NORMAL
MDC_IDC_STAT_HEART_RATE_ATRIAL_MAX: 300 {BEATS}/MIN
MDC_IDC_STAT_HEART_RATE_ATRIAL_MEAN: 94 {BEATS}/MIN
MDC_IDC_STAT_HEART_RATE_ATRIAL_MIN: 40 {BEATS}/MIN
MDC_IDC_STAT_HEART_RATE_DTM_END: NORMAL
MDC_IDC_STAT_HEART_RATE_DTM_START: NORMAL
MDC_IDC_STAT_HEART_RATE_VENTRICULAR_MAX: 240 {BEATS}/MIN
MDC_IDC_STAT_HEART_RATE_VENTRICULAR_MEAN: 92 {BEATS}/MIN
MDC_IDC_STAT_HEART_RATE_VENTRICULAR_MIN: 40 {BEATS}/MIN

## 2022-02-23 PROCEDURE — 85610 PROTHROMBIN TIME: CPT

## 2022-02-23 PROCEDURE — 36416 COLLJ CAPILLARY BLOOD SPEC: CPT

## 2022-02-23 NOTE — PROGRESS NOTES
ANTICOAGULATION MANAGEMENT     Victorino Khan 88 year old male is on warfarin with therapeutic INR result. (Goal INR 2.0-3.0)    Recent labs: (last 7 days)     02/23/22  1254   INR 2.2*       ASSESSMENT     Source(s): Chart Review and Patient/Caregiver Call       Warfarin doses taken: Warfarin taken as instructed    Diet: No new diet changes identified    New illness, injury, or hospitalization: No    Medication/supplement changes: None noted    Signs or symptoms of bleeding or clotting: No    Previous INR: Therapeutic last 2(+) visits    Additional findings: None     PLAN     Recommended plan for no diet, medication or health factor changes affecting INR     Dosing Instructions: Continue your current warfarin dose with next INR in 6 weeks       Summary  As of 2/23/2022    Full warfarin instructions:  2 mg every Thu; 4 mg all other days   Next INR check:  4/6/2022             Telephone call with Victorino who verbalizes understanding and agrees to plan    Lab visit scheduled    Education provided: None required    Plan made per [facility] anticoagulation protocol    Luís Williamson RN  Anticoagulation Clinic  2/23/2022    _______________________________________________________________________     Anticoagulation Episode Summary     Current INR goal:  2.0-3.0   TTR:  81.5 % (1 y)   Target end date:  Indefinite   Send INR reminders to:  JOSEFINA MIDWAY    Indications    History of deep venous thrombosis [Z86.718]           Comments:           Anticoagulation Care Providers     Provider Role Specialty Phone number    Jez Jack MD Referring Internal Medicine [phone]

## 2022-03-15 ENCOUNTER — TRANSFERRED RECORDS (OUTPATIENT)
Dept: HEALTH INFORMATION MANAGEMENT | Facility: CLINIC | Age: 87
End: 2022-03-15

## 2022-03-15 ENCOUNTER — OFFICE VISIT (OUTPATIENT)
Dept: INTERNAL MEDICINE | Facility: CLINIC | Age: 87
End: 2022-03-15
Payer: COMMERCIAL

## 2022-03-15 VITALS
WEIGHT: 199.6 LBS | BODY MASS INDEX: 29.06 KG/M2 | SYSTOLIC BLOOD PRESSURE: 102 MMHG | DIASTOLIC BLOOD PRESSURE: 76 MMHG | HEART RATE: 64 BPM | OXYGEN SATURATION: 98 %

## 2022-03-15 DIAGNOSIS — H60.391 INFECTIVE OTITIS EXTERNA, RIGHT: ICD-10-CM

## 2022-03-15 DIAGNOSIS — Z98.890 S/P NISSEN FUNDOPLICATION (WITHOUT GASTROSTOMY TUBE) PROCEDURE: ICD-10-CM

## 2022-03-15 DIAGNOSIS — M81.0 AGE-RELATED OSTEOPOROSIS WITHOUT CURRENT PATHOLOGICAL FRACTURE: ICD-10-CM

## 2022-03-15 DIAGNOSIS — Z51.81 ENCOUNTER FOR THERAPEUTIC DRUG MONITORING: ICD-10-CM

## 2022-03-15 DIAGNOSIS — E78.5 DYSLIPIDEMIA: ICD-10-CM

## 2022-03-15 DIAGNOSIS — Z86.718 HISTORY OF DEEP VENOUS THROMBOSIS: ICD-10-CM

## 2022-03-15 DIAGNOSIS — Z11.59 ENCOUNTER FOR SCREENING FOR OTHER VIRAL DISEASES: Primary | ICD-10-CM

## 2022-03-15 DIAGNOSIS — J45.20 ASTHMA IN ADULT, MILD INTERMITTENT, UNCOMPLICATED: ICD-10-CM

## 2022-03-15 DIAGNOSIS — Z00.00 ENCOUNTER FOR SUBSEQUENT ANNUAL WELLNESS VISIT IN MEDICARE PATIENT: Primary | ICD-10-CM

## 2022-03-15 DIAGNOSIS — M19.172 POST-TRAUMATIC OSTEOARTHRITIS OF LEFT ANKLE: ICD-10-CM

## 2022-03-15 DIAGNOSIS — Z95.0 CARDIAC PACEMAKER IN SITU: ICD-10-CM

## 2022-03-15 DIAGNOSIS — D68.51 FACTOR V LEIDEN (H): ICD-10-CM

## 2022-03-15 LAB
ALBUMIN SERPL-MCNC: 3.8 G/DL (ref 3.5–5)
ALBUMIN UR-MCNC: NEGATIVE MG/DL
ALP SERPL-CCNC: 71 U/L (ref 45–120)
ALT SERPL W P-5'-P-CCNC: 15 U/L (ref 0–45)
ANION GAP SERPL CALCULATED.3IONS-SCNC: 12 MMOL/L (ref 5–18)
APPEARANCE UR: CLEAR
AST SERPL W P-5'-P-CCNC: 23 U/L (ref 0–40)
BILIRUB SERPL-MCNC: 1.4 MG/DL (ref 0–1)
BILIRUB UR QL STRIP: NEGATIVE
BUN SERPL-MCNC: 23 MG/DL (ref 8–28)
CALCIUM SERPL-MCNC: 9.4 MG/DL (ref 8.5–10.5)
CHLORIDE BLD-SCNC: 105 MMOL/L (ref 98–107)
CHOLEST SERPL-MCNC: 182 MG/DL
CO2 SERPL-SCNC: 23 MMOL/L (ref 22–31)
COLOR UR AUTO: YELLOW
CREAT SERPL-MCNC: 0.99 MG/DL (ref 0.7–1.3)
ERYTHROCYTE [DISTWIDTH] IN BLOOD BY AUTOMATED COUNT: 12.6 % (ref 10–15)
FASTING STATUS PATIENT QL REPORTED: NORMAL
GFR SERPL CREATININE-BSD FRML MDRD: 73 ML/MIN/1.73M2
GLUCOSE BLD-MCNC: 94 MG/DL (ref 70–125)
GLUCOSE UR STRIP-MCNC: NEGATIVE MG/DL
HCT VFR BLD AUTO: 45.7 % (ref 40–53)
HDLC SERPL-MCNC: 60 MG/DL
HGB BLD-MCNC: 14.7 G/DL (ref 13.3–17.7)
HGB UR QL STRIP: NEGATIVE
KETONES UR STRIP-MCNC: NEGATIVE MG/DL
LDLC SERPL CALC-MCNC: 109 MG/DL
LEUKOCYTE ESTERASE UR QL STRIP: NEGATIVE
MCH RBC QN AUTO: 32 PG (ref 26.5–33)
MCHC RBC AUTO-ENTMCNC: 32.2 G/DL (ref 31.5–36.5)
MCV RBC AUTO: 99 FL (ref 78–100)
NITRATE UR QL: NEGATIVE
PH UR STRIP: 5.5 [PH] (ref 5–8)
PLATELET # BLD AUTO: 91 10E3/UL (ref 150–450)
POTASSIUM BLD-SCNC: 4.3 MMOL/L (ref 3.5–5)
PROT SERPL-MCNC: 6.6 G/DL (ref 6–8)
RBC # BLD AUTO: 4.6 10E6/UL (ref 4.4–5.9)
SODIUM SERPL-SCNC: 140 MMOL/L (ref 136–145)
SP GR UR STRIP: 1.02 (ref 1–1.03)
TRIGL SERPL-MCNC: 66 MG/DL
UROBILINOGEN UR STRIP-ACNC: 0.2 E.U./DL
WBC # BLD AUTO: 5.8 10E3/UL (ref 4–11)

## 2022-03-15 PROCEDURE — 90715 TDAP VACCINE 7 YRS/> IM: CPT | Performed by: INTERNAL MEDICINE

## 2022-03-15 PROCEDURE — 85027 COMPLETE CBC AUTOMATED: CPT | Performed by: INTERNAL MEDICINE

## 2022-03-15 PROCEDURE — 80061 LIPID PANEL: CPT | Performed by: INTERNAL MEDICINE

## 2022-03-15 PROCEDURE — 99397 PER PM REEVAL EST PAT 65+ YR: CPT | Mod: 25 | Performed by: INTERNAL MEDICINE

## 2022-03-15 PROCEDURE — 80053 COMPREHEN METABOLIC PANEL: CPT | Performed by: INTERNAL MEDICINE

## 2022-03-15 PROCEDURE — 36415 COLL VENOUS BLD VENIPUNCTURE: CPT | Performed by: INTERNAL MEDICINE

## 2022-03-15 PROCEDURE — 93005 ELECTROCARDIOGRAM TRACING: CPT | Performed by: INTERNAL MEDICINE

## 2022-03-15 PROCEDURE — 99214 OFFICE O/P EST MOD 30 MIN: CPT | Mod: 25 | Performed by: INTERNAL MEDICINE

## 2022-03-15 PROCEDURE — 81003 URINALYSIS AUTO W/O SCOPE: CPT | Performed by: INTERNAL MEDICINE

## 2022-03-15 PROCEDURE — 93010 ELECTROCARDIOGRAM REPORT: CPT | Performed by: INTERNAL MEDICINE

## 2022-03-15 PROCEDURE — 90471 IMMUNIZATION ADMIN: CPT | Performed by: INTERNAL MEDICINE

## 2022-03-15 RX ORDER — NEOMYCIN SULFATE, POLYMYXIN B SULFATE, HYDROCORTISONE 3.5; 10000; 1 MG/ML; [USP'U]/ML; MG/ML
3 SOLUTION/ DROPS AURICULAR (OTIC) 4 TIMES DAILY
Qty: 10 ML | Refills: 0 | Status: SHIPPED | OUTPATIENT
Start: 2022-03-15 | End: 2022-03-22

## 2022-03-15 ASSESSMENT — ACTIVITIES OF DAILY LIVING (ADL): CURRENT_FUNCTION: HOUSEWORK REQUIRES ASSISTANCE

## 2022-03-15 NOTE — PROGRESS NOTES
"Answers for HPI/ROS submitted by the patient on 3/15/2022  In general, how would you rate your overall physical health?: good  Frequency of exercise:: 4-5 days/week  Do you usually eat at least 4 servings of fruit and vegetables a day, include whole grains & fiber, and avoid regularly eating high fat or \"junk\" foods? : Yes  Taking medications regularly:: Yes  Medication side effects:: None  Activities of Daily Living: housework requires assistance  Home safety: no safety concerns identified  Hearing Impairment:: difficulty understanding soft or whispered speech  In the past 6 months, have you been bothered by leaking of urine?: No  In general, how would you rate your overall mental or emotional health?: excellent  Additional concerns today:: Yes  Duration of exercise:: 30-45 minutes    SUBJECTIVE:   Victorino Khan is a 88 year old male who presents for Preventive Visit.  Eddy is .  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  No cognitive deficits are noted.  His health risk assessment was reviewed.  He has had very major issues with degenerative arthritis of multiple sites and osteoporosis.  Otherwise, health has been excellent.  He does have a healthcare directive.    He is scheduled to have a left ankle replacement for advanced osteoarthritis by Dr. Josep Chatman in April at [facility].  No contraindications to the planned surgery are noted.    He has a history of mild intermittent asthma.  Symptoms are under good control.  He has seen Dr. Rudy Franklin for pulmonary follow-up.    He is on chronic anticoagulation with warfarin due to a history of deep venous thrombosis with heterozygous factor V Leiden mutation.  Warfarin may be skipped preoperatively without bridging.    He has a history of osteoporosis with multiple previous fractures.  He has had significant height loss related to this.  He is now on Prolia.  Most recent bone density study was in " "February 2021.  He had T-scores of -2.5 in the left total hip, -2.9 in the right femoral neck, and -3.0 in the right 33% radius.  He reports that he had a rib fracture last autumn.  He made a good recovery from this.    He has a past history of a Nissen fundoplication for paraesophageal hernia.  He made a good recovery from this and has no current problems with dysphagia or heartburn.    He has seen Dr.Dan King for shoulder pain related to advanced osteoarthritis of the shoulders.  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  He has had no complications with this.    {Patient has been advised of split billing requirements and indicates understanding: Yes  Are you in the first 12 months of your Medicare coverage?  No    Healthy Habits:     In general, how would you rate your overall health?  Good    Frequency of exercise:  4-5 days/week    Duration of exercise:  30-45 minutes    Do you usually eat at least 4 servings of fruit and vegetables a day, include whole grains    & fiber and avoid regularly eating high fat or \"junk\" foods?  Yes    Taking medications regularly:  Yes    Medication side effects:  None    Ability to successfully perform activities of daily living:  Housework requires assistance    Home Safety:  No safety concerns identified    Hearing Impairment:  Difficulty understanding soft or whispered speech    In the past 6 months, have you been bothered by leaking of urine?  No    In general, how would you rate your overall mental or emotional health?  Excellent      PHQ-2 Total Score: 0    Additional concerns today:  Yes    Do you feel safe in your environment? Yes    Have you ever done Advance Care Planning? (For example, a Health Directive, POLST, or a discussion with a medical provider or your loved ones about your wishes): Yes, advance care planning is on file.    Hearing Acuity: He has bilateral hearing aids which " he feels are of benefit      Fall risk   He notes some posterolateral instability aggravated by ankle pain and osteoarthritis of multiple sites    Cognitive Screening   1) Repeat 3 items (Leader, Season, Table)    2) Clock draw: NORMAL  3) 3 item recall: Recalls 3 objects  Results: 3 items recalled: COGNITIVE IMPAIRMENT LESS LIKELY    Mini-CogTM Copyright KODAK Gonzalez. Licensed by the author for use in [facility]; reprinted with permission (lane@[facility]). All rights reserved.      Do you have sleep apnea, excessive snoring or daytime drowsiness?: no    Reviewed and updated as needed this visit by clinical staff   Tobacco  Allergies    Med Hx  Surg Hx  Fam Hx  Soc Hx        Reviewed and updated as needed this visit by Provider                 Social History     Tobacco Use     Smoking status: Never Smoker     Smokeless tobacco: Never Used   Substance Use Topics     Alcohol use: No         Alcohol Use 3/15/2022   Prescreen: >3 drinks/day or >7 drinks/week? No               Current providers sharing in care for this patient include:   Patient Care Team:  Jez Jack MD as PCP - General (Internal Medicine)  Jez Jack MD as Assigned PCP  Allie Frazier APRN CNP as Assigned Heart and Vascular Provider  Josep Chatman MD orthopedics  Dread King MD orthopedics  Rudy Franklin, pulmonary  MILLICENT Caruso MD vitreoretinal  Tate Georges MD cardiology  The following health maintenance items are reviewed in Epic and correct as of today:  Health Maintenance Due   Topic Date Due     ASTHMA ACTION PLAN  Never done     ADVANCE CARE PLANNING  Never done     DTAP/TDAP/TD IMMUNIZATION (1 - Tdap) Never done     MEDICARE ANNUAL WELLNESS VISIT  Never done     ZOSTER IMMUNIZATION (2 of 3) 03/01/2011     INFLUENZA VACCINE (1) 09/01/2021     FALL RISK ASSESSMENT  01/12/2022             Review of Systems  CONSTITUTIONAL: NEGATIVE for fever, chills, change in weight  INTEGUMENTARY/SKIN: NEGATIVE for worrisome  "rashes, moles or lesions  EYES: Sees Dr. Delta Caruso for macular degeneration and getting treated with previous injections  ENT/MOUTH:  has bilateral hearing aids.  Notes pain in the right external canal to touch  RESP: NEGATIVE for significant cough or SOB  BREAST: NEGATIVE for masses, tenderness or discharge  CV: NEGATIVE for chest pain, palpitations or peripheral edema.  He had a pacemaker placed in 2017  GI: NEGATIVE for nausea, abdominal pain, heartburn, or change in bowel habits  : NEGATIVE for frequency, dysuria, or hematuria  MUSCULOSKELETAL: Multiple joint pains.  Severe ankle pain is leading to upcoming surgery  NEURO: NEGATIVE for weakness, dizziness or paresthesias  ENDOCRINE: NEGATIVE for temperature intolerance, skin/hair changes  HEME: NEGATIVE for bleeding problems  PSYCHIATRIC: NEGATIVE for changes in mood or affect    OBJECTIVE:   There were no vitals taken for this visit. Estimated body mass index is 25.77 kg/m  as calculated from the following:    Height as of 11/1/21: 1.765 m (5' 9.49\").    Weight as of 11/5/21: 80.3 kg (177 lb).  Physical Exam  /76 (BP Location: Left arm, Patient Position: Sitting, Cuff Size: Adult Regular)   Pulse 64   Wt 90.5 kg (199 lb 9.6 oz)   SpO2 98%   BMI 29.06 kg/m      General Appearance:  Alert, cooperative, no distress, appears stated age   Head:  Normocephalic, without obvious abnormality, atraumatic   Eyes:  PERRL, conjunctiva/corneas clear, EOM's intact   Ears:   Some exudate noted right external canal.  Left appears clear   Nose: Nares normal, septum midline, mucosa normal, no drainage   Throat: Lips, mucosa, and tongue normal; teeth and gums normal   Neck: Supple, symmetrical, trachea midline, no adenopathy, thyroid: not enlarged, symmetric, no tenderness/mass/nodules, no carotid bruit or JVD   Back:    prominent thoracic kyphosis   Lungs:   Clear to auscultation bilaterally, respirations unlabored   Chest Wall:   pacemaker left upper chest   Heart:  " Regular rate and rhythm, S1, S2 normal, no murmur, rub or gallop   Abdomen:   Soft, non-tender, bowel sounds active all four quadrants,  no masses, no organomegaly   Genitalia:  No inguinal hernias   Rectal:   Exam deferred   Extremities:  Marked grating to range of motion of shoulders, but range of motion is fairly well-preserved.  Deformity left ankle.  Peripheral pulses intact.  Dystrophic toenails.  Scars over knees consistent with previous total knee replacements   Skin: Skin color, texture, turgor normal, no rashes or lesions   Lymph nodes: Cervical and supraclavicular normal   Neurologic: No dysarthria or aphasia.  Cranial nerves, motor or sensory exams intact with symmetric DTRs         Diagnostic Test Results:  Labs reviewed in Epic      ASSESSMENT / PLAN:   (Z00.00) Encounter for subsequent annual wellness visit in Medicare patient  (primary encounter diagnosis)  Comment: Eddy is .  He is a retired farmer.  No cognitive deficits are noted.  Health risk assessment was reviewed.  Health maintenance habits are good.  Activity is limited by  severe osteoarthritis of multiple sites and osteoporosis  Plan: Health maintenance and screening issues were discussed    (Z95.0) Cardiac pacemaker in situ  Comment: AV sequential pacer was placed in 2017 due to heart block.  No complications have been noted  Plan: EKG 12-lead, tracing only            (J45.20) Asthma in adult, mild intermittent, uncomplicated  Comment: This has been quite mild with no recent significant problems.  He is on Advair  Plan: Continue Advair    (M81.0) Age-related osteoporosis without current pathological fracture  Comment: He has had multiple vertebral fractures.  He is on Prolia  Plan: Continue Prolia.  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  He did have a preoperative exam as part of today's visit.  No contraindications to the  planned surgery are noted  Plan: He appears medically stable for the planned ankle replacement    (D68.51) Factor V Leiden (H)  Comment: He is on warfarin due to deep venous thrombosis with heterozygous factor V Leiden mutation.  Plan: Warfarin should be held for 5 days preoperatively and then restarted after surgery when okay with Dr. Chatman.  He should not require bridging     (Z98.890) S/P Nissen fundoplication (without gastrostomy tube) procedure  Comment: He has made an excellent recovery.  He has no current issues with dysphagia or heartburn    (Z51.81) Encounter for therapeutic drug monitoring  Comment: Monitoring labs are checked  Plan: Comprehensive metabolic panel, UA Macro with         Reflex to Micro and Culture - lab collect, CBC         with platelets, CANCELED: UA Macro with Reflex         to Micro and Culture - lab collect           (E78.5) Dyslipidemia  Comment: Lipids will be checked.  He has no evidence for underlying coronary artery disease  Plan: Lipid panel reflex to direct LDL Fasting           (H60.391) Infective otitis externa, right  Comment: External canal appears irritated from his hearing aid.  We will try Cortisporin drops  Plan: neomycin-polymyxin-hydrocortisone (CORTISPORIN)        3.5-22234-5 otic solution            (Z86.718) History of deep venous thrombosis  Comment: On warfarin.  He is heterozygous for factor V Leiden mutation as noted above     Wet macular degeneration  He sees Dr. Delta Caruso and has had periodic injections for this.  Eddy feels he is managing well.    Patient Instructions   1.  Tdap is advised.  Cheaper at pharmacy    2. Cortisporin drops 4 times daily for one week for right ear pain.  Leave hearing aide out for one week.    3.  EKG    4.  I will notify you of test results    5.  Continue Prolia.  Recheck bone density 2/2023    6.  Skip aspirin 1 week preoperatively    7.  Skip warfarin 5 days preoperatively, then resume usual dose    8.  Morning of surgery, use  "inhaler.  Skip other medications and resume postoperatively    9.  Annual wellness visit in 1 year    10.  No contraindications to the planned surgery are noted    11.  Perioperative cardiac risk is felt to be low    Addendum 4/20/2022: Ankle surgery was rescheduled with planned surgical date 4/21/2022.  No new medical issues have developed since his exam on 3/15/2022.  Eddy appears medically stable for the planned surgery.  He should have skipped aspirin 1 week preoperatively, and warfarin 5 days preoperatively.      COUNSELING:  Reviewed preventive health counseling, as reflected in patient instructions       Regular exercise       Healthy diet/nutrition    Estimated body mass index is 25.77 kg/m  as calculated from the following:    Height as of 11/1/21: 1.765 m (5' 9.49\").    Weight as of 11/5/21: 80.3 kg (177 lb).        He reports that he has never smoked. He has never used smokeless tobacco.      Appropriate preventive services were discussed with this patient, including applicable screening as appropriate for cardiovascular disease, diabetes, osteopenia/osteoporosis, and glaucoma.  As appropriate for age/gender, discussed screening for colorectal cancer, prostate cancer, breast cancer, and cervical cancer. Checklist reviewing preventive services available has been given to the patient.    Reviewed patients plan of care and provided an AVS. The Basic Care Plan (routine screening as documented in Health Maintenance) for Victorino meets the Care Plan requirement. This Care Plan has been established and reviewed with the Patient.    Counseling Resources:  ATP IV Guidelines  Pooled Cohorts Equation Calculator  Breast Cancer Risk Calculator  Breast Cancer: Medication to Reduce Risk  FRAX Risk Assessment  ICSI Preventive Guidelines  Dietary Guidelines for Americans, 2010  USDA's MyPlate  ASA Prophylaxis  Lung CA Screening    Jez Jack MD  [facility]    Identified Health Risks:  "

## 2022-03-15 NOTE — PATIENT INSTRUCTIONS
1.  Tdap is advised.  Cheaper at pharmacy    2. Cortisporin drops 4 times daily for one week for right ear pain.  Leave linda aide out for one week.    3.  EKG    4.  I will notify you of test results    5.  Continue Prolia.  Recheck bone density 2/2023    6.  Skip aspirin 1 week preoperatively    7.  Skip warfarin 5 days preoperatively, then resume usual dose    8.  Morning of surgery, use inhaler.  Skip other medications and resume postoperatively    9.  Annual wellness visit in 1 year

## 2022-03-16 LAB
ATRIAL RATE - MUSE: 23 BPM
DIASTOLIC BLOOD PRESSURE - MUSE: NORMAL MMHG
INTERPRETATION ECG - MUSE: NORMAL
P AXIS - MUSE: NORMAL DEGREES
PR INTERVAL - MUSE: NORMAL MS
QRS DURATION - MUSE: 166 MS
QT - MUSE: 482 MS
QTC - MUSE: 695 MS
R AXIS - MUSE: -34 DEGREES
SYSTOLIC BLOOD PRESSURE - MUSE: NORMAL MMHG
T AXIS - MUSE: 79 DEGREES
VENTRICULAR RATE- MUSE: 125 BPM

## 2022-03-17 ENCOUNTER — TELEPHONE (OUTPATIENT)
Dept: INTERNAL MEDICINE | Facility: CLINIC | Age: 87
End: 2022-03-17
Payer: COMMERCIAL

## 2022-03-17 DIAGNOSIS — D69.6 THROMBOCYTOPENIA (H): Primary | ICD-10-CM

## 2022-03-17 NOTE — TELEPHONE ENCOUNTER
Spoke with pt and relayed pcp message.  Pt understanding. Lab appt scheduled for 4/6/22 when pt is already having INR checked.

## 2022-03-17 NOTE — TELEPHONE ENCOUNTER
----- Message from Jez Jack MD sent at 3/17/2022  2:35 PM CDT -----  Please notify Eddy of test results.  He should recheck his platelet count before he his upcoming ankle surgery.    Jez Jack MD

## 2022-04-06 ENCOUNTER — ANTICOAGULATION THERAPY VISIT (OUTPATIENT)
Dept: ANTICOAGULATION | Facility: CLINIC | Age: 87
End: 2022-04-06

## 2022-04-06 ENCOUNTER — LAB (OUTPATIENT)
Dept: LAB | Facility: CLINIC | Age: 87
End: 2022-04-06
Payer: COMMERCIAL

## 2022-04-06 DIAGNOSIS — Z86.718 HISTORY OF DEEP VENOUS THROMBOSIS: Primary | ICD-10-CM

## 2022-04-06 DIAGNOSIS — Z86.718 HISTORY OF DEEP VENOUS THROMBOSIS: ICD-10-CM

## 2022-04-06 DIAGNOSIS — D69.6 THROMBOCYTOPENIA (H): ICD-10-CM

## 2022-04-06 LAB
ERYTHROCYTE [DISTWIDTH] IN BLOOD BY AUTOMATED COUNT: 12.6 % (ref 10–15)
HCT VFR BLD AUTO: 47.4 % (ref 40–53)
HGB BLD-MCNC: 15.2 G/DL (ref 13.3–17.7)
INR BLD: 2.5 (ref 0.9–1.1)
MCH RBC QN AUTO: 32.1 PG (ref 26.5–33)
MCHC RBC AUTO-ENTMCNC: 32.1 G/DL (ref 31.5–36.5)
MCV RBC AUTO: 100 FL (ref 78–100)
PLATELET # BLD AUTO: 159 10E3/UL (ref 150–450)
RBC # BLD AUTO: 4.73 10E6/UL (ref 4.4–5.9)
WBC # BLD AUTO: 4.3 10E3/UL (ref 4–11)

## 2022-04-06 PROCEDURE — 85027 COMPLETE CBC AUTOMATED: CPT

## 2022-04-06 PROCEDURE — 36415 COLL VENOUS BLD VENIPUNCTURE: CPT

## 2022-04-06 PROCEDURE — 85610 PROTHROMBIN TIME: CPT

## 2022-04-06 PROCEDURE — 36416 COLLJ CAPILLARY BLOOD SPEC: CPT

## 2022-04-06 NOTE — PROGRESS NOTES
ANTICOAGULATION MANAGEMENT     Victorino Khan 88 year old male is on warfarin with therapeutic INR result. (Goal INR 2.0-3.0)    Recent labs: (last 7 days)     04/06/22  1032   INR 2.5*       ASSESSMENT       Source(s): Chart Review and Patient/Caregiver Call       Warfarin doses taken: Warfarin taken as instructed    Diet: No new diet changes identified    New illness, injury, or hospitalization: No    Medication/supplement changes: None noted    Signs or symptoms of bleeding or clotting: No    Previous INR: Therapeutic last 2(+) visits    Additional findings: Upcoming surgery/procedure 4/14 has not yet had instruction from orthopedics, but ok to hold warfarin before surgery without bridge per Dr Jack 3/15. After surgery he will resume with surgeon's ok, usual dose with inr one week after resumption       PLAN     Recommended plan for no diet, medication or health factor changes affecting INR     Dosing Instructions: continue your current warfarin dose until instructed to stop for surgery, with next INR one week after resumption    Summary  As of 4/6/2022    Full warfarin instructions:  2 mg every Thu; 4 mg all other days   Next INR check:  4/22/2022             Telephone call with Victorino who verbalizes understanding and agrees to plan    Lab visit scheduled    Education provided: None required    Plan made per [facility] anticoagulation protocol    Luís Williamson RN  Anticoagulation Clinic  4/6/2022    _______________________________________________________________________     Anticoagulation Episode Summary     Current INR goal:  2.0-3.0   TTR:  90.5 % (1 y)   Target end date:  Indefinite   Send INR reminders to:  [facility]    Indications    History of deep venous thrombosis [Z86.718]           Comments:           Anticoagulation Care Providers     Provider Role Specialty Phone number    Jez Jack MD Referring Internal Medicine [phone]

## 2022-04-11 ENCOUNTER — LAB (OUTPATIENT)
Dept: LAB | Facility: CLINIC | Age: 87
End: 2022-04-11
Attending: ORTHOPAEDIC SURGERY
Payer: COMMERCIAL

## 2022-04-11 DIAGNOSIS — Z11.59 ENCOUNTER FOR SCREENING FOR OTHER VIRAL DISEASES: ICD-10-CM

## 2022-04-11 PROCEDURE — U0003 INFECTIOUS AGENT DETECTION BY NUCLEIC ACID (DNA OR RNA); SEVERE ACUTE RESPIRATORY SYNDROME CORONAVIRUS 2 (SARS-COV-2) (CORONAVIRUS DISEASE [COVID-19]), AMPLIFIED PROBE TECHNIQUE, MAKING USE OF HIGH THROUGHPUT TECHNOLOGIES AS DESCRIBED BY CMS-2020-01-R: HCPCS

## 2022-04-11 PROCEDURE — U0005 INFEC AGEN DETEC AMPLI PROBE: HCPCS

## 2022-04-11 RX ORDER — CEFAZOLIN SODIUM/WATER 2 G/20 ML
2 SYRINGE (ML) INTRAVENOUS
Status: CANCELLED | OUTPATIENT
Start: 2022-04-11

## 2022-04-11 RX ORDER — CEFAZOLIN SODIUM/WATER 2 G/20 ML
2 SYRINGE (ML) INTRAVENOUS SEE ADMIN INSTRUCTIONS
Status: CANCELLED | OUTPATIENT
Start: 2022-04-11

## 2022-04-12 LAB — SARS-COV-2 RNA RESP QL NAA+PROBE: NEGATIVE

## 2022-04-13 ENCOUNTER — ANESTHESIA EVENT (OUTPATIENT)
Dept: SURGERY | Facility: CLINIC | Age: 87
End: 2022-04-13
Payer: COMMERCIAL

## 2022-04-13 RX ORDER — WARFARIN SODIUM 4 MG/1
2 TABLET ORAL WEEKLY
COMMUNITY
End: 2022-12-20

## 2022-04-13 RX ORDER — WARFARIN SODIUM 4 MG/1
4 TABLET ORAL
COMMUNITY
End: 2022-12-20

## 2022-04-13 ASSESSMENT — LIFESTYLE VARIABLES: TOBACCO_USE: 0

## 2022-04-13 ASSESSMENT — ENCOUNTER SYMPTOMS: DYSRHYTHMIAS: 1

## 2022-04-13 NOTE — ANESTHESIA PREPROCEDURE EVALUATION
Anesthesia Pre-Procedure Evaluation    Patient: Victorino Khan   MRN: [id number] : 1934        Procedure : Procedure(s):  LEFT TOTAL ANKLE REPLACEMENT  POSSIBLE TENDON ACHILLES LENGTHENING  POSSIBLE MEDIAL DISPLACEMENT CALCANEAL OSTEOTOMY          Past Medical History:   Diagnosis Date     DVT (deep venous thrombosis) (H)      Osteoporosis      Vertebral compression fracture (H)       Past Surgical History:   Procedure Laterality Date     IMPLANT PACEMAKER Left     For sick sinus syndrome     LAPAROSCOPIC NISSEN FUNDOPLICATION  2011    For large paraesophageal hernia     TOTAL KNEE ARTHROPLASTY Right 2000     TOTAL KNEE ARTHROPLASTY Left       No Known Allergies   Social History     Tobacco Use     Smoking status: Never Smoker     Smokeless tobacco: Never Used   Substance Use Topics     Alcohol use: No      Wt Readings from Last 1 Encounters:   03/15/22 90.5 kg (199 lb 9.6 oz)        Anesthesia Evaluation            ROS/MED HX  ENT/Pulmonary:     (+) Mild Persistent, asthma  (-) tobacco use   Neurologic:  - neg neurologic ROS     Cardiovascular:     (+) Dyslipidemia -----pacemaker, dysrhythmias, 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,  (-) CAD and murmur   METS/Exercise Tolerance:     Hematologic: Comments: Leiden mutation    (+) History of blood clots, pt is anticoagulated,     Musculoskeletal: Comment: Osteopenia  (+) arthritis,     GI/Hepatic: Comment: S/P Lucio    (+) GERD, hiatal hernia,     Renal/Genitourinary:  - neg Renal ROS     Endo:  - neg endo ROS   (+) Obesity,     Psychiatric/Substance Use:  - neg psychiatric ROS     Infectious Disease:       Malignancy:       Other:            Physical Exam    Airway        Mallampati: III   TM distance: > 3 FB   Neck ROM: full   Mouth opening: > 3 cm    Respiratory Devices and Support         Dental         B=Bridge, C=Chipped, L=Loose, M=Missing    Cardiovascular          Rhythm and rate: regular (-) no murmur    Pulmonary   pulmonary exam normal        breath sounds  clear to auscultation           OUTSIDE LABS:  CBC:   Lab Results   Component Value Date    WBC 4.3 04/06/2022    WBC 5.8 03/15/2022    HGB 15.2 04/06/2022    HGB 14.7 03/15/2022    HCT 47.4 04/06/2022    HCT 45.7 03/15/2022     04/06/2022    PLT 91 (L) 03/15/2022     BMP:   Lab Results   Component Value Date     03/15/2022     06/14/2021    POTASSIUM 4.3 03/15/2022    POTASSIUM 4.5 06/14/2021    CHLORIDE 105 03/15/2022    CHLORIDE 108 (H) 06/14/2021    CO2 23 03/15/2022    CO2 25 06/14/2021    BUN 23 03/15/2022    BUN 23 06/14/2021    CR 0.99 03/15/2022    CR 0.98 06/14/2021    GLC 94 03/15/2022    GLC 98 06/14/2021     COAGS:   Lab Results   Component Value Date    INR 2.5 (H) 04/06/2022     POC:   Lab Results   Component Value Date    BGM 87 10/14/2017     HEPATIC:   Lab Results   Component Value Date    ALBUMIN 3.8 03/15/2022    PROTTOTAL 6.6 03/15/2022    ALT 15 03/15/2022    AST 23 03/15/2022    ALKPHOS 71 03/15/2022    BILITOTAL 1.4 (H) 03/15/2022     OTHER:   Lab Results   Component Value Date    TEE 9.4 03/15/2022    TSH 1.23 01/12/2021       Anesthesia Plan    ASA Status:  3   NPO Status:  NPO Appropriate    Anesthesia Type: General.     - Airway: LMA   Induction: Intravenous, Propofol.   Maintenance: Balanced.        Consents    Anesthesia Plan(s) and associated risks, benefits, and realistic alternatives discussed. Questions answered and patient/representative(s) expressed understanding.    - Discussed:     - Discussed with:  Patient      - Extended Intubation/Ventilatory Support Discussed: No.      - Patient is DNR/DNI Status: No    Use of blood products discussed: No .     Postoperative Care    Pain management: Multi-modal analgesia, Peripheral nerve block (Single Shot).   PONV prophylaxis: Ondansetron (or other 5HT-3), Dexamethasone or Solumedrol     Comments:    Other Comments: Patient is counseled on the anesthesia plan and relevant anesthesia procedures including all risks and  benefits. All patient questions were answered.             Guido Dean MD

## 2022-04-14 ENCOUNTER — DOCUMENTATION ONLY (OUTPATIENT)
Dept: ANTICOAGULATION | Facility: CLINIC | Age: 87
End: 2022-04-14

## 2022-04-14 ENCOUNTER — HOSPITAL ENCOUNTER (OUTPATIENT)
Facility: CLINIC | Age: 87
Discharge: HOME | End: 2022-04-14
Attending: ORTHOPAEDIC SURGERY | Admitting: ORTHOPAEDIC SURGERY
Payer: COMMERCIAL

## 2022-04-14 ENCOUNTER — ANESTHESIA (OUTPATIENT)
Dept: SURGERY | Facility: CLINIC | Age: 87
End: 2022-04-14
Payer: COMMERCIAL

## 2022-04-14 VITALS
TEMPERATURE: 97.1 F | HEIGHT: 71 IN | OXYGEN SATURATION: 93 % | BODY MASS INDEX: 28.08 KG/M2 | SYSTOLIC BLOOD PRESSURE: 103 MMHG | WEIGHT: 200.6 LBS | HEART RATE: 72 BPM | DIASTOLIC BLOOD PRESSURE: 60 MMHG | RESPIRATION RATE: 16 BRPM

## 2022-04-14 DIAGNOSIS — Z11.59 ENCOUNTER FOR SCREENING FOR OTHER VIRAL DISEASES: Primary | ICD-10-CM

## 2022-04-14 DIAGNOSIS — Z86.718 HISTORY OF DEEP VENOUS THROMBOSIS: Primary | ICD-10-CM

## 2022-04-14 LAB — INR PPP: 1.81 (ref 0.85–1.15)

## 2022-04-14 PROCEDURE — 999N000141 HC STATISTIC PRE-PROCEDURE NURSING ASSESSMENT: Performed by: ORTHOPAEDIC SURGERY

## 2022-04-14 PROCEDURE — 85610 PROTHROMBIN TIME: CPT | Performed by: ANESTHESIOLOGY

## 2022-04-14 PROCEDURE — 250N000013 HC RX MED GY IP 250 OP 250 PS 637: Performed by: PHYSICIAN ASSISTANT

## 2022-04-14 PROCEDURE — 36415 COLL VENOUS BLD VENIPUNCTURE: CPT | Performed by: ANESTHESIOLOGY

## 2022-04-14 PROCEDURE — 272N000001 HC OR GENERAL SUPPLY STERILE: Performed by: ORTHOPAEDIC SURGERY

## 2022-04-14 PROCEDURE — 271N000001 HC OR GENERAL SUPPLY NON-STERILE: Performed by: ORTHOPAEDIC SURGERY

## 2022-04-14 RX ORDER — SODIUM CHLORIDE, SODIUM LACTATE, POTASSIUM CHLORIDE, CALCIUM CHLORIDE 600; 310; 30; 20 MG/100ML; MG/100ML; MG/100ML; MG/100ML
INJECTION, SOLUTION INTRAVENOUS CONTINUOUS
Status: CANCELLED | OUTPATIENT
Start: 2022-04-14

## 2022-04-14 RX ORDER — ONDANSETRON 2 MG/ML
4 INJECTION INTRAMUSCULAR; INTRAVENOUS EVERY 30 MIN PRN
Status: CANCELLED | OUTPATIENT
Start: 2022-04-14

## 2022-04-14 RX ORDER — TRANEXAMIC ACID 650 MG/1
1950 TABLET ORAL ONCE
Status: COMPLETED | OUTPATIENT
Start: 2022-04-14 | End: 2022-04-14

## 2022-04-14 RX ORDER — CEFAZOLIN SODIUM/WATER 2 G/20 ML
2 SYRINGE (ML) INTRAVENOUS
Status: DISCONTINUED | OUTPATIENT
Start: 2022-04-14 | End: 2022-04-14 | Stop reason: HOSPADM

## 2022-04-14 RX ORDER — HYDROMORPHONE HCL IN WATER/PF 6 MG/30 ML
0.2 PATIENT CONTROLLED ANALGESIA SYRINGE INTRAVENOUS EVERY 5 MIN PRN
Status: CANCELLED | OUTPATIENT
Start: 2022-04-14

## 2022-04-14 RX ORDER — ONDANSETRON 4 MG/1
4 TABLET, ORALLY DISINTEGRATING ORAL EVERY 30 MIN PRN
Status: CANCELLED | OUTPATIENT
Start: 2022-04-14

## 2022-04-14 RX ORDER — FENTANYL CITRATE 0.05 MG/ML
25 INJECTION, SOLUTION INTRAMUSCULAR; INTRAVENOUS EVERY 5 MIN PRN
Status: CANCELLED | OUTPATIENT
Start: 2022-04-14

## 2022-04-14 RX ORDER — LIDOCAINE 40 MG/G
CREAM TOPICAL
Status: DISCONTINUED | OUTPATIENT
Start: 2022-04-14 | End: 2022-04-14 | Stop reason: HOSPADM

## 2022-04-14 RX ORDER — OXYCODONE HYDROCHLORIDE 5 MG/1
5 TABLET ORAL EVERY 4 HOURS PRN
Status: CANCELLED | OUTPATIENT
Start: 2022-04-14

## 2022-04-14 RX ORDER — SODIUM CHLORIDE, SODIUM LACTATE, POTASSIUM CHLORIDE, CALCIUM CHLORIDE 600; 310; 30; 20 MG/100ML; MG/100ML; MG/100ML; MG/100ML
INJECTION, SOLUTION INTRAVENOUS CONTINUOUS
Status: DISCONTINUED | OUTPATIENT
Start: 2022-04-14 | End: 2022-04-14 | Stop reason: HOSPADM

## 2022-04-14 RX ORDER — FENTANYL CITRATE 0.05 MG/ML
50 INJECTION, SOLUTION INTRAMUSCULAR; INTRAVENOUS
Status: DISCONTINUED | OUTPATIENT
Start: 2022-04-14 | End: 2022-04-14 | Stop reason: HOSPADM

## 2022-04-14 RX ORDER — CEFAZOLIN SODIUM/WATER 2 G/20 ML
2 SYRINGE (ML) INTRAVENOUS SEE ADMIN INSTRUCTIONS
Status: DISCONTINUED | OUTPATIENT
Start: 2022-04-14 | End: 2022-04-14 | Stop reason: HOSPADM

## 2022-04-14 RX ADMIN — TRANEXAMIC ACID 1950 MG: 650 TABLET ORAL at 06:15

## 2022-04-14 NOTE — OR NURSING
Dr. Chatman cancelled case because patients INR was 1.81.  The INR  was too elevated for surgery.  Dr. Chatman explained situation to patient and has a plan to reschedule next week.

## 2022-04-14 NOTE — PROGRESS NOTES
ANTICOAGULATION  MANAGEMENT: Discharge Review    Victorino Khan chart reviewed for anticoagulation continuity of care    Outpatient surgery/procedure on 22 for total ankle- but it was cancelled and rescheduled for 22 d/t INR too high.    Discharge disposition: Home    Results:    Recent labs: (last 7 days)     22  0615   INR 1.81*     Anticoagulation inpatient management:     not applicable     Anticoagulation discharge instructions:     Warfarin dosinmg today and tomorrow and then hold x 5 days for procedure   Bridging: No   INR goal change: No      Medication changes affecting anticoagulation: No    Additional factors affecting anticoagulation: No    Plan     Recommend to check INR on 22    Spoke with Eddy x 2    Anticoagulation Calendar updated     Plan made with Theresa Borja formerly [facility]    Moni Olsen RN

## 2022-04-18 ENCOUNTER — LAB (OUTPATIENT)
Dept: LAB | Facility: CLINIC | Age: 87
End: 2022-04-18
Attending: ORTHOPAEDIC SURGERY
Payer: COMMERCIAL

## 2022-04-18 DIAGNOSIS — Z11.59 ENCOUNTER FOR SCREENING FOR OTHER VIRAL DISEASES: ICD-10-CM

## 2022-04-18 PROCEDURE — U0005 INFEC AGEN DETEC AMPLI PROBE: HCPCS

## 2022-04-18 PROCEDURE — U0003 INFECTIOUS AGENT DETECTION BY NUCLEIC ACID (DNA OR RNA); SEVERE ACUTE RESPIRATORY SYNDROME CORONAVIRUS 2 (SARS-COV-2) (CORONAVIRUS DISEASE [COVID-19]), AMPLIFIED PROBE TECHNIQUE, MAKING USE OF HIGH THROUGHPUT TECHNOLOGIES AS DESCRIBED BY CMS-2020-01-R: HCPCS

## 2022-04-19 LAB — SARS-COV-2 RNA RESP QL NAA+PROBE: NEGATIVE

## 2022-04-20 ENCOUNTER — ANTICOAGULATION THERAPY VISIT (OUTPATIENT)
Dept: ANTICOAGULATION | Facility: CLINIC | Age: 87
End: 2022-04-20

## 2022-04-20 ENCOUNTER — LAB (OUTPATIENT)
Dept: LAB | Facility: CLINIC | Age: 87
End: 2022-04-20
Payer: COMMERCIAL

## 2022-04-20 ENCOUNTER — TELEPHONE (OUTPATIENT)
Dept: INTERNAL MEDICINE | Facility: CLINIC | Age: 87
End: 2022-04-20

## 2022-04-20 DIAGNOSIS — Z86.718 HISTORY OF DEEP VENOUS THROMBOSIS: Primary | ICD-10-CM

## 2022-04-20 DIAGNOSIS — Z86.718 HISTORY OF DEEP VENOUS THROMBOSIS: ICD-10-CM

## 2022-04-20 LAB — INR BLD: 1.1 (ref 0.9–1.1)

## 2022-04-20 PROCEDURE — 36416 COLLJ CAPILLARY BLOOD SPEC: CPT

## 2022-04-20 PROCEDURE — 85610 PROTHROMBIN TIME: CPT

## 2022-04-20 NOTE — TELEPHONE ENCOUNTER
Leanna from [facility] is calling.    Pts surgery was cancelled due to his INR last week.    Needing addendum to note from 3/15/22 to say clear for surgery-if ok and with the new INR results that he got today.    Please call Leanna [phone]    Called and Talked to Christine LEARY and she will follow up with Dr Jack.

## 2022-04-20 NOTE — PROGRESS NOTES
ANTICOAGULATION MANAGEMENT     Victorino Khan 88 year old male is on warfarin with subtherapeutic INR result. (Goal INR 2.0-3.0)    Recent labs: (last 7 days)     04/20/22  1118   INR 1.1       ASSESSMENT       Source(s): Chart Review    Previous INR was Therapeutic last 2(+) visits    Medication, diet, health changes since last INR chart reviewed; none identified     Holding warfarin for surgery tomorrow, left ankle replacement           PLAN     Recommended plan for temporary change(s) affecting INR     Dosing Instructions: resume with surgeon's permission, inr 1-2 weeks later      Summary  As of 4/20/2022    Full warfarin instructions:  4/20: Hold; Otherwise 2 mg every Thu; 4 mg all other days   Next INR check:  5/4/2022             was previously instructed on this    Contact [phone] to schedule and with any changes, questions or concerns.     Education provided: None required    Plan made per [facility] anticoagulation protocol    Luís Williamson RN  Anticoagulation Clinic  4/20/2022    _______________________________________________________________________     Anticoagulation Episode Summary     Current INR goal:  2.0-3.0   TTR:  88.2 % (1 y)   Target end date:  Indefinite   Send INR reminders to:  [facility]    Indications    History of deep venous thrombosis [Z86.718]           Comments:           Anticoagulation Care Providers     Provider Role Specialty Phone number    Jez Jack MD Referring Internal Medicine [phone]

## 2022-04-21 ENCOUNTER — ANESTHESIA (OUTPATIENT)
Dept: SURGERY | Facility: CLINIC | Age: 87
End: 2022-04-21
Payer: COMMERCIAL

## 2022-04-21 ENCOUNTER — ANESTHESIA EVENT (OUTPATIENT)
Dept: SURGERY | Facility: CLINIC | Age: 87
End: 2022-04-21
Payer: COMMERCIAL

## 2022-04-21 ENCOUNTER — HOSPITAL ENCOUNTER (OUTPATIENT)
Facility: CLINIC | Age: 87
Discharge: HOME | End: 2022-04-23
Attending: ORTHOPAEDIC SURGERY | Admitting: ORTHOPAEDIC SURGERY
Payer: COMMERCIAL

## 2022-04-21 ENCOUNTER — APPOINTMENT (OUTPATIENT)
Dept: CT IMAGING | Facility: CLINIC | Age: 87
End: 2022-04-21
Attending: NURSE PRACTITIONER
Payer: COMMERCIAL

## 2022-04-21 ENCOUNTER — APPOINTMENT (OUTPATIENT)
Dept: GENERAL RADIOLOGY | Facility: CLINIC | Age: 87
End: 2022-04-21
Attending: ORTHOPAEDIC SURGERY
Payer: COMMERCIAL

## 2022-04-21 DIAGNOSIS — Z96.662 STATUS POST ANKLE JOINT REPLACEMENT, LEFT: Primary | ICD-10-CM

## 2022-04-21 DIAGNOSIS — Z96.662 STATUS POST LEFT ANKLE JOINT REPLACEMENT: ICD-10-CM

## 2022-04-21 PROBLEM — Z96.669 S/P ANKLE JOINT REPLACEMENT: Status: ACTIVE | Noted: 2022-04-21

## 2022-04-21 LAB
ANION GAP SERPL CALCULATED.3IONS-SCNC: 5 MMOL/L (ref 3–14)
BUN SERPL-MCNC: 24 MG/DL (ref 7–30)
CALCIUM SERPL-MCNC: 8.2 MG/DL (ref 8.5–10.1)
CHLORIDE BLD-SCNC: 107 MMOL/L (ref 94–109)
CO2 SERPL-SCNC: 27 MMOL/L (ref 20–32)
CREAT SERPL-MCNC: 1.22 MG/DL (ref 0.66–1.25)
ERYTHROCYTE [DISTWIDTH] IN BLOOD BY AUTOMATED COUNT: 13 % (ref 10–15)
GFR SERPL CREATININE-BSD FRML MDRD: 57 ML/MIN/1.73M2
GLUCOSE BLD-MCNC: 141 MG/DL (ref 70–99)
HCT VFR BLD AUTO: 41.9 % (ref 40–53)
HGB BLD-MCNC: 13.5 G/DL (ref 13.3–17.7)
INR PPP: 1.15 (ref 0.85–1.15)
MCH RBC QN AUTO: 33.1 PG (ref 26.5–33)
MCHC RBC AUTO-ENTMCNC: 32.2 G/DL (ref 31.5–36.5)
MCV RBC AUTO: 103 FL (ref 78–100)
PLATELET # BLD AUTO: 105 10E3/UL (ref 150–450)
POTASSIUM BLD-SCNC: 5.1 MMOL/L (ref 3.4–5.3)
RBC # BLD AUTO: 4.08 10E6/UL (ref 4.4–5.9)
SODIUM SERPL-SCNC: 139 MMOL/L (ref 133–144)
TROPONIN I SERPL HS-MCNC: 17 NG/L
WBC # BLD AUTO: 6.8 10E3/UL (ref 4–11)

## 2022-04-21 PROCEDURE — 36415 COLL VENOUS BLD VENIPUNCTURE: CPT | Performed by: NURSE PRACTITIONER

## 2022-04-21 PROCEDURE — 80048 BASIC METABOLIC PNL TOTAL CA: CPT | Performed by: NURSE PRACTITIONER

## 2022-04-21 PROCEDURE — 258N000003 HC RX IP 258 OP 636: Performed by: NURSE ANESTHETIST, CERTIFIED REGISTERED

## 2022-04-21 PROCEDURE — C1776 JOINT DEVICE (IMPLANTABLE): HCPCS | Performed by: ORTHOPAEDIC SURGERY

## 2022-04-21 PROCEDURE — 250N000011 HC RX IP 250 OP 636: Performed by: NURSE ANESTHETIST, CERTIFIED REGISTERED

## 2022-04-21 PROCEDURE — 84484 ASSAY OF TROPONIN QUANT: CPT | Performed by: NURSE PRACTITIONER

## 2022-04-21 PROCEDURE — 258N000003 HC RX IP 258 OP 636: Performed by: ORTHOPAEDIC SURGERY

## 2022-04-21 PROCEDURE — 85610 PROTHROMBIN TIME: CPT | Performed by: ANESTHESIOLOGY

## 2022-04-21 PROCEDURE — 250N000009 HC RX 250: Performed by: NURSE ANESTHETIST, CERTIFIED REGISTERED

## 2022-04-21 PROCEDURE — 370N000017 HC ANESTHESIA TECHNICAL FEE, PER MIN: Performed by: ORTHOPAEDIC SURGERY

## 2022-04-21 PROCEDURE — 250N000009 HC RX 250: Performed by: PHYSICIAN ASSISTANT

## 2022-04-21 PROCEDURE — P9041 ALBUMIN (HUMAN),5%, 50ML: HCPCS | Performed by: SURGERY

## 2022-04-21 PROCEDURE — 272N000001 HC OR GENERAL SUPPLY STERILE: Performed by: ORTHOPAEDIC SURGERY

## 2022-04-21 PROCEDURE — 36415 COLL VENOUS BLD VENIPUNCTURE: CPT | Performed by: ANESTHESIOLOGY

## 2022-04-21 PROCEDURE — 93005 ELECTROCARDIOGRAM TRACING: CPT | Mod: XU

## 2022-04-21 PROCEDURE — 250N000011 HC RX IP 250 OP 636: Performed by: PHYSICIAN ASSISTANT

## 2022-04-21 PROCEDURE — 710N000009 HC RECOVERY PHASE 1, LEVEL 1, PER MIN: Performed by: ORTHOPAEDIC SURGERY

## 2022-04-21 PROCEDURE — 250N000013 HC RX MED GY IP 250 OP 250 PS 637: Performed by: PHYSICIAN ASSISTANT

## 2022-04-21 PROCEDURE — 85027 COMPLETE CBC AUTOMATED: CPT | Performed by: NURSE PRACTITIONER

## 2022-04-21 PROCEDURE — C1713 ANCHOR/SCREW BN/BN,TIS/BN: HCPCS | Performed by: ORTHOPAEDIC SURGERY

## 2022-04-21 PROCEDURE — 250N000011 HC RX IP 250 OP 636: Performed by: ORTHOPAEDIC SURGERY

## 2022-04-21 PROCEDURE — 360N000084 HC SURGERY LEVEL 4 W/ FLUORO, PER MIN: Performed by: ORTHOPAEDIC SURGERY

## 2022-04-21 PROCEDURE — 250N000009 HC RX 250: Performed by: ORTHOPAEDIC SURGERY

## 2022-04-21 PROCEDURE — 271N000001 HC OR GENERAL SUPPLY NON-STERILE: Performed by: ORTHOPAEDIC SURGERY

## 2022-04-21 PROCEDURE — 258N000003 HC RX IP 258 OP 636: Performed by: ANESTHESIOLOGY

## 2022-04-21 PROCEDURE — 250N000011 HC RX IP 250 OP 636: Performed by: SURGERY

## 2022-04-21 PROCEDURE — 258N000003 HC RX IP 258 OP 636: Performed by: SURGERY

## 2022-04-21 PROCEDURE — 71275 CT ANGIOGRAPHY CHEST: CPT

## 2022-04-21 PROCEDURE — 999N000141 HC STATISTIC PRE-PROCEDURE NURSING ASSESSMENT: Performed by: ORTHOPAEDIC SURGERY

## 2022-04-21 PROCEDURE — 250N000009 HC RX 250: Performed by: SURGERY

## 2022-04-21 PROCEDURE — 999N000179 XR SURGERY CARM FLUORO LESS THAN 5 MIN W STILLS

## 2022-04-21 PROCEDURE — 258N000003 HC RX IP 258 OP 636: Performed by: PHYSICIAN ASSISTANT

## 2022-04-21 PROCEDURE — 250N000025 HC SEVOFLURANE, PER MIN: Performed by: ORTHOPAEDIC SURGERY

## 2022-04-21 PROCEDURE — 99221 1ST HOSP IP/OBS SF/LOW 40: CPT | Performed by: NURSE PRACTITIONER

## 2022-04-21 DEVICE — TIBIAL TRAY, XL, SIZE 3
Type: IMPLANTABLE DEVICE | Site: ANKLE | Status: FUNCTIONAL
Brand: SALTO TALARIS®

## 2022-04-21 DEVICE — IMPLANTABLE DEVICE: Type: IMPLANTABLE DEVICE | Site: ANKLE | Status: FUNCTIONAL

## 2022-04-21 DEVICE — INSERT, SIZE 3, LEFT, TH8
Type: IMPLANTABLE DEVICE | Site: ANKLE | Status: FUNCTIONAL
Brand: SALTO TALARIS®

## 2022-04-21 DEVICE — TALAR DOME, SIZE 3, LEFT
Type: IMPLANTABLE DEVICE | Site: ANKLE | Status: FUNCTIONAL
Brand: SALTO TALARIS®

## 2022-04-21 RX ORDER — TRANEXAMIC ACID 10 MG/ML
1 INJECTION, SOLUTION INTRAVENOUS ONCE
Status: COMPLETED | OUTPATIENT
Start: 2022-04-21 | End: 2022-04-21

## 2022-04-21 RX ORDER — OXYCODONE HYDROCHLORIDE 5 MG/1
5 TABLET ORAL EVERY 4 HOURS PRN
Status: CANCELLED | OUTPATIENT
Start: 2022-04-21

## 2022-04-21 RX ORDER — ONDANSETRON 2 MG/ML
4 INJECTION INTRAMUSCULAR; INTRAVENOUS EVERY 6 HOURS PRN
Status: DISCONTINUED | OUTPATIENT
Start: 2022-04-21 | End: 2022-04-23 | Stop reason: HOSPADM

## 2022-04-21 RX ORDER — MULTIPLE VITAMINS W/ MINERALS TAB 9MG-400MCG
1 TAB ORAL DAILY
Status: DISCONTINUED | OUTPATIENT
Start: 2022-04-22 | End: 2022-04-23 | Stop reason: HOSPADM

## 2022-04-21 RX ORDER — ALBUMIN, HUMAN INJ 5% 5 %
12.5 SOLUTION INTRAVENOUS ONCE
Status: COMPLETED | OUTPATIENT
Start: 2022-04-21 | End: 2022-04-21

## 2022-04-21 RX ORDER — HYDROMORPHONE HCL IN WATER/PF 6 MG/30 ML
0.2 PATIENT CONTROLLED ANALGESIA SYRINGE INTRAVENOUS
Status: DISCONTINUED | OUTPATIENT
Start: 2022-04-21 | End: 2022-04-23 | Stop reason: HOSPADM

## 2022-04-21 RX ORDER — NALOXONE HYDROCHLORIDE 0.4 MG/ML
0.4 INJECTION, SOLUTION INTRAMUSCULAR; INTRAVENOUS; SUBCUTANEOUS
Status: DISCONTINUED | OUTPATIENT
Start: 2022-04-21 | End: 2022-04-23 | Stop reason: HOSPADM

## 2022-04-21 RX ORDER — SODIUM CHLORIDE 9 MG/ML
INJECTION, SOLUTION INTRAVENOUS CONTINUOUS
Status: DISCONTINUED | OUTPATIENT
Start: 2022-04-21 | End: 2022-04-23

## 2022-04-21 RX ORDER — LIDOCAINE HYDROCHLORIDE 20 MG/ML
INJECTION, SOLUTION INFILTRATION; PERINEURAL PRN
Status: DISCONTINUED | OUTPATIENT
Start: 2022-04-21 | End: 2022-04-21

## 2022-04-21 RX ORDER — CEFAZOLIN SODIUM/WATER 2 G/20 ML
2 SYRINGE (ML) INTRAVENOUS SEE ADMIN INSTRUCTIONS
Status: DISCONTINUED | OUTPATIENT
Start: 2022-04-21 | End: 2022-04-21 | Stop reason: HOSPADM

## 2022-04-21 RX ORDER — ONDANSETRON 4 MG/1
4 TABLET, ORALLY DISINTEGRATING ORAL EVERY 6 HOURS PRN
Status: DISCONTINUED | OUTPATIENT
Start: 2022-04-21 | End: 2022-04-23 | Stop reason: HOSPADM

## 2022-04-21 RX ORDER — PROCHLORPERAZINE MALEATE 5 MG
5 TABLET ORAL EVERY 6 HOURS PRN
Status: DISCONTINUED | OUTPATIENT
Start: 2022-04-21 | End: 2022-04-23 | Stop reason: HOSPADM

## 2022-04-21 RX ORDER — ACETAMINOPHEN 325 MG/1
650 TABLET ORAL EVERY 4 HOURS PRN
Status: DISCONTINUED | OUTPATIENT
Start: 2022-04-24 | End: 2022-04-23 | Stop reason: HOSPADM

## 2022-04-21 RX ORDER — AMOXICILLIN 250 MG
1 CAPSULE ORAL 2 TIMES DAILY
Status: DISCONTINUED | OUTPATIENT
Start: 2022-04-21 | End: 2022-04-23 | Stop reason: HOSPADM

## 2022-04-21 RX ORDER — NALOXONE HYDROCHLORIDE 0.4 MG/ML
0.2 INJECTION, SOLUTION INTRAMUSCULAR; INTRAVENOUS; SUBCUTANEOUS
Status: DISCONTINUED | OUTPATIENT
Start: 2022-04-21 | End: 2022-04-23 | Stop reason: HOSPADM

## 2022-04-21 RX ORDER — MAGNESIUM HYDROXIDE 1200 MG/15ML
LIQUID ORAL PRN
Status: DISCONTINUED | OUTPATIENT
Start: 2022-04-21 | End: 2022-04-21 | Stop reason: HOSPADM

## 2022-04-21 RX ORDER — ACETAMINOPHEN 325 MG/1
975 TABLET ORAL EVERY 8 HOURS
Status: DISCONTINUED | OUTPATIENT
Start: 2022-04-21 | End: 2022-04-23 | Stop reason: HOSPADM

## 2022-04-21 RX ORDER — SODIUM CHLORIDE, SODIUM LACTATE, POTASSIUM CHLORIDE, CALCIUM CHLORIDE 600; 310; 30; 20 MG/100ML; MG/100ML; MG/100ML; MG/100ML
INJECTION, SOLUTION INTRAVENOUS CONTINUOUS
Status: DISCONTINUED | OUTPATIENT
Start: 2022-04-21 | End: 2022-04-21 | Stop reason: HOSPADM

## 2022-04-21 RX ORDER — BISACODYL 10 MG
10 SUPPOSITORY, RECTAL RECTAL DAILY PRN
Status: DISCONTINUED | OUTPATIENT
Start: 2022-04-21 | End: 2022-04-23 | Stop reason: HOSPADM

## 2022-04-21 RX ORDER — ONDANSETRON 2 MG/ML
INJECTION INTRAMUSCULAR; INTRAVENOUS PRN
Status: DISCONTINUED | OUTPATIENT
Start: 2022-04-21 | End: 2022-04-21

## 2022-04-21 RX ORDER — FENTANYL CITRATE 50 UG/ML
INJECTION, SOLUTION INTRAMUSCULAR; INTRAVENOUS PRN
Status: DISCONTINUED | OUTPATIENT
Start: 2022-04-21 | End: 2022-04-21

## 2022-04-21 RX ORDER — WARFARIN SODIUM 4 MG/1
4 TABLET ORAL
Status: DISCONTINUED | OUTPATIENT
Start: 2022-04-21 | End: 2022-04-21

## 2022-04-21 RX ORDER — CEFAZOLIN SODIUM 2 G/100ML
2 INJECTION, SOLUTION INTRAVENOUS EVERY 8 HOURS
Status: COMPLETED | OUTPATIENT
Start: 2022-04-21 | End: 2022-04-22

## 2022-04-21 RX ORDER — CEFAZOLIN SODIUM/WATER 2 G/20 ML
2 SYRINGE (ML) INTRAVENOUS
Status: COMPLETED | OUTPATIENT
Start: 2022-04-21 | End: 2022-04-21

## 2022-04-21 RX ORDER — PROPOFOL 10 MG/ML
INJECTION, EMULSION INTRAVENOUS CONTINUOUS PRN
Status: DISCONTINUED | OUTPATIENT
Start: 2022-04-21 | End: 2022-04-21

## 2022-04-21 RX ORDER — PROPOFOL 10 MG/ML
INJECTION, EMULSION INTRAVENOUS PRN
Status: DISCONTINUED | OUTPATIENT
Start: 2022-04-21 | End: 2022-04-21

## 2022-04-21 RX ORDER — WARFARIN SODIUM 4 MG/1
4 TABLET ORAL
Status: DISCONTINUED | OUTPATIENT
Start: 2022-04-22 | End: 2022-04-22

## 2022-04-21 RX ORDER — EPHEDRINE SULFATE 50 MG/ML
INJECTION, SOLUTION INTRAMUSCULAR; INTRAVENOUS; SUBCUTANEOUS PRN
Status: DISCONTINUED | OUTPATIENT
Start: 2022-04-21 | End: 2022-04-21

## 2022-04-21 RX ORDER — LIDOCAINE 40 MG/G
CREAM TOPICAL
Status: DISCONTINUED | OUTPATIENT
Start: 2022-04-21 | End: 2022-04-23 | Stop reason: HOSPADM

## 2022-04-21 RX ORDER — GABAPENTIN 300 MG/1
300 CAPSULE ORAL DAILY
Status: DISCONTINUED | OUTPATIENT
Start: 2022-04-22 | End: 2022-04-23 | Stop reason: HOSPADM

## 2022-04-21 RX ORDER — HYDROXYZINE HYDROCHLORIDE 10 MG/1
10 TABLET, FILM COATED ORAL EVERY 6 HOURS PRN
Qty: 30 TABLET | Refills: 0 | Status: SHIPPED | OUTPATIENT
Start: 2022-04-21 | End: 2023-04-19

## 2022-04-21 RX ORDER — AMOXICILLIN 250 MG
1-2 CAPSULE ORAL 2 TIMES DAILY
Qty: 10 TABLET | Refills: 0 | Status: SHIPPED | OUTPATIENT
Start: 2022-04-21

## 2022-04-21 RX ORDER — ONDANSETRON 2 MG/ML
4 INJECTION INTRAMUSCULAR; INTRAVENOUS EVERY 30 MIN PRN
Status: DISCONTINUED | OUTPATIENT
Start: 2022-04-21 | End: 2022-04-21 | Stop reason: HOSPADM

## 2022-04-21 RX ORDER — HYDROMORPHONE HCL IN WATER/PF 6 MG/30 ML
0.4 PATIENT CONTROLLED ANALGESIA SYRINGE INTRAVENOUS
Status: DISCONTINUED | OUTPATIENT
Start: 2022-04-21 | End: 2022-04-23 | Stop reason: HOSPADM

## 2022-04-21 RX ORDER — FLUTICASONE PROPIONATE AND SALMETEROL XINAFOATE 115; 21 UG/1; UG/1
2 AEROSOL, METERED RESPIRATORY (INHALATION) 2 TIMES DAILY
Status: DISCONTINUED | OUTPATIENT
Start: 2022-04-21 | End: 2022-04-23 | Stop reason: HOSPADM

## 2022-04-21 RX ORDER — FENTANYL CITRATE 0.05 MG/ML
25 INJECTION, SOLUTION INTRAMUSCULAR; INTRAVENOUS EVERY 5 MIN PRN
Status: DISCONTINUED | OUTPATIENT
Start: 2022-04-21 | End: 2022-04-21 | Stop reason: HOSPADM

## 2022-04-21 RX ORDER — CALCIUM CARBONATE 500(1250)
500 TABLET ORAL DAILY
Status: DISCONTINUED | OUTPATIENT
Start: 2022-04-22 | End: 2022-04-23 | Stop reason: HOSPADM

## 2022-04-21 RX ORDER — IOPAMIDOL 755 MG/ML
72 INJECTION, SOLUTION INTRAVASCULAR ONCE
Status: COMPLETED | OUTPATIENT
Start: 2022-04-21 | End: 2022-04-21

## 2022-04-21 RX ORDER — ONDANSETRON 4 MG/1
4 TABLET, ORALLY DISINTEGRATING ORAL EVERY 30 MIN PRN
Status: DISCONTINUED | OUTPATIENT
Start: 2022-04-21 | End: 2022-04-21 | Stop reason: HOSPADM

## 2022-04-21 RX ORDER — DEXAMETHASONE SODIUM PHOSPHATE 4 MG/ML
INJECTION, SOLUTION INTRA-ARTICULAR; INTRALESIONAL; INTRAMUSCULAR; INTRAVENOUS; SOFT TISSUE PRN
Status: DISCONTINUED | OUTPATIENT
Start: 2022-04-21 | End: 2022-04-21

## 2022-04-21 RX ORDER — POLYETHYLENE GLYCOL 3350 17 G/17G
17 POWDER, FOR SOLUTION ORAL DAILY
Status: DISCONTINUED | OUTPATIENT
Start: 2022-04-22 | End: 2022-04-23 | Stop reason: HOSPADM

## 2022-04-21 RX ORDER — HYDROMORPHONE HCL IN WATER/PF 6 MG/30 ML
0.4 PATIENT CONTROLLED ANALGESIA SYRINGE INTRAVENOUS EVERY 5 MIN PRN
Status: DISCONTINUED | OUTPATIENT
Start: 2022-04-21 | End: 2022-04-21 | Stop reason: HOSPADM

## 2022-04-21 RX ORDER — HYDROXYZINE HYDROCHLORIDE 10 MG/1
10 TABLET, FILM COATED ORAL EVERY 6 HOURS PRN
Status: DISCONTINUED | OUTPATIENT
Start: 2022-04-21 | End: 2022-04-23 | Stop reason: HOSPADM

## 2022-04-21 RX ORDER — FLUTICASONE PROPIONATE 50 MCG
1 SPRAY, SUSPENSION (ML) NASAL DAILY
Status: DISCONTINUED | OUTPATIENT
Start: 2022-04-22 | End: 2022-04-23 | Stop reason: HOSPADM

## 2022-04-21 RX ADMIN — SODIUM CHLORIDE, POTASSIUM CHLORIDE, SODIUM LACTATE AND CALCIUM CHLORIDE: 600; 310; 30; 20 INJECTION, SOLUTION INTRAVENOUS at 13:09

## 2022-04-21 RX ADMIN — SODIUM CHLORIDE 95 ML: 900 INJECTION INTRAVENOUS at 21:19

## 2022-04-21 RX ADMIN — DEXAMETHASONE SODIUM PHOSPHATE 4 MG: 4 INJECTION, SOLUTION INTRA-ARTICULAR; INTRALESIONAL; INTRAMUSCULAR; INTRAVENOUS; SOFT TISSUE at 10:36

## 2022-04-21 RX ADMIN — SENNOSIDES AND DOCUSATE SODIUM 1 TABLET: 50; 8.6 TABLET ORAL at 21:40

## 2022-04-21 RX ADMIN — SODIUM CHLORIDE: 9 INJECTION, SOLUTION INTRAVENOUS at 17:13

## 2022-04-21 RX ADMIN — Medication 5 MG: at 10:13

## 2022-04-21 RX ADMIN — SODIUM CHLORIDE, POTASSIUM CHLORIDE, SODIUM LACTATE AND CALCIUM CHLORIDE: 600; 310; 30; 20 INJECTION, SOLUTION INTRAVENOUS at 09:05

## 2022-04-21 RX ADMIN — Medication 2 G: at 10:03

## 2022-04-21 RX ADMIN — PHENYLEPHRINE HYDROCHLORIDE 100 MCG: 10 INJECTION INTRAVENOUS at 11:46

## 2022-04-21 RX ADMIN — LIDOCAINE HYDROCHLORIDE 80 MG: 20 INJECTION, SOLUTION INFILTRATION; PERINEURAL at 10:09

## 2022-04-21 RX ADMIN — FENTANYL CITRATE 25 MCG: 50 INJECTION, SOLUTION INTRAMUSCULAR; INTRAVENOUS at 11:36

## 2022-04-21 RX ADMIN — ACETAMINOPHEN 975 MG: 325 TABLET ORAL at 17:13

## 2022-04-21 RX ADMIN — PROPOFOL 150 MG: 10 INJECTION, EMULSION INTRAVENOUS at 10:09

## 2022-04-21 RX ADMIN — SODIUM CHLORIDE, POTASSIUM CHLORIDE, SODIUM LACTATE AND CALCIUM CHLORIDE: 600; 310; 30; 20 INJECTION, SOLUTION INTRAVENOUS at 11:56

## 2022-04-21 RX ADMIN — PROPOFOL 30 MCG/KG/MIN: 10 INJECTION, EMULSION INTRAVENOUS at 10:21

## 2022-04-21 RX ADMIN — FENTANYL CITRATE 50 MCG: 50 INJECTION, SOLUTION INTRAMUSCULAR; INTRAVENOUS at 10:09

## 2022-04-21 RX ADMIN — PHENYLEPHRINE HYDROCHLORIDE 200 MCG: 10 INJECTION INTRAVENOUS at 10:29

## 2022-04-21 RX ADMIN — PHENYLEPHRINE HYDROCHLORIDE 100 MCG: 10 INJECTION INTRAVENOUS at 10:27

## 2022-04-21 RX ADMIN — FLUTICASONE PROPIONATE AND SALMETEROL XINAFOATE 2 PUFF: 115; 21 AEROSOL, METERED RESPIRATORY (INHALATION) at 21:44

## 2022-04-21 RX ADMIN — ONDANSETRON 4 MG: 2 INJECTION INTRAMUSCULAR; INTRAVENOUS at 10:36

## 2022-04-21 RX ADMIN — TRANEXAMIC ACID 1 G: 10 INJECTION, SOLUTION INTRAVENOUS at 10:18

## 2022-04-21 RX ADMIN — FENTANYL CITRATE 25 MCG: 50 INJECTION, SOLUTION INTRAMUSCULAR; INTRAVENOUS at 11:39

## 2022-04-21 RX ADMIN — TRANEXAMIC ACID 1 G: 10 INJECTION, SOLUTION INTRAVENOUS at 11:34

## 2022-04-21 RX ADMIN — IOPAMIDOL 72 ML: 755 INJECTION, SOLUTION INTRAVENOUS at 21:19

## 2022-04-21 RX ADMIN — Medication 5 MG: at 10:29

## 2022-04-21 RX ADMIN — ROPIVACAINE HYDROCHLORIDE 10 ML: 5 INJECTION, SOLUTION EPIDURAL; INFILTRATION; PERINEURAL at 09:46

## 2022-04-21 RX ADMIN — MIDAZOLAM HYDROCHLORIDE 2 MG: 1 INJECTION, SOLUTION INTRAMUSCULAR; INTRAVENOUS at 09:46

## 2022-04-21 RX ADMIN — PHENYLEPHRINE HYDROCHLORIDE 150 MCG: 10 INJECTION INTRAVENOUS at 10:17

## 2022-04-21 RX ADMIN — PHENYLEPHRINE HYDROCHLORIDE 150 MCG: 10 INJECTION INTRAVENOUS at 10:15

## 2022-04-21 RX ADMIN — PHENYLEPHRINE HYDROCHLORIDE 100 MCG: 10 INJECTION INTRAVENOUS at 10:13

## 2022-04-21 RX ADMIN — PHENYLEPHRINE HYDROCHLORIDE 0.5 MCG/KG/MIN: 10 INJECTION INTRAVENOUS at 10:33

## 2022-04-21 RX ADMIN — SODIUM CHLORIDE 1000 ML: 9 INJECTION, SOLUTION INTRAVENOUS at 20:35

## 2022-04-21 RX ADMIN — ALBUMIN HUMAN 12.5 G: 0.05 INJECTION, SOLUTION INTRAVENOUS at 13:40

## 2022-04-21 RX ADMIN — CEFAZOLIN SODIUM 2 G: 2 INJECTION, SOLUTION INTRAVENOUS at 17:13

## 2022-04-21 ASSESSMENT — LIFESTYLE VARIABLES: TOBACCO_USE: 0

## 2022-04-21 ASSESSMENT — ENCOUNTER SYMPTOMS: DYSRHYTHMIAS: 1

## 2022-04-21 NOTE — OR NURSING
Dr. Kushal Pozo notified of labile heartrate, all paced and that systolic BP has been 89 for last 45 minutes.  tO for Albumin 5%, 250cc taken and implemented.

## 2022-04-21 NOTE — ANESTHESIA CARE TRANSFER NOTE
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    Anesthesia Type:   General     Note:    Oropharynx: oropharynx clear of all foreign objects  Level of Consciousness: awake and drowsy  Oxygen Supplementation: face mask  Level of Supplemental Oxygen (L/min / FiO2): 6  Independent Airway: airway patency satisfactory and stable  Dentition: dentition unchanged  Vital Signs Stable: post-procedure vital signs reviewed and stable  Report to RN Given: handoff report given  Patient transferred to: PACU  Comments: To PACU: Arouses easily, good airway, 02 face mask, VSS  Report to RN  Handoff Report: Identifed the Patient, Identified the Reponsible Provider, Reviewed the pertinent medical history, Discussed the surgical course, Reviewed Intra-OP anesthesia mangement and issues during anesthesia, Set expectations for post-procedure period and Allowed opportunity for questions and acknowledgement of understanding      Vitals:  Vitals Value Taken Time   BP     Temp     Pulse 66 04/21/22 1208   Resp 11 04/21/22 1208   SpO2 95 % 04/21/22 1208   Vitals shown include unvalidated device data.    Electronically Signed By: JOHN Santillan CRNA  April 21, 2022  12:09 PM

## 2022-04-21 NOTE — OR NURSING
Dr. Kushal Pozo notified of paced rhythm of 120.  BP 80 systolic. IV fluids increased to wide open until Dr. Pozo can arrive.   Dr. Pozo to bedside.  BP and HR back to baseline of HR 65 and bP of 106/68.

## 2022-04-21 NOTE — PROGRESS NOTES
PTA medications completed by Medication Scribe day of surgery     Medication history sources: Patient, Surescripts and H&P  In the past week, patient estimated taking medication this percent of the time: Greater than 90%  Adherence assessment: N/A Not Observed    Significant changes made to the medication list:  None      Additional medication history information:   Patient brought own home meds: Flonase Nasal Spray, Advair Inhaler    Medication reconciliation completed by provider prior to medication history? No    Time spent in this activity: 40 minutes    The information provided in this note is only as accurate as the sources available at the time of update(s)    Prior to Admission medications    Medication Sig Last Dose Taking? Auth Provider   aspirin (ASA) 81 MG EC tablet Take 81 mg by mouth daily 4/11/2022 at am Yes Reported, Patient   calcium carbonate (OS- MG [place]. [place]) 500 MG tablet Take 500 mg by mouth daily  4/21/2022 at am Yes Reported, Patient   Cholecalciferol (VITAMIN D3 PO) Take 2,000 Units by mouth daily  4/21/2022 at am Yes Reported, Patient   denosumab (PROLIA) 60 MG/ML SOSY injection Inject 60 mg Subcutaneous every 6 months December 2021 Yes Reported, Patient   fluticasone (FLONASE) 50 MCG/ACT nasal spray Spray 1 spray into both nostrils daily 4/21/2022 at am Yes Reported, Patient   fluticasone-salmeterol (ADVAIR-HFA) 115-21 MCG/ACT inhaler Inhale 2 puffs into the lungs 2 times daily 4/21/2022 at am Yes Reported, Patient   gabapentin (NEURONTIN) 300 MG capsule Take 1 capsule (300 mg) by mouth daily 4/21/2022 at am Yes Neto Ford MD   multivitamin w/minerals (THERA-VIT-M) tablet Take 1 tablet by mouth daily 4/21/2022 at am Yes Reported, Patient   warfarin ANTICOAGULANT (COUMADIN) 4 MG tablet Take 4 mg by mouth six times a week 4/11/2022 at pm Yes Reported, Patient   warfarin ANTICOAGULANT (COUMADIN) 4 MG tablet Take 2 mg by mouth once a week (Wednesday 0.5 x 4 mg = 2 mg) 4/6/2022  at pm Yes Reported, Patient     Medication history completed by:    Jutsin Michaud CPhT  Medication [facility]

## 2022-04-21 NOTE — PROVIDER NOTIFICATION
MD Notification    Notified Person: MD    Notified Person Name: on call    Notification Date/Time:1841 4/21    Notification Interaction: on call paged    Purpose of Notification: low BP    Orders Received:    Comments:

## 2022-04-21 NOTE — ANESTHESIA PROCEDURE NOTES
Sciatic Procedure Note    Pre-Procedure   Staff -        Anesthesiologist:  Kushal Pozo MD       Performed By: anesthesiologist       Location: pre-op       Pre-Anesthestic Checklist: patient identified, IV checked, site marked, risks and benefits discussed, informed consent, monitors and equipment checked, pre-op evaluation, at physician/surgeon's request and post-op pain management  Timeout:       Correct Patient: Yes        Correct Procedure: Yes        Correct Site: Yes        Correct Position: Yes        Correct Laterality: Yes        Site Marked: Yes  Procedure Documentation  Procedure: Sciatic       Laterality: left       Patient Position: supine       Patient Prep/Sterile Barriers: sterile gloves, mask, patient draped       Skin prep: Chloraprep       Local skin infiltrated with 3 mL of 1% lidocaine.  (popliteal approach).       Needle Type: insulated       Needle Gauge: 21.        Needle Length (Inches): 3.5        Ultrasound guided       1. Ultrasound was used to identify targeted nerve, plexus, vascular marker, or fascial plane and place a needle adjacent to it in real-time.       2. Ultrasound was used to visualize the spread of anesthetic in close proximity to the above referenced structure.       3. A permanent image is entered into the patient's record.       4. The visualized anatomic structures appeared normal.       5. There were no apparent abnormal pathologic findings.    Assessment/Narrative         The placement was negative for: blood aspirated, painful injection and site bleeding       Paresthesias: No.       Bolus given via needle..        Secured via.        Insertion/Infusion Method: Single Shot       Complications: none       Injection made incrementally with aspirations every 5 mL.    Medication(s) Administered   Ropivacaine 0.5% w/ 1:400K Epi (Injection) - Injection   30 mL - 4/21/2022 9:46:00 AM   Comments:  The surgeon has given a verbal order transferring care of this  patient to me for the performance of a regional analgesia block for post-op pain control. It is requested of me because I am uniquely trained and qualified to perform this block and the surgeon is neither trained nor qualified to perform this procedure.

## 2022-04-21 NOTE — ANESTHESIA POSTPROCEDURE EVALUATION
Patient: Victorino Khan    Procedure: Procedure(s):  LEFT TOTAL ANKLE REPLACEMENT  TENDON ACHILLELS LENGTHENING, MEDIAL DISPLACEMENT CALCANEAL OSTOTOMY       Anesthesia Type:  General    Note:  Disposition: Inpatient   Postop Pain Control: Uneventful            Sign Out: Well controlled pain   PONV: No   Neuro/Psych: Uneventful            Sign Out: Acceptable/Baseline neuro status   Airway/Respiratory: Uneventful            Sign Out: Acceptable/Baseline resp. status   CV/Hemodynamics: Uneventful            Sign Out: Acceptable CV status   Other NRE: NONE   DID A NON-ROUTINE EVENT OCCUR? No           Last vitals:  Vitals Value Taken Time   BP 54/33 04/21/22 1430   Temp     Pulse 57 04/21/22 1430   Resp 18 04/21/22 1430   SpO2 92 % 04/21/22 1430   Vitals shown include unvalidated device data.    Electronically Signed By: Kushal Pozo MD  April 21, 2022  3:45 PM

## 2022-04-21 NOTE — ANESTHESIA PROCEDURE NOTES
Femoral (via adductor canal) Procedure Note    Pre-Procedure   Staff -        Anesthesiologist:  Kushal Pozo MD       Performed By: anesthesiologist       Location: pre-op       Pre-Anesthestic Checklist: patient identified, IV checked, site marked, risks and benefits discussed, informed consent, monitors and equipment checked, pre-op evaluation, at physician/surgeon's request and post-op pain management  Timeout:       Correct Patient: Yes        Correct Procedure: Yes        Correct Site: Yes        Correct Position: Yes        Correct Laterality: Yes        Site Marked: Yes  Procedure Documentation  Procedure: Femoral (via adductor canal)       Laterality: left       Patient Position: supine       Patient Prep/Sterile Barriers: sterile gloves, mask, patient draped       Skin prep: Chloraprep       Local skin infiltrated with 3 mL of 1% lidocaine.        Needle Type: insulated       Needle Gauge: 21.        Needle Length (Inches): 3.5        Ultrasound guided       1. Ultrasound was used to identify targeted nerve, plexus, vascular marker, or fascial plane and place a needle adjacent to it in real-time.       2. Ultrasound was used to visualize the spread of anesthetic in close proximity to the above referenced structure.       3. A permanent image is entered into the patient's record.       4. The visualized anatomic structures appeared normal.       5. There were no apparent abnormal pathologic findings.    Assessment/Narrative         The placement was negative for: blood aspirated, painful injection and site bleeding       Paresthesias: No.       Bolus given via needle..        Secured via.        Insertion/Infusion Method: Single Shot       Complications: none       Injection made incrementally with aspirations every 5 mL.    Medication(s) Administered   Ropivacaine 0.5% w/ 1:400K Epi (Injection) - Injection   10 mL - 4/21/2022 9:46:00 AM   Comments:  The surgeon has given a verbal order  transferring care of this patient to me for the performance of a regional analgesia block for post-op pain control. It is requested of me because I am uniquely trained and qualified to perform this block and the surgeon is neither trained nor qualified to perform this procedure.

## 2022-04-21 NOTE — OP NOTE
PREOPERATIVE DIAGNOSIS:   1.  Left end-stage ankle degenerative joint disease.  2.  Left hindfoot valgus deformity.  3.  Left Achilles contracture.    POSTOPERATIVE DIAGNOSIS:   1.  Left end-stage ankle degenerative joint disease.  2.  Left hindfoot valgus deformity.  3.  Left Achilles contracture.    PROCEDURE:  1.  Left total ankle arthroplasty.  2.  Left medial displacement calcaneal osteotomy.  3.  Left percutaneous Achilles tendon lengthening.    ATTENDING SURGEON: Magan Chatman MD.    ASSISTANT SURGEON: Rei Patton PA-C.    ANESTHESIA: General with regional nerve block.    ESTIMATED BLOOD LOSS: Minimal.    IMPLANTS: Serge Talaris total ankle arthroplasty with size 3 XL tibia, size 3 talus, and 8mm polyethylene insert; [place] 28 7.0mm Monster headless compression screw.     COMPLICATIONS: None apparent.    Of note, a skilled surgical assistant, Rei Patton PA-C, was necessary and utilized during the case to assist with patient positioning, prepping and draping, soft tissue retraction and stabilizing the leg while completing the procedure, completing the bone and soft tissue work, wound closure, and splint application.  The assistant was present throughout the entire case.    INDICATIONS: Victorino is a very pleasant 88-year-old gentleman who presented to my clinic for evaluation of left end-stage ankle degenerative joint disease. The patient has undergone multiple conservative treatments for control of discomfort including brace immobilization, ice, anti-inflammatory medication, and corticosteroid injections. The patient noted significant decreased quality of life as a result of ankle pain.  The patient demonstrated preserved range of motion of the ankle, and the benefits and risks of total arthroplasty versus arthrodesis were discussed with the patient. Given the patient's activity level, age, and desire to maintain motion at the ankle joint, arthroplasty was selected. The benefits and risks of surgery  were discussed in full with the patient, and they provided informed consent to proceed.    DESCRIPTION OF PROCEDURE: The patient was identified in the preoperative holding area on the date of surgery. The operative site was marked with an indelible marker, and the patient was brought back to the operating room and transferred to the operating table in a supine position. All bony prominences were well padded. Anesthesia was administered without complication. The left lower extremity was prepped and draped in standard sterile fashion. A preoperative timeout was performed identifying the correct patient, procedure, operative site, antibiotic administration and equipment necessary for the procedure.    After Esmarch exsanguination, an upper thigh tourniquet was inflated. A longitudinal incision was made centered over the anterior ankle joint line. Soft tissue dissection was carefully carried down, protecting the superficial peroneal nerve, which ran quite proximal directly over the distal tibia plafond and ankle joint line.  A window was created around the nerve to allow for protection of the nerve during the case. The extensor retinaculum was incised, taking care to maintain the anterior tibialis tendon within its own sheath for protection throughout the remainder of the case. Deep soft tissue dissection was carefully carried down, retracting the deep neurovascular bundle laterally with the digital extensor tendons for protection. There was marked synovitis over the anterior aspect of the ankle joint, which was carefully debrided. The ankle joint was easily visualized. Elevation of the periosteum was performed to allow for adequate visualization of the distal tibia and ankle joint line. The prominent distal tibia anterior spurs were resected to the level of the tibial plafond.  A pin was inserted in percutaneous fashion in the proximal tibia at the level of the tibial tubercle for placement of the guide leanna. The guide  leanna was then secured distally with a separate pin in the distal tibia. Utilizing fluoroscopic imaging, the guide leanna was aligned in appropriate orientation, taking a 8mm bone resection from the distal tibia and correcting the patient's subtle valgus deformity. A size 3 component was selected to remove minimal bone from the medial malleolus and limit impingement on the fibula. Pins were placed to protect the medial and lateral malleoli during the distal tibia resection. After the bone cut, the anterior aspect of the distal tibia bone was resected from the wound.    The posterior talar chamfer guide was then secured, and the posterior chamfer cut created. The anterior talar chamfer ganga was secured in appropriate position. The anterior chamfer cut was made. Care was taken to remove any prominent anterior talar neck bone to allow for appropriate positioning of the chamfer cutting guides. At this point, distractors were placed into the ankle joint to assess for balancing. The ankle was well balanced in the coronal plane.  The lateral chamfer guide was then secured. The lateral chamfer cut was created after creating the bell hole in the central aspect of the talus. The remaining posterior tibial bone cut was removed prior to placement of the trial components.  Fluoroscopic imaging confirmed appropriate alignment of the trial components.  The ankle remained well balanced with varus and valgus stress.    With the trial components in place, the patient was noted to demonstrate 5 degrees of dorsiflexion with the ankle and hindfoot in a neutral position. I elected to proceed with a tendoAchilles lengthening to improve dorsiflexion range of motion. A triple-cut eamon-section lengthening was performed with percutaneous incisions. The ankle was carefully manipulated with approximately 20 degrees of dorsiflexion achieved. The Achilles tendon remained intact following the manipulation.    The wound was copiously irrigated. The  above-noted components were implanted into position after preparing the tibial keel. Fluoroscopic imaging confirmed appropriate seating of the components. The patient demonstrated excellent range of motion in dorsiflexion and plantarflexion after implantation of the components. The wound was once again copiously irrigated. The extensor retinaculum was reapproximated with interrupted 2-0 Vicryl suture. The subcutaneous tissues and skin were reapproximated with interrupted 3-0 Monocryl and 3-0 nylon sutures respectively.     Due to the patient's persistent hindfoot valgus deformity and to balance the hindfoot appropriately under the ankle, a medial displacement calcaneal osteotomy was completed.  An oblique incision was made directly over the lateral aspect of the hindfoot.  Soft tissue dissection was carefully carried down maintaining excellent hemostasis.  Periosteum was dissected off from the lateral calcaneal tuberosity and Hohmann retractor retractors were placed both superior and plantar to the calcaneal tuberosity.  An oscillating saw was utilized to complete an osteotomy through the calcaneal tuberosity, and the posterior tuberosity fragment was translated approximately 8 mm medially.  The osteotomy was secured with a 7.0mm headless compression screw.  Fluoroscopic imaging confirmed appropriate positioning of the hardware and excellent correction of the hindfoot valgus deformity. The subcutaneous tissues and skin were reapproximated with interrupted 3-0 Monocryl and 3-0 nylon sutures respectively.     Xeroform and sterile dressings were applied. The patient was placed in a short leg splint. The patient was extubated and brought to the PACU in stable condition for further postoperative care.    Postoperatively, the patient will remain nonweightbearing on the left lower extremity. The patient will resume his daily Coumadin for DVT prophylaxis postoperatively. The patient will be admitted to the hospital for  rehabilitation and pain control. The patient will plan return to clinic for followup in 2 weeks for repeat evaluation including wound check, suture removal and weightbearing radiographs of the left ankle.    Of note, all counts were correct at the conclusion of the case.

## 2022-04-22 ENCOUNTER — APPOINTMENT (OUTPATIENT)
Dept: GENERAL RADIOLOGY | Facility: CLINIC | Age: 87
End: 2022-04-22
Attending: PHYSICIAN ASSISTANT
Payer: COMMERCIAL

## 2022-04-22 ENCOUNTER — APPOINTMENT (OUTPATIENT)
Dept: PHYSICAL THERAPY | Facility: CLINIC | Age: 87
End: 2022-04-22
Attending: PHYSICIAN ASSISTANT
Payer: COMMERCIAL

## 2022-04-22 ENCOUNTER — APPOINTMENT (OUTPATIENT)
Dept: PHYSICAL THERAPY | Facility: CLINIC | Age: 87
End: 2022-04-22
Attending: ORTHOPAEDIC SURGERY
Payer: COMMERCIAL

## 2022-04-22 PROBLEM — I82.290: Status: ACTIVE | Noted: 2022-04-22

## 2022-04-22 LAB
CREAT SERPL-MCNC: 1.02 MG/DL (ref 0.66–1.25)
ERYTHROCYTE [DISTWIDTH] IN BLOOD BY AUTOMATED COUNT: 13.1 % (ref 10–15)
GFR SERPL CREATININE-BSD FRML MDRD: 71 ML/MIN/1.73M2
GLUCOSE BLDC GLUCOMTR-MCNC: 112 MG/DL (ref 70–99)
HCT VFR BLD AUTO: 40.8 % (ref 40–53)
HGB BLD-MCNC: 13.1 G/DL (ref 13.3–17.7)
INR PPP: 1.22 (ref 0.85–1.15)
MCH RBC QN AUTO: 33 PG (ref 26.5–33)
MCHC RBC AUTO-ENTMCNC: 32.1 G/DL (ref 31.5–36.5)
MCV RBC AUTO: 103 FL (ref 78–100)
PLATELET # BLD AUTO: 123 10E3/UL (ref 150–450)
RBC # BLD AUTO: 3.97 10E6/UL (ref 4.4–5.9)
WBC # BLD AUTO: 8 10E3/UL (ref 4–11)

## 2022-04-22 PROCEDURE — 85027 COMPLETE CBC AUTOMATED: CPT | Performed by: NURSE PRACTITIONER

## 2022-04-22 PROCEDURE — 71046 X-RAY EXAM CHEST 2 VIEWS: CPT

## 2022-04-22 PROCEDURE — 99232 SBSQ HOSP IP/OBS MODERATE 35: CPT | Performed by: INTERNAL MEDICINE

## 2022-04-22 PROCEDURE — 82962 GLUCOSE BLOOD TEST: CPT

## 2022-04-22 PROCEDURE — 258N000003 HC RX IP 258 OP 636: Performed by: PHYSICIAN ASSISTANT

## 2022-04-22 PROCEDURE — 250N000013 HC RX MED GY IP 250 OP 250 PS 637: Performed by: PHYSICIAN ASSISTANT

## 2022-04-22 PROCEDURE — 97116 GAIT TRAINING THERAPY: CPT | Mod: GP | Performed by: PHYSICAL THERAPIST

## 2022-04-22 PROCEDURE — 97530 THERAPEUTIC ACTIVITIES: CPT | Mod: GP | Performed by: PHYSICAL THERAPIST

## 2022-04-22 PROCEDURE — 85610 PROTHROMBIN TIME: CPT

## 2022-04-22 PROCEDURE — 96372 THER/PROPH/DIAG INJ SC/IM: CPT | Performed by: INTERNAL MEDICINE

## 2022-04-22 PROCEDURE — 82565 ASSAY OF CREATININE: CPT

## 2022-04-22 PROCEDURE — 97161 PT EVAL LOW COMPLEX 20 MIN: CPT | Mod: GP | Performed by: PHYSICAL THERAPIST

## 2022-04-22 PROCEDURE — 250N000013 HC RX MED GY IP 250 OP 250 PS 637: Performed by: INTERNAL MEDICINE

## 2022-04-22 PROCEDURE — 250N000011 HC RX IP 250 OP 636: Performed by: INTERNAL MEDICINE

## 2022-04-22 PROCEDURE — 36415 COLL VENOUS BLD VENIPUNCTURE: CPT

## 2022-04-22 PROCEDURE — 250N000013 HC RX MED GY IP 250 OP 250 PS 637: Performed by: ORTHOPAEDIC SURGERY

## 2022-04-22 PROCEDURE — 250N000011 HC RX IP 250 OP 636: Performed by: PHYSICIAN ASSISTANT

## 2022-04-22 RX ORDER — WARFARIN SODIUM 7.5 MG/1
7.5 TABLET ORAL
Status: COMPLETED | OUTPATIENT
Start: 2022-04-22 | End: 2022-04-22

## 2022-04-22 RX ORDER — OXYCODONE HYDROCHLORIDE 5 MG/1
5 TABLET ORAL EVERY 4 HOURS PRN
Status: DISCONTINUED | OUTPATIENT
Start: 2022-04-22 | End: 2022-04-22

## 2022-04-22 RX ORDER — MECLIZINE HYDROCHLORIDE 25 MG/1
25 TABLET ORAL 3 TIMES DAILY PRN
Status: DISCONTINUED | OUTPATIENT
Start: 2022-04-22 | End: 2022-04-23 | Stop reason: HOSPADM

## 2022-04-22 RX ORDER — ENOXAPARIN SODIUM 100 MG/ML
40 INJECTION SUBCUTANEOUS EVERY 24 HOURS
Status: DISCONTINUED | OUTPATIENT
Start: 2022-04-22 | End: 2022-04-23 | Stop reason: HOSPADM

## 2022-04-22 RX ADMIN — ACETAMINOPHEN AND CODEINE PHOSPHATE 1 TABLET: 300; 30 TABLET ORAL at 09:12

## 2022-04-22 RX ADMIN — SENNOSIDES AND DOCUSATE SODIUM 1 TABLET: 50; 8.6 TABLET ORAL at 08:21

## 2022-04-22 RX ADMIN — ACETAMINOPHEN AND CODEINE PHOSPHATE 1 TABLET: 300; 30 TABLET ORAL at 21:40

## 2022-04-22 RX ADMIN — MULTIPLE VITAMINS W/ MINERALS TAB 1 TABLET: TAB at 08:23

## 2022-04-22 RX ADMIN — FLUTICASONE PROPIONATE AND SALMETEROL XINAFOATE 2 PUFF: 115; 21 AEROSOL, METERED RESPIRATORY (INHALATION) at 21:43

## 2022-04-22 RX ADMIN — ACETAMINOPHEN AND CODEINE PHOSPHATE 1 TABLET: 300; 30 TABLET ORAL at 17:28

## 2022-04-22 RX ADMIN — POLYETHYLENE GLYCOL 3350 17 G: 17 POWDER, FOR SOLUTION ORAL at 08:22

## 2022-04-22 RX ADMIN — FLUTICASONE PROPIONATE AND SALMETEROL XINAFOATE 2 PUFF: 115; 21 AEROSOL, METERED RESPIRATORY (INHALATION) at 08:30

## 2022-04-22 RX ADMIN — ACETAMINOPHEN 975 MG: 325 TABLET ORAL at 00:15

## 2022-04-22 RX ADMIN — WARFARIN SODIUM 7.5 MG: 7.5 TABLET ORAL at 17:20

## 2022-04-22 RX ADMIN — ENOXAPARIN SODIUM 40 MG: 40 INJECTION SUBCUTANEOUS at 17:20

## 2022-04-22 RX ADMIN — CALCIUM 500 MG: 500 TABLET ORAL at 08:22

## 2022-04-22 RX ADMIN — SENNOSIDES AND DOCUSATE SODIUM 1 TABLET: 50; 8.6 TABLET ORAL at 21:41

## 2022-04-22 RX ADMIN — Medication 1 SPRAY: at 08:30

## 2022-04-22 RX ADMIN — ACETAMINOPHEN AND CODEINE PHOSPHATE 1 TABLET: 300; 30 TABLET ORAL at 08:22

## 2022-04-22 RX ADMIN — MECLIZINE HYDROCHLORIDE 25 MG: 25 TABLET ORAL at 17:20

## 2022-04-22 RX ADMIN — GABAPENTIN 300 MG: 300 CAPSULE ORAL at 08:22

## 2022-04-22 RX ADMIN — SODIUM CHLORIDE: 9 INJECTION, SOLUTION INTRAVENOUS at 08:28

## 2022-04-22 RX ADMIN — HYDROXYZINE HYDROCHLORIDE 10 MG: 10 TABLET ORAL at 06:26

## 2022-04-22 RX ADMIN — CEFAZOLIN SODIUM 2 G: 2 INJECTION, SOLUTION INTRAVENOUS at 02:35

## 2022-04-22 NOTE — PLAN OF CARE
[facility]      OUTPATIENT PHYSICAL THERAPY EVALUATION  PLAN OF TREATMENT FOR OUTPATIENT REHABILITATION  (COMPLETE FOR INITIAL CLAIMS ONLY)  Patient's Last Name, First Name, M.I.  YOB: 1934  Victorino Khan                        Provider's Name  [facility] Medical Record No.  [id number]                               Onset Date:  04/21/22   Start of Care Date:  04/22/22      Type:     _X_PT   ___OT   ___SLP Medical Diagnosis:  L TAA                        PT Diagnosis:  difficulty with ambulation.   Visits from SOC:  1   _________________________________________________________________________________  Plan of Treatment/Functional Goals    Planned Interventions: bed mobility training, gait training, stair training, strengthening, transfer training, cryotherapy, balance training     Goals: See Physical Therapy Goals on Care Plan in [employer] electronic health record.    Therapy Frequency: 2x/day  Predicted Duration of Therapy Intervention: 04/24/22  _________________________________________________________________________________    I CERTIFY THE NEED FOR THESE SERVICES FURNISHED UNDER        THIS PLAN OF TREATMENT AND WHILE UNDER MY CARE     (Physician co-signature of this document indicates review and certification of the therapy plan).              Certification date from: 04/22/22, Certification date to: 04/24/22    Referring Physician: Magan Chatman MD            Initial Assessment        See Physical Therapy evaluation dated 04/22/22 in Epic electronic health record.

## 2022-04-22 NOTE — PROGRESS NOTES
"   04/22/22 1112   Quick Adds   Type of Visit Initial PT Evaluation   Living Environment   People in Home significant other  (significant other- available for 24/7 support)   Current Living Arrangements house   Home Accessibility stairs to enter home;stairs within home   Number of Stairs, Main Entrance 1   Stair Railings, Main Entrance none   Number of Stairs, Within Home, Primary two;greater than 10 stairs   Stair Railings, Within Home, Primary railing on left side (ascending)   Transportation Anticipated family or friend will provide   Living Environment Comments Pt drives. Once inside, pt has 2 steps to get to main level which has a railing; 14 steps to get downstairs but pt doesn't need to use.   Self-Care   Usual Activity Tolerance moderate   Current Activity Tolerance fair   Regular Exercise No   Equipment Currently Used at Home none   Fall history within last six months yes  (cracked rib - missed step coming out of building that didn't see)   Number of times patient has fallen within last six months 1   Activity/Exercise/Self-Care Comment Pt is IND with functional mob at baseline. Pt owns 4WW, crutches, FWW.   General Information   Onset of Illness/Injury or Date of Surgery 04/21/22   Referring Physician Magan Chatman MD   Patient/Family Therapy Goals Statement (PT) return home; be able to walk 1  mile in 6 months   Pertinent History of Current Problem (include personal factors and/or comorbidities that impact the POC) Pt is 89 yo male s/p L TAA, POD #1. Per pt's EMR, \"was admitted on 4/21/2022 with postop hypotension following L ankle replacement. There is an incidental finding of abnormality on the chest CT, suggestive of a foreign body in the left upper lobe segmental pulmonary arterial branch\"   Existing Precautions/Restrictions fall;weight bearing   Weight-Bearing Status - LLE nonweight-bearing   Weight-Bearing Status - RLE full weight-bearing   Cognition   Affect/Mental Status (Cognition) WNL "   Orientation Status (Cognition) oriented x 4   Pain Assessment   Patient Currently in Pain   (9.5/10 in the L ankle)   Integumentary/Edema   Integumentary/Edema Comments L ankle covered with dressing and splint.   Posture    Posture Protracted shoulders;Forward head position   Range of Motion (ROM)   Range of Motion ROM deficits secondary to surgical procedure   ROM Comment Unable to assess L ankle ROM d/t splint. Otherwise B LE ROM WFL.   Strength (Manual Muscle Testing)   Strength (Manual Muscle Testing) Able to perform L SLR   Bed Mobility   Bed Mobility supine-sit   Comment, (Bed Mobility) supine-sit with mod I.   Transfers   Transfers sit-stand transfer   Comment, (Transfers) STS with FWW and SBA   Gait/Stairs (Locomotion)   Distance in Feet (Required for LE Total Joints) 15' + 15'   Comment, (Gait/Stairs) Pt ambulated 5' with FWW and close CGA.   Balance   Balance Comments minor LOB with gait and with navigation of knee scooter requiring up to Lucy to recover.   Sensory Examination   Sensory Perception patient reports no sensory changes   Clinical Impression   Criteria for Skilled Therapeutic Intervention Yes, treatment indicated   PT Diagnosis (PT) difficulty with ambulation.   Influenced by the following impairments pain, decreased ROM, decreased strength, decreased activity tolerance.   Functional limitations due to impairments difficulty with functional mobility requiring AD and assist.   Clinical Presentation (PT Evaluation Complexity) Stable/Uncomplicated   Clinical Presentation Rationale therapist's clinical judgment   Clinical Decision Making (Complexity) low complexity   Planned Therapy Interventions (PT) bed mobility training;gait training;stair training;strengthening;transfer training;cryotherapy;balance training   Risk & Benefits of therapy have been explained patient   PT Discharge Planning   PT Rationale for DC Rec Pt is significantly below baseline and currently requires FWW with transfers and  ambulation with up to Lucy. Pt is NWB on L LE and has decreased activity tolerance which limits his ability to ambulate long distances without resting. Pt has not been able to perform stairs to this date due to said deficits and must perform x4 step to get to main level from outside. Pt would benefit from continued IP rehab in order to progress IND with functional mobility including gait, use of knee scooter, and stair navigation.   PT Brief overview of current status bed mob with mod I, STS with SBA, gait with FWW and CG-Lucy   Plan of Care Review   Plan of Care Reviewed With patient   Therapy Certification   Start of care date 04/22/22   Certification date from 04/22/22   Certification date to 04/24/22   Medical Diagnosis L TAA   Total Evaluation Time   Total Evaluation Time (Minutes) 10   Physical Therapy Goals   PT Frequency 2x/day   PT Predicted Duration/Target Date for Goal Attainment 04/24/22   PT Goals Bed Mobility;Transfers;Gait;Stairs   PT: Bed Mobility Modified independent;Supine to/from sit   PT: Transfers Supervision/stand-by assist;Sit to/from stand;Assistive device   PT: Gait Supervision/stand-by assist;Rolling walker;100 feet   PT: Stairs Minimal assist;4 stairs;Rail on left

## 2022-04-22 NOTE — PROGRESS NOTES
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  His postop course has been complicated by hypotension    Status post left ankle replacement on 4/22/2021 with Dr. Magan Chatman  -Defer analgesia, mobility, therapies, antimicrobials, pharmacologic DVT prophylaxis to the primary surgical service    Postop hypotension  SIRS    SIRS, resolved: based on HR > 90 bpm and RR > 20 breaths/min, without known infection.    requiring to 50 mL of 5% albumin and 500 mL of 0.9% saline in the postoperative state.  I think this is a relatively healthy elderly man who exercises frequently and has baseline low blood pressures that was exacerbated by GETA that was slow to recover upon cessation of inhaled anesthetics.  At the time of my exam while supine and even and 45 degree head of bed position blood pressures were 90s/60s.  Must entertain VTE particularly PE, ACS, acute hemorrhage    Stat EKG showed paced rhythm, troponin is negative    CT angiogram negative for PE.     Unlikely acute hemorrhage with hemoglobin 13.5    on telemetry overnight    factor V Leiden mutation with 2 lifetime DVTs, first was remote approximately 15 years ago second happened approximately 10 years ago after trial off of anticoagulation, now on lifelong anticoagulation,    Recommend resuming warfarin ASAP when safe to do so (started today)    Heme consult requested re: brachiocephalic vein occlusion, does patient need bridging heparin?    heart block requiring permanent pacemaker placement.  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    On CT scan on 4/21/2022 it was noted that there is a pacemaker in the left chest wall. Its 2 leads extend into the left subclavian vein, brachiocephalic vein, superior vena cava and terminate in the right atrium and right ventricle. There is likely occlusion of the left brachiocephalic vein as no contrast is identified surrounding the leads as they traverse this vessel. There are multiple chest wall and mediastinal collaterals which drain into the right internal jugular vein, azygos vein, as well as inferiorly into the liver and left hepatic venous system.  There is a linear metallic density wedged in a left upper lobe segmental pulmonary arterial branch. This could represent an embolized guidewire fragment versus a fragment of a pacemaker lead. re: possible pacemaker wire fragment vs guidewire fragment     Cardiology consult requested, patient had pacemaker implanted by Dr. Capps on 10/13/2017. I appreciate Kathy Lowry' advice.  See her note, \"Dr. Georges and I spoke with Dr. Vallejo who read the CT scan yesterday. He reviewed these images again, and feels the density is most consistent with bone cement embolization from vertebroplasty, and not of fragmented pacemaker.\"    Heme consult requested re: brachiocephalic vein occlusion:  Bridge with heparin?       Thrombocytopenia has been problematic in the recent past    Recheck CBC     mild intermittent asthma     Continue PTA Advair          Diet: Advance Diet as Tolerated: Regular Diet Adult  Discharge Instruction - Regular Diet Adult    DVT Prophylaxis: Warfarin  Chauhan Catheter: Not present  Central Lines: None  Cardiac Monitoring: ACTIVE order. Indication: hypotension  Code Status: Full Code      Disposition Plan   Expected Discharge: 04/23/2022     Anticipated discharge location:  Awaiting care coordination huddle  Delays:     discussed with PT, they recommend TCU.  Patient " "seems intent on going home.  Ultimately, dispo per Ortho and patient       The patient's care was discussed with the Bedside Nurse, Patient and PT.    Елена Zarco MD  Hospitalist Service  [facility]  Securely message with the Vocera Web Console (learn more here)  Text page via [facility] Paging/Directory         Clinically Significant Risk Factors Present on Admission          # Hypocalcemia: Ca = 8.2 mg/dL (Ref range: 8.5 - 10.1 mg/dL) and/or iCa = N/A on admission, will replace as needed      # Coagulation Defect: home medication list includes an anticoagulant medication  # Platelet Defect: home medication list includes an antiplatelet medication   # Overweight: Estimated body mass index is 27.48 kg/m  as calculated from the following:    Height as of this encounter: 1.803 m (5' 11\").    Weight as of this encounter: 89.4 kg (197 lb).      ______________________________________________________________________    Interval History   \"My pain is a 12.\"  Patient reports severe ankle pain.  He talks about wanting to go home, says he has a knee walker, thinks he can manage.  PT was at bedside, they recommend TCU.  After my visit, he reported vertigo, \"I get this a couple of times a year.\"  Ordered Meclizine.  Ordered oxycodone, patient refused, wants Tylenol #3.    Data reviewed today: I reviewed all medications, new labs and imaging results over the last 24 hours. I personally reviewed the chest x-ray image(s) showing Pacemaker unchanged in appearance from previous. There are.    Physical Exam   Vital Signs: Temp: 97.9  F (36.6  C) Temp src: Oral BP: 101/72 Pulse: 75   Resp: 16 SpO2: 91 % O2 Device: None (Room air) Oxygen Delivery: 1 LPM  Weight: 197 lbs 0 oz  Constitutional: Awake, alert, cooperative, no apparent distress  Respiratory: Clear to auscultation bilaterally, no crackles or wheezing  Cardiovascular: Regular rate and rhythm, normal S1 and S2, and no murmur noted  GI: Normal bowel " sounds, soft, non-distended, non-tender  Skin/Integumen: left leg has dressing, surgical shoe  Other: Mood is pleasant      Data   Recent Labs   Lab 04/22/22  0749 04/22/22  0451 04/21/22 2005 04/21/22  0854 04/20/22  1118   WBC 8.0  --  6.8  --   --    HGB 13.1*  --  13.5  --   --    *  --  103*  --   --    *  --  105*  --   --    INR 1.22*  --   --  1.15 1.1   NA  --   --  139  --   --    POTASSIUM  --   --  5.1  --   --    CHLORIDE  --   --  107  --   --    CO2  --   --  27  --   --    BUN  --   --  24  --   --    CR 1.02  --  1.22  --   --    ANIONGAP  --   --  5  --   --    TEE  --   --  8.2*  --   --    GLC  --  112* 141*  --   --      Recent Results (from the past 24 hour(s))   CT Chest Pulmonary Embolism w Contrast    Addendum: 4/22/2022    Comparison is made to a chest x-ray performed on 4/22/2022 at 1140.  The patient has undergone multiple vertebral plasties. The previously  described metallic density in a left upper lobe pulmonary artery  branch likely represents embolized polymethylmethacrylate bone cement  used for vertebroplasty which may have entered a paravertebral vein.    ADRIAN MONTE MD         SYSTEM ID:  [id number]      Narrative    EXAM: CT CHEST PULMONARY EMBOLISM W CONTRAST  LOCATION: [facility]  DATE/TIME: 4/21/2022 9:23 PM    INDICATION: Shortness of breath. Pulmonary embolism suspected.  COMPARISON: CT of the thoracic spine dated 11/11/2020  TECHNIQUE: CT chest pulmonary angiogram during arterial phase injection of IV contrast. Multiplanar reformats and MIP reconstructions were performed. Dose reduction techniques were used.   CONTRAST: 72 mL Isovue 370    FINDINGS:  ANGIOGRAM CHEST: No definite acute pulmonary embolism is identified.    There is a 3 cm long linear metallic density wedged in a left upper lobe segmental pulmonary artery on image 42 series 7. This was not definitely seen on the previous CT of the thoracic spine on  11/11/2020.    LUNGS AND PLEURA: Mild to moderate dependent atelectasis within the posterior inferior lower lobes bilaterally.    MEDIASTINUM/AXILLAE: There is a pacemaker in the left chest wall. Its 2 leads extend into the left subclavian vein, brachiocephalic vein, superior vena cava and terminate in the right atrium and right ventricle. 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 There are multiple chest wall and mediastinal collaterals which drain into the right internal jugular vein, azygos vein, as well as inferiorly into the   liver and left hepatic venous system.    UPPER ABDOMEN: There are cysts in the left kidney.    MUSCULOSKELETAL: There are multiple compression fractures involving the cervical, thoracic, and lumbar spine. Vertebroplasty changes involving thoracic and lumbar vertebral bodies. There is associated kyphotic accentuation of the upper thoracic and   cervical spine.      Impression    IMPRESSION:  1.  No definite acute pulmonary embolus. There is a linear metallic density wedged in a left upper lobe segmental pulmonary arterial branch. This could represent an embolized guidewire fragment versus a fragment of a pacemaker lead.  2.  Occluded left brachiocephalic vein.   XR Chest 2 Views    Narrative    CHEST TWO VIEWS April 22, 2022 10:45 AM     HISTORY: Pacemaker positioning.    COMPARISON: October 27, 2017.       Impression    IMPRESSION: Pacemaker unchanged in appearance from previous. There are  no acute infiltrates. The cardiac silhouette is not enlarged.  Pulmonary vasculature is unremarkable.    MAXIMINO BOOTHE MD         SYSTEM ID:  [id number]     Medications     sodium chloride 100 mL/hr at 04/22/22 0828     Warfarin Therapy Reminder         acetaminophen  975 mg Oral Q8H     calcium carbonate 500 mg (elemental)  500 mg Oral Daily     fluticasone  1 spray Both Nostrils Daily      fluticasone-salmeterol  2 puff Inhalation BID     gabapentin  300 mg Oral Daily     multivitamin w/minerals  1 tablet Oral Daily     polyethylene glycol  17 g Oral Daily     senna-docusate  1 tablet Oral BID     sodium chloride (PF)  3 mL Intracatheter Q8H     warfarin ANTICOAGULANT  7.5 mg Oral ONCE at 18:00

## 2022-04-22 NOTE — DISCHARGE INSTRUCTIONS
Post-Operative Instruction Sheet for Foot and Ankle Surgery  Magan Chatman M.D.      These precautions MUST be followed for the first 24 hours after surgery:  Upon discharge, go directly home.  You MUST make arrangements for a responsible adult to stay with you the first night following surgery.  Surgery may be cancelled if you do not have someone that can stay with you.  DO NOT DRINK ALCOHOLIC BEVERAGES.  It is not unusual to feel lightheaded up to 24 hours after surgery or while taking pain medications.  If you feel lightheaded, sit up for a few minutes before standing and have someone assist you when you get up to walk or use the restroom.  Do not use any mechanical equipment or heavy machinery.  Do not make any important or legal decisions for 24 hours or while on pain medication.  You may experience dry mouth, sore throat, or sleep disturbances from the anesthesia and medications used during surgery.  Generalized muscle aches can sometimes occur.  These symptoms generally disappear by 24 hours.    The following are general guidelines and instructions about what to expect the first weeks following surgery.  These are not specific, and your recovery may be slightly different.  Please follow the instructions that are specifically written out for you after the surgery if there are any questions.    Pain Management   If you have received a nerve block, the pain relief can last anywhere from 12-30 hours, this also means you may not have sensation or movement in your foot for that amount of time. You will have pain after the block wears off!  Anticipate this and start pain meds prior to the block wearing off.   When you get home start taking your over the counter pain medications as well as the non narcotic prescribed medication right away.  If your nerve block is still intact when going to sleep I would advise taking your narcotic even if you don't have pain.  Continue to take your medications as prescribed  for the first 24-48 hours - ensure that the patient (you) are alert and have no difficulty breathing before taking the medication.  Often it may be helpful to set an alarm throughout the night to ensure you don t miss a dose of the medication and wake up with a lot more pain.  You can expect that the first two nights will be the most painful and uncomfortable. I will give you strong medication to make you as comfortable as possible, but you may still have some break-through pain.  This is not unexpected, as there are many nerve endings in the foot and ankle and many patients state the pain from foot surgery is worse than most other injuries or procedures!  After the first few days, take the medication as needed for pain.  As your pain improves, you can gradually decrease your pain meds by utilizing Tylenol or an anti-inflammatory medication.  You should also use these medications as directed early in the post-operative process to supplement the narcotic pain medication.    Dressing   Bleeding through the dressings is quite common.  This usually occurs for the first 1-2 hours after surgery. The actual bleeding has stopped by the time you see the drainage through your dressings.  You can reinforce the outside of the dressing with gauze and an Ace wrap unless otherwise directed.  In most cases, your first dressing change will be performed at your first clinic follow-up visit.  In some cases, I may allow you to change your dressings sooner.  Your dressing should be kept DRY at all times - do not shower, bathe, or wet your dressing in any way after surgery!    Wound Care  Once your stitches are removed, you can shower with soapy water and gently cleanse the incision if it is completely dry.   Do not shower or wash the incision(s) if parts of the incision(s) are open or still draining.  Do not soak the incision(s) in a bathtub or hot tub until the incision(s) is completely dry for one week.  Do not soak the incision(s) in  lake or ocean water for at least one month post-operatively.    Elevation   I recommend strict elevation of your foot above the level of your heart for 4-5 days.  Elevation of the foot remains important up to two weeks after surgery to limit swelling and help wound healing.  Elevate your foot/ankle to at least your waist level but above your heart would be best. The more you elevate your foot and ankle, the less pain you will have.   In the first few days following surgery, restrict the time your foot is  down  to 10 minutes or less at a time.  It is also good to keep your blood flowing through the operated leg and limit the risk of blood clots.  The first day following surgery, I would encourage you to get up and move around the house for a few minutes every hour and then return to elevating the foot.  After the strict elevation period (see above) is completed, you may gradually become more active. You should  listen  to your foot/ankle as to when to get off of it and elevate it again.  This may help even months after surgery.  Remember: avoid anything that hurts or makes your foot/ankle swell!    Icing   Icing can be very useful to decrease the pain and swelling of the foot.   Start by first placing a large garbage bag over the dressing.  Ice may then be placed around the extremity by using either bags of ice taped around the extremity   or you may place the extremity in a bucket filled with ice (the dressing MUST be covered with a plastic bag!).  Bags of frozen peas work well!  You should ice for no more than 20 minutes at a time and repeat at 2 hour intervals.   Do NOT place ice directly on your skin or dressing.     Activity   In most cases (unless otherwise instructed), you may not bear weight on your operated foot/ankle for 2 weeks after surgery.  That means the foot may not touch the ground when upright!  Specific weight bearing instructions for your surgery will be provided on the day of surgery.  Your toes  may experience bruising after surgery and become darker when the foot hangs down.  Unless you had surgery on those toes, it is important to actively wiggle your toes for 5-10 minutes each hour.  Many of your questions can be addressed at your 2-week follow-up appointment - please make a list of things to ask us as they come up during your recovery.    What to watch for    Severe swelling and/or pain in the calf: this could indicate a deep vein thrombosis (blood clot in leg) which requires urgent evaluation and treatment!  Profuse bleeding: that which soaks through your dressing and increases in size throughout the first day after surgery.  Blue or white toes: this indicates a lack of blood flow to the foot.   Fever greater than 101.5: fevers less than this are very common the first few days after surgery and are unlikely to indicate infection or any unexpected problem.  Severe pain: that which does not improve after pain medication, except for the first two nights.   If you have any of the above problems or any concerns, please contact my office ([phone]) and further instructions will be provided.  If you are unable to reach anyone or feel you have a medical emergency, please do not hesitate to go to the nearest urgent care or emergency department.    Medications   *Medications may take up to 24 hours to be refilled by my office.*    All pain medications, along with inactivity following surgery, can cause constipation.  Use the stool softeners as recommended, increase fluid intake to at least 1 quart per day, and increase your dietary fiber.  (The  p  fruits - peaches, plums, pears, and prunes - as well as anise/black licorice are generally helpful.)    Antibiotics may be prescribed to limit the risk of infection only in limited circumstances.  Kelfex (cephalexin) 500 mg - This is an antibiotic given in addition to the antibiotic given during surgery to help reduce the chance of post-operative infection.    Dosage: 1 tablet by mouth 4 times a day.  OR   Cleocin (clindamycin) 600 mg - This is an antibiotic given to those patients who are allergic to penicillin in addition to the antibiotic given during surgery to help reduce the chance of post-operative infection.   Dosage: 1 tablet by mouth 3 times a day.    Anti-nausea and spasms  Hydroxyzine 25 mg  - This medicine should be taken if you experience any nausea or vomiting. If you know you are sensitive to narcotics please take 1 tablet 30 minutes prior to pain medication. This may also help with any mild itching experienced with the pain medication or muscle spasms.  Dosage: 1 tablet by mouth every 6 hours as needed for nausea, itching, spasms, or adjuvant pain control.    Pain medications (you will only be given a prescription for ONE of the following!)   Ultram (tramadol) 50mg - This is a pain medication that should be taken EVERY 4 TO 6 HOURS for pain relief. You should start the medication when you arrive home after surgery, before the nerve block wears off.  The first 1-2 nights you may need to take 2 tablets every 4 hours.  You should be given enough medication to last to the first office visit.  This medication cannot be refilled over the phone!  Dosage: 1-2 tablets every 4-6 hours as needed for pain relief.  OR  Oxycodone 5mg - This is a pain medication that may be taken EVERY 3 to 4 HOURS for pain relief.  You should start the medication when you arrive home after surgery, before the nerve block wears off.  The first 1-2 nights you may need to take up to 2 or even 3 tablets every 3-4 hours. You should be given enough medication to last to the first office visit.  This medication cannot be refilled over the phone!  Dosage: 1-2 (or 3) tablets every 3-4 hours as needed for pain relief.  OR  Norco (hydrocodone/acetominophen) 5/325 mg - This is a pain medication that should be taken EVERY 4 TO 6 HOURS for pain relief. You should start the medication when you  arrive home after surgery, before the nerve block wears off.  The first 1-2 nights you may need to take 2 tablets every 4 hours.  You should be given enough medication to last to the first office visit.  This medication cannot be refilled over the phone!  Dosage: 1-2 tablets every 4-6 hours as needed for pain relief.  *Do NOT take any Tylenol while taking this medication!  You may alternate Tylenol with this medication provided you do not take greater than 3 grams of Tylenol in total over a 24 hour time period.    Blood thinner  Depending on the type of surgery and your personal risk factors, I may prescribe a medication to help limit the risk of developing a blood clot after your surgery.  The length of time to take the recommended medication will be provided and typically lasts until you are moving about normally or bearing weight on the operated foot/ankle.  ECASA (enteric coated aspirin) 325mg tablet daily.  Lovenox (enoxaparin) 40mg subcutaneous injection daily.  Xarelto 10mg tablet daily.    Stool Softener  Take an over the counter stool softener such as senna or Miralax starting the day after your surgery to prevent constipation. This is a common side effect of the narcotic pain medications.  You may stop taking this after you have regular bowel movements or no longer require use of narcotic pain medications.    Dental Implications  Dental procedures should be avoided until your incisions are healed. Furthermore, surgical procedures including hardware or an allograft require taking an antibiotic within one hour of all dental work within 6 months of surgery. Total ankle replacements require indefinite use of antibiotics prior to dental procedures. We would be happy to provide you with the necessary prescription upon request.    Follow-up Visits  You should have your initial post-operative visits already scheduled but if you do not recall the exact dates or do not believe they have been scheduled, please  contact [facility] right away to ensure that we have the appropriate visits in the system.    You will likely follow-up with my physician assistant for your first post-operative visit and for a few of the additional follow-up visits.  He works directly with me on all patients and will be able to inform me if there are any concerns during your recovery process!        You can often find additional information about your procedure or condition on the [facility] website at [email or web address] or [email or web address].    Additional information from reputable orthopaedic foot and ankle surgeons affiliated with the American [employer] Foot and Ankle Society can be found at [email or web address].      Post-operative Foot and Ankle Surgery Instructions and Tips for Pain Control  Dr. Chatman, [facility]    Non-medication Interventions  Read your post-operative instruction handout!  Elevate the leg at the heart level 95% of the time for the first 2-3 days.  Limit the amount of time the foot and ankle are  down  for no more than 10 minutes at a time the first few days.  Ice consistently for the first 2-3 days.  If on bare skin or a thin dressing, limit icing to 20 minutes per hour.  If around a splint, apply the ice behind the knee for 20 minutes per hours or over the splint around the ankle as much as tolerated.  Do not plan extra activity for the first 2-3 days after surgery.  Expect the foot or ankle will be painful - surgery hurts!  The pain will get better.  Trust the process.    Non-opiate Medications  Start these medications right away after you get home from surgery and continue on a regular schedule for at least 3 days.  As you pain allows, start to use as needed until your first clinic visit after surgery.  It may be helpful to alternate the ibuprofen/Celebrex and Tylenol to maximize pain relief.  In rare cases, I may recommend avoiding ibuprofen or aleve  after surgery - this will be made clear at the time of surgery.  Motrin or Advil (ibuprofen) 600mg every 6 hours OR Celebrex 100mg every 12 hours.  Tylenol (acetaminophen) 650mg every 6 hours.    Neurontin (gabapentin) 300mg every 8 hours for the first 3 days only.    Hydroxyzine 25mg (or 10mg if >65 years of age) every 6 hours.    Opiate Medications  These medications should be used sparingly, just as needed, and for the first few days after surgery.  Please see the below guidelines for details.  Ultram (tramadol) 50mg, 1-2 tablets every 4 hours as needed.  OR  Oxycodone 5mg, 1-2 tablets every 3 hours as needed.    Opiate pain medications can be very effective, but carry a number of potentially harmful side effects including tolerance and addiction if used inappropriately.  They will be the most effective the first few days following surgery in the following situations:  If you had a nerve block before surgery, take one pill a few hours after you get home from the surgery center.  Keep to a regular schedule with the medication, taking just one pill every 4 hours until the block wears off (tingling, pins and needles, increased movement in the toes, increased pain, etc.).  Take 1-2 pills again 4 hours later.  Take 1-2 pills at bedtime the first few days after surgery to help sleep and limit pain through the night.  Take 1-2 pills for  rescue  when pain is not controlled by the regularly scheduled medications and non-medication interventions.  You should generally feel less need for the opiate pain medication after the first few days after surgery.  Remember, foot and ankle surgery hurts!  The goal of medication management is to  take the edge off  and keep the pain level tolerable.  Trust the process - the pain will get better!    Multiple studies indicate that opioid pain medication is not needed beyond a few days postoperatively and prolonged use of these medications can actually lead to increased perception of  pain and tolerance/addiction issues.  The CDC and state boards have been recommending and enforcing restrictions on opioid prescribing in light of the significant consequences that arise from chronic opioid use and [facility] is adopting many of these recommendations for your safety and well-being.      Sue et al, Satisfaction with Pain Relief After Operative Treatment of an Ankle Fracture, Injury. 2012; 43(11):1958-61.  Sue et al, Pain Relief After Operative Treatment of an Extremity Fracture, JBJS Am. 2017; 99:1908-15.  Loan et al, Support for Safer Opioid Prescribing Practices, Vilynx Am. 2017; 99:1945-55.  Allan et al, Liposomal Bupivacaine in Forefoot Surgery, Foot Ankle Int. 2015; 36(5):503-7.  Gael et al, Addition of Pregabalin to Multimodal Analgesic Therapy Following Ankle Surgery, Reg Anesth Pain Med. 2012; 37(3):302-7.

## 2022-04-22 NOTE — PROGRESS NOTES
Patient vital signs are at baseline: PARTIALLY MET.  Patient able to ambulate as they were prior to admission or with assist devices provided by therapies during their stay:  NO  Patient MUST void prior to discharge:  YES  Patient able to tolerate oral intake:  YES  Pain has adequate pain control using Oral analgesics:  YES  Does patient have an identified :  YES  Has goal D/C date and time been discussed with patient:  YES    Soft BP, other vitals stable. C/o vertigo, Meclizine given.  CMS intact. Up with 1 and walker but doesn't follow NWB.  Tylenol #3 for pain. Refused oxycodone. Voiding per urinal. A&OX4 but somewhat forgetful. Discharge pending home vs tcu.

## 2022-04-22 NOTE — PLAN OF CARE
Goal Outcome Evaluation:  Patient vital signs are at baseline: Yes  Patient able to ambulate as they were prior to admission or with assist devices provided by therapies during their stay:  No,  Reason:  low BP  Patient MUST void prior to discharge:  No,  Reason:  DTV  Patient able to tolerate oral intake:  Yes  Pain has adequate pain control using Oral analgesics:  Yes  Does patient have an identified :  Yes  Has goal D/C date and time been discussed with patient:  Yes    A&O x 4, low B/P, pain controlled with oral analgesics, drsg CDI, CMS intact, not mobile right now due to low BP, DTV, IV infusing, continue to monitor.                        Stable Head is atraumatic. Head shape is symmetrical.

## 2022-04-22 NOTE — H&P
[facility]    History and Physical - Hospitalist Service       Date of Admission:  4/21/2022    Assessment & Plan      Victorino Khan is a 88 year old male with past medical history of factor V Leiden mutation with 2 lifetime DVTs, now on lifelong anticoagulation, heart block requiring permanent pacemaker placement, mild intermittent asthma and prior Nissen fundoplication admitted on 4/21/2022 for left ankle replacement.  His postop course has been complicated by hypotension    Status post left ankle replacement on 4/22/2021 with Dr. Magan Chatman  -Defer analgesia, mobility, therapies, antimicrobials, pharmacologic DVT prophylaxis to the primary surgical service    Postop hypotension requiring to 50 mL of 5% albumin and 500 mL of 0.9% saline in the postoperative state.  I think this is a relatively healthy elderly man who exercises frequently and has baseline low blood pressures that was exacerbated by GETA that was slow to recover upon cessation of inhaled anesthetics.  At the time of my exam while supine and even and 45 degree head of bed position blood pressures were 90s/60s.  Must entertain VTE particularly PE, ACS, acute hemorrhage  -Stat EKG shows paced rhythm, troponin is negative  - Well score is at least 2-3 which is moderate risk and I feel strongly about obtaining CT angiogram to rule out PE.  This largely ruled out PE however there are abnormalities which are described below.  - Unlikely acute hemorrhage with hemoglobin 13.5  -Keep patient on telemetry overnight  - Every 4 hour blood pressure monitoring  - Orthostatic blood pressures tonight and in the morning    factor V Leiden mutation with 2 lifetime DVTs, first was remote approximately 15 years ago second happened approximately 10 years ago after trial off of anticoagulation, now on lifelong anticoagulation,  -Recommend resuming warfarin ASAP when safe to do so in the postoperative state     heart block requiring permanent  pacemaker placement.  On CT scan on 4/21/2022 it was noted that there is a pacemaker in the left chest wall. Its 2 leads extend into the left subclavian vein, brachiocephalic vein, superior vena cava and terminate in the right atrium and right ventricle. 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 There are multiple chest wall and mediastinal collaterals which drain into the right internal jugular vein, azygos vein, as well as inferiorly into the liver and left hepatic venous system.  There is a linear metallic density wedged in a left upper lobe segmental pulmonary arterial branch. This could represent an embolized guidewire fragment versus a fragment of a pacemaker lead.  -re: possible pacemaker wire fragment vs guidewire fragment warrants discussion with cardiology team prior to discharge  -re: UE DVT,chronicity unknown but may be chronic given degree of collateral vessels and can resume warfarin asap and follow up with hematology in OP setting asap  -I have not yet discussed these results with pt (was late at night, pt probably sleeping)    Thrombocytopenia has been problematic in the recent past  - Recheck CBC in the a.m.    mild intermittent asthma   -Continue PTA Advair        Diet: Advance Diet as Tolerated: Regular Diet Adult  Discharge Instruction - Regular Diet Adult    DVT Prophylaxis: Warfarin and Pneumatic Compression Devices  Chauhan Catheter: Not present  Central Lines: None  Cardiac Monitoring: ACTIVE order. Indication: hypotension  Code Status: Full Code      Clinically Significant Risk Factors Present on Admission               # Coagulation Defect: home medication list includes an anticoagulant medication  # Platelet Defect: home medication list includes an antiplatelet medication   # Overweight: Estimated body mass index is 27.48 kg/m  as calculated from the following:    Height as of this encounter:  "1.803 m (5' 11\").    Weight as of this encounter: 89.4 kg (197 lb).    -Increases all cause maternal    Disposition Plan   Expected Discharge:  timeframe as per primary surgical service  Anticipated discharge location:  Awaiting care coordination huddle  Delays:    None yet identified       The patient's care was discussed with the Attending Physician, Dr. Esme Watson, Bedside Nurse and Patient.    Total time is 35 minutes of which 30 minutes was spent at the bedside in face-to-face evaluation obtaining history, examining patient and outlining plan of care described in the above note    JOHN [facility]  Hospitalist Service  [facility]  Securely message with the Vocera Web Console (learn more here)  Text page via [employer] Paging/Directory         ______________________________________________________________________    Chief Complaint   Hypotension    History is obtained from the patient and EMR    History of Present Illness   Victorino Khan is a 88 year old male with past medical history of factor V Leiden mutation with 2 lifetime DVTs, first was remote approximately 15 years ago second happened approximately 10 years ago after trial off of anticoagulation, now on lifelong anticoagulation, heart block requiring permanent pacemaker placement, mild intermittent asthma and prior Nissen fundoplication.  Patient underwent left ankle replacement on 4/21/2022 with Dr. Magan Chatman.  Patient received endotracheal anesthesia, duration of procedure is approximately 2 hours.  Intraoperative blood pressures range from  mmHg systolic.  He received intraoperative dexamethasone, ephedrine, phenylephrine, propofol, Ancef, TXA, LR 1 L, EBL was 10 mL.  Patient was hypotensive in the PACU with BPs in the 80s for which she received 250 mL of 5% albumin.  In the afternoon of postop day 0 patient had recurrent hypotension, david is documented as 50/33, I suspect this to be erroneous as other " blood pressures were 70s-80s/50s-70s.  I received an asap consult to assist with management of hypotension.  500 mL normal saline bolus has been ordered prior to my arrival    On my arrival patient is lying supine in bed awake talkative nontoxic appearing.  He is not diaphoretic, denies any chest pain, palpitations, presyncope or dyspnea.  He denies any fevers or chills.  He did report that it was a bit unusual for him to become dyspneic walking from the parking to the surgical area.  He reports at home his blood pressure is rarely over 100.  Clinic BP at his preoperative evaluation was 102/76.  He denies any overt blood loss and described a vigorous exercise regimen of bike interval training 3 times a week as well as other stretching exercises 20 to 30 minutes on a daily basis.  Of note he has been off his warfarin for the last 7 days in anticipation of his surgery.    Review of Systems    The 10 point Review of Systems is negative other than noted in the HPI    Past Medical History    I have reviewed this patient's medical history and updated it with pertinent information if needed.   Past Medical History:   Diagnosis Date     DVT (deep venous thrombosis) (H)      Osteoporosis      Vertebral compression fracture (H)    Mild intermittent asthma  Heart block requiring permanent pacemaker    Past Surgical History   I have reviewed this patient's surgical history and updated it with pertinent information if needed.  Past Surgical History:   Procedure Laterality Date     IMPLANT PACEMAKER Left     For sick sinus syndrome     LAPAROSCOPIC NISSEN FUNDOPLICATION  11/2011    For large paraesophageal hernia     TOTAL KNEE ARTHROPLASTY Right 2000     TOTAL KNEE ARTHROPLASTY Left 2010       Social History   I have reviewed this patient's social history and updated it with pertinent information if needed.  Social History     Tobacco Use     Smoking status: Never Smoker     Smokeless tobacco: Never Used   Vaping Use     Vaping  Use: Never used   Substance Use Topics     Alcohol use: No     Drug use: Never   Retired hog farmer    Family History   I have reviewed this patient's family history and updated it with pertinent information if needed.  Family History   Problem Relation Age of Onset     Coronary Artery Disease Early Onset Father      Coronary Artery Disease Early Onset Brother        Prior to Admission Medications   Prior to Admission Medications   Prescriptions Last Dose Informant Patient Reported? Taking?   Cholecalciferol (VITAMIN D3 PO) 4/21/2022 at am Self Yes Yes   Sig: Take 2,000 Units by mouth daily    aspirin (ASA) 81 MG EC tablet 4/11/2022 at am Self Yes Yes   Sig: Take 81 mg by mouth daily   calcium carbonate (OS- MG [place]. [place]) 500 MG tablet 4/21/2022 at am Self Yes Yes   Sig: Take 500 mg by mouth daily    denosumab (PROLIA) 60 MG/ML SOSY injection December 2021 Self Yes Yes   Sig: Inject 60 mg Subcutaneous every 6 months   fluticasone (FLONASE) 50 MCG/ACT nasal spray 4/21/2022 at am Self Yes Yes   Sig: Spray 1 spray into both nostrils daily   fluticasone-salmeterol (ADVAIR-HFA) 115-21 MCG/ACT inhaler 4/21/2022 at am Self Yes Yes   Sig: Inhale 2 puffs into the lungs 2 times daily   gabapentin (NEURONTIN) 300 MG capsule 4/21/2022 at am Self No Yes   Sig: Take 1 capsule (300 mg) by mouth daily   multivitamin w/minerals (THERA-VIT-M) tablet 4/21/2022 at am Self Yes Yes   Sig: Take 1 tablet by mouth daily   warfarin ANTICOAGULANT (COUMADIN) 4 MG tablet 4/11/2022 at pm Self Yes Yes   Sig: Take 4 mg by mouth six times a week   warfarin ANTICOAGULANT (COUMADIN) 4 MG tablet 4/6/2022 at pm Self Yes Yes   Sig: Take 2 mg by mouth once a week (Wednesday 0.5 x 4 mg = 2 mg)      Facility-Administered Medications: None     Allergies   No Known Allergies    Physical Exam   Vital Signs: Temp: 97.5  F (36.4  C) Temp src: Oral BP: (!) 77/57 Pulse: 119   Resp: 18 SpO2: 92 % O2 Device: Nasal cannula Oxygen Delivery: 4 LPM  Weight: 197  lbs 0 oz    Constitutional: vs 92/60, 91/58, 93/61  General:  adult pt lying in bed without acute distress  Neuro: +follows commands wiggle toes and show 2 fingers bilat, face symmetric, tongue midline, speech fluent  Eyes pupils equal round 3mm briskly reactive bilat, sclera nonicteric, noninjected, conjunctiva pink,  Head, ENT & mouth: NC/AT,  mouth moist oral mucosa  Neck: supple  CV S1S2 no murmurs, BP is as above  resp: CTAB upper  lobes  gi:normoactive bowel sounds, soft, nontender, nondisteded  Ext: no edema  Skin: no rashes on exposed skin  Lymph: defer  Musculoskeletal no bony joint deformities except the left lower extremity which is Ace wrapped from knees to toes, patient is unable to move the toes and has minimal to no sensation in the toes but has brisk capillary refill, left dorsalis pedis pulse easily palpable    Data   Data reviewed today: I reviewed all medications, new labs and imaging results over the last 24 hours. I personally reviewed the EKG tracing showing Paced rhythm.    Recent Labs   Lab 04/21/22 2005 04/21/22  0854 04/20/22  1118   WBC 6.8  --   --    HGB 13.5  --   --    *  --   --    *  --   --    INR  --  1.15 1.1     --   --    POTASSIUM 5.1  --   --    CHLORIDE 107  --   --    CO2 27  --   --    BUN 24  --   --    CR 1.22  --   --    ANIONGAP 5  --   --    TEE 8.2*  --   --    *  --   --      Recent Results (from the past 24 hour(s))   XR Surgery JEREMIAH L/T 5 Min Fluoro w Stills    Narrative    SURGERY C-ARM FLUORO LESS THAN 5 MINUTES WITH STILLS 4/21/2022 11:35  AM     COMPARISON: None.    HISTORY: Intraoperative fluoroscopy of the ankle taken during an  orthopedic procedure.    NUMBER OF IMAGES ACQUIRED: 4 images.  FLUOROSCOPY TIME: .6 minute(s)      Impression    IMPRESSION: Multiple fluoroscopic spot views of the ankle submitted  for review. These demonstrate changes of total ankle arthroplasty. The  components are well seated without evidence of  complication. Please  see the operative note for further details.    KEM ORTIZ MD         SYSTEM ID:  [id number]   CT Chest Pulmonary Embolism w Contrast    Narrative    EXAM: CT CHEST PULMONARY EMBOLISM W CONTRAST  LOCATION: [facility]  DATE/TIME: 4/21/2022 9:23 PM    INDICATION: Shortness of breath. Pulmonary embolism suspected.  COMPARISON: CT of the thoracic spine dated 11/11/2020  TECHNIQUE: CT chest pulmonary angiogram during arterial phase injection of IV contrast. Multiplanar reformats and MIP reconstructions were performed. Dose reduction techniques were used.   CONTRAST: 72 mL Isovue 370    FINDINGS:  ANGIOGRAM CHEST: No definite acute pulmonary embolism is identified.    There is a 3 cm long linear metallic density wedged in a left upper lobe segmental pulmonary artery on image 42 series 7. This was not definitely seen on the previous CT of the thoracic spine on 11/11/2020.    LUNGS AND PLEURA: Mild to moderate dependent atelectasis within the posterior inferior lower lobes bilaterally.    MEDIASTINUM/AXILLAE: There is a pacemaker in the left chest wall. Its 2 leads extend into the left subclavian vein, brachiocephalic vein, superior vena cava and terminate in the right atrium and right ventricle. 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 There are multiple chest wall and mediastinal collaterals which drain into the right internal jugular vein, azygos vein, as well as inferiorly into the   liver and left hepatic venous system.    UPPER ABDOMEN: There are cysts in the left kidney.    MUSCULOSKELETAL: There are multiple compression fractures involving the cervical, thoracic, and lumbar spine. Vertebroplasty changes involving thoracic and lumbar vertebral bodies. There is associated kyphotic accentuation of the upper thoracic and   cervical spine.      Impression     IMPRESSION:  1.  No definite acute pulmonary embolus. There is a linear metallic density wedged in a left upper lobe segmental pulmonary arterial branch. This could represent an embolized guidewire fragment versus a fragment of a pacemaker lead.  2.  Occluded left brachiocephalic vein.

## 2022-04-22 NOTE — CONSULTS
"  [facility] Cardiology Consultation     Date of Admission:  4/21/2022  Date of Consult: 04/22/22  Requesting Physician: Dr. Zarco  Primary care physician: Jez Jack  Primary cardiologist: Dr. Carrera  Staff Cardiologist:  Dr. Georges    Reason for consult: question of fragmented pacemaker lead on chest CT    Assessment:   Victorino Khan is a 88 year old male who was admitted on 4/21/2022 with postop hypotension following L ankle replacement. There is an incidental finding of abnormality on the chest CT, suggestive of a foreign body in the left upper lobe segmental pulmonary arterial branch. The initial impression states \"this could represent an embolized guidewire fragment versus a fragment of a pacemaker lead.\" On review of prior chest x-rays, this was present in 2021, but not in 2017. We repeated a chest x-ray today as well, and it appears stable from 2021. Dr. Georges and I spoke with Dr. Vallejo who read the CT scan yesterday. 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     It would be highly unusual for a piece of pacemaker to embolize this many years post-implant (2017). Last device check on 2/22/22 showed stable lead impedence and threshold. To be sure, we will repeat an interrogation while the patient is here. If stable, this makes the likelihood of a fractured pacemaker lead even less.     We will continue routine clinic follow up for this patient. Please call if there are additional questions/concerns.     We did not see the patient today. A total of 45 minutes was spent on chart review, test ordering, and discussion with other providers.    Kathy Lowry PA-C  [facility]  Pager: [phone]  Text Page  (7:30am - 4pm M-F)   "

## 2022-04-22 NOTE — PROGRESS NOTES
"[facility]  Orthopaedics/Foot and Ankle Surgery  Daily Post-Op Note    04/22/2022          Assessment and Plan:    Assessment:   Post-operative day #1  Procedure(s):  LEFT TOTAL ANKLE REPLACEMENT (Left)  TENDON ACHILLELS LENGTHENING, MEDIAL DISPLACEMENT CALCANEAL OSTOTOMY (Left)     No excessive bleeding  Pain increasing recently, but managable      Plan:   1. NWB LLE.  May keep elevated while at rest to limit swelling and pain.  2. Cont. current pain regimen.  Under appropriate control at this time. Block has warn off.   3. PT to eval  4. Warfarin, SCDs for DVT prophy.  5. Appreciate hospitalist co-management for hypotension determining if appropriate for discharge  6. Plan d/c Home pending pain, PT and if medically stable.              Interval History:   Stable.  Doing well patient has had his blood pressure remain stable in the 90s/50s through the night. Had hypotension following surgery with it dropping as low as 77/57. Improving slowly.  Pain is reasonably controlled.  No fevers. Denies N/V, tolerating PO intake.               Physical Exam:   Blood pressure 93/55, pulse 61, temperature 97.3  F (36.3  C), temperature source Oral, resp. rate 20, height 1.803 m (5' 11\"), weight 89.4 kg (197 lb), SpO2 96 %.  I/O last 3 completed shifts:  In: 1000 [I.V.:1000]  Out: 1010 [Urine:1000; Blood:10]    Splint is C/D/I. Toes are all warm and well perfused with brisk capillary refill. The patient denies any focal calf tenderness. Sensation is intact distally in the toes.            Data:   All laboratory data related to this surgery reviewed  Recent Labs   Lab Test 04/21/22 2005 04/06/22  1032 03/15/22  1221 06/14/21  1047 01/12/21  1339   HGB 13.5 15.2 14.7 15.3 14.5     Recent Labs   Lab Test 04/21/22  0854 04/20/22  1118 04/14/22  0615 04/06/22  1032 02/23/22  1254   INR 1.15 1.1 1.81* 2.5* 2.2*      Recent Labs   Lab Test 04/21/22 2005   WBC 6.8   *   POTASSIUM 5.1   CR 1.22       "

## 2022-04-22 NOTE — PHARMACY-ANTICOAGULATION SERVICE
Clinical Pharmacy - Warfarin Dosing Consult     Pharmacy has been consulted to manage this patient s warfarin therapy.  Indication: DVT/PE Prophylaxis  Therapy Goal: INR 2-3  Warfarin Prior to Admission: Yes  Warfarin PTA Regimen: 2 mg on Wednesday, 4 mg on all other days  Recent documented change in oral intake/nutrition: No    INR   Date Value Ref Range Status   04/22/2022 1.22 (H) 0.85 - 1.15 Final   04/21/2022 1.15 0.85 - 1.15 Final       Recommend warfarin 7.5  mg today.  Pharmacy will monitor Victorino Khan daily and order warfarin doses to achieve specified goal.      Please contact pharmacy as soon as possible if the warfarin needs to be held for a procedure or if the warfarin goals change.

## 2022-04-22 NOTE — PROGRESS NOTES
Patient VSS on RA, low SBP maintained in the 90s after bolus. pt c/o pain atarax and tylenol given as scheduled. Tele monitor on as ordered. Wound dressing CDI, pt tolerating PO intakes and voiding well. Will continue to monitor.

## 2022-04-23 ENCOUNTER — APPOINTMENT (OUTPATIENT)
Dept: PHYSICAL THERAPY | Facility: CLINIC | Age: 87
End: 2022-04-23
Attending: ORTHOPAEDIC SURGERY
Payer: COMMERCIAL

## 2022-04-23 VITALS
HEIGHT: 71 IN | DIASTOLIC BLOOD PRESSURE: 66 MMHG | BODY MASS INDEX: 27.58 KG/M2 | RESPIRATION RATE: 16 BRPM | TEMPERATURE: 98.4 F | HEART RATE: 71 BPM | WEIGHT: 197 LBS | SYSTOLIC BLOOD PRESSURE: 120 MMHG | OXYGEN SATURATION: 90 %

## 2022-04-23 LAB
FASTING STATUS PATIENT QL REPORTED: NO
GLUCOSE BLD-MCNC: 122 MG/DL (ref 70–99)
HGB BLD-MCNC: 12.5 G/DL (ref 13.3–17.7)
INR PPP: 1.43 (ref 0.85–1.15)

## 2022-04-23 PROCEDURE — 85610 PROTHROMBIN TIME: CPT

## 2022-04-23 PROCEDURE — 250N000013 HC RX MED GY IP 250 OP 250 PS 637: Performed by: INTERNAL MEDICINE

## 2022-04-23 PROCEDURE — 97530 THERAPEUTIC ACTIVITIES: CPT | Mod: GP | Performed by: PHYSICAL THERAPY ASSISTANT

## 2022-04-23 PROCEDURE — 250N000013 HC RX MED GY IP 250 OP 250 PS 637: Performed by: PHYSICIAN ASSISTANT

## 2022-04-23 PROCEDURE — 258N000003 HC RX IP 258 OP 636: Performed by: PHYSICIAN ASSISTANT

## 2022-04-23 PROCEDURE — 97116 GAIT TRAINING THERAPY: CPT | Mod: GP | Performed by: PHYSICAL THERAPY ASSISTANT

## 2022-04-23 PROCEDURE — 82947 ASSAY GLUCOSE BLOOD QUANT: CPT | Performed by: ORTHOPAEDIC SURGERY

## 2022-04-23 PROCEDURE — 36415 COLL VENOUS BLD VENIPUNCTURE: CPT

## 2022-04-23 PROCEDURE — 85018 HEMOGLOBIN: CPT | Performed by: PHYSICIAN ASSISTANT

## 2022-04-23 PROCEDURE — 99232 SBSQ HOSP IP/OBS MODERATE 35: CPT | Performed by: INTERNAL MEDICINE

## 2022-04-23 RX ORDER — WARFARIN SODIUM 4 MG/1
4 TABLET ORAL
Status: DISCONTINUED | OUTPATIENT
Start: 2022-04-23 | End: 2022-04-23 | Stop reason: HOSPADM

## 2022-04-23 RX ADMIN — MULTIPLE VITAMINS W/ MINERALS TAB 1 TABLET: TAB at 08:50

## 2022-04-23 RX ADMIN — MECLIZINE HYDROCHLORIDE 25 MG: 25 TABLET ORAL at 08:49

## 2022-04-23 RX ADMIN — ACETAMINOPHEN AND CODEINE PHOSPHATE 1 TABLET: 300; 30 TABLET ORAL at 08:49

## 2022-04-23 RX ADMIN — ACETAMINOPHEN AND CODEINE PHOSPHATE 1 TABLET: 300; 30 TABLET ORAL at 13:27

## 2022-04-23 RX ADMIN — CALCIUM 500 MG: 500 TABLET ORAL at 08:49

## 2022-04-23 RX ADMIN — ACETAMINOPHEN AND CODEINE PHOSPHATE 1 TABLET: 300; 30 TABLET ORAL at 04:26

## 2022-04-23 RX ADMIN — SENNOSIDES AND DOCUSATE SODIUM 1 TABLET: 50; 8.6 TABLET ORAL at 08:49

## 2022-04-23 RX ADMIN — SODIUM CHLORIDE: 9 INJECTION, SOLUTION INTRAVENOUS at 00:40

## 2022-04-23 RX ADMIN — Medication 1 SPRAY: at 08:54

## 2022-04-23 RX ADMIN — GABAPENTIN 300 MG: 300 CAPSULE ORAL at 08:49

## 2022-04-23 RX ADMIN — POLYETHYLENE GLYCOL 3350 17 G: 17 POWDER, FOR SOLUTION ORAL at 08:50

## 2022-04-23 RX ADMIN — FLUTICASONE PROPIONATE AND SALMETEROL XINAFOATE 2 PUFF: 115; 21 AEROSOL, METERED RESPIRATORY (INHALATION) at 08:54

## 2022-04-23 NOTE — PROGRESS NOTES
Patient vital signs are at baseline: YES  Patient able to ambulate as they were prior to admission or with assist devices provided by therapies during their stay:  NO  Patient MUST void prior to discharge:  YES  Patient able to tolerate oral intake:  YES  Pain has adequate pain control using Oral analgesics:  YES  Does patient have an identified :  YES  Has goal D/C date and time been discussed with patient:  YES       Patient A&ox4/ forgetful , VSS BP improved . IV infusing . Dressing CDI. Void on urinal. Pain managed with Tylenol#3. NWB on Left foot. Tele 100% v-paced . Will continue monitoring

## 2022-04-23 NOTE — CONSULTS
TCU recommended at discharge, but patient and family have declined.  Pt/family agreeable to [facility] services and [facility] has accepted him.  Writer confirmed acceptance with Ciera at intake and chart notes, H/P, and discharge orders faxed to [facility] at:  [phone].  Patient resides in a 1 level home with 1 stair to enter.  Pt/family updated with above agency and contact info added to AVS.    Rachel Johnson RN  Care Coordinator  [facility]  [phone] (text or call)

## 2022-04-23 NOTE — UTILIZATION REVIEW
[facility] Utilization Review  Admission Status; Secondary Review Determination     Admission Date: 4/21/2022  8:09 AM      Under the authority of the Utilization Management Committee, the utilization review process indicated a secondary review on the above patient.  The review outcome is based on review of the medical records, discussions with staff, and applying clinical experience noted on the date of the review.        (X)      Inpatient Status Appropriate - This patient's medical care is consistent with medical management for inpatient care and reasonable inpatient medical practice.          RATIONALE FOR DETERMINATION   Victorino Khan is a 88 year old male with complex past medical history, who is status post left TKA with Achilles tendon lengthening and medial displacement calcaneal ostotomy, who is registered as outpatient with prolonged recovery for close monitoring, perioperative care and safe disposition planning following uncomplicated procedure on 4/21/2022.  However, hospital course is complicated by hypotension requiring extensive work-up including ruling out pulmonary embolism and today noted to be hypoxic with oxygen saturation 87% requiring oxygenation.  Also suspected SIRS but no fever or chills noted.  Plan to continue to monitor, further work-up and ongoing management of the above-mentioned medical issues.  In light of failed outpatient cares, complicated hospital course requiring ongoing work-up and management with anticipated length of stay more than 2 midnights, criteria for inpatient admission is met.  Unfortunately, no contact info listed for the provider in the directory (SAMRA Gonzales), therefore could not reach the primary team to communicate the recommendation.  Will forward to UR team for follow-up.      The severity of illness, intensity of service provided, expected length of stay and risk for adverse outcome make the care complex, high risk and appropriate for hospital  admission.The patient requires hospital based medical care which is anticipated to require a stay of 2 or more midnights;  therefore the patient is appropriately admitted to the hospital as inpatient.         The information on this document is developed by the utilization review team in order for the business office to ensure compliance.  This only denotes the appropriateness of proper admission status and does not reflect the quality of care rendered.              Sincerely,       Gm Contreras MD, MS  Physician Advisor  Utilization Review-[place]    Phone: [phone]

## 2022-04-23 NOTE — PROGRESS NOTES
"[facility]  Orthopaedics/Foot and Ankle Surgery  Daily Post-Op Note    04/23/2022          Assessment and Plan:    Assessment:   Post-operative day #1  Procedure(s):  LEFT TOTAL ANKLE REPLACEMENT (Left)  TENDON ACHILLELS LENGTHENING, MEDIAL DISPLACEMENT CALCANEAL OSTOTOMY (Left)           Plan:   1. NWB LLE.  May keep elevated while at rest to limit swelling and pain.  2. Cont. current pain regimen.  Under appropriate control at this time. Block has warn off.   3. PT to eval  4. Warfarin, SCDs for DVT prophy.  5. Appreciate hospitalist co-management for hypotension determining if appropriate for discharge  6. Plan d/c Home with home care pending PT and if medically stable.              Interval History:   Pain with movement.  Tolerating tylenol #3, doesn't want to change pain medications, says pain is tolerable.  He has been slow to progress with therapy. We discussed discharge, TCU vs home care.  He would prefer to discharge home with home care.  Postop course has been complicated by hypotension, medicine dollowing.  Blood pressure improved this morning. Had hypotension following surgery with it dropping as low as 77/57. Improving slowly.                Physical Exam:   Blood pressure 123/66, pulse 66, temperature 98.4  F (36.9  C), temperature source Oral, resp. rate 16, height 1.803 m (5' 11\"), weight 89.4 kg (197 lb), SpO2 97 %.  I/O last 3 completed shifts:  In: 125 [P.O.:125]  Out: 1000 [Urine:1000]    Splint is C/D/I. Toes are all warm and well perfused with brisk capillary refill. The patient denies any focal calf tenderness. Sensation is intact distally in the toes.            Data:   All laboratory data related to this surgery reviewed  Recent Labs   Lab Test 04/22/22  0749 04/21/22 2005 04/06/22  1032 03/15/22  1221 06/14/21  1047   HGB 13.1* 13.5 15.2 14.7 15.3     Recent Labs   Lab Test 04/22/22  0749 04/21/22  0854 04/20/22  1118 04/14/22  0615 04/06/22  1032   INR 1.22* 1.15 1.1 " 1.81* 2.5*      Recent Labs   Lab Test 04/22/22  0749 04/21/22 2005   WBC 8.0 6.8   * 105*   POTASSIUM  --  5.1   CR 1.02 1.22

## 2022-04-23 NOTE — PROGRESS NOTES
VSS, CMS intact. Up with strong assist of 1 and walker. Pain managed with tylenol#3. Reviewed AVS with patient and spouse with teach back. Answered all the questions. Discharge medications and instruction sheets given. A&OX4, forgetful. Voiding per urinal. Dressing C/D/I. Left floor via wheelchair. Ciera from ortho updated about the discharge.

## 2022-04-23 NOTE — PROGRESS NOTES
[facility]    Medicine Progress Note - Hospitalist Service    Date of Admission:  4/21/2022    Assessment & Plan           Victorino Khan is a 88 year old male with past medical history of factor V Leiden mutation with 2 lifetime DVTs, now on lifelong anticoagulation, heart block requiring permanent pacemaker placement, mild intermittent asthma and prior Nissen fundoplication admitted on 4/21/2022 for left ankle replacement.  His postop course has been complicated by hypotension    Status post left ankle replacement on 4/22/2021 with Dr. Magan Chatman  -Defer analgesia, mobility, therapies, antimicrobials, pharmacologic DVT prophylaxis to the primary surgical service    Postop hypotension  SIRS    SIRS, resolved: based on HR > 90 bpm and RR > 20 breaths/min, without known infection.    requiring to 50 mL of 5% albumin and 500 mL of 0.9% saline in the postoperative state.  I think this is a relatively healthy elderly man who exercises frequently and has baseline low blood pressures that was exacerbated by GETA that was slow to recover upon cessation of inhaled anesthetics.  At the time of my exam while supine and even and 45 degree head of bed position blood pressures were 90s/60s.  Must entertain VTE particularly PE, ACS, acute hemorrhage    Stat EKG showed paced rhythm, troponin is negative    CT angiogram negative for PE.     Unlikely acute hemorrhage with hemoglobin 13.5    on telemetry overnight    factor V Leiden mutation with 2 lifetime DVTs, first was remote approximately 15 years ago second happened approximately 10 years ago after trial off of anticoagulation, now on lifelong anticoagulation,    Warfarin resumed    INR is rising appropriately. Will give Lovenox while in hospital (DVT prophylaxis dose), he says he typically does not bridge when off anticoagulants, so will not add bridging dose as outpatient, especially given history of thrombocytopenia    heart block requiring  "permanent pacemaker placement.  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    On CT scan on 4/21/2022 it was noted that there is a pacemaker in the left chest wall. Its 2 leads extend into the left subclavian vein, brachiocephalic vein, superior vena cava and terminate in the right atrium and right ventricle. There is likely occlusion of the left brachiocephalic vein as no contrast is identified surrounding the leads as they traverse this vessel. There are multiple chest wall and mediastinal collaterals which drain into the right internal jugular vein, azygos vein, as well as inferiorly into the liver and left hepatic venous system.  There is a linear metallic density wedged in a left upper lobe segmental pulmonary arterial branch.     Cardiology consult requested, patient had pacemaker implanted by Dr. Capps on 10/13/2017. I appreciate Kathy Lowry' advice.  See her note, \"Dr. Georges and I spoke with Dr. Vallejo who read the CT scan yesterday. 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  No specific intervention needed.    Thrombocytopenia has been problematic in the recent past    Recheck CBC. Platelet count is adequate.    mild intermittent asthma     Continue PTA Advair          Diet: Advance Diet as Tolerated: Regular Diet Adult  Discharge Instruction - Regular Diet Adult    DVT Prophylaxis: Warfarin  Chauhan Catheter: Not present  Central Lines: None  Cardiac Monitoring: ACTIVE order. Indication: hypotension  Code Status: Full Code      Disposition Plan   Expected Discharge: 04/23/2022   no " "medical objections to discharge today, if appropriate arrangements are in place  Anticipated discharge location:  Awaiting care coordination huddle  Delays:   dispo per Ortho        The patient's care was discussed with the Bedside Nurse, Patient and PT.    Елена Zarco MD  Hospitalist Service  [facility]  Securely message with the Vocera Web Console (learn more here)  Text page via [employer] Paging/Directory         Clinically Significant Risk Factors Present on Admission               # Coagulation Defect: home medication list includes an anticoagulant medication  # Platelet Defect: home medication list includes an antiplatelet medication   # Overweight: Estimated body mass index is 27.48 kg/m  as calculated from the following:    Height as of this encounter: 1.803 m (5' 11\").    Weight as of this encounter: 89.4 kg (197 lb).      ______________________________________________________________________    Interval History   \"I'm disappointed with my strength.\"  Patient says it took more strength than he anticipated to go up and down stairs.  He says he has also had more pain than he anticipated, but prefers to use Tylenol 3.  He is aware that codeine causes constipation and has a bowel regimen at home.  He has no new respiratory complaints.    Data reviewed today: I reviewed all medications, new labs and imaging results over the last 24 hours. I personally reviewed the chest x-ray image(s) showing Pacemaker unchanged in appearance from previous. There are.    Physical Exam   Vital Signs: Temp: 98.4  F (36.9  C) Temp src: Oral BP: 123/66 Pulse: 66   Resp: 16 SpO2: 97 % O2 Device: Nasal cannula Oxygen Delivery: 2 LPM  Weight: 197 lbs 0 oz  Constitutional: Awake, alert, cooperative, no apparent distress  Respiratory: Clear to auscultation bilaterally, no crackles or wheezing  Cardiovascular: Regular rate and rhythm, normal S1 and S2, and no murmur noted  GI: Normal bowel sounds, soft, " non-distended, non-tender  Skin/Integumen: left leg has dressing, it is elevated on a wedge pillow  Other: Mood is pleasant      Data   Recent Labs   Lab 04/22/22  0749 04/22/22  0451 04/21/22 2005 04/21/22  0854 04/20/22  1118   WBC 8.0  --  6.8  --   --    HGB 13.1*  --  13.5  --   --    *  --  103*  --   --    *  --  105*  --   --    INR 1.22*  --   --  1.15 1.1   NA  --   --  139  --   --    POTASSIUM  --   --  5.1  --   --    CHLORIDE  --   --  107  --   --    CO2  --   --  27  --   --    BUN  --   --  24  --   --    CR 1.02  --  1.22  --   --    ANIONGAP  --   --  5  --   --    TEE  --   --  8.2*  --   --    GLC  --  112* 141*  --   --      Recent Results (from the past 24 hour(s))   XR Chest 2 Views    Narrative    CHEST TWO VIEWS April 22, 2022 10:45 AM     HISTORY: Pacemaker positioning.    COMPARISON: October 27, 2017.       Impression    IMPRESSION: Pacemaker unchanged in appearance from previous. There are  no acute infiltrates. The cardiac silhouette is not enlarged.  Pulmonary vasculature is unremarkable.    MAXIMINO BOOTHE MD         SYSTEM ID:  [id number]     Medications     Warfarin Therapy Reminder         acetaminophen  975 mg Oral Q8H     calcium carbonate 500 mg (elemental)  500 mg Oral Daily     enoxaparin ANTICOAGULANT  40 mg Subcutaneous Q24H     fluticasone  1 spray Both Nostrils Daily     fluticasone-salmeterol  2 puff Inhalation BID     gabapentin  300 mg Oral Daily     multivitamin w/minerals  1 tablet Oral Daily     polyethylene glycol  17 g Oral Daily     senna-docusate  1 tablet Oral BID     sodium chloride (PF)  3 mL Intracatheter Q8H

## 2022-04-23 NOTE — PLAN OF CARE
PT-  Pt is discharging home today with assist of family and Home PT.  TCU was recommended but pt and family declined as family feels they can manage pt's needs.  PT goals not met.

## 2022-04-25 ENCOUNTER — DOCUMENTATION ONLY (OUTPATIENT)
Dept: ANTICOAGULATION | Facility: CLINIC | Age: 87
End: 2022-04-25
Payer: COMMERCIAL

## 2022-04-25 LAB
ATRIAL RATE - MUSE: 66 BPM
DIASTOLIC BLOOD PRESSURE - MUSE: NORMAL MMHG
INTERPRETATION ECG - MUSE: NORMAL
P AXIS - MUSE: 47 DEGREES
PR INTERVAL - MUSE: 268 MS
QRS DURATION - MUSE: 164 MS
QT - MUSE: 482 MS
QTC - MUSE: 505 MS
R AXIS - MUSE: -77 DEGREES
SYSTOLIC BLOOD PRESSURE - MUSE: NORMAL MMHG
T AXIS - MUSE: 49 DEGREES
VENTRICULAR RATE- MUSE: 66 BPM

## 2022-04-25 NOTE — PROGRESS NOTES
ANTICOAGULATION  MANAGEMENT: Discharge Review    Victorino Khan chart reviewed for anticoagulation continuity of care    Outpatient surgery/procedure on 4/21 for ankle replacement.    Discharge disposition: Home    Results:    Recent labs: (last 7 days)     04/20/22  1118 04/21/22  0854 04/22/22  0749 04/23/22  1020   INR 1.1 1.15 1.22* 1.43*     Anticoagulation inpatient management:     home regimen continued    Anticoagulation discharge instructions:     Warfarin dosing: home regimen continued   Bridging: No   INR goal change: No      Medication changes affecting anticoagulation: No    Additional factors affecting anticoagulation: No    Plan     No adjustment to anticoagulation plan needed    Patient not contacted in scheduled fri 4/29    No adjustment to Anticoagulation Calendar was required    Luís Williamson RN

## 2022-04-26 ENCOUNTER — TELEPHONE (OUTPATIENT)
Dept: INTERNAL MEDICINE | Facility: CLINIC | Age: 87
End: 2022-04-26
Payer: COMMERCIAL

## 2022-04-26 NOTE — TELEPHONE ENCOUNTER
Patient called asking when Home care will be out to take INR Writer called HC number given by patient. Home care nurse scheduled to see patient on Friday 4/29.  Patient in agreement with plan and verbalizes understanding.   Thanks!   Rhona Beverly RN

## 2022-04-29 ENCOUNTER — ANTICOAGULATION THERAPY VISIT (OUTPATIENT)
Dept: ANTICOAGULATION | Facility: CLINIC | Age: 87
End: 2022-04-29
Payer: COMMERCIAL

## 2022-04-29 DIAGNOSIS — Z86.718 HISTORY OF DEEP VENOUS THROMBOSIS: Primary | ICD-10-CM

## 2022-04-29 LAB — INR (EXTERNAL): 5.3 (ref 0.9–1.1)

## 2022-04-29 NOTE — PROGRESS NOTES
ANTICOAGULATION MANAGEMENT     Victorino Khan 88 year old male is on warfarin with supratherapeutic INR result. (Goal INR 2.0-3.0)    Recent labs: (last 7 days)     04/29/22  1119   INR 5.3*       ASSESSMENT       Source(s): Chart Review and Home Care/Facility Nurse       Warfarin doses taken: Warfarin taken as instructed. Patient already took 4 mg dose of warfarin this morning    Diet: No new diet changes identified    New illness, injury, or hospitalization: Yes: left ankle joint replacement on 4/21/22    Medication/supplement changes: Started Tylenol #3, and hydroxyzine    Signs or symptoms of bleeding or clotting: Some bruising on ankle from surgery, but otherwise healing well     Previous INR: Subtherapeutic    Additional findings: Was recommended to go to TCU after discharged, family declined       PLAN     Recommended plan for temporary change(s) affecting INR     Dosing Instructions: hold 2 doses then decrease your warfarin dose (15% change) with next INR in 3 days       Summary  As of 4/29/2022    Full warfarin instructions:  4/30: Hold; 5/1: Hold; Otherwise 2 mg every Thu; 4 mg all other days   Next INR check:  5/2/2022             Detailed voice message left for [facility] home care/facility nurse with dosing instructions and follow up date.     Orders given to  Homecare nurse/facility to recheck    Education provided: Please call back if any changes to your diet, medications or how you've been taking warfarin, Monitoring for bleeding signs and symptoms, Monitoring for clotting signs and symptoms and When to seek medical attention/emergency care    Plan made with [facility] Pharmacist Gertrudis Vasquse, RN  Anticoagulation Clinic  4/29/2022    _______________________________________________________________________     Anticoagulation Episode Summary     Current INR goal:  2.0-3.0   TTR:  86.2 % (1 y)   Target end date:  Indefinite   Send INR reminders to:  JOSEFINA SHANNON    Indications    History of  deep venous thrombosis [Z86.718]           Comments:  [facility]         Anticoagulation Care Providers     Provider Role Specialty Phone number    Jez Jack MD Referring Internal Medicine [phone]

## 2022-05-02 ENCOUNTER — ANTICOAGULATION THERAPY VISIT (OUTPATIENT)
Dept: ANTICOAGULATION | Facility: CLINIC | Age: 87
End: 2022-05-02
Payer: COMMERCIAL

## 2022-05-02 DIAGNOSIS — Z86.718 HISTORY OF DEEP VENOUS THROMBOSIS: Primary | ICD-10-CM

## 2022-05-02 LAB — INR (EXTERNAL): 1.9 (ref 0.9–1.1)

## 2022-05-02 NOTE — PROGRESS NOTES
ANTICOAGULATION MANAGEMENT     Victorino Khan 88 year old male is on warfarin with subtherapeutic INR result. (Goal INR 2.0-3.0)    Recent labs: (last 7 days)     05/02/22  1039   INR 1.9*       ASSESSMENT       Source(s): Chart Review and Home Care/Facility Nurse       Warfarin doses taken: Warfarin recently held for SUPRA which may be affecting INR    Diet: No new diet changes identified    New illness, injury, or hospitalization: No    Medication/supplement changes: Stopped Tylenol 3 over the weekend    Signs or symptoms of bleeding or clotting: No    Previous INR: Supratherapeutic    Additional findings: No       PLAN     Recommended plan for ongoing change(s) affecting INR     Dosing Instructions: continue your current warfarin dose with next INR in 3 days       Summary  As of 5/2/2022    Full warfarin instructions:  2 mg every Tue, Thu, Sat; 4 mg all other days   Next INR check:  5/5/2022             Telephone call with [facility] home care/facility nurse who verbalizes understanding and agrees to plan    Orders given to  Homecare nurse/facility to recheck    Education provided: Contact [phone]  with any changes, questions or concerns.     Plan made per [facility] anticoagulation protocol    Noemi Meeks RN  Anticoagulation Clinic  5/2/2022    _______________________________________________________________________     Anticoagulation Episode Summary     Current INR goal:  2.0-3.0   TTR:  85.6 % (1 y)   Target end date:  Indefinite   Send INR reminders to:  JOSEFINA SHANNON    Indications    History of deep venous thrombosis [Z86.718]           Comments:  [facility]         Anticoagulation Care Providers     Provider Role Specialty Phone number    Jez Jack MD Referring Internal Medicine [phone]          Noemi Ron, RN, BSN, PHN  Anticoagulation Nurse  [phone]

## 2022-05-03 ENCOUNTER — TRANSFERRED RECORDS (OUTPATIENT)
Dept: HEALTH INFORMATION MANAGEMENT | Facility: CLINIC | Age: 87
End: 2022-05-03
Payer: COMMERCIAL

## 2022-05-03 DIAGNOSIS — Z53.9 DIAGNOSIS NOT YET DEFINED: Primary | ICD-10-CM

## 2022-05-03 PROCEDURE — G0180 MD CERTIFICATION HHA PATIENT: HCPCS | Performed by: INTERNAL MEDICINE

## 2022-05-05 ENCOUNTER — ANTICOAGULATION THERAPY VISIT (OUTPATIENT)
Dept: ANTICOAGULATION | Facility: CLINIC | Age: 87
End: 2022-05-05
Payer: COMMERCIAL

## 2022-05-05 DIAGNOSIS — Z86.718 HISTORY OF DEEP VENOUS THROMBOSIS: Primary | ICD-10-CM

## 2022-05-05 LAB — INR (EXTERNAL): 2.2 (ref 0.9–1.1)

## 2022-05-05 NOTE — PROGRESS NOTES
ANTICOAGULATION MANAGEMENT     Victorino Khan 88 year old male is on warfarin with therapeutic INR result. (Goal INR 2.0-3.0)    Recent labs: (last 7 days)     05/05/22  1117   INR 2.2*       ASSESSMENT       Source(s): Chart Review, Patient/Caregiver Call and Home Care/Facility Nurse       Warfarin doses taken: More warfarin taken than planned which may be affecting INR Patient states he resumed his regular dose of 4 mg daily    Diet: No new diet changes identified    New illness, injury, or hospitalization: No, but is still recovering from ankle replacement    Medication/supplement changes: None noted    Signs or symptoms of bleeding or clotting: No    Previous INR: Subtherapeutic    Additional findings: None       PLAN     Recommended plan for no diet, medication or health factor changes affecting INR     Dosing Instructions: continue your current warfarin dose with next INR in 1 week       Summary  As of 5/5/2022    Full warfarin instructions:  2 mg every Tue, Thu, Sat; 4 mg all other days   Next INR check:  5/12/2022             Telephone call with  [facility] home care nurse and Victorino patient stated he is not taking enough warfarin and needs to be on 26 mg weekly.  Strongly encouraged patient not to self adjust dose and also looked back at dose prior to surgery and patient was taking 2 mg every Thursday.  Attempted to explain to patient as he recovers from surgery he may not need as much warfarin due to the recovery process and may go back to higher dose as time goes on.    Orders given to  Homecare nurse/facility to recheck    Education provided: Goal range and significance of current result, Importance of therapeutic range, Importance of following up at instructed interval and Importance of taking warfarin as instructed    Plan made per [facility] anticoagulation protocol    Haylie Loredo, RN  Anticoagulation Clinic  5/5/2022    _______________________________________________________________________      Anticoagulation Episode Summary     Current INR goal:  2.0-3.0   TTR:  85.3 % (1 y)   Target end date:  Indefinite   Send INR reminders to:  JOSEFINA SHANNON    Indications    History of deep venous thrombosis [Z86.718]           Comments:  [facility]         Anticoagulation Care Providers     Provider Role Specialty Phone number    Jez Jack MD Referring Internal Medicine [phone]

## 2022-05-09 ENCOUNTER — MEDICAL CORRESPONDENCE (OUTPATIENT)
Dept: HEALTH INFORMATION MANAGEMENT | Facility: CLINIC | Age: 87
End: 2022-05-09
Payer: COMMERCIAL

## 2022-05-13 ENCOUNTER — DOCUMENTATION ONLY (OUTPATIENT)
Dept: ANTICOAGULATION | Facility: CLINIC | Age: 87
End: 2022-05-13

## 2022-05-13 NOTE — PROGRESS NOTES
ANTICOAGULATION     Victorino Khan is overdue for INR check.      LVM for home care nurse [facility] to please get INR by Monday 5/16     Rhona Beverly, RN

## 2022-05-16 ENCOUNTER — ANTICOAGULATION THERAPY VISIT (OUTPATIENT)
Dept: ANTICOAGULATION | Facility: CLINIC | Age: 87
End: 2022-05-16
Payer: COMMERCIAL

## 2022-05-16 DIAGNOSIS — Z86.718 HISTORY OF DEEP VENOUS THROMBOSIS: Primary | ICD-10-CM

## 2022-05-16 LAB — INR (EXTERNAL): 2.6 (ref 0.9–1.1)

## 2022-05-16 NOTE — PROGRESS NOTES
ANTICOAGULATION MANAGEMENT     Victorino Khan 88 year old male is on warfarin with therapeutic INR result. (Goal INR 2.0-3.0)    Recent labs: (last 7 days)     05/16/22  1006   INR 2.6*       ASSESSMENT       Source(s): Chart Review and Home Care/Facility Nurse       Warfarin doses taken: More warfarin taken than planned which may be affecting INR and Patient self-adjusted dose despite the extensive education last INR check    Diet: No new diet changes identified    New illness, injury, or hospitalization: No    Medication/supplement changes: None noted    Signs or symptoms of bleeding or clotting: No    Previous INR: Therapeutic last visit; previously outside of goal range    Additional findings: Adjusted dose to reflect how patient has been taking warfarin which is a 18.2% increase.  See previoiusl notes drom 5/5/22.       PLAN     Recommended plan for no diet, medication or health factor changes affecting INR     Dosing Instructions: continue your current warfarin dose with next INR in 2 weeks       Summary  As of 5/16/2022    Full warfarin instructions:  2 mg every Wed; 4 mg all other days   Next INR check:  5/27/2022             Telephone call with [facility] home care/facility nurse who verbalizes understanding and agrees to plan    Orders given to  Homecare nurse/facility to recheck    Education provided: Importance of taking warfarin as instructed    Plan made per [facility] anticoagulation protocol    Haylie Loredo, RN  Anticoagulation Clinic  5/16/2022    _______________________________________________________________________     Anticoagulation Episode Summary     Current INR goal:  2.0-3.0   TTR:  85.3 % (1 y)   Target end date:  Indefinite   Send INR reminders to:  JOSEFINA SHANNON    Indications    History of deep venous thrombosis [Z86.718]           Comments:  [facility]         Anticoagulation Care Providers     Provider Role Specialty Phone number    Jez Jack MD Referring Internal  Medicine [phone]

## 2022-05-24 ENCOUNTER — ANCILLARY PROCEDURE (OUTPATIENT)
Dept: CARDIOLOGY | Facility: CLINIC | Age: 87
End: 2022-05-24
Attending: INTERNAL MEDICINE
Payer: COMMERCIAL

## 2022-05-24 DIAGNOSIS — Z95.0 CARDIAC PACEMAKER IN SITU: ICD-10-CM

## 2022-05-24 DIAGNOSIS — I49.5 SICK SINUS SYNDROME (H): ICD-10-CM

## 2022-05-24 PROCEDURE — 93296 REM INTERROG EVL PM/IDS: CPT | Performed by: INTERNAL MEDICINE

## 2022-05-24 PROCEDURE — 93294 REM INTERROG EVL PM/LDLS PM: CPT | Performed by: INTERNAL MEDICINE

## 2022-05-27 ENCOUNTER — ANTICOAGULATION THERAPY VISIT (OUTPATIENT)
Dept: ANTICOAGULATION | Facility: CLINIC | Age: 87
End: 2022-05-27
Payer: COMMERCIAL

## 2022-05-27 DIAGNOSIS — Z86.718 HISTORY OF DEEP VENOUS THROMBOSIS: Primary | ICD-10-CM

## 2022-05-27 LAB — INR (EXTERNAL): 2.7 (ref 0.9–1.1)

## 2022-05-27 NOTE — PROGRESS NOTES
ANTICOAGULATION MANAGEMENT     Victorino Khan 88 year old male is on warfarin with therapeutic INR result. (Goal INR 2.0-3.0)    Recent labs: (last 7 days)     05/27/22  1213   INR 2.7*       ASSESSMENT       Source(s): Chart Review, Patient/Caregiver Call and Home Care/Facility Nurse       Warfarin doses taken: Warfarin taken as instructed    Diet: No new diet changes identified    New illness, injury, or hospitalization: No    Medication/supplement changes: None noted    Signs or symptoms of bleeding or clotting: No    Previous INR: Therapeutic last 2(+) visits    Additional findings: Patient prefers to have INR slighly lower so would like to do 2 days of 2 mg and 4 mg all other days.        PLAN     Recommended plan for no diet, medication or health factor changes affecting INR     Dosing Instructions: decrease your warfarin dose (7.7% change) with next INR in 2 weeks       Summary  As of 5/27/2022    Full warfarin instructions:  2 mg every Wed, Sat; 4 mg all other days   Next INR check:  6/10/2022             Telephone call with [facility] home care/facility nurse who agrees to plan and repeated back plan correctly    Orders given to  Homecare nurse/facility to recheck    Education provided: Please call back if any changes to your diet, medications or how you've been taking warfarin    Plan made per [facility] anticoagulation protocol    Zoila Muro, RN  Anticoagulation Clinic  5/27/2022    _______________________________________________________________________     Anticoagulation Episode Summary     Current INR goal:  2.0-3.0   TTR:  85.3 % (1 y)   Target end date:  Indefinite   Send INR reminders to:  JOSEFINA SHANNON    Indications    History of deep venous thrombosis [Z86.718]           Comments:  [facility]         Anticoagulation Care Providers     Provider Role Specialty Phone number    Jez Jack MD Referring Internal Medicine [phone]

## 2022-05-31 ENCOUNTER — TRANSFERRED RECORDS (OUTPATIENT)
Dept: HEALTH INFORMATION MANAGEMENT | Facility: CLINIC | Age: 87
End: 2022-05-31
Payer: COMMERCIAL

## 2022-06-02 LAB
MDC_IDC_EPISODE_DTM: NORMAL
MDC_IDC_EPISODE_ID: NORMAL
MDC_IDC_EPISODE_TYPE: NORMAL
MDC_IDC_LEAD_IMPLANT_DT: NORMAL
MDC_IDC_LEAD_IMPLANT_DT: NORMAL
MDC_IDC_LEAD_LOCATION: NORMAL
MDC_IDC_LEAD_LOCATION: NORMAL
MDC_IDC_LEAD_LOCATION_DETAIL_1: NORMAL
MDC_IDC_LEAD_LOCATION_DETAIL_1: NORMAL
MDC_IDC_LEAD_MFG: NORMAL
MDC_IDC_LEAD_MFG: NORMAL
MDC_IDC_LEAD_MODEL: NORMAL
MDC_IDC_LEAD_MODEL: NORMAL
MDC_IDC_LEAD_POLARITY_TYPE: NORMAL
MDC_IDC_LEAD_POLARITY_TYPE: NORMAL
MDC_IDC_LEAD_SERIAL: NORMAL
MDC_IDC_LEAD_SERIAL: NORMAL
MDC_IDC_MSMT_BATTERY_DTM: NORMAL
MDC_IDC_MSMT_BATTERY_REMAINING_LONGEVITY: 106 MO
MDC_IDC_MSMT_BATTERY_REMAINING_PERCENTAGE: 95.5 %
MDC_IDC_MSMT_BATTERY_RRT_TRIGGER: NORMAL
MDC_IDC_MSMT_BATTERY_STATUS: NORMAL
MDC_IDC_MSMT_BATTERY_VOLTAGE: 2.98 V
MDC_IDC_MSMT_LEADCHNL_RA_IMPEDANCE_VALUE: 490 OHM
MDC_IDC_MSMT_LEADCHNL_RA_LEAD_CHANNEL_STATUS: NORMAL
MDC_IDC_MSMT_LEADCHNL_RA_PACING_THRESHOLD_AMPLITUDE: 0.5 V
MDC_IDC_MSMT_LEADCHNL_RA_PACING_THRESHOLD_PULSEWIDTH: 0.5 MS
MDC_IDC_MSMT_LEADCHNL_RA_SENSING_INTR_AMPL: 5 MV
MDC_IDC_MSMT_LEADCHNL_RV_IMPEDANCE_VALUE: 410 OHM
MDC_IDC_MSMT_LEADCHNL_RV_LEAD_CHANNEL_STATUS: NORMAL
MDC_IDC_MSMT_LEADCHNL_RV_PACING_THRESHOLD_AMPLITUDE: 0.75 V
MDC_IDC_MSMT_LEADCHNL_RV_PACING_THRESHOLD_PULSEWIDTH: 0.5 MS
MDC_IDC_MSMT_LEADCHNL_RV_SENSING_INTR_AMPL: 5.8 MV
MDC_IDC_PG_IMPLANT_DTM: NORMAL
MDC_IDC_PG_MFG: NORMAL
MDC_IDC_PG_MODEL: NORMAL
MDC_IDC_PG_SERIAL: NORMAL
MDC_IDC_PG_TYPE: NORMAL
MDC_IDC_SESS_CLINIC_NAME: NORMAL
MDC_IDC_SESS_DTM: NORMAL
MDC_IDC_SESS_REPROGRAMMED: NO
MDC_IDC_SESS_TYPE: NORMAL
MDC_IDC_SET_BRADY_AT_MODE_SWITCH_MODE: NORMAL
MDC_IDC_SET_BRADY_AT_MODE_SWITCH_RATE: 170 {BEATS}/MIN
MDC_IDC_SET_BRADY_LOWRATE: 60 {BEATS}/MIN
MDC_IDC_SET_BRADY_MAX_SENSOR_RATE: 130 {BEATS}/MIN
MDC_IDC_SET_BRADY_MAX_TRACKING_RATE: 120 {BEATS}/MIN
MDC_IDC_SET_BRADY_MODE: NORMAL
MDC_IDC_SET_BRADY_PAV_DELAY_LOW: 250 MS
MDC_IDC_SET_BRADY_SAV_DELAY_LOW: 250 MS
MDC_IDC_SET_LEADCHNL_RA_PACING_AMPLITUDE: 2 V
MDC_IDC_SET_LEADCHNL_RA_PACING_ANODE_ELECTRODE_1: NORMAL
MDC_IDC_SET_LEADCHNL_RA_PACING_ANODE_LOCATION_1: NORMAL
MDC_IDC_SET_LEADCHNL_RA_PACING_CAPTURE_MODE: NORMAL
MDC_IDC_SET_LEADCHNL_RA_PACING_CATHODE_ELECTRODE_1: NORMAL
MDC_IDC_SET_LEADCHNL_RA_PACING_CATHODE_LOCATION_1: NORMAL
MDC_IDC_SET_LEADCHNL_RA_PACING_POLARITY: NORMAL
MDC_IDC_SET_LEADCHNL_RA_PACING_PULSEWIDTH: 0.5 MS
MDC_IDC_SET_LEADCHNL_RA_SENSING_ADAPTATION_MODE: NORMAL
MDC_IDC_SET_LEADCHNL_RA_SENSING_ANODE_ELECTRODE_1: NORMAL
MDC_IDC_SET_LEADCHNL_RA_SENSING_ANODE_LOCATION_1: NORMAL
MDC_IDC_SET_LEADCHNL_RA_SENSING_CATHODE_ELECTRODE_1: NORMAL
MDC_IDC_SET_LEADCHNL_RA_SENSING_CATHODE_LOCATION_1: NORMAL
MDC_IDC_SET_LEADCHNL_RA_SENSING_POLARITY: NORMAL
MDC_IDC_SET_LEADCHNL_RA_SENSING_SENSITIVITY: 0.3 MV
MDC_IDC_SET_LEADCHNL_RV_PACING_AMPLITUDE: 2 V
MDC_IDC_SET_LEADCHNL_RV_PACING_ANODE_ELECTRODE_1: NORMAL
MDC_IDC_SET_LEADCHNL_RV_PACING_ANODE_LOCATION_1: NORMAL
MDC_IDC_SET_LEADCHNL_RV_PACING_CAPTURE_MODE: NORMAL
MDC_IDC_SET_LEADCHNL_RV_PACING_CATHODE_ELECTRODE_1: NORMAL
MDC_IDC_SET_LEADCHNL_RV_PACING_CATHODE_LOCATION_1: NORMAL
MDC_IDC_SET_LEADCHNL_RV_PACING_POLARITY: NORMAL
MDC_IDC_SET_LEADCHNL_RV_PACING_PULSEWIDTH: 0.5 MS
MDC_IDC_SET_LEADCHNL_RV_SENSING_ADAPTATION_MODE: NORMAL
MDC_IDC_SET_LEADCHNL_RV_SENSING_ANODE_ELECTRODE_1: NORMAL
MDC_IDC_SET_LEADCHNL_RV_SENSING_ANODE_LOCATION_1: NORMAL
MDC_IDC_SET_LEADCHNL_RV_SENSING_CATHODE_ELECTRODE_1: NORMAL
MDC_IDC_SET_LEADCHNL_RV_SENSING_CATHODE_LOCATION_1: NORMAL
MDC_IDC_SET_LEADCHNL_RV_SENSING_POLARITY: NORMAL
MDC_IDC_SET_LEADCHNL_RV_SENSING_SENSITIVITY: 0.5 MV
MDC_IDC_STAT_AT_BURDEN_PERCENT: 1 %
MDC_IDC_STAT_AT_DTM_END: NORMAL
MDC_IDC_STAT_AT_DTM_START: NORMAL
MDC_IDC_STAT_AT_MODE_SW_COUNT: 10
MDC_IDC_STAT_AT_MODE_SW_COUNT_PER_DAY: 0
MDC_IDC_STAT_AT_MODE_SW_MAX_DURATION: 114 S
MDC_IDC_STAT_AT_MODE_SW_PERCENT_TIME: 1 %
MDC_IDC_STAT_BRADY_AP_VP_PERCENT: 28 %
MDC_IDC_STAT_BRADY_AP_VS_PERCENT: 1 %
MDC_IDC_STAT_BRADY_AS_VP_PERCENT: 69 %
MDC_IDC_STAT_BRADY_AS_VS_PERCENT: 2.5 %
MDC_IDC_STAT_BRADY_DTM_END: NORMAL
MDC_IDC_STAT_BRADY_DTM_START: NORMAL
MDC_IDC_STAT_BRADY_RA_PERCENT_PACED: 28 %
MDC_IDC_STAT_BRADY_RV_PERCENT_PACED: 97 %
MDC_IDC_STAT_CRT_DTM_END: NORMAL
MDC_IDC_STAT_CRT_DTM_START: NORMAL
MDC_IDC_STAT_HEART_RATE_ATRIAL_MAX: 270 {BEATS}/MIN
MDC_IDC_STAT_HEART_RATE_ATRIAL_MEAN: 83 {BEATS}/MIN
MDC_IDC_STAT_HEART_RATE_ATRIAL_MIN: 40 {BEATS}/MIN
MDC_IDC_STAT_HEART_RATE_DTM_END: NORMAL
MDC_IDC_STAT_HEART_RATE_DTM_START: NORMAL
MDC_IDC_STAT_HEART_RATE_VENTRICULAR_MAX: 240 {BEATS}/MIN
MDC_IDC_STAT_HEART_RATE_VENTRICULAR_MEAN: 82 {BEATS}/MIN
MDC_IDC_STAT_HEART_RATE_VENTRICULAR_MIN: 40 {BEATS}/MIN

## 2022-06-10 ENCOUNTER — ANTICOAGULATION THERAPY VISIT (OUTPATIENT)
Dept: ANTICOAGULATION | Facility: CLINIC | Age: 87
End: 2022-06-10
Payer: COMMERCIAL

## 2022-06-10 DIAGNOSIS — Z86.718 HISTORY OF DEEP VENOUS THROMBOSIS: Primary | ICD-10-CM

## 2022-06-10 LAB — INR (EXTERNAL): 2.5 (ref 0.9–1.1)

## 2022-06-10 NOTE — PROGRESS NOTES
ANTICOAGULATION MANAGEMENT     Victorino Khan 88 year old male is on warfarin with therapeutic INR result. (Goal INR 2.0-3.0)    Recent labs: (last 7 days)     06/10/22  0952   INR 2.5*       ASSESSMENT       Source(s): Chart Review and Home Care/Facility Nurse       Warfarin doses taken: More warfarin taken than planned which may be affecting INR. Patient now reporting to home care RN that he has been taking 25 mg/week for the last several weeks. He also has a 5mg tablet. He self adjusts his dose 0.5-1mg from time to time. Home care was previously unaware of this.     Diet: No new diet changes identified    New illness, injury, or hospitalization: No    Medication/supplement changes: None noted    Signs or symptoms of bleeding or clotting: No    Previous INR: Therapeutic last 2(+) visits, patient now reporting that he was taking this same dose for those INRs or perhaps 0.5-1mg more per week    Additional findings: None       PLAN     Recommended plan for temporary change(s) affecting INR     Dosing Instructions: continue your current warfarin dose with next INR in 2 weeks       Summary  As of 6/10/2022    Full warfarin instructions:  2.5 mg every Mon, Fri; 4 mg all other days   Next INR check:  6/24/2022             Telephone call with Valentina home care/facility nurse who verbalizes understanding and agrees to plan    Orders given to  Homecare nurse/facility to recheck    Education provided: Goal range and significance of current result and Importance of taking warfarin as instructed. Recommended against self-adjusting dose. Even though 0.5-1mg is unlikely to have a huge effect on patient's INR (which has been relatively stable) over the course of a week it is also unlikely to be beneficial. Valentina will reinforce education.     Plan made per [facility] anticoagulation protocol    Flavia Mancia, RN  Anticoagulation Clinic  6/10/2022    _______________________________________________________________________      Anticoagulation Episode Summary     Current INR goal:  2.0-3.0   TTR:  85.3 % (1 y)   Target end date:  Indefinite   Send INR reminders to:  JOSEFINA SHANNON    Indications    History of deep venous thrombosis [Z86.718]           Comments:  [facility]         Anticoagulation Care Providers     Provider Role Specialty Phone number    Jez Jack MD Referring Internal Medicine [phone]

## 2022-06-23 ENCOUNTER — LAB (OUTPATIENT)
Dept: LAB | Facility: CLINIC | Age: 87
End: 2022-06-23
Payer: COMMERCIAL

## 2022-06-23 ENCOUNTER — ANTICOAGULATION THERAPY VISIT (OUTPATIENT)
Dept: ANTICOAGULATION | Facility: CLINIC | Age: 87
End: 2022-06-23

## 2022-06-23 DIAGNOSIS — Z86.718 HISTORY OF DEEP VENOUS THROMBOSIS: ICD-10-CM

## 2022-06-23 DIAGNOSIS — Z86.718 HISTORY OF DEEP VENOUS THROMBOSIS: Primary | ICD-10-CM

## 2022-06-23 LAB — INR BLD: 2.2 (ref 0.9–1.1)

## 2022-06-23 PROCEDURE — 85610 PROTHROMBIN TIME: CPT

## 2022-06-23 PROCEDURE — 36416 COLLJ CAPILLARY BLOOD SPEC: CPT

## 2022-06-23 NOTE — PROGRESS NOTES
ANTICOAGULATION MANAGEMENT     Victorino Khan 88 year old male is on warfarin with therapeutic INR result. (Goal INR 2.0-3.0)    Recent labs: (last 7 days)     06/23/22  0922   INR 2.2*       ASSESSMENT       Source(s): Chart Review and Patient/Caregiver Call          PLAN     Recommended plan for no diet, medication or health factor changes affecting INR     Dosing Instructions: continue your current warfarin dose with next INR in 2 weeks       Summary  As of 6/23/2022    Full warfarin instructions:  2.5 mg every Mon, Fri; 4 mg all other days   Next INR check:  7/7/2022             Detailed voice message left for Victorino with dosing instructions and follow up date.     Contact [phone]  to schedule and with any changes, questions or concerns.     Education provided: Please call back if any changes to your diet, medications or how you've been taking warfarin    Plan made per [facility] anticoagulation protocol    Iraida Marshall RN  Anticoagulation Clinic  6/23/2022    _______________________________________________________________________     Anticoagulation Episode Summary     Current INR goal:  2.0-3.0   TTR:  85.3 % (1 y)   Target end date:  Indefinite   Send INR reminders to:  JOSEFINA SHANNON    Indications    History of deep venous thrombosis [Z86.718]           Comments:  [facility]         Anticoagulation Care Providers     Provider Role Specialty Phone number    Jez Jack MD Referring Internal Medicine [phone]        Attempt 1:  Left message for patient to return call.    Iraida Marshall RN

## 2022-07-08 ENCOUNTER — TELEPHONE (OUTPATIENT)
Dept: ANTICOAGULATION | Facility: CLINIC | Age: 87
End: 2022-07-08

## 2022-07-08 ENCOUNTER — ANTICOAGULATION THERAPY VISIT (OUTPATIENT)
Dept: ANTICOAGULATION | Facility: CLINIC | Age: 87
End: 2022-07-08

## 2022-07-08 ENCOUNTER — LAB (OUTPATIENT)
Dept: LAB | Facility: CLINIC | Age: 87
End: 2022-07-08
Payer: COMMERCIAL

## 2022-07-08 DIAGNOSIS — Z86.718 HISTORY OF DEEP VENOUS THROMBOSIS: Primary | ICD-10-CM

## 2022-07-08 DIAGNOSIS — Z86.718 HISTORY OF DEEP VENOUS THROMBOSIS: ICD-10-CM

## 2022-07-08 LAB — INR BLD: 2.4 (ref 0.9–1.1)

## 2022-07-08 PROCEDURE — 85610 PROTHROMBIN TIME: CPT

## 2022-07-08 PROCEDURE — 36416 COLLJ CAPILLARY BLOOD SPEC: CPT

## 2022-07-08 NOTE — PROGRESS NOTES
ANTICOAGULATION MANAGEMENT     Victorino Khan 88 year old male is on warfarin with therapeutic INR result. (Goal INR 2.0-3.0)    Recent labs: (last 7 days)     07/08/22  1044   INR 2.4*       ASSESSMENT       Source(s): Chart Review and Patient/Caregiver Call       Warfarin doses taken: Warfarin taken as instructed    Diet: No new diet changes identified    New illness, injury, or hospitalization: No    Medication/supplement changes: None noted    Signs or symptoms of bleeding or clotting: No    Previous INR: Therapeutic last visit; previously outside of goal range    Additional findings: None       PLAN     Recommended plan for no diet, medication or health factor changes affecting INR     Dosing Instructions: continue your current warfarin dose with next INR in 4 weeks       Summary  As of 7/8/2022    Full warfarin instructions:  2.5 mg every Mon, Fri; 4 mg all other days   Next INR check:  8/5/2022             Telephone call with Victorino who verbalizes understanding and agrees to plan    Lab visit scheduled    Education provided: None required    Plan made per [facility] anticoagulation protocol    Luís Williamson RN  Anticoagulation Clinic  7/8/2022    _______________________________________________________________________     Anticoagulation Episode Summary     Current INR goal:  2.0-3.0   TTR:  89.0 % (1 y)   Target end date:  Indefinite   Send INR reminders to:  JOSEFINA MIDWAY    Indications    History of deep venous thrombosis [Z86.718]           Comments:           Anticoagulation Care Providers     Provider Role Specialty Phone number    Jez Jack MD Referring Internal Medicine [phone]

## 2022-07-14 ENCOUNTER — ALLIED HEALTH/NURSE VISIT (OUTPATIENT)
Dept: FAMILY MEDICINE | Facility: CLINIC | Age: 87
End: 2022-07-14
Payer: COMMERCIAL

## 2022-07-14 ENCOUNTER — TELEPHONE (OUTPATIENT)
Dept: INTERNAL MEDICINE | Facility: CLINIC | Age: 87
End: 2022-07-14

## 2022-07-14 DIAGNOSIS — Z92.29 PERSONAL HISTORY OF OTHER DRUG THERAPY: ICD-10-CM

## 2022-07-14 DIAGNOSIS — M81.0 OSTEOPOROSIS: Primary | ICD-10-CM

## 2022-07-14 DIAGNOSIS — M81.0 SENILE OSTEOPOROSIS: Primary | ICD-10-CM

## 2022-07-14 PROCEDURE — 96372 THER/PROPH/DIAG INJ SC/IM: CPT | Performed by: INTERNAL MEDICINE

## 2022-07-14 PROCEDURE — 99207 PR NO CHARGE NURSE ONLY: CPT

## 2022-07-14 NOTE — PROGRESS NOTES
"Prolia Injection Phone Screen      Screening questions have been asked 2-3 days prior to administration visit for Prolia. If any questions are answered with \"Yes,\" this phone encounter were will routed to ordering provider for further evaluation.     1.  When was the last injection?  1/31/22    2.  Has insurance for this injection been verified?  Yes    3.  Did you experience any new onset achiness or rashes that lasted for over a month with your previous Prolia injection?   No    4.  Do you have a fever over 101?F or a new deep cough that is unusual for you today? No    5.  Have you started any new medications in the last 6 months that you were told could affect your immune system? These may have been prescribed by oncologist, transplant, rheumatology, or dermatology.   No    6.  In the last 6 months have you have gastric bypass or parathyroid surgery?   No    7.  Do you plan dental work requiring drilling into the bone such as implants/extractions or oral surgery in the next 2-3 months?   No    8. Do you have new insurance since the last injection?    9. Have you received the Covid-19 vaccine? Yes  If No - Proceed with Prolia injection  If Yes - Date of vaccination 11/4/21. Will need to wait until 2 weeks after 2nd dose of Covid-19 vaccine before administering Prolia       Patient informed if symptoms discussed above present prior to their administration appointment, they are to notify clinic immediately.     Christine Herrera"

## 2022-08-05 ENCOUNTER — LAB (OUTPATIENT)
Dept: LAB | Facility: CLINIC | Age: 87
End: 2022-08-05
Payer: COMMERCIAL

## 2022-08-05 ENCOUNTER — TELEPHONE (OUTPATIENT)
Dept: ANTICOAGULATION | Facility: CLINIC | Age: 87
End: 2022-08-05

## 2022-08-05 ENCOUNTER — ANTICOAGULATION THERAPY VISIT (OUTPATIENT)
Dept: ANTICOAGULATION | Facility: CLINIC | Age: 87
End: 2022-08-05

## 2022-08-05 DIAGNOSIS — Z86.718 HISTORY OF DEEP VENOUS THROMBOSIS: ICD-10-CM

## 2022-08-05 DIAGNOSIS — Z86.718 HISTORY OF DEEP VENOUS THROMBOSIS: Primary | ICD-10-CM

## 2022-08-05 LAB — INR BLD: 2.5 (ref 0.9–1.1)

## 2022-08-05 PROCEDURE — 36416 COLLJ CAPILLARY BLOOD SPEC: CPT

## 2022-08-05 PROCEDURE — 85610 PROTHROMBIN TIME: CPT

## 2022-08-05 NOTE — PROGRESS NOTES
ANTICOAGULATION MANAGEMENT     Victorino Khan 88 year old male is on warfarin with therapeutic INR result. (Goal INR 2.0-3.0)    Recent labs: (last 7 days)     08/05/22  1037   INR 2.5*       ASSESSMENT       Source(s): Chart Review and Template       Warfarin doses taken: Warfarin taken as instructed    Diet: No new diet changes identified    New illness, injury, or hospitalization: No    Medication/supplement changes: None noted    Signs or symptoms of bleeding or clotting: No    Previous INR: Therapeutic last 2(+) visits    Additional findings: None       PLAN     Recommended plan for no diet, medication or health factor changes affecting INR     Dosing Instructions: Continue your current warfarin dose with next INR in 6 weeks       Summary  As of 8/5/2022    Full warfarin instructions:  2.5 mg every Mon, Fri; 4 mg all other days   Next INR check:  9/2/2022             Detailed voice message left for Victorino with dosing instructions and follow up date.     Contact [phone] to schedule and with any changes, questions or concerns.     Education provided: None required    Plan made per [facility] anticoagulation protocol    Luís Williamson RN  Anticoagulation Clinic  8/5/2022    _______________________________________________________________________     Anticoagulation Episode Summary     Current INR goal:  2.0-3.0   TTR:  94.8 % (1 y)   Target end date:  Indefinite   Send INR reminders to:  JOSEFINA MIDWAY    Indications    History of deep venous thrombosis [Z86.718]           Comments:           Anticoagulation Care Providers     Provider Role Specialty Phone number    Jez Jack MD Referring Internal Medicine [phone]

## 2022-08-05 NOTE — TELEPHONE ENCOUNTER
Reason for Call:  Other returning call    Detailed comments: Pt calling back to Luís regarding INR, he states it was 2.5 and missed nurse's call    Phone Number Patient can be reached at: Cell number on file:    Telephone Information:   Mobile [phone]       Best Time: Anytime    Can we leave a detailed message on this number? YES    Call taken on 8/5/2022 at 1:44 PM by Britni Maguire

## 2022-08-08 ENCOUNTER — ANCILLARY PROCEDURE (OUTPATIENT)
Dept: CARDIOLOGY | Facility: CLINIC | Age: 87
End: 2022-08-08
Attending: INTERNAL MEDICINE
Payer: COMMERCIAL

## 2022-08-08 DIAGNOSIS — Z95.0 CARDIAC PACEMAKER IN SITU: ICD-10-CM

## 2022-08-08 PROCEDURE — 93280 PM DEVICE PROGR EVAL DUAL: CPT | Performed by: INTERNAL MEDICINE

## 2022-08-09 LAB
MDC_IDC_LEAD_IMPLANT_DT: NORMAL
MDC_IDC_LEAD_IMPLANT_DT: NORMAL
MDC_IDC_LEAD_LOCATION: NORMAL
MDC_IDC_LEAD_LOCATION: NORMAL
MDC_IDC_LEAD_LOCATION_DETAIL_1: NORMAL
MDC_IDC_LEAD_LOCATION_DETAIL_1: NORMAL
MDC_IDC_LEAD_MFG: NORMAL
MDC_IDC_LEAD_MFG: NORMAL
MDC_IDC_LEAD_MODEL: NORMAL
MDC_IDC_LEAD_MODEL: NORMAL
MDC_IDC_LEAD_POLARITY_TYPE: NORMAL
MDC_IDC_LEAD_POLARITY_TYPE: NORMAL
MDC_IDC_LEAD_SERIAL: NORMAL
MDC_IDC_LEAD_SERIAL: NORMAL
MDC_IDC_MSMT_BATTERY_REMAINING_LONGEVITY: 48 MO
MDC_IDC_MSMT_BATTERY_STATUS: NORMAL
MDC_IDC_MSMT_BATTERY_VOLTAGE: 2.98 V
MDC_IDC_MSMT_LEADCHNL_RA_IMPEDANCE_VALUE: 475 OHM
MDC_IDC_MSMT_LEADCHNL_RA_PACING_THRESHOLD_AMPLITUDE: 0.5 V
MDC_IDC_MSMT_LEADCHNL_RA_PACING_THRESHOLD_AMPLITUDE: 0.5 V
MDC_IDC_MSMT_LEADCHNL_RA_PACING_THRESHOLD_PULSEWIDTH: 0.5 MS
MDC_IDC_MSMT_LEADCHNL_RA_PACING_THRESHOLD_PULSEWIDTH: 0.5 MS
MDC_IDC_MSMT_LEADCHNL_RA_SENSING_INTR_AMPL: 5 MV
MDC_IDC_MSMT_LEADCHNL_RV_IMPEDANCE_VALUE: 400 OHM
MDC_IDC_MSMT_LEADCHNL_RV_PACING_THRESHOLD_AMPLITUDE: 0.75 V
MDC_IDC_MSMT_LEADCHNL_RV_PACING_THRESHOLD_AMPLITUDE: 0.75 V
MDC_IDC_MSMT_LEADCHNL_RV_PACING_THRESHOLD_PULSEWIDTH: 0.5 MS
MDC_IDC_MSMT_LEADCHNL_RV_PACING_THRESHOLD_PULSEWIDTH: 0.5 MS
MDC_IDC_MSMT_LEADCHNL_RV_SENSING_INTR_AMPL: 6.5 MV
MDC_IDC_PG_IMPLANT_DTM: NORMAL
MDC_IDC_PG_MFG: NORMAL
MDC_IDC_PG_MODEL: NORMAL
MDC_IDC_PG_SERIAL: NORMAL
MDC_IDC_PG_TYPE: NORMAL
MDC_IDC_SESS_CLINIC_NAME: NORMAL
MDC_IDC_SESS_DTM: NORMAL
MDC_IDC_SESS_TYPE: NORMAL
MDC_IDC_SET_BRADY_AT_MODE_SWITCH_MODE: NORMAL
MDC_IDC_SET_BRADY_AT_MODE_SWITCH_RATE: 170 {BEATS}/MIN
MDC_IDC_SET_BRADY_HYSTRATE: NORMAL
MDC_IDC_SET_BRADY_LOWRATE: 60 {BEATS}/MIN
MDC_IDC_SET_BRADY_MAX_SENSOR_RATE: 130 {BEATS}/MIN
MDC_IDC_SET_BRADY_MAX_TRACKING_RATE: 120 {BEATS}/MIN
MDC_IDC_SET_BRADY_MODE: NORMAL
MDC_IDC_SET_BRADY_NIGHT_RATE: NORMAL
MDC_IDC_SET_BRADY_PAV_DELAY_LOW: 250 MS
MDC_IDC_SET_BRADY_SAV_DELAY_LOW: 250 MS
MDC_IDC_SET_LEADCHNL_RA_PACING_AMPLITUDE: 2 V
MDC_IDC_SET_LEADCHNL_RA_PACING_CAPTURE_MODE: NORMAL
MDC_IDC_SET_LEADCHNL_RA_PACING_POLARITY: NORMAL
MDC_IDC_SET_LEADCHNL_RA_PACING_PULSEWIDTH: 0.5 MS
MDC_IDC_SET_LEADCHNL_RA_SENSING_ADAPTATION_MODE: NORMAL
MDC_IDC_SET_LEADCHNL_RA_SENSING_POLARITY: NORMAL
MDC_IDC_SET_LEADCHNL_RA_SENSING_SENSITIVITY: 0.3 MV
MDC_IDC_SET_LEADCHNL_RV_PACING_AMPLITUDE: 2 V
MDC_IDC_SET_LEADCHNL_RV_PACING_CAPTURE_MODE: NORMAL
MDC_IDC_SET_LEADCHNL_RV_PACING_POLARITY: NORMAL
MDC_IDC_SET_LEADCHNL_RV_PACING_PULSEWIDTH: 0.5 MS
MDC_IDC_SET_LEADCHNL_RV_SENSING_POLARITY: NORMAL
MDC_IDC_SET_LEADCHNL_RV_SENSING_SENSITIVITY: 0.5 MV
MDC_IDC_STAT_AT_MODE_SW_COUNT: 0
MDC_IDC_STAT_BRADY_RA_PERCENT_PACED: 21 %
MDC_IDC_STAT_BRADY_RV_PERCENT_PACED: 93 %

## 2022-09-16 ENCOUNTER — LAB (OUTPATIENT)
Dept: LAB | Facility: CLINIC | Age: 87
End: 2022-09-16
Payer: COMMERCIAL

## 2022-09-16 ENCOUNTER — ANTICOAGULATION THERAPY VISIT (OUTPATIENT)
Dept: ANTICOAGULATION | Facility: CLINIC | Age: 87
End: 2022-09-16

## 2022-09-16 DIAGNOSIS — Z86.718 HISTORY OF DEEP VENOUS THROMBOSIS: Primary | ICD-10-CM

## 2022-09-16 DIAGNOSIS — Z86.718 HISTORY OF DEEP VENOUS THROMBOSIS: ICD-10-CM

## 2022-09-16 LAB — INR BLD: 2.6 (ref 0.9–1.1)

## 2022-09-16 PROCEDURE — 85610 PROTHROMBIN TIME: CPT

## 2022-09-16 PROCEDURE — 36416 COLLJ CAPILLARY BLOOD SPEC: CPT

## 2022-09-16 NOTE — PROGRESS NOTES
ANTICOAGULATION MANAGEMENT     Victorino Khan 88 year old male is on warfarin with therapeutic INR result. (Goal INR 2.0-3.0)    Recent labs: (last 7 days)     09/16/22  1003   INR 2.6*       ASSESSMENT       Source(s): Chart Review and Patient/Caregiver Call       Warfarin doses taken: Warfarin taken as instructed    Diet: No new diet changes identified    New illness, injury, or hospitalization: No    Medication/supplement changes: None noted    Signs or symptoms of bleeding or clotting: No    Previous INR: Therapeutic last 2(+) visits    Additional findings: None       PLAN     Recommended plan for no diet, medication or health factor changes affecting INR     Dosing Instructions: Continue your current warfarin dose with next INR in 6 weeks       Summary  As of 9/16/2022    Full warfarin instructions:  2.5 mg every Mon, Fri; 4 mg all other days   Next INR check:  10/28/2022             Telephone call with Victorino who verbalizes understanding and agrees to plan    Lab visit scheduled    Education provided: None required    Plan made per [facility] anticoagulation protocol    Luís Williamson RN  Anticoagulation Clinic  9/16/2022    _______________________________________________________________________     Anticoagulation Episode Summary     Current INR goal:  2.0-3.0   TTR:  94.8 % (1 y)   Target end date:  Indefinite   Send INR reminders to:  JOSEFINA MIDWAY    Indications    History of deep venous thrombosis [Z86.718]           Comments:           Anticoagulation Care Providers     Provider Role Specialty Phone number    Jez Jack MD Referring Internal Medicine [phone]

## 2022-10-28 ENCOUNTER — ANTICOAGULATION THERAPY VISIT (OUTPATIENT)
Dept: ANTICOAGULATION | Facility: CLINIC | Age: 87
End: 2022-10-28

## 2022-10-28 ENCOUNTER — LAB (OUTPATIENT)
Dept: LAB | Facility: CLINIC | Age: 87
End: 2022-10-28
Payer: COMMERCIAL

## 2022-10-28 DIAGNOSIS — Z86.718 HISTORY OF DEEP VENOUS THROMBOSIS: Primary | ICD-10-CM

## 2022-10-28 DIAGNOSIS — Z86.718 HISTORY OF DEEP VENOUS THROMBOSIS: ICD-10-CM

## 2022-10-28 LAB — INR BLD: 4.7 (ref 0.9–1.1)

## 2022-10-28 PROCEDURE — 85610 PROTHROMBIN TIME: CPT

## 2022-10-28 PROCEDURE — 36416 COLLJ CAPILLARY BLOOD SPEC: CPT

## 2022-10-28 NOTE — PROGRESS NOTES
ANTICOAGULATION MANAGEMENT     Victorino Khan 88 year old male is on warfarin with supratherapeutic INR result. (Goal INR 2.0-3.0)    Recent labs: (last 7 days)     10/28/22  1006   INR 4.7*       ASSESSMENT       Source(s): Chart Review and Patient/Caregiver Call       Warfarin doses taken: Warfarin taken as instructed    Diet: trip may be affecting diet and INR on vacation last 10 days in [place]    New illness, injury, or hospitalization: No    Medication/supplement changes: None noted    Signs or symptoms of bleeding or clotting: No    Previous INR: Therapeutic last 2(+) visits    Additional findings: None       PLAN     Recommended plan for no diet, medication or health factor changes affecting INR     Dosing Instructions: hold 2 doses then continue your current warfarin dose with next INR in 1 week       Summary  As of 10/28/2022    Full warfarin instructions:  10/28: Hold; 10/29: Hold; Otherwise 2.5 mg every Mon, Fri; 4 mg all other days; Starting 10/28/2022   Next INR check:  11/4/2022             Telephone call with Victorino who verbalizes understanding and agrees to plan    Lab visit scheduled    Education provided:     Contact [phone] with any changes, questions or concerns.     Plan made per [facility] anticoagulation protocol    Luís Williamson RN  Anticoagulation Clinic  10/28/2022    _______________________________________________________________________     Anticoagulation Episode Summary     Current INR goal:  2.0-3.0   TTR:  85.5 % (1 y)   Target end date:  Indefinite   Send INR reminders to:  [facility]    Indications    History of deep venous thrombosis [Z86.718]           Comments:           Anticoagulation Care Providers     Provider Role Specialty Phone number    Jez Jack MD Referring Internal Medicine [phone]

## 2022-10-30 ENCOUNTER — HEALTH MAINTENANCE LETTER (OUTPATIENT)
Age: 87
End: 2022-10-30

## 2022-11-04 ENCOUNTER — TELEPHONE (OUTPATIENT)
Dept: INTERNAL MEDICINE | Facility: CLINIC | Age: 87
End: 2022-11-04

## 2022-11-04 ENCOUNTER — VIRTUAL VISIT (OUTPATIENT)
Dept: FAMILY MEDICINE | Facility: CLINIC | Age: 87
End: 2022-11-04
Payer: COMMERCIAL

## 2022-11-04 DIAGNOSIS — I49.5 SICK SINUS SYNDROME (H): ICD-10-CM

## 2022-11-04 DIAGNOSIS — J44.89 CHRONIC OBSTRUCTIVE AIRWAY DISEASE WITH ASTHMA (H): ICD-10-CM

## 2022-11-04 DIAGNOSIS — I82.290 BRACHIOCEPHALIC VEIN THROMBOSIS (H): ICD-10-CM

## 2022-11-04 DIAGNOSIS — U07.1 INFECTION DUE TO 2019 NOVEL CORONAVIRUS: Primary | ICD-10-CM

## 2022-11-04 DIAGNOSIS — Z86.718 HISTORY OF DEEP VENOUS THROMBOSIS: Primary | ICD-10-CM

## 2022-11-04 PROCEDURE — 99214 OFFICE O/P EST MOD 30 MIN: CPT | Mod: 95 | Performed by: PHYSICIAN ASSISTANT

## 2022-11-04 RX ORDER — ALBUTEROL SULFATE 90 UG/1
2 AEROSOL, METERED RESPIRATORY (INHALATION) EVERY 6 HOURS
Qty: 18 G | Refills: 0 | Status: SHIPPED | OUTPATIENT
Start: 2022-11-04

## 2022-11-04 ASSESSMENT — ASTHMA QUESTIONNAIRES
QUESTION_2 LAST FOUR WEEKS HOW OFTEN HAVE YOU HAD SHORTNESS OF BREATH: ONCE OR TWICE A WEEK
ACT_TOTALSCORE: 22
QUESTION_4 LAST FOUR WEEKS HOW OFTEN HAVE YOU USED YOUR RESCUE INHALER OR NEBULIZER MEDICATION (SUCH AS ALBUTEROL): NOT AT ALL
QUESTION_3 LAST FOUR WEEKS HOW OFTEN DID YOUR ASTHMA SYMPTOMS (WHEEZING, COUGHING, SHORTNESS OF BREATH, CHEST TIGHTNESS OR PAIN) WAKE YOU UP AT NIGHT OR EARLIER THAN USUAL IN THE MORNING: NOT AT ALL
QUESTION_5 LAST FOUR WEEKS HOW WOULD YOU RATE YOUR ASTHMA CONTROL: WELL CONTROLLED
ACT_TOTALSCORE: 22
QUESTION_1 LAST FOUR WEEKS HOW MUCH OF THE TIME DID YOUR ASTHMA KEEP YOU FROM GETTING AS MUCH DONE AT WORK, SCHOOL OR AT HOME: A LITTLE OF THE TIME

## 2022-11-04 NOTE — TELEPHONE ENCOUNTER
Reason for Call:  Other call back    Detailed comments: Patient is covid positive and had an appointment this morning to discuss paxlovid treatment. There seems to be some confusion about whether the paxlovid is indicated for him because of possible reactions with other medications. He also wants to know when he should get his next INR done. Please call to discuss with patient.    Phone Number Patient can be reached at: Cell number on file:    Telephone Information:   Mobile [phone]       Best Time: any    Can we leave a detailed message on this number? YES    Call taken on 11/4/2022 at 2:16 PM by Kathy Alvarez

## 2022-11-04 NOTE — PROGRESS NOTES
Victorino is a 88 year old who is being evaluated via a billable video visit.      How would you like to obtain your AVS? MyChart  If the video visit is dropped, the invitation should be resent by: Send to e-mail at: [email or web address]  Will anyone else be joining your video visit? Yes, wife Virginia        Assessment & Plan     Infection due to 2019 novel coronavirus  Clinical presentation and labs are consistent with a COVID-19 infection.  Outpatient medication options were discussed with the patient.  We mutually agreed to proceed with Paxlovid, but patient will discuss option with INR clinic today prior to starting medicine. Risks vs benefits discussed with patient at length, but prefers to give it a try.  Patient's renal function is normal. Rescue inhaler (albuterol) sent to pharmacy as well.  - nirmatrelvir and ritonavir (PAXLOVID) therapy pack; Take 3 tablets by mouth 2 times daily for 5 days (Take 2 Nirmatrelvir tablets and 1 Ritonavir tablet twice daily for 5 days)    Chronic obstructive airway disease with asthma (H)  Patient should switch from Advair to alternative daily inhaler pending insurance coverage - Dulera sent to pharmacy with this note as well; ok to use rescue inhaler as needed with new prescription sent to pharmacy.  - albuterol (PROAIR HFA/PROVENTIL HFA/VENTOLIN HFA) 108 (90 Base) MCG/ACT inhaler; Inhale 2 puffs into the lungs every 6 hours    Sick sinus syndrome (H)  Brachiocephalic vein thrombosis (H)  Known issue that I take into account for their medical decisions, no current exacerbations or new concerns.  Patient to discuss with INR clinic and monitor levels closely.      Review of prior external note(s) from - previous routine PCP notes  30+ minutes spent on the date of the encounter doing chart review, history and exam, documentation and further activities per the note       BMI:   Estimated body mass index is 27.48 kg/m  as calculated from the following:    Height as of 4/21/22: 1.803 m  "(5' 11\").    Weight as of 4/21/22: 89.4 kg (197 lb).   Weight management plan: Discussed healthy diet and exercise guidelines    Patient Instructions     Clinical presentation and labs are consistent with a COVID-19 infection.  Outpatient medication options were discussed with the patient.  We mutually agreed to proceed with Plaxlovid.  Patient will discuss with INR clinic prior to starting paxlovid due to warfarin.  Patient's renal function is normal.    Patient should also use alternative to previous Advair as daily inhaler while on paxlovid- will send prescription for DULERA, pending insurance coverage due other further interactions.        FACT SHEET FOR PATIENTS, PARENTS, AND CAREGIVERS  EMERGENCY USE AUTHORIZATION (EUA) OF PAXLOVID  FOR CORONAVIRUS DISEASE 2019 (COVID-19)  You are being given this Fact Sheet because your healthcare provider believes it is necessary to provide you with PAXLOVID for the treatment of mild-to-moderate coronavirus disease (COVID-19) caused by the SARS-CoV-2 virus. This Fact Sheet contains information to help you understand the risks and benefits of taking the PAXLOVID you have received or may receive.    The U.S. Food and Drug Administration (FDA) has issued an Emergency Use Authorization (EUA) to make PAXLOVID available during the COVID-19 pandemic (for more details about an EUA please see  What is an Emergency Use Authorization?     at the end of this document). PAXLOVID is not an FDA-approved medicine in the [place]. Read this Fact Sheet for information about PAXLOVID. Talk to your healthcare provider about your options or if you have any questions. It is your choice to take PAXLOVID.    What is COVID-19?  COVID-19 is caused by a virus called a coronavirus. You can get COVID-19 through close contact with another person who has the virus. COVID-19 illnesses have ranged from very mild-to-severe, including illness resulting in death. While information so far suggests that " most COVID-19 illness is mild, serious  illness can happen and may cause some of your other medical conditions to become worse. Older people and people of all ages with severe, long lasting (chronic) medical conditions like heart disease, lung disease, and diabetes, for example seem to be at higher risk of being hospitalized for COVID-19.    What is PAXLOVID?  PAXLOVID is an investigational medicine used to treat mild-to-moderate COVID-19 in adults and children [12 years of age and older weighing at least 88 pounds (40 kg)] with positive results of direct SARS-CoV-2 viral testing, and who are at high risk for progression to severe COVID-19, including hospitalization or death. PAXLOVID is investigational because it is still being studied. There is limited information about the  safety and effectiveness of using PAXLOVID to treat people with mild-to-moderate COVID-19.    The FDA has authorized the emergency use of PAXLOVID for the treatment of mild-tomoderate COVID-19 in adults and children [12 years of age and older weighing at least 88 pounds (40 kg)] with a positive test for the virus that causes COVID-19, and who are at high risk for progression to severe COVID-19, including hospitalization or death, under an EUA.    What should I tell my healthcare provider before I take PAXLOVID?  Tell your healthcare provider if you:    Have any allergies    Have liver or kidney disease    Are pregnant or plan to become pregnant    Are breastfeeding a child    Have any serious illnesses    Tell your healthcare provider about all the medicines you take, including prescription and over-the-counter medicines, vitamins, and herbal supplements. Some medicines may interact with PAXLOVID and may cause serious side effects. Keep a list of your medicines to show your healthcare provider and pharmacist when you get a new medicine.    You can ask your healthcare provider or pharmacist for a list of medicines that interact with PAXLOVID.  Do not start taking a new medicine without telling your healthcare provider. Your healthcare provider can tell you if it is safe to take PAXLOVID with other medicines.    Tell your healthcare provider if you are taking combined hormonal contraceptive. PAXLOVID may affect how your birth control pills work. Females who are able to become pregnant should use another effective alternative form of contraception or an additional barrier method of contraception. Talk to your healthcare provider if you have any questions about contraceptive methods that might be right for you.    How do I take PAXLOVID?    PAXLOVID consists of 2 medicines: nirmatrelvir and ritonavir.   o Take 2 pink tablets of nirmatrelvir with 1 white tablet of ritonavir by mouth  2 times each day (in the morning and in the evening) for 5 days. For each  dose, take all 3 tablets at the same time.  o If you have kidney disease, talk to your healthcare provider. You  may need a different dose.    Swallow the tablets whole. Do not chew, break, or crush the tablets.    Take PAXLOVID with or without food.    Do not stop taking PAXLOVID without talking to your healthcare provider, even if  you feel better.      If you miss a dose of PAXLOVID within 8 hours of the time it is usually taken,  take it as soon as you remember. If you miss a dose by more than 8 hours, skip  the missed dose and take the next dose at your regular time. Do not take  2 doses of PAXLOVID at the same time.    If you take too much PAXLOVID, call your healthcare provider or go to the  nearest hospital emergency room right away.    If you are taking a ritonavir- or cobicistat-containing medicine to treat hepatitis C  or Human Immunodeficiency Virus (HIV), you should continue to take your  medicine as prescribed by your healthcare provider.        Talk to your healthcare provider if you do not feel better or if you feel worse after  5 days.  Who should generally not take PAXLOVID?  Do not take  PAXLOVID if:    You are allergic to nirmatrelvir, ritonavir, or any of the ingredients in PAXLOVID.    You are taking any of the following medicines:  o Alfuzosin  o Pethidine, propoxyphene  o Ranolazine  o Amiodarone, dronedarone, flecainide, propafenone, quinidine  o Colchicine  o Lurasidone, pimozide, clozapine  o Dihydroergotamine, ergotamine, methylergonovine  o Lovastatin, simvastatin  o Sildenafil (Revatio ) for pulmonary arterial hypertension (PAH)  o Triazolam, oral midazolam  o Apalutamide  o Carbamazepine, phenobarbital, phenytoin  o Rifampin  o Eli s Wort (hypericum perforatum)  Taking PAXLOVID with these medicines may cause serious or life-threatening side  effects or affect how PAXLOVID works.  These are not the only medicines that may cause serious side effects if taken with  PAXLOVID. PAXLOVID may increase or decrease the levels of multiple other  medicines. It is very important to tell your healthcare provider about all of the medicines  you are taking because additional laboratory tests or changes in the dose of your other  medicines may be necessary while you are taking PAXLOVID. Your healthcare provider  may also tell you about specific symptoms to watch out for that may indicate that you  need to stop or decrease the dose of some of your other medicines.    What are the important possible side effects of PAXLOVID?  Possible side effects of PAXLOVID are:    Allergic Reactions. Allergic reactions can happen in people taking  PAXLOVID, even after only 1 dose. Stop taking PAXLOVID and call your  healthcare provider right away if you get any of the following symptoms of an  allergic reaction:  o hives  o trouble swallowing or breathing  o swelling of the mouth, lips, or face  o throat tightness  o hoarseness   o skin rash    Liver Problems. Tell your healthcare provider right away if you have any of  these signs and symptoms of liver problems: loss of appetite, yellowing of your  skin and the  whites of eyes (jaundice), dark-colored urine, pale colored stools  and itchy skin, stomach area (abdominal) pain.    Resistance to HIV Medicines. If you have untreated HIV infection, PAXLOVID  may lead to some HIV medicines not working as well in the future.    Other possible side effects include:  o altered sense of taste  o diarrhea  o high blood pressure  o muscle aches  These are not all the possible side effects of PAXLOVID. Not many people have taken  PAXLOVID. Serious and unexpected side effects may happen. PAXLOVID is still being  studied, so it is possible that all of the risks are not known at this time.    What other treatment choices are there?  Veklury (remdesivir) is FDA-approved for the treatment of mild-to-moderate COVID-19  in certain adults and children. Talk with your doctor to see if Veklury is appropriate for  you.  Like PAXLOVID, FDA may also allow for the emergency use of other medicines to treat  people with COVID-19. Go to https://www.fda.gov/emergency-preparedness-andresponse/mcm-legal-regulatory-and-policy-framework/emergency-use-authorization for information on the emergency use of other medicines that are authorized by FDA to treat people with COVID-19. Your healthcare provider may talk with you about clinical  trials for which you may be eligible.  It is your choice to be treated or not to be treated with PAXLOVID. Should you decide  not to receive it or for your child not to receive it, it will not change your standard  medical care.  What if I am pregnant or breastfeeding?  There is no experience treating pregnant women or breastfeeding mothers with  PAXLOVID. For a mother and unborn baby, the benefit of taking PAXLOVID may be  greater than the risk from the treatment. If you are pregnant, discuss your options and  specific situation with your healthcare provider.  It is recommended that you use effective barrier contraception or do not have sexual  activity while taking PAXLOVID. If  you are breastfeeding, discuss your options and specific situation with your healthcare provider.     How do I report side effects with PAXLOVID?  Contact your healthcare provider if you have any side effects that bother you or do not  go away.  Report side effects to FDA MedWatch at www.fda.gov/medwatch or call 3-734-AYQ6335 or you can report side effects to [employer] at the contact information provided  below.  Website Fax number Telephone number  [employer] [phone]    How should I store PAXLOVID?  Store PAXLOVID tablets at room temperature, between 68?F to 77?F (20?C to 25?C).  How can I learn more about COVID-19?    Ask your healthcare provider.    Visit https://www.cdc.gov/COVID19.    Contact your local or state public health department.    What is an Emergency Use Authorization (EUA)?  The [place] FDA has made PAXLOVID available under an emergency access  mechanism called an Emergency Use Authorization (EUA). The EUA is supported by a  [place] of Health and Human Service (HHS) declaration that circumstances exist to  justify the emergency use of drugs and biological products during the COVID-19  pandemic.    PAXLOVID for the treatment of mild-to-moderate COVID-19 in adults and children  [12 years of age and older weighing at least 88 pounds (40 kg)] with positive results of  direct SARS-CoV-2 viral testing, and who are at high risk for progression to severe  COVID-19, including hospitalization or death, has not undergone the same type of  review as an FDA-approved product. In issuing an EUA under the COVID-19 public  health emergency, the FDA has determined, among other things, that based on the  total amount of scientific evidence available including data from adequate and  well-controlled clinical trials, if available, it is reasonable to believe that the product may  be effective for diagnosing, treating, or preventing COVID-19, or a serious  or  life-threatening disease or condition caused by COVID-19; that the known and potential  benefits of the product, when used to diagnose, treat, or prevent such disease or  condition, outweigh the known and potential risks of such product; and that there are no  adequate, approved, and available alternatives.    All of these criteria must be met to allow for the product to be used in the treatment of  patients during the COVID-19 pandemic. The EUA for PAXLOVID is in effect for the  duration of the COVID-19 declaration justifying emergency use of this product, unless  terminated or revoked (after which the products may no longer be used under the  EUA).        Additional Information  For general questions, visit the website or call the telephone number provided below.  Website Telephone number  www[employer]Go [employer]  [phone]  (1-446-C89-QQYP)  You can also go to www.G[employer] or call [phone] for more information.    Discharge Instructions for COVID-19 Patients  You have--or may have--COVID-19. Please follow the instructions listed below.   If you have a weakened immune system, discuss with your doctor any other actions you need to take.  How can I protect others?  If you have symptoms (fever, cough, body aches or trouble breathing):  1. Stay home and away from others (self-isolate) until:  ? Your other symptoms have resolved (gotten better). And   ? You've had no fever--and no medicine that reduces fever--for 1 full day (24 hours). And   ? At least 10 days have passed since your symptoms started. (You may need to wait 20 days. Follow the advice of your care team.)  If you don't show symptoms, but testing showed that you have COVID-19:    Stay home and away from others (self-isolate) until at least 10 days have passed since the date of your first positive COVID-19 test.  During this time  1. Stay in your own room, even for meals. Use your own bathroom if you can.  2. Stay away from others in  "your home. No hugging, kissing or shaking hands. No visitors.  3. Don't go to work, school or anywhere else.  4. Clean \"high touch\" surfaces often (doorknobs, counters, handles). Use household cleaning spray or wipes.  5. You'll find a full list of  on the EPA website: www.epa.gov/pesticide-registration/list-n-disinfectants-use-against-sars-cov-2.  6. Cover your mouth and nose with a mask or other face covering to avoid spreading germs.  7. Wash your hands and face often. Use soap and water.  8. Caregivers in these groups are at risk for severe illness due to COVID-19:  1. People 65 years and older  2. People who live in a nursing home or long-term care facility  3. People with chronic disease (lung, heart, cancer, diabetes, kidney, liver, immunologic)  4. People who have a weakened immune system, including those who:    Are in cancer treatment    Take medicine that weakens the immune system, such as corticosteroids    Had a bone marrow or organ transplant    Have an immune deficiency    Have poorly controlled HIV or AIDS    Are obese (body mass index of 40 or higher)    Smoke regularly  9. Caregivers should wear gloves while washing dishes, handling laundry and cleaning bedrooms and bathrooms.  10. Use caution when washing and drying laundry: Don't shake dirty laundry and use the warmest water setting that you can.  11. For more tips on managing your health at home, go to www.cdc.gov/coronavirus/2019-ncov/downloads/10Things.pdf.  How can I take care of myself at home?  1. Get lots of rest. Drink extra fluids (unless a doctor has told you not to).  2. Take Tylenol (acetaminophen) for fever or pain. If you have liver or kidney problems, ask your family doctor if it's okay to take Tylenol.   Adults can take either:   ? 650 mg (two 325 mg pills) every 4 to 6 hours, or   ? 1,000 mg (two 500 mg pills) every 8 hours as needed.  ? Note: Don't take more than 3,000 mg in one day. Acetaminophen is found in many " medicines (both prescribed and over-the-counter medicines). Read all labels to be sure you don't take too much.   For children, check the Tylenol bottle for the right dose. The dose is based on the child's age or weight.  3. If you have other health problems (like cancer, heart failure, an organ transplant or severe kidney disease): Call your specialty clinic if you don't feel better in the next 2 days.  4. Know when to call 911. Emergency warning signs include:  ? Trouble breathing or shortness of breath  ? Pain or pressure in the chest that doesn't go away  ? Feeling confused like you haven't felt before, or not being able to wake up  ? Bluish-colored lips or face  5. Your doctor may have prescribed a blood thinner medicine. Follow their instructions.  Where can I get more information?  1. [facility] - About COVID-19:   [email or web address]  2. [facility] - What to Do If You're Sick: www.cdc.gov/coronavirus/2019-ncov/about/steps-when-sick.html  3. CDC - Ending Home Isolation: www.cdc.gov/coronavirus/2019-ncov/hcp/disposition-in-home-patients.html  4. CDC - Caring for Someone: www.cdc.gov/coronavirus/2019-ncov/if-you-are-sick/care-for-someone.html  5. [facility] - Interim Guidance for Hospital Discharge to Home: www.health.[facility].[place].us/diseases/coronavirus/hcp/hospdischarge.pdf  6. Below are the COVID-19 hotlines at the [facility] ([facility]). Interpreters are available.  ? For health questions: Call 917-786-7801 or 1-281.217.2011 (7 a.m. to 7 p.m.)  ? For questions about schools and childcare: Call 053-813-7650 or 1-453.165.6758 (7 a.m. to 7 p.m.)    For informational purposes only. Not to replace the advice of your health care provider. Clinically reviewed by Dr. Prosper Rashid.   Copyright   2020 [employer]. All rights reserved. Loopback 550886 - REV 01/05/21.      Did you know?      You can schedule a video visit for follow-up appointments as well as future appointments  "for certain conditions.  Please see the below link.     Video Visits ([email or web address])     If you have not already done so,  I encourage you to sign up for [email or web address] ([email or web address]).  This will allow you to review your results, securely communicate with a provider, and schedule virtual visits as well.    COVID-19 positive patient.  Encounter for consideration of medication intervention. Patient does qualify for a prescription. Full discussion with patient including medication options, risks and benefits. Potential drug interactions reviewed with patient.     Treatment Planned Paxlovid, Rx sent to local pharmacy    Temporary change to home medications:   Patient should switch ADVAIR to DULERA - new prescription sent to pharmacy.  Patient should discuss with INR clinic regarding possible interactions with warfarin.    Estimated body mass index is 27.48 kg/m  as calculated from the following:    Height as of 4/21/22: 1.803 m (5' 11\").    Weight as of 4/21/22: 89.4 kg (197 lb).  GFR Estimate   Date Value Ref Range Status   04/22/2022 71 >60 mL/min/1.73m2 Final     Comment:     Effective December 21, 2021 eGFRcr in adults is calculated using the 2021 CKD-EPI creatinine equation which includes age and gender (Erik et al., NEJ, DOI: 10.1056/PHSWxc0284640)   06/14/2021 >60 >60 mL/min/1.73m2 Final   10/13/2017 73 >60 mL/min/1.7m2 Final     Comment:     Non  GFR Calc     Lab Results   Component Value Date    SMCPG51AQX Negative 04/18/2022       Return in about 4 weeks (around 12/2/2022) for Follow up, with PCP, or sooner with worsening symptoms.    JO[facility]    Dinorah Gleason is a 88 year old accompanied by his spouse, presenting for the following health issues:  Suspected Covid      HPI       COVID-19 Symptom Review  How many days ago did these symptoms start? 5 days (10/30/22); tested positive last night (11/3/22)    Are any " "of the following symptoms significant for you?  New or worsening difficulty breathing? Yes    Please describe what kind of difficulty you are having breathing:Mild dyspnea (able to do ADLs without difficulty, mild shortness of breath with activities such as climbing one or two flights of stairs or walking briskly)    Worsening cough? Yes, I am coughing up mucus.    Fever or chills? No    Headache: YES    Sore throat: No    Chest pain: No    Diarrhea: No    Body aches? YES    What treatments has patient tried? Acetaminophen   Does patient live in a nursing home, group home, or shelter? No  Does patient have a way to get food/medications during quarantined? Yes, I have a friend or family member who can help me.            Patient still with body aches, sinus/eye pressure.  Patient breathing ok, but increased activity more shortness of breath.  Deep coughs  No fever, chills, night sweats. No nausea, vomiting, diarrhea.    Patient has had ALL COVID vaccines.      Review of Systems   Constitutional, HEENT, cardiovascular, pulmonary, GI, , musculoskeletal, neuro, skin, endocrine and psych systems are negative, except as otherwise noted.      Objective    Vitals - Patient Reported  Weight (Patient Reported): 89.4 kg (197 lb)  Height (Patient Reported): 180.3 cm (5' 11\")  BMI (Based on Pt Reported Ht/Wt): 27.48        Physical Exam   GENERAL: Healthy, alert and no distress  EYES: Eyes grossly normal to inspection.  No discharge or erythema, or obvious scleral/conjunctival abnormalities.  RESP: No audible wheeze, cough, or visible cyanosis.  No visible retractions or increased work of breathing.    SKIN: Visible skin clear. No significant rash, abnormal pigmentation or lesions.  NEURO: Cranial nerves grossly intact.  Mentation and speech appropriate for age.  PSYCH: Mentation appears normal, affect normal/bright, judgement and insight intact, normal speech and appearance well-groomed.            Video-Visit " Details    Video Start Time: 11:10 AM    Type of service:  Video Visit    Video End Time:11:29 AM    Originating Location (pt. Location): Home        Distant Location (provider location):  On-site    Platform used for Video Visit: Swathi

## 2022-11-04 NOTE — PATIENT INSTRUCTIONS
Clinical presentation and labs are consistent with a COVID-19 infection.  Outpatient medication options were discussed with the patient.  We mutually agreed to proceed with Plaxlovid.  Patient will discuss with INR clinic prior to starting paxlovid due to warfarin.  Patient's renal function is normal.    Patient should also use alternative to previous Advair as daily inhaler while on paxlovid- will send prescription for DULERA, pending insurance coverage due other further interactions.        FACT SHEET FOR PATIENTS, PARENTS, AND CAREGIVERS  EMERGENCY USE AUTHORIZATION (EUA) OF PAXLOVID  FOR CORONAVIRUS DISEASE 2019 (COVID-19)  You are being given this Fact Sheet because your healthcare provider believes it is necessary to provide you with PAXLOVID for the treatment of mild-to-moderate coronavirus disease (COVID-19) caused by the SARS-CoV-2 virus. This Fact Sheet contains information to help you understand the risks and benefits of taking the PAXLOVID you have received or may receive.    The U.S. Food and Drug Administration (FDA) has issued an Emergency Use Authorization (EUA) to make PAXLOVID available during the COVID-19 pandemic (for more details about an EUA please see  What is an Emergency Use Authorization?     at the end of this document). PAXLOVID is not an FDA-approved medicine in the [place]. Read this Fact Sheet for information about PAXLOVID. Talk to your healthcare provider about your options or if you have any questions. It is your choice to take PAXLOVID.    What is COVID-19?  COVID-19 is caused by a virus called a coronavirus. You can get COVID-19 through close contact with another person who has the virus. COVID-19 illnesses have ranged from very mild-to-severe, including illness resulting in death. While information so far suggests that most COVID-19 illness is mild, serious  illness can happen and may cause some of your other medical conditions to become worse. Older people and people of  all ages with severe, long lasting (chronic) medical conditions like heart disease, lung disease, and diabetes, for example seem to be at higher risk of being hospitalized for COVID-19.    What is PAXLOVID?  PAXLOVID is an investigational medicine used to treat mild-to-moderate COVID-19 in adults and children [12 years of age and older weighing at least 88 pounds (40 kg)] with positive results of direct SARS-CoV-2 viral testing, and who are at high risk for progression to severe COVID-19, including hospitalization or death. PAXLOVID is investigational because it is still being studied. There is limited information about the  safety and effectiveness of using PAXLOVID to treat people with mild-to-moderate COVID-19.    The FDA has authorized the emergency use of PAXLOVID for the treatment of mild-tomoderate COVID-19 in adults and children [12 years of age and older weighing at least 88 pounds (40 kg)] with a positive test for the virus that causes COVID-19, and who are at high risk for progression to severe COVID-19, including hospitalization or death, under an EUA.    What should I tell my healthcare provider before I take PAXLOVID?  Tell your healthcare provider if you:  ? Have any allergies  ? Have liver or kidney disease  ? Are pregnant or plan to become pregnant  ? Are breastfeeding a child  ? Have any serious illnesses    Tell your healthcare provider about all the medicines you take, including prescription and over-the-counter medicines, vitamins, and herbal supplements. Some medicines may interact with PAXLOVID and may cause serious side effects. Keep a list of your medicines to show your healthcare provider and pharmacist when you get a new medicine.    You can ask your healthcare provider or pharmacist for a list of medicines that interact with PAXLOVID. Do not start taking a new medicine without telling your healthcare provider. Your healthcare provider can tell you if it is safe to take PAXLOVID with other  medicines.    Tell your healthcare provider if you are taking combined hormonal contraceptive. PAXLOVID may affect how your birth control pills work. Females who are able to become pregnant should use another effective alternative form of contraception or an additional barrier method of contraception. Talk to your healthcare provider if you have any questions about contraceptive methods that might be right for you.    How do I take PAXLOVID?  ? PAXLOVID consists of 2 medicines: nirmatrelvir and ritonavir.   o Take 2 pink tablets of nirmatrelvir with 1 white tablet of ritonavir by mouth  2 times each day (in the morning and in the evening) for 5 days. For each  dose, take all 3 tablets at the same time.  o If you have kidney disease, talk to your healthcare provider. You  may need a different dose.  ? Swallow the tablets whole. Do not chew, break, or crush the tablets.  ? Take PAXLOVID with or without food.  ? Do not stop taking PAXLOVID without talking to your healthcare provider, even if  you feel better.    ? If you miss a dose of PAXLOVID within 8 hours of the time it is usually taken,  take it as soon as you remember. If you miss a dose by more than 8 hours, skip  the missed dose and take the next dose at your regular time. Do not take  2 doses of PAXLOVID at the same time.  ? If you take too much PAXLOVID, call your healthcare provider or go to the  nearest hospital emergency room right away.  ? If you are taking a ritonavir- or cobicistat-containing medicine to treat hepatitis C  or Human Immunodeficiency Virus (HIV), you should continue to take your  medicine as prescribed by your healthcare provider.        Talk to your healthcare provider if you do not feel better or if you feel worse after  5 days.  Who should generally not take PAXLOVID?  Do not take PAXLOVID if:  ? You are allergic to nirmatrelvir, ritonavir, or any of the ingredients in PAXLOVID.  ? You are taking any of the following medicines:  o  Alfuzosin  o Pethidine, propoxyphene  o Ranolazine  o Amiodarone, dronedarone, flecainide, propafenone, quinidine  o Colchicine  o Lurasidone, pimozide, clozapine  o Dihydroergotamine, ergotamine, methylergonovine  o Lovastatin, simvastatin  o Sildenafil (Revatio ) for pulmonary arterial hypertension (PAH)  o Triazolam, oral midazolam  o Apalutamide  o Carbamazepine, phenobarbital, phenytoin  o Rifampin  o Hiram s Wort (hypericum perforatum)  Taking PAXLOVID with these medicines may cause serious or life-threatening side  effects or affect how PAXLOVID works.  These are not the only medicines that may cause serious side effects if taken with  PAXLOVID. PAXLOVID may increase or decrease the levels of multiple other  medicines. It is very important to tell your healthcare provider about all of the medicines  you are taking because additional laboratory tests or changes in the dose of your other  medicines may be necessary while you are taking PAXLOVID. Your healthcare provider  may also tell you about specific symptoms to watch out for that may indicate that you  need to stop or decrease the dose of some of your other medicines.    What are the important possible side effects of PAXLOVID?  Possible side effects of PAXLOVID are:  ? Allergic Reactions. Allergic reactions can happen in people taking  PAXLOVID, even after only 1 dose. Stop taking PAXLOVID and call your  healthcare provider right away if you get any of the following symptoms of an  allergic reaction:  o hives  o trouble swallowing or breathing  o swelling of the mouth, lips, or face  o throat tightness  o hoarseness   o skin rash  ? Liver Problems. Tell your healthcare provider right away if you have any of  these signs and symptoms of liver problems: loss of appetite, yellowing of your  skin and the whites of eyes (jaundice), dark-colored urine, pale colored stools  and itchy skin, stomach area (abdominal) pain.  ? Resistance to HIV Medicines. If you  have untreated HIV infection, PAXLOVID  may lead to some HIV medicines not working as well in the future.  ? Other possible side effects include:  o altered sense of taste  o diarrhea  o high blood pressure  o muscle aches  These are not all the possible side effects of PAXLOVID. Not many people have taken  PAXLOVID. Serious and unexpected side effects may happen. PAXLOVID is still being  studied, so it is possible that all of the risks are not known at this time.    What other treatment choices are there?  Veklury (remdesivir) is FDA-approved for the treatment of mild-to-moderate COVID-19  in certain adults and children. Talk with your doctor to see if Veklury is appropriate for  you.  Like PAXLOVID, FDA may also allow for the emergency use of other medicines to treat  people with COVID-19. Go to https://www.fda.gov/emergency-preparedness-andresponse/mcm-legal-regulatory-and-policy-framework/emergency-use-authorization for information on the emergency use of other medicines that are authorized by FDA to treat people with COVID-19. Your healthcare provider may talk with you about clinical  trials for which you may be eligible.  It is your choice to be treated or not to be treated with PAXLOVID. Should you decide  not to receive it or for your child not to receive it, it will not change your standard  medical care.  What if I am pregnant or breastfeeding?  There is no experience treating pregnant women or breastfeeding mothers with  PAXLOVID. For a mother and unborn baby, the benefit of taking PAXLOVID may be  greater than the risk from the treatment. If you are pregnant, discuss your options and  specific situation with your healthcare provider.  It is recommended that you use effective barrier contraception or do not have sexual  activity while taking PAXLOVID. If you are breastfeeding, discuss your options and specific situation with your healthcare provider.     How do I report side effects with  PAXLOVID?  Contact your healthcare provider if you have any side effects that bother you or do not  go away.  Report side effects to FDA [employer] at www.fda.gov/medwatch or call 9-596-NRX3383 or you can report side effects to [employer] at the contact information provided  below.  Website Fax number Telephone number  [employer] [phone]    How should I store PAXLOVID?  Store PAXLOVID tablets at room temperature, between 68?F to 77?F (20?C to 25?C).  How can I learn more about COVID-19?  ? Ask your healthcare provider.  ? Visit https://www.cdc.gov/COVID19.  ? Contact your local or state public health department.    What is an Emergency Use Authorization (EUA)?  The [place] FDA has made PAXLOVID available under an emergency access  mechanism called an Emergency Use Authorization (EUA). The EUA is supported by a   of Health and Human Service (HHS) declaration that circumstances exist to  justify the emergency use of drugs and biological products during the COVID-19  pandemic.    PAXLOVID for the treatment of mild-to-moderate COVID-19 in adults and children  [12 years of age and older weighing at least 88 pounds (40 kg)] with positive results of  direct SARS-CoV-2 viral testing, and who are at high risk for progression to severe  COVID-19, including hospitalization or death, has not undergone the same type of  review as an FDA-approved product. In issuing an EUA under the COVID-19 public  health emergency, the FDA has determined, among other things, that based on the  total amount of scientific evidence available including data from adequate and  well-controlled clinical trials, if available, it is reasonable to believe that the product may  be effective for diagnosing, treating, or preventing COVID-19, or a serious or  life-threatening disease or condition caused by COVID-19; that the known and potential  benefits of the product, when used to diagnose, treat, or  "prevent such disease or  condition, outweigh the known and potential risks of such product; and that there are no  adequate, approved, and available alternatives.    All of these criteria must be met to allow for the product to be used in the treatment of  patients during the COVID-19 pandemic. The EUA for PAXLOVID is in effect for the  duration of the COVID-19 declaration justifying emergency use of this product, unless  terminated or revoked (after which the products may no longer be used under the  EUA).        Additional Information  For general questions, visit the website or call the telephone number provided below.  Website Telephone number  [email or web address]  [phone]  ([phone])  You can also go to www.[employer] or call [phone] for more information.    Discharge Instructions for COVID-19 Patients  You have--or may have--COVID-19. Please follow the instructions listed below.   If you have a weakened immune system, discuss with your doctor any other actions you need to take.  How can I protect others?  If you have symptoms (fever, cough, body aches or trouble breathing):  Stay home and away from others (self-isolate) until:  Your other symptoms have resolved (gotten better). And   You've had no fever--and no medicine that reduces fever--for 1 full day (24 hours). And   At least 10 days have passed since your symptoms started. (You may need to wait 20 days. Follow the advice of your care team.)  If you don't show symptoms, but testing showed that you have COVID-19:  Stay home and away from others (self-isolate) until at least 10 days have passed since the date of your first positive COVID-19 test.  During this time  Stay in your own room, even for meals. Use your own bathroom if you can.  Stay away from others in your home. No hugging, kissing or shaking hands. No visitors.  Don't go to work, school or anywhere else.  Clean \"high touch\" surfaces often (doorknobs, counters, " handles). Use household cleaning spray or wipes.  You'll find a full list of  on the EPA website: www.epa.gov/pesticide-registration/list-n-disinfectants-use-against-sars-cov-2.  Cover your mouth and nose with a mask or other face covering to avoid spreading germs.  Wash your hands and face often. Use soap and water.  Caregivers in these groups are at risk for severe illness due to COVID-19:  People 65 years and older  People who live in a nursing home or long-term care facility  People with chronic disease (lung, heart, cancer, diabetes, kidney, liver, immunologic)  People who have a weakened immune system, including those who:  Are in cancer treatment  Take medicine that weakens the immune system, such as corticosteroids  Had a bone marrow or organ transplant  Have an immune deficiency  Have poorly controlled HIV or AIDS  Are obese (body mass index of 40 or higher)  Smoke regularly  Caregivers should wear gloves while washing dishes, handling laundry and cleaning bedrooms and bathrooms.  Use caution when washing and drying laundry: Don't shake dirty laundry and use the warmest water setting that you can.  For more tips on managing your health at home, go to www.cdc.gov/coronavirus/2019-ncov/downloads/10Things.pdf.  How can I take care of myself at home?  Get lots of rest. Drink extra fluids (unless a doctor has told you not to).  Take Tylenol (acetaminophen) for fever or pain. If you have liver or kidney problems, ask your family doctor if it's okay to take Tylenol.   Adults can take either:   650 mg (two 325 mg pills) every 4 to 6 hours, or   1,000 mg (two 500 mg pills) every 8 hours as needed.  Note: Don't take more than 3,000 mg in one day. Acetaminophen is found in many medicines (both prescribed and over-the-counter medicines). Read all labels to be sure you don't take too much.   For children, check the Tylenol bottle for the right dose. The dose is based on the child's age or weight.  If you have  other health problems (like cancer, heart failure, an organ transplant or severe kidney disease): Call your specialty clinic if you don't feel better in the next 2 days.  Know when to call 911. Emergency warning signs include:  Trouble breathing or shortness of breath  Pain or pressure in the chest that doesn't go away  Feeling confused like you haven't felt before, or not being able to wake up  Bluish-colored lips or face  Your doctor may have prescribed a blood thinner medicine. Follow their instructions.  Where can I get more information?  [facility] - About COVID-19:   [email or web address]  CDC - What to Do If You're Sick: www.cdc.gov/coronavirus/2019-ncov/about/steps-when-sick.html  CDC - Ending Home Isolation: www.cdc.gov/coronavirus/2019-ncov/hcp/disposition-in-home-patients.html  Formerly named [facility] - Caring for Someone: www.cdc.gov/coronavirus/2019-ncov/if-you-are-sick/care-for-someone.html  [facility] - Interim Guidance for Hospital Discharge to Home: www.[facility].mn./diseases/coronavirus/hcp/hospdischarge.pdf  Below are the COVID-19 hotlines at the [facility] Health ([facility]). Interpreters are available.  For health questions: Call 734-292-0348 or [phone] (7 a.m. to 7 p.m.)  For questions about schools and childcare: Call 569-689-3353 or 1-474.556.7296 (7 a.m. to 7 p.m.)    For informational purposes only. Not to replace the advice of your health care provider. Clinically reviewed by Dr. Prosper Rashid.   Copyright   2020 [employer]. All rights reserved. [employer] [id number] - REV 01/05/21.      Did you know?      You can schedule a video visit for follow-up appointments as well as future appointments for certain conditions.  Please see the below link.     Video Visits ([email or web address])     If you have not already done so,  I encourage you to sign up for [employer] ([email or web address]).  This will allow you to review your results, securely communicate  with a provider, and schedule virtual visits as well.

## 2022-11-07 ENCOUNTER — LAB (OUTPATIENT)
Dept: LAB | Facility: CLINIC | Age: 87
End: 2022-11-07
Payer: COMMERCIAL

## 2022-11-07 ENCOUNTER — ANTICOAGULATION THERAPY VISIT (OUTPATIENT)
Dept: ANTICOAGULATION | Facility: CLINIC | Age: 87
End: 2022-11-07

## 2022-11-07 ENCOUNTER — ANCILLARY PROCEDURE (OUTPATIENT)
Dept: CARDIOLOGY | Facility: CLINIC | Age: 87
End: 2022-11-07
Attending: INTERNAL MEDICINE
Payer: COMMERCIAL

## 2022-11-07 ENCOUNTER — TELEPHONE (OUTPATIENT)
Dept: FAMILY MEDICINE | Facility: CLINIC | Age: 87
End: 2022-11-07

## 2022-11-07 DIAGNOSIS — Z86.718 HISTORY OF DEEP VENOUS THROMBOSIS: ICD-10-CM

## 2022-11-07 DIAGNOSIS — Z95.0 CARDIAC PACEMAKER IN SITU: Primary | ICD-10-CM

## 2022-11-07 DIAGNOSIS — Z86.718 HISTORY OF DEEP VENOUS THROMBOSIS: Primary | ICD-10-CM

## 2022-11-07 DIAGNOSIS — Z95.0 CARDIAC PACEMAKER IN SITU: ICD-10-CM

## 2022-11-07 LAB — INR BLD: 1.7 (ref 0.9–1.1)

## 2022-11-07 PROCEDURE — 36416 COLLJ CAPILLARY BLOOD SPEC: CPT

## 2022-11-07 PROCEDURE — 93294 REM INTERROG EVL PM/LDLS PM: CPT | Performed by: INTERNAL MEDICINE

## 2022-11-07 PROCEDURE — 85610 PROTHROMBIN TIME: CPT

## 2022-11-07 PROCEDURE — 93296 REM INTERROG EVL PM/IDS: CPT | Performed by: INTERNAL MEDICINE

## 2022-11-07 NOTE — PROGRESS NOTES
ANTICOAGULATION MANAGEMENT     Victorino Khan 88 year old male is on warfarin with subtherapeutic INR result. (Goal INR 2.0-3.0)    Recent labs: (last 7 days)     11/07/22  1253   INR 1.7*       ASSESSMENT       Source(s): Chart Review and Patient/Caregiver Call       Warfarin doses taken: Warfarin taken as instructed    Diet: No new diet changes identified    New illness, injury, or hospitalization: No    Medication/supplement changes: paxlovid 11/4-11/9    Signs or symptoms of bleeding or clotting: No    Previous INR: Supratherapeutic    Additional findings: None       PLAN     Recommended plan for no diet, medication or health factor changes affecting INR     Dosing Instructions: booster dose then continue your current warfarin dose with next INR in 2 weeks       Summary  As of 11/7/2022    Full warfarin instructions:  2.5 mg every Mon, Fri; 4 mg all other days; Starting 11/7/2022   Next INR check:  11/21/2022             Detailed voice message left for Victorino with dosing instructions and follow up date.     Contact [phone] to schedule and with any changes, questions or concerns.     Education provided:     Please call back if any changes to your diet, medications or how you've been taking warfarin    Plan made per [facility] anticoagulation protocol    Luís Williamson RN  Anticoagulation Clinic  11/7/2022    _______________________________________________________________________     Anticoagulation Episode Summary     Current INR goal:  2.0-3.0   TTR:  83.7 % (1 y)   Target end date:  Indefinite   Send INR reminders to:  [facility]    Indications    History of deep venous thrombosis [Z86.718]           Comments:           Anticoagulation Care Providers     Provider Role Specialty Phone number    Jez Jack MD Referring Internal Medicine [phone]

## 2022-11-07 NOTE — TELEPHONE ENCOUNTER
Prior Authorization Retail Medication Request    Medication/Dose: albuterol (PROAIR HFA/PROVENTIL HFA/VENTOLIN HFA) 108 (90 Base) MCG/ACT inhaler  ICD code (if different than what is on RX):  Chronic obstructive airway disease with asthma (H) [J44.9]   Previously Tried and Failed:  unknown  Rationale:  unknown    Insurance Name:  [employer]  Insurance ID:  [id number]      Pharmacy Information (if different than what is on RX)  Name:  [facility] pharmacy  Phone:  [phone]

## 2022-11-08 NOTE — TELEPHONE ENCOUNTER
Central Prior Authorization Team   Phone: [phone]      PA NOT NEEDED    Medication: albuterol (PROAIR HFA/PROVENTIL HFA/VENTOLIN HFA) 108 (90 Base) MCG/ACT inhaler-PA NOT NEEDED  Insurance Company: [employer] - Phone [phone] Fax [phone]  Pharmacy Filling the Rx: [id number] [place], [place] - [address]  Filling Pharmacy Phone: [phone]  Filling Pharmacy Fax:    Start Date: 11/8/2022    Called pharmacy to verify which product they are running, per tech, PA not needed, they found one that was covered and processed.  Patient has already picked up.

## 2022-11-09 LAB
MDC_IDC_EPISODE_DTM: NORMAL
MDC_IDC_EPISODE_DURATION: 26 S
MDC_IDC_EPISODE_ID: NORMAL
MDC_IDC_EPISODE_TYPE: NORMAL
MDC_IDC_EPISODE_VENDOR_TYPE: NORMAL
MDC_IDC_LEAD_IMPLANT_DT: NORMAL
MDC_IDC_LEAD_IMPLANT_DT: NORMAL
MDC_IDC_LEAD_LOCATION: NORMAL
MDC_IDC_LEAD_LOCATION: NORMAL
MDC_IDC_LEAD_LOCATION_DETAIL_1: NORMAL
MDC_IDC_LEAD_LOCATION_DETAIL_1: NORMAL
MDC_IDC_LEAD_MFG: NORMAL
MDC_IDC_LEAD_MFG: NORMAL
MDC_IDC_LEAD_MODEL: NORMAL
MDC_IDC_LEAD_MODEL: NORMAL
MDC_IDC_LEAD_POLARITY_TYPE: NORMAL
MDC_IDC_LEAD_POLARITY_TYPE: NORMAL
MDC_IDC_LEAD_SERIAL: NORMAL
MDC_IDC_LEAD_SERIAL: NORMAL
MDC_IDC_MSMT_BATTERY_DTM: NORMAL
MDC_IDC_MSMT_BATTERY_REMAINING_LONGEVITY: 44 MO
MDC_IDC_MSMT_BATTERY_REMAINING_PERCENTAGE: 45 %
MDC_IDC_MSMT_BATTERY_RRT_TRIGGER: NORMAL
MDC_IDC_MSMT_BATTERY_STATUS: NORMAL
MDC_IDC_MSMT_BATTERY_VOLTAGE: 2.98 V
MDC_IDC_MSMT_LEADCHNL_RA_IMPEDANCE_VALUE: 440 OHM
MDC_IDC_MSMT_LEADCHNL_RA_LEAD_CHANNEL_STATUS: NORMAL
MDC_IDC_MSMT_LEADCHNL_RA_PACING_THRESHOLD_AMPLITUDE: 0.38 V
MDC_IDC_MSMT_LEADCHNL_RA_PACING_THRESHOLD_PULSEWIDTH: 0.5 MS
MDC_IDC_MSMT_LEADCHNL_RA_SENSING_INTR_AMPL: 5 MV
MDC_IDC_MSMT_LEADCHNL_RV_IMPEDANCE_VALUE: 380 OHM
MDC_IDC_MSMT_LEADCHNL_RV_LEAD_CHANNEL_STATUS: NORMAL
MDC_IDC_MSMT_LEADCHNL_RV_PACING_THRESHOLD_AMPLITUDE: 0.75 V
MDC_IDC_MSMT_LEADCHNL_RV_PACING_THRESHOLD_PULSEWIDTH: 0.5 MS
MDC_IDC_MSMT_LEADCHNL_RV_SENSING_INTR_AMPL: 5.4 MV
MDC_IDC_PG_IMPLANT_DTM: NORMAL
MDC_IDC_PG_MFG: NORMAL
MDC_IDC_PG_MODEL: NORMAL
MDC_IDC_PG_SERIAL: NORMAL
MDC_IDC_PG_TYPE: NORMAL
MDC_IDC_SESS_CLINIC_NAME: NORMAL
MDC_IDC_SESS_DTM: NORMAL
MDC_IDC_SESS_REPROGRAMMED: NO
MDC_IDC_SESS_TYPE: NORMAL
MDC_IDC_SET_BRADY_AT_MODE_SWITCH_MODE: NORMAL
MDC_IDC_SET_BRADY_AT_MODE_SWITCH_RATE: 170 {BEATS}/MIN
MDC_IDC_SET_BRADY_LOWRATE: 60 {BEATS}/MIN
MDC_IDC_SET_BRADY_MAX_SENSOR_RATE: 130 {BEATS}/MIN
MDC_IDC_SET_BRADY_MAX_TRACKING_RATE: 120 {BEATS}/MIN
MDC_IDC_SET_BRADY_MODE: NORMAL
MDC_IDC_SET_BRADY_PAV_DELAY_LOW: 250 MS
MDC_IDC_SET_BRADY_SAV_DELAY_LOW: 250 MS
MDC_IDC_SET_LEADCHNL_RA_PACING_AMPLITUDE: 2 V
MDC_IDC_SET_LEADCHNL_RA_PACING_ANODE_ELECTRODE_1: NORMAL
MDC_IDC_SET_LEADCHNL_RA_PACING_ANODE_LOCATION_1: NORMAL
MDC_IDC_SET_LEADCHNL_RA_PACING_CAPTURE_MODE: NORMAL
MDC_IDC_SET_LEADCHNL_RA_PACING_CATHODE_ELECTRODE_1: NORMAL
MDC_IDC_SET_LEADCHNL_RA_PACING_CATHODE_LOCATION_1: NORMAL
MDC_IDC_SET_LEADCHNL_RA_PACING_POLARITY: NORMAL
MDC_IDC_SET_LEADCHNL_RA_PACING_PULSEWIDTH: 0.5 MS
MDC_IDC_SET_LEADCHNL_RA_SENSING_ADAPTATION_MODE: NORMAL
MDC_IDC_SET_LEADCHNL_RA_SENSING_ANODE_ELECTRODE_1: NORMAL
MDC_IDC_SET_LEADCHNL_RA_SENSING_ANODE_LOCATION_1: NORMAL
MDC_IDC_SET_LEADCHNL_RA_SENSING_CATHODE_ELECTRODE_1: NORMAL
MDC_IDC_SET_LEADCHNL_RA_SENSING_CATHODE_LOCATION_1: NORMAL
MDC_IDC_SET_LEADCHNL_RA_SENSING_POLARITY: NORMAL
MDC_IDC_SET_LEADCHNL_RA_SENSING_SENSITIVITY: 0.3 MV
MDC_IDC_SET_LEADCHNL_RV_PACING_AMPLITUDE: 2 V
MDC_IDC_SET_LEADCHNL_RV_PACING_ANODE_ELECTRODE_1: NORMAL
MDC_IDC_SET_LEADCHNL_RV_PACING_ANODE_LOCATION_1: NORMAL
MDC_IDC_SET_LEADCHNL_RV_PACING_CAPTURE_MODE: NORMAL
MDC_IDC_SET_LEADCHNL_RV_PACING_CATHODE_ELECTRODE_1: NORMAL
MDC_IDC_SET_LEADCHNL_RV_PACING_CATHODE_LOCATION_1: NORMAL
MDC_IDC_SET_LEADCHNL_RV_PACING_POLARITY: NORMAL
MDC_IDC_SET_LEADCHNL_RV_PACING_PULSEWIDTH: 0.5 MS
MDC_IDC_SET_LEADCHNL_RV_SENSING_ADAPTATION_MODE: NORMAL
MDC_IDC_SET_LEADCHNL_RV_SENSING_ANODE_ELECTRODE_1: NORMAL
MDC_IDC_SET_LEADCHNL_RV_SENSING_ANODE_LOCATION_1: NORMAL
MDC_IDC_SET_LEADCHNL_RV_SENSING_CATHODE_ELECTRODE_1: NORMAL
MDC_IDC_SET_LEADCHNL_RV_SENSING_CATHODE_LOCATION_1: NORMAL
MDC_IDC_SET_LEADCHNL_RV_SENSING_POLARITY: NORMAL
MDC_IDC_SET_LEADCHNL_RV_SENSING_SENSITIVITY: 0.5 MV
MDC_IDC_STAT_AT_BURDEN_PERCENT: 1 %
MDC_IDC_STAT_AT_DTM_END: NORMAL
MDC_IDC_STAT_AT_DTM_START: NORMAL
MDC_IDC_STAT_AT_MODE_SW_COUNT: 11
MDC_IDC_STAT_AT_MODE_SW_COUNT_PER_DAY: 0
MDC_IDC_STAT_AT_MODE_SW_MAX_DURATION: 74 S
MDC_IDC_STAT_AT_MODE_SW_PERCENT_TIME: 1 %
MDC_IDC_STAT_BRADY_AP_VP_PERCENT: 21 %
MDC_IDC_STAT_BRADY_AP_VS_PERCENT: 1 %
MDC_IDC_STAT_BRADY_AS_VP_PERCENT: 71 %
MDC_IDC_STAT_BRADY_AS_VS_PERCENT: 6.3 %
MDC_IDC_STAT_BRADY_DTM_END: NORMAL
MDC_IDC_STAT_BRADY_DTM_START: NORMAL
MDC_IDC_STAT_BRADY_RA_PERCENT_PACED: 21 %
MDC_IDC_STAT_BRADY_RV_PERCENT_PACED: 93 %
MDC_IDC_STAT_CRT_DTM_END: NORMAL
MDC_IDC_STAT_CRT_DTM_START: NORMAL
MDC_IDC_STAT_HEART_RATE_ATRIAL_MAX: 330 {BEATS}/MIN
MDC_IDC_STAT_HEART_RATE_ATRIAL_MEAN: 88 {BEATS}/MIN
MDC_IDC_STAT_HEART_RATE_ATRIAL_MIN: 40 {BEATS}/MIN
MDC_IDC_STAT_HEART_RATE_DTM_END: NORMAL
MDC_IDC_STAT_HEART_RATE_DTM_START: NORMAL
MDC_IDC_STAT_HEART_RATE_VENTRICULAR_MAX: 240 {BEATS}/MIN
MDC_IDC_STAT_HEART_RATE_VENTRICULAR_MEAN: 86 {BEATS}/MIN
MDC_IDC_STAT_HEART_RATE_VENTRICULAR_MIN: 40 {BEATS}/MIN

## 2022-11-28 ENCOUNTER — TELEPHONE (OUTPATIENT)
Dept: ANTICOAGULATION | Facility: CLINIC | Age: 87
End: 2022-11-28

## 2022-11-28 NOTE — TELEPHONE ENCOUNTER
ANTICOAGULATION     Victorino Khan is overdue for Chromogenic Factor X check.      Left message for patient to call and schedule lab appointment as soon as possible. If returning call, please schedule.     Luís Williamson RN

## 2022-11-30 ENCOUNTER — TRANSFERRED RECORDS (OUTPATIENT)
Dept: HEALTH INFORMATION MANAGEMENT | Facility: CLINIC | Age: 87
End: 2022-11-30

## 2022-12-01 ENCOUNTER — LAB (OUTPATIENT)
Dept: LAB | Facility: CLINIC | Age: 87
End: 2022-12-01
Payer: COMMERCIAL

## 2022-12-01 ENCOUNTER — ANTICOAGULATION THERAPY VISIT (OUTPATIENT)
Dept: ANTICOAGULATION | Facility: CLINIC | Age: 87
End: 2022-12-01

## 2022-12-01 ENCOUNTER — TELEPHONE (OUTPATIENT)
Dept: INTERNAL MEDICINE | Facility: CLINIC | Age: 87
End: 2022-12-01

## 2022-12-01 DIAGNOSIS — Z86.718 HISTORY OF DEEP VENOUS THROMBOSIS: Primary | ICD-10-CM

## 2022-12-01 DIAGNOSIS — Z86.718 HISTORY OF DEEP VENOUS THROMBOSIS: ICD-10-CM

## 2022-12-01 LAB — INR BLD: 3.5 (ref 0.9–1.1)

## 2022-12-01 PROCEDURE — 36416 COLLJ CAPILLARY BLOOD SPEC: CPT

## 2022-12-01 PROCEDURE — 85610 PROTHROMBIN TIME: CPT

## 2022-12-01 NOTE — PROGRESS NOTES
ANTICOAGULATION MANAGEMENT     Victorino Khan 88 year old male is on warfarin with supratherapeutic INR result. (Goal INR 2.0-3.0)    Recent labs: (last 7 days)     12/01/22  1312   INR 3.5*       ASSESSMENT       Source(s): Chart Review, Patient/Caregiver Call and Template       Warfarin doses taken: Warfarin taken as instructed    Diet: No new diet changes identified    New illness, injury, or hospitalization: No    Medication/supplement changes: None noted    Signs or symptoms of bleeding or clotting: No    Previous INR: Subtherapeutic    Additional findings: None       PLAN     Recommended plan for no diet, medication or health factor changes affecting INR     Dosing Instructions: hold dose then decrease your warfarin dose (6% change) with next INR in 2 weeks       Summary  As of 12/1/2022    Full warfarin instructions:  12/1: Hold; Otherwise 2.5 mg every Mon, Wed, Fri; 4 mg all other days; Starting 12/1/2022   Next INR check:  12/15/2022             Telephone call with Victorino who verbalizes understanding and agrees to plan    Lab visit scheduled    Education provided:     Contact [phone] with any changes, questions or concerns.     Plan made per [facility] anticoagulation protocol    Luís Williamson RN  Anticoagulation Clinic  12/1/2022    _______________________________________________________________________     Anticoagulation Episode Summary     Current INR goal:  2.0-3.0   TTR:  80.8 % (1 y)   Target end date:  Indefinite   Send INR reminders to:  [facility]    Indications    History of deep venous thrombosis [Z86.718]           Comments:           Anticoagulation Care Providers     Provider Role Specialty Phone number    Jez Jack MD Referring Internal Medicine [phone]

## 2022-12-01 NOTE — TELEPHONE ENCOUNTER
Patient Returning Call    Reason for call:  INR call back    Information relayed to patient:  Will get a call back     Patient has additional questions:  Yes    What are your questions/concerns:  Patients needs to make sure his dosing is right     Could we send this information to you in [facility] or would you prefer to receive a phone call?:   Patient would prefer a phone call   Okay to leave a detailed message?: Yes at Home number on file [phone] (home)

## 2022-12-02 ENCOUNTER — DOCUMENTATION ONLY (OUTPATIENT)
Dept: ANTICOAGULATION | Facility: CLINIC | Age: 87
End: 2022-12-02

## 2022-12-02 DIAGNOSIS — Z86.718 HISTORY OF DEEP VENOUS THROMBOSIS: Primary | ICD-10-CM

## 2022-12-02 DIAGNOSIS — D68.51 HETEROZYGOUS FACTOR V LEIDEN MUTATION (H): ICD-10-CM

## 2022-12-02 NOTE — PROGRESS NOTES
ANTICOAGULATION CLINIC REFERRAL RENEWAL REQUEST       An annual renewal order is required for all patients referred to [facility].?  Please review and sign the pended referral order for Victorino Khan.       ANTICOAGULATION SUMMARY      Warfarin indication(s)   DVT    Mechanical heart valve present?  NO       Current goal range   INR: 2.0-3.0     Goal appropriate for indication? Goal INR 2-3, standard for indication(s) above     Time in Therapeutic Range (TTR)  (Goal > 60%) 80.8%       Office visit with referring provider's group within last year yes on 3/15/22       Luís Williamson RN  [facility]

## 2022-12-06 DIAGNOSIS — Z51.81 ENCOUNTER FOR THERAPEUTIC DRUG MONITORING: ICD-10-CM

## 2022-12-06 DIAGNOSIS — M19.049 ARTHRITIS OF HAND: Primary | ICD-10-CM

## 2022-12-19 ENCOUNTER — LAB (OUTPATIENT)
Dept: LAB | Facility: CLINIC | Age: 87
End: 2022-12-19
Payer: COMMERCIAL

## 2022-12-19 ENCOUNTER — ANTICOAGULATION THERAPY VISIT (OUTPATIENT)
Dept: ANTICOAGULATION | Facility: CLINIC | Age: 87
End: 2022-12-19

## 2022-12-19 DIAGNOSIS — Z86.718 HISTORY OF DEEP VENOUS THROMBOSIS: ICD-10-CM

## 2022-12-19 DIAGNOSIS — D68.51 HETEROZYGOUS FACTOR V LEIDEN MUTATION (H): ICD-10-CM

## 2022-12-19 DIAGNOSIS — Z86.718 HISTORY OF DEEP VENOUS THROMBOSIS: Primary | ICD-10-CM

## 2022-12-19 LAB — INR BLD: 1.3 (ref 0.9–1.1)

## 2022-12-19 PROCEDURE — 85610 PROTHROMBIN TIME: CPT

## 2022-12-19 PROCEDURE — 36416 COLLJ CAPILLARY BLOOD SPEC: CPT

## 2022-12-19 NOTE — PROGRESS NOTES
ANTICOAGULATION MANAGEMENT     Victorino Khan 88 year old male is on warfarin with subtherapeutic INR result. (Goal INR 2.0-3.0)    Recent labs: (last 7 days)     12/19/22  1308   INR 1.3*       ASSESSMENT       Source(s): Chart Review and Template       Warfarin doses taken: Warfarin taken as instructed    Diet: No new diet changes identified    New illness, injury, or hospitalization: No    Medication/supplement changes: None noted    Signs or symptoms of bleeding or clotting: No    Previous INR: Supratherapeutic    Additional findings: Refill needed today. Victorino meets all criteria for refill (current [facility] referral, office visit with referring provider/group in last year, lab monitoring up to date or not exceeding 2 weeks overdue). Rx instructions and quantity supplied updated to match patient's current dosing plan. Warfarin 90 day supply with 1 refill granted per [facility] protocol        PLAN     Recommended plan for no diet, medication or health factor changes affecting INR     Dosing Instructions: booster dose then Increase your warfarin dose (6.4% change) with next INR in 1 week       Summary  As of 12/19/2022    Full warfarin instructions:  12/19: 5 mg; Otherwise 2.5 mg every Mon, Fri; 4 mg all other days; Starting 12/19/2022   Next INR check:  12/26/2022             Detailed voice message left for Victorino with dosing instructions and follow up date.     Contact [phone] to schedule and with any changes, questions or concerns.     Education provided:     Please call back if any changes to your diet, medications or how you've been taking warfarin    Plan made per [facility] anticoagulation protocol    Luís Williamson RN  Anticoagulation Clinic  12/19/2022    _______________________________________________________________________     Anticoagulation Episode Summary     Current INR goal:  2.0-3.0   TTR:  78.1 % (1 y)   Target end date:  Indefinite   Send INR reminders to:  [facility]    Indications    History of deep  venous thrombosis [Z86.718]  Heterozygous factor V Leiden mutation (H) [D68.51]           Comments:           Anticoagulation Care Providers     Provider Role Specialty Phone number    Jez Jack MD Referring Internal Medicine [phone]

## 2022-12-20 RX ORDER — WARFARIN SODIUM 5 MG/1
TABLET ORAL
Qty: 30 TABLET | Refills: 1 | Status: SHIPPED | OUTPATIENT
Start: 2022-12-20 | End: 2024-02-01

## 2022-12-27 ENCOUNTER — ANTICOAGULATION THERAPY VISIT (OUTPATIENT)
Dept: ANTICOAGULATION | Facility: CLINIC | Age: 87
End: 2022-12-27

## 2022-12-27 ENCOUNTER — LAB (OUTPATIENT)
Dept: LAB | Facility: CLINIC | Age: 87
End: 2022-12-27
Payer: COMMERCIAL

## 2022-12-27 DIAGNOSIS — Z86.718 HISTORY OF DEEP VENOUS THROMBOSIS: Primary | ICD-10-CM

## 2022-12-27 DIAGNOSIS — Z86.718 HISTORY OF DEEP VENOUS THROMBOSIS: ICD-10-CM

## 2022-12-27 DIAGNOSIS — D68.51 HETEROZYGOUS FACTOR V LEIDEN MUTATION (H): ICD-10-CM

## 2022-12-27 LAB — INR BLD: 2.9 (ref 0.9–1.1)

## 2022-12-27 PROCEDURE — 85610 PROTHROMBIN TIME: CPT

## 2022-12-27 PROCEDURE — 36415 COLL VENOUS BLD VENIPUNCTURE: CPT

## 2022-12-27 NOTE — PROGRESS NOTES
ANTICOAGULATION MANAGEMENT     Victorino Khan 88 year old male is on warfarin with therapeutic INR result. (Goal INR 2.0-3.0)    Recent labs: (last 7 days)     12/27/22  1435   INR 2.9*       ASSESSMENT       Source(s): Chart Review and Patient/Caregiver Call       Warfarin doses taken: Less warfarin taken than planned which may be affecting INR reverted to previous dose    Diet: No new diet changes identified    New illness, injury, or hospitalization: No    Medication/supplement changes: None noted    Signs or symptoms of bleeding or clotting: No    Previous INR: Subtherapeutic    Additional findings: None       PLAN     Recommended plan for no diet, medication or health factor changes affecting INR     Dosing Instructions: Continue your current warfarin dose with next INR in 2 weeks       Summary  As of 12/27/2022    Full warfarin instructions:  2.5 mg every Mon, Wed, Fri; 4 mg all other days   Next INR check:  1/12/2023             Telephone call with Victorino who verbalizes understanding and agrees to plan    Check at provider office visit    Education provided:     Contact [phone] with any changes, questions or concerns.     Plan made per [facility] anticoagulation protocol    Luís Williamson RN  Anticoagulation Clinic  12/27/2022    _______________________________________________________________________     Anticoagulation Episode Summary     Current INR goal:  2.0-3.0   TTR:  77.1 % (1 y)   Target end date:  Indefinite   Send INR reminders to:  [facility]    Indications    History of deep venous thrombosis [Z86.718]  Heterozygous factor V Leiden mutation (H) [D68.51]           Comments:           Anticoagulation Care Providers     Provider Role Specialty Phone number    Jez Jack MD Referring Internal Medicine [phone]

## 2023-01-09 ENCOUNTER — TELEPHONE (OUTPATIENT)
Dept: INTERNAL MEDICINE | Facility: CLINIC | Age: 88
End: 2023-01-09

## 2023-01-09 NOTE — TELEPHONE ENCOUNTER
General Call    Contacts       Type Contact Phone/Fax    01/09/2023 12:27 PM CST Phone (Incoming) Victorino Khan (Self) [phone] (M)        Reason for Call:  Patient would like to speak to staff member    What are your questions or concerns:  Pt is concerned about appts on 1/12/23.    Date of last appointment with provider: NA    Could we send this information to you in [place] or would you prefer to receive a phone call?:   Patient would prefer a phone call   Okay to leave a detailed message?: Yes at Cell number on file:    Telephone Information:   Mobile [phone]

## 2023-01-12 ENCOUNTER — ANTICOAGULATION THERAPY VISIT (OUTPATIENT)
Dept: ANTICOAGULATION | Facility: CLINIC | Age: 88
End: 2023-01-12

## 2023-01-12 ENCOUNTER — OFFICE VISIT (OUTPATIENT)
Dept: INTERNAL MEDICINE | Facility: CLINIC | Age: 88
End: 2023-01-12
Payer: COMMERCIAL

## 2023-01-12 ENCOUNTER — LAB (OUTPATIENT)
Dept: LAB | Facility: CLINIC | Age: 88
End: 2023-01-12
Payer: COMMERCIAL

## 2023-01-12 VITALS
HEART RATE: 80 BPM | RESPIRATION RATE: 16 BRPM | BODY MASS INDEX: 26.85 KG/M2 | TEMPERATURE: 97 F | OXYGEN SATURATION: 94 % | WEIGHT: 191.8 LBS | SYSTOLIC BLOOD PRESSURE: 122 MMHG | HEIGHT: 71 IN | DIASTOLIC BLOOD PRESSURE: 64 MMHG

## 2023-01-12 DIAGNOSIS — Z79.01 CHRONIC ANTICOAGULATION: ICD-10-CM

## 2023-01-12 DIAGNOSIS — E03.9 ACQUIRED HYPOTHYROIDISM: ICD-10-CM

## 2023-01-12 DIAGNOSIS — Z95.0 STATUS CARDIAC PACEMAKER: ICD-10-CM

## 2023-01-12 DIAGNOSIS — K21.9 GASTROESOPHAGEAL REFLUX DISEASE WITHOUT ESOPHAGITIS: ICD-10-CM

## 2023-01-12 DIAGNOSIS — D68.51 HETEROZYGOUS FACTOR V LEIDEN MUTATION (H): ICD-10-CM

## 2023-01-12 DIAGNOSIS — M75.101 ROTATOR CUFF SYNDROME, RIGHT: ICD-10-CM

## 2023-01-12 DIAGNOSIS — J44.89 CHRONIC OBSTRUCTIVE AIRWAY DISEASE WITH ASTHMA (H): Primary | ICD-10-CM

## 2023-01-12 DIAGNOSIS — Z86.39 H/O REACTIVE HYPOGLYCEMIA: ICD-10-CM

## 2023-01-12 DIAGNOSIS — Z86.718 HISTORY OF DEEP VENOUS THROMBOSIS: ICD-10-CM

## 2023-01-12 DIAGNOSIS — Z23 ENCOUNTER FOR IMMUNIZATION: ICD-10-CM

## 2023-01-12 DIAGNOSIS — Z86.718 HISTORY OF DEEP VENOUS THROMBOSIS: Primary | ICD-10-CM

## 2023-01-12 DIAGNOSIS — D69.6 THROMBOCYTOPENIA (H): ICD-10-CM

## 2023-01-12 PROBLEM — M12.811 ROTATOR CUFF TEAR ARTHROPATHY OF RIGHT SHOULDER: Status: ACTIVE | Noted: 2022-08-12

## 2023-01-12 PROBLEM — I44.1 2ND DEGREE AV BLOCK: Status: RESOLVED | Noted: 2018-12-21 | Resolved: 2023-01-12

## 2023-01-12 PROBLEM — D68.9 COAGULATION DEFECT (H): Status: RESOLVED | Noted: 2017-10-17 | Resolved: 2023-01-12

## 2023-01-12 LAB
ALBUMIN SERPL BCG-MCNC: 4 G/DL (ref 3.5–5.2)
ALP SERPL-CCNC: 63 U/L (ref 40–129)
ALT SERPL W P-5'-P-CCNC: 15 U/L (ref 10–50)
ANION GAP SERPL CALCULATED.3IONS-SCNC: 12 MMOL/L (ref 7–15)
AST SERPL W P-5'-P-CCNC: 26 U/L (ref 10–50)
BILIRUB SERPL-MCNC: 1.3 MG/DL
BUN SERPL-MCNC: 27.1 MG/DL (ref 8–23)
CALCIUM SERPL-MCNC: 10 MG/DL (ref 8.8–10.2)
CHLORIDE SERPL-SCNC: 106 MMOL/L (ref 98–107)
CREAT SERPL-MCNC: 1.04 MG/DL (ref 0.67–1.17)
DEPRECATED HCO3 PLAS-SCNC: 25 MMOL/L (ref 22–29)
ERYTHROCYTE [DISTWIDTH] IN BLOOD BY AUTOMATED COUNT: 12.8 % (ref 10–15)
GFR SERPL CREATININE-BSD FRML MDRD: 69 ML/MIN/1.73M2
GLUCOSE SERPL-MCNC: 98 MG/DL (ref 70–99)
HBA1C MFR BLD: 5.3 % (ref 0–5.6)
HCT VFR BLD AUTO: 46 % (ref 40–53)
HGB BLD-MCNC: 15 G/DL (ref 13.3–17.7)
INR BLD: 1.9 (ref 0.9–1.1)
MCH RBC QN AUTO: 33.1 PG (ref 26.5–33)
MCHC RBC AUTO-ENTMCNC: 32.6 G/DL (ref 31.5–36.5)
MCV RBC AUTO: 102 FL (ref 78–100)
PLATELET # BLD AUTO: 130 10E3/UL (ref 150–450)
POTASSIUM SERPL-SCNC: 5.1 MMOL/L (ref 3.4–5.3)
PROT SERPL-MCNC: 6.2 G/DL (ref 6.4–8.3)
RBC # BLD AUTO: 4.53 10E6/UL (ref 4.4–5.9)
SODIUM SERPL-SCNC: 143 MMOL/L (ref 136–145)
TSH SERPL DL<=0.005 MIU/L-ACNC: 1.27 UIU/ML (ref 0.3–4.2)
WBC # BLD AUTO: 4.7 10E3/UL (ref 4–11)

## 2023-01-12 PROCEDURE — 90677 PCV20 VACCINE IM: CPT | Performed by: INTERNAL MEDICINE

## 2023-01-12 PROCEDURE — 36415 COLL VENOUS BLD VENIPUNCTURE: CPT

## 2023-01-12 PROCEDURE — 85610 PROTHROMBIN TIME: CPT

## 2023-01-12 PROCEDURE — 99214 OFFICE O/P EST MOD 30 MIN: CPT | Mod: 25 | Performed by: INTERNAL MEDICINE

## 2023-01-12 PROCEDURE — 83036 HEMOGLOBIN GLYCOSYLATED A1C: CPT | Performed by: INTERNAL MEDICINE

## 2023-01-12 PROCEDURE — 84443 ASSAY THYROID STIM HORMONE: CPT | Performed by: INTERNAL MEDICINE

## 2023-01-12 PROCEDURE — 85027 COMPLETE CBC AUTOMATED: CPT | Performed by: INTERNAL MEDICINE

## 2023-01-12 PROCEDURE — G0009 ADMIN PNEUMOCOCCAL VACCINE: HCPCS | Performed by: INTERNAL MEDICINE

## 2023-01-12 PROCEDURE — 80053 COMPREHEN METABOLIC PANEL: CPT | Performed by: INTERNAL MEDICINE

## 2023-01-12 NOTE — LETTER
January 20, 2023      Victorino MCGRATH Bill  [address] [place] [address]        Dear ,    We are writing to inform you of your test results.  Your tests are all good.  The minor abnormalities are not significant. There is no diabetes.     Resulted Orders   Hemoglobin A1c   Result Value Ref Range    Hemoglobin A1C 5.3 0.0 - 5.6 %      Comment:      Normal <5.7%   Prediabetes 5.7-6.4%    Diabetes 6.5% or higher     Note: Adopted from ADA consensus guidelines.   CBC with platelets   Result Value Ref Range    WBC Count 4.7 4.0 - 11.0 10e3/uL    RBC Count 4.53 4.40 - 5.90 10e6/uL    Hemoglobin 15.0 13.3 - 17.7 g/dL    Hematocrit 46.0 40.0 - 53.0 %     (H) 78 - 100 fL    MCH 33.1 (H) 26.5 - 33.0 pg    MCHC 32.6 31.5 - 36.5 g/dL    RDW 12.8 10.0 - 15.0 %    Platelet Count 130 (L) 150 - 450 10e3/uL   Comprehensive metabolic panel   Result Value Ref Range    Sodium 143 136 - 145 mmol/L    Potassium 5.1 3.4 - 5.3 mmol/L    Chloride 106 98 - 107 mmol/L    Carbon Dioxide (CO2) 25 22 - 29 mmol/L    Anion Gap 12 7 - 15 mmol/L    Urea Nitrogen 27.1 (H) 8.0 - 23.0 mg/dL    Creatinine 1.04 0.67 - 1.17 mg/dL    Calcium 10.0 8.8 - 10.2 mg/dL    Glucose 98 70 - 99 mg/dL    Alkaline Phosphatase 63 40 - 129 U/L    AST 26 10 - 50 U/L    ALT 15 10 - 50 U/L    Protein Total 6.2 (L) 6.4 - 8.3 g/dL    Albumin 4.0 3.5 - 5.2 g/dL    Bilirubin Total 1.3 (H) <=1.2 mg/dL    GFR Estimate 69 >60 mL/min/1.73m2      Comment:      Effective December 21, 2021 eGFRcr in adults is calculated using the 2021 CKD-EPI creatinine equation which includes age and gender (Erik hart al., NEJM, DOI: 10.1056/VQMGal0122783)   TSH with free T4 reflex   Result Value Ref Range    TSH 1.27 0.30 - 4.20 uIU/mL       If you have any questions or concerns, please call the clinic at the number listed above.       Sincerely,      Yuri Funes MD

## 2023-01-12 NOTE — PROGRESS NOTES
ANTICOAGULATION MANAGEMENT     Victorino Khan 88 year old male is on warfarin with subtherapeutic INR result. (Goal INR 2.0-3.0)    Recent labs: (last 7 days)     01/12/23  0931   INR 1.9*       ASSESSMENT       Source(s): Chart Review and Patient/Caregiver Call       Warfarin doses taken: Warfarin taken as instructed    Diet: No new diet changes identified    New illness, injury, or hospitalization: No    Medication/supplement changes: None noted    Signs or symptoms of bleeding or clotting: No    Previous INR: Therapeutic last visit; previously outside of goal range    Additional findings: None       PLAN     Recommended plan for no diet, medication or health factor changes affecting INR     Dosing Instructions: Increase your warfarin dose (6.4% change) with next INR in 2 weeks       Summary  As of 1/12/2023    Full warfarin instructions:  2.5 mg every Sun, Wed; 4 mg all other days   Next INR check:  1/26/2023             Telephone call with Victorino who verbalizes understanding and agrees to plan    Lab visit scheduled    Education provided:     Contact [phone] with any changes, questions or concerns.     Plan made per [facility] anticoagulation protocol    Luís Williamson RN  Anticoagulation Clinic  1/12/2023    _______________________________________________________________________     Anticoagulation Episode Summary     Current INR goal:  2.0-3.0   TTR:  76.7 % (1 y)   Target end date:  Indefinite   Send INR reminders to:  [facility]    Indications    History of deep venous thrombosis [Z86.718]  Heterozygous factor V Leiden mutation (H) [D68.51]           Comments:           Anticoagulation Care Providers     Provider Role Specialty Phone number    Jez Jack MD Referring Internal Medicine [phone]

## 2023-01-12 NOTE — PROGRESS NOTES
ASSESSMENT AND PLAN:    1. 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 Status cardiac pacemaker    3. Rotator cuff syndrome, right  Will be getting total shoulder reverse arthroplasty in the spring.     4. Chronic anticoagulation  Ongoing for thrombophilia.    5. Gastroesophageal reflux disease without esophagitis  S/p Nisson, no dysphagia    6. Thrombocytopenia  Mild, chronic, will monitor.     7. Reactive hypoglycemia  Infrequent episodes of sweats, and weakness that responds to glucose ingestion.  Not presyncope.  Not new, not frequent, doesn't think anything needs be done.     8. Osteoporosis  prolia shot today.    Follow up in 6 months.     CHIEF COMPLAINT:  Establish care    HISTORY OF PRESENT ILLNESS:  Victorino Khan is a 88 year old male doing well overall. Does get episodes, infrequently, of sweating, and feeling weak that responds to taking in glucose.  Not presyncope, or lightheadedness, and doesn't get dyspnea or cough.     REVIEW OF SYSTEMS:   See HPI, all other systems on review are negative.    Past Medical History:   Diagnosis Date     Chronic anticoagulation      Gastroesophageal reflux disease without esophagitis      Heterozygous factor V Leiden mutation (H)      Osteoporosis      Personal history of DVT (deep vein thrombosis)      Rotator cuff syndrome, right      Status cardiac pacemaker      Vertebral compression fracture (H)      History   Smoking Status     Never   Smokeless Tobacco     Never     Family History   Problem Relation Age of Onset     Coronary Artery Disease Early Onset Father      Coronary Artery Disease Early Onset Brother      Past Surgical History:   Procedure Laterality Date     ARTHROPLASTY ANKLE Left 4/21/2022    Procedure: LEFT TOTAL ANKLE REPLACEMENT;  Surgeon: Magan Chatman MD;  Location: [facility] OR     IMPLANT PACEMAKER Left     For sick sinus syndrome     LAPAROSCOPIC NISSEN FUNDOPLICATION  11/2011    For large  "paraesophageal hernia     LENGTHEN TENDON ACHILLES Left 4/21/2022    Procedure: TENDON ACHILLELS LENGTHENING, MEDIAL DISPLACEMENT CALCANEAL OSTOTOMY;  Surgeon: Magan Chatman MD;  Location: [facility] OR     TOTAL KNEE ARTHROPLASTY Right 2000     TOTAL KNEE ARTHROPLASTY Left 2010     VITALS:  Vitals:    01/12/23 0952   BP: 122/64   Pulse: 80   Resp: 16   Temp: 97  F (36.1  C)   TempSrc: Tympanic   SpO2: 94%   Weight: 87 kg (191 lb 12.8 oz)   Height: 1.803 m (5' 11\")     Wt Readings from Last 3 Encounters:   01/12/23 87 kg (191 lb 12.8 oz)   04/21/22 89.4 kg (197 lb)   04/14/22 91 kg (200 lb 9.6 oz)     PHYSICAL EXAM:  Constitutional:  In NAD, alert and oriented  Neck: no cervical or axillary adenopathy  Cardiac:  S1 S2   Lungs: Clear to auscultation  Abdomen:   Soft, flat and non-tender    DECISION TO OBTAIN OLD RECORDS AND/OR OBTAIN HISTORY FROM SOMEONE OTHER THAN PATIENT, AND/OR ACCESSING CARE EVERYWHERE):  1  0     REVIEW AND SUMMARIZATION OF OLD RECORDS, AND/OR OBTAINING HISTORY FROM SOMEONE OTHER THAN PATIENT, AND/OR DISCUSSION OF CASE WITH ANOTHER HEALTH CARE PROVIDER:  2 reviewed past health evaluations.     REVIEW AND/OR ORDER OF OF CLINICAL LAB TESTS: 1  ordered.    REVIEW AND/OR ORDER OF RADIOLOGY TESTS: 1 0.    REVIEW AND/OR ORDER OF MEDICAL TESTS (EKG/ECHO/COLONOSCOPY/EGD): 1  0    INDEPENDENT  VISUALIZATION OF IMAGE, TRACING, OR SPECIMEN ITSELF (2 EACH):  0     TOTAL: 3    Current Outpatient Medications   Medication Sig Dispense Refill     acetaminophen-codeine (TYLENOL #3) 300-30 MG tablet Take 1-2 tablets by mouth every 4 hours as needed for moderate to severe pain 30 tablet 0     albuterol (PROAIR HFA/PROVENTIL HFA/VENTOLIN HFA) 108 (90 Base) MCG/ACT inhaler Inhale 2 puffs into the lungs every 6 hours 18 g 0     aspirin (ASA) 81 MG EC tablet Take 81 mg by mouth daily       calcium carbonate (OS- MG [place]. [place]) 500 MG tablet Take 500 mg by mouth daily        Cholecalciferol (VITAMIN D3 PO) Take " 2,000 Units by mouth daily        fluticasone (FLONASE) 50 MCG/ACT nasal spray Spray 1 spray into both nostrils daily       fluticasone-salmeterol (ADVAIR-HFA) 115-21 MCG/ACT inhaler Inhale 2 puffs into the lungs 2 times daily       gabapentin (NEURONTIN) 300 MG capsule Take 1 capsule (300 mg) by mouth daily 90 capsule 3     hydrOXYzine (ATARAX) 10 MG tablet Take 1 tablet (10 mg) by mouth every 6 hours as needed for itching or anxiety (with pain, moderate pain) 30 tablet 0     mometasone-formoterol (DULERA) 100-5 MCG/ACT inhaler Inhale 2 puffs into the lungs 2 times daily 8.6 g 0     multivitamin w/minerals (THERA-VIT-M) tablet Take 1 tablet by mouth daily       senna-docusate (SENOKOT-S/PERICOLACE) 8.6-50 MG tablet Take 1-2 tablets by mouth 2 times daily Take while on oral narcotics to prevent or treat constipation. 10 tablet 0     warfarin ANTICOAGULANT (COUMADIN) 4 MG tablet TAKE ONE-HALF (1/2) TO 1 TABLET DAILY AS DIRECTED, ADJUST DOSE BASED ON INR RESULTS 90 tablet 3     warfarin ANTICOAGULANT (COUMADIN) 5 MG tablet Take 5 mg twice weekly or as directed based on inr results. Uses in conjunction with 4 mg tabs 30 tablet 1     Yuri Funes MD  Internal Medicine  [facility]

## 2023-01-16 ENCOUNTER — ALLIED HEALTH/NURSE VISIT (OUTPATIENT)
Dept: FAMILY MEDICINE | Facility: CLINIC | Age: 88
End: 2023-01-16
Payer: COMMERCIAL

## 2023-01-16 DIAGNOSIS — M81.0 OSTEOPOROSIS: Primary | ICD-10-CM

## 2023-01-16 DIAGNOSIS — M81.0 SENILE OSTEOPOROSIS: Primary | ICD-10-CM

## 2023-01-16 DIAGNOSIS — Z92.29 PERSONAL HISTORY OF OTHER DRUG THERAPY: ICD-10-CM

## 2023-01-16 PROCEDURE — 99207 PR NO CHARGE NURSE ONLY: CPT

## 2023-01-16 PROCEDURE — 96372 THER/PROPH/DIAG INJ SC/IM: CPT | Performed by: INTERNAL MEDICINE

## 2023-01-16 NOTE — PROGRESS NOTES
Indication: Prolia  (denosumab) is a prescription medicine used to treat osteoporosis in patients who:     Are at high risk for fracture, meaning patients who have had a fracture related to osteoporosis, or who have multiple risk factors for fracture     Cannot use another osteoporosis medicine or other osteoporosis medicines did not work well   The timeline for early/late injections would be 4 weeks early and any time after the 6 month eliot. If a patient receives their injection late, then the subsequent injection would be 6 months from the date that they actually received the injection    1.  When was the last injection?  7/14/2022  2.  Did they check with their insurance for this calendar year?  Yes  3.  Is there an order in the chart?  Yes   4.  Has the patient had dental work involving the bone in the past month or will have work in the next 6 months?  yes  5.  Did you have the patient wait 15 minutes after the injection?  yes  6.  Remember to use .injection under the medication notes    The following steps were completed to comply with the REMS program for Prolia:    Reviewed information in the Medication Guide and Patient Counseling Chart, including the serious risks of Prolia  and the symptoms of each risk.    Advised patient to seek prompt medical attention if they have signs or symptoms of any of the serious risks.  Provided each patient a copy of the Medication Guide and Patient Brochure.      Clinic Administered Medication Documentation    Administrations This Visit     denosumab (PROLIA) injection 60 mg     Admin Date  01/16/2023 Action  Given Dose  60 mg Route  Subcutaneous Site  Left Arm Administered By  Yennifer Marley RN    Ordering Provider: Jez Jack MD    Patient Supplied?: No                Prolia Documentation    Prior to injection, verified patient identity using patient's name and date of birth. Medication was administered. Please see MAR and medication order for additional information.  Patient instructed to remain in clinic for 15 minutes.    Indication: Prolia  (denosumab) is a prescription medicine used to treat osteoporosis in patients who:     Are at high risk for fracture, meaning patients who have had a fracture related to osteoporosis, or who have multiple risk factors for fracture.    Cannot use another osteoporosis medicine or other osteoporosis medicines did not work well.    The timeline for early/late injections would be 4 weeks early and any time after the 6 month eliot. If a patient receives their injection late, then the subsequent injection would be 6 months from the date that they actually received the injection.    When was the last injection?  7/14/22  Was the last injection at least 6 months ago? Yes  Has the prior authorization been completed?  Yes  Is there an active order (within the past 365 days) in the chart?  Yes  Patient denies any dental work involving the bone (e.g. tooth extraction or dental implants) in the past 4 weeks?  Yes  Patient denies plans for any dental work involving the bone (e.g. tooth extraction or dental implants) in the next 4 weeks? Yes    The following steps were completed to comply with the REMS program for Prolia:    Reviewed information in the Medication Guide and Patient Counseling Chart, including the serious risks of Prolia  and the symptoms of each risk.    Advised patient to seek prompt medical attention if they have signs or symptoms of any of the serious risks.    Provided each patient a copy of the Medication Guide and Patient Brochure.      Was entire vial of medication used? Yes  Vial/Syringe: Single dose vial  Expiration Date:  03/31/2024  Was this medication supplied by the patient? No

## 2023-01-26 ENCOUNTER — ANTICOAGULATION THERAPY VISIT (OUTPATIENT)
Dept: ANTICOAGULATION | Facility: CLINIC | Age: 88
End: 2023-01-26

## 2023-01-26 ENCOUNTER — LAB (OUTPATIENT)
Dept: LAB | Facility: CLINIC | Age: 88
End: 2023-01-26
Payer: COMMERCIAL

## 2023-01-26 DIAGNOSIS — Z86.718 HISTORY OF DEEP VENOUS THROMBOSIS: Primary | ICD-10-CM

## 2023-01-26 DIAGNOSIS — D68.51 HETEROZYGOUS FACTOR V LEIDEN MUTATION (H): ICD-10-CM

## 2023-01-26 DIAGNOSIS — Z86.718 HISTORY OF DEEP VENOUS THROMBOSIS: ICD-10-CM

## 2023-01-26 LAB — INR BLD: 1.8 (ref 0.9–1.1)

## 2023-01-26 PROCEDURE — 85610 PROTHROMBIN TIME: CPT

## 2023-01-26 PROCEDURE — 36416 COLLJ CAPILLARY BLOOD SPEC: CPT

## 2023-01-26 NOTE — PROGRESS NOTES
ANTICOAGULATION MANAGEMENT     Victorino Khan 88 year old male is on warfarin with subtherapeutic INR result. (Goal INR 2.0-3.0)    Recent labs: (last 7 days)     01/26/23  1259   INR 1.8*       ASSESSMENT       Source(s): Chart Review, Patient/Caregiver Call and Template       Warfarin doses taken: Less warfarin taken than planned which may be affecting INR and Missed dose(s) may be affecting INR    Diet: No new diet changes identified    New illness, injury, or hospitalization: No    Medication/supplement changes: None noted    Signs or symptoms of bleeding or clotting: No    Previous INR: Subtherapeutic    Additional findings: None       PLAN     Recommended plan for no diet, medication or health factor changes affecting INR     Dosing Instructions: booster dose then continue your current warfarin dose with next INR in 2 weeks       Summary  As of 1/26/2023    Full warfarin instructions:  1/26: 5 mg; Otherwise 2.5 mg every Sun, Wed; 4 mg all other days   Next INR check:  2/9/2023             Telephone call with Victorino who verbalizes understanding and agrees to plan    Lab visit scheduled    Education provided:     Contact [phone] with any changes, questions or concerns.     Plan made per [facility] anticoagulation protocol    Luís Williamson RN  Anticoagulation Clinic  1/26/2023    _______________________________________________________________________     Anticoagulation Episode Summary     Current INR goal:  2.0-3.0   TTR:  72.8 % (1 y)   Target end date:  Indefinite   Send INR reminders to:  [facility]    Indications    History of deep venous thrombosis [Z86.718]  Heterozygous factor V Leiden mutation (H) [D68.51]           Comments:           Anticoagulation Care Providers     Provider Role Specialty Phone number    Jez Jack MD Referring Internal Medicine [phone]

## 2023-02-09 ENCOUNTER — ANTICOAGULATION THERAPY VISIT (OUTPATIENT)
Dept: ANTICOAGULATION | Facility: CLINIC | Age: 88
End: 2023-02-09

## 2023-02-09 ENCOUNTER — LAB (OUTPATIENT)
Dept: LAB | Facility: CLINIC | Age: 88
End: 2023-02-09
Payer: COMMERCIAL

## 2023-02-09 DIAGNOSIS — D68.51 HETEROZYGOUS FACTOR V LEIDEN MUTATION (H): ICD-10-CM

## 2023-02-09 DIAGNOSIS — Z86.718 HISTORY OF DEEP VENOUS THROMBOSIS: ICD-10-CM

## 2023-02-09 DIAGNOSIS — Z86.718 HISTORY OF DEEP VENOUS THROMBOSIS: Primary | ICD-10-CM

## 2023-02-09 LAB — INR BLD: 2.8 (ref 0.9–1.1)

## 2023-02-09 PROCEDURE — 36416 COLLJ CAPILLARY BLOOD SPEC: CPT

## 2023-02-09 PROCEDURE — 85610 PROTHROMBIN TIME: CPT

## 2023-02-09 NOTE — PROGRESS NOTES
ANTICOAGULATION MANAGEMENT     Victorino Khan 88 year old male is on warfarin with therapeutic INR result. (Goal INR 2.0-3.0)    Recent labs: (last 7 days)     02/09/23  1313   INR 2.8*       ASSESSMENT       Source(s): Chart Review and Patient/Caregiver Call       Warfarin doses taken: Warfarin taken as instructed    Diet: No new diet changes identified    New illness, injury, or hospitalization: No    Medication/supplement changes: None noted    Signs or symptoms of bleeding or clotting: No    Previous INR: Therapeutic last 2(+) visits    Additional findings: None       PLAN     Recommended plan for no diet, medication or health factor changes affecting INR     Dosing Instructions: Continue your current warfarin dose with next INR in 2 weeks       Summary  As of 2/9/2023    Full warfarin instructions:  2 mg every Sun; 2.5 mg every Wed; 4 mg all other days   Next INR check:  2/23/2023             Telephone call with Victorino who verbalizes understanding and agrees to plan    Lab visit scheduled    Education provided:     Contact [phone] with any changes, questions or concerns.     Plan made per [facility] anticoagulation protocol    Luís Williamson RN  Anticoagulation Clinic  2/9/2023    _______________________________________________________________________     Anticoagulation Episode Summary     Current INR goal:  2.0-3.0   TTR:  72.0 % (1 y)   Target end date:  Indefinite   Send INR reminders to:  [facility]    Indications    History of deep venous thrombosis [Z86.718]  Heterozygous factor V Leiden mutation (H) [D68.51]           Comments:           Anticoagulation Care Providers     Provider Role Specialty Phone number    Jez Jack MD Referring Internal Medicine [phone]

## 2023-02-13 ENCOUNTER — ANCILLARY PROCEDURE (OUTPATIENT)
Dept: CARDIOLOGY | Facility: CLINIC | Age: 88
End: 2023-02-13
Attending: INTERNAL MEDICINE
Payer: COMMERCIAL

## 2023-02-13 DIAGNOSIS — Z95.0 CARDIAC PACEMAKER IN SITU: ICD-10-CM

## 2023-02-13 PROCEDURE — 93294 REM INTERROG EVL PM/LDLS PM: CPT | Performed by: INTERNAL MEDICINE

## 2023-02-13 PROCEDURE — 93296 REM INTERROG EVL PM/IDS: CPT | Performed by: INTERNAL MEDICINE

## 2023-02-28 ENCOUNTER — DOCUMENTATION ONLY (OUTPATIENT)
Dept: ANTICOAGULATION | Facility: CLINIC | Age: 88
End: 2023-02-28

## 2023-02-28 ENCOUNTER — LAB (OUTPATIENT)
Dept: LAB | Facility: CLINIC | Age: 88
End: 2023-02-28
Payer: COMMERCIAL

## 2023-02-28 ENCOUNTER — ANTICOAGULATION THERAPY VISIT (OUTPATIENT)
Dept: ANTICOAGULATION | Facility: CLINIC | Age: 88
End: 2023-02-28

## 2023-02-28 DIAGNOSIS — Z79.01 CHRONIC ANTICOAGULATION: ICD-10-CM

## 2023-02-28 DIAGNOSIS — Z86.718 HISTORY OF DEEP VENOUS THROMBOSIS: Primary | ICD-10-CM

## 2023-02-28 DIAGNOSIS — D68.51 HETEROZYGOUS FACTOR V LEIDEN MUTATION (H): ICD-10-CM

## 2023-02-28 DIAGNOSIS — Z86.718 HISTORY OF DEEP VENOUS THROMBOSIS: ICD-10-CM

## 2023-02-28 DIAGNOSIS — D68.51 HETEROZYGOUS FACTOR V LEIDEN MUTATION (H): Primary | ICD-10-CM

## 2023-02-28 LAB — INR BLD: 2.6 (ref 0.9–1.1)

## 2023-02-28 PROCEDURE — 85610 PROTHROMBIN TIME: CPT

## 2023-02-28 PROCEDURE — 36416 COLLJ CAPILLARY BLOOD SPEC: CPT

## 2023-02-28 NOTE — PROGRESS NOTES
ANTICOAGULATION MANAGEMENT     Victorino Khan 89 year old male is on warfarin with therapeutic INR result. (Goal INR 2.0-3.0)    Recent labs: (last 7 days)     02/28/23  1302   INR 2.6*       ASSESSMENT       Source(s): Chart Review and Template       Warfarin doses taken: per questionnarie pt has been taking 2mg Sundays/Thursday and 4mg all other days of the week, which is slightly less than what [facility] had listed. Updated warfarin maintenance dose to reflect how pt has been taking warfarin and requested call back if he made an error on questionnaire and took warfarin differently    Diet: No new diet changes identified    New illness, injury, or hospitalization: No    Medication/supplement changes: None noted    Signs or symptoms of bleeding or clotting: No    Previous INR: Therapeutic last visit; previously outside of goal range    Additional findings: None         PLAN     Recommended plan for no diet, medication or health factor changes affecting INR     Dosing Instructions: Continue your current warfarin dose (updated MD to reflect how pt stated in questionnaire he has been taking) with next INR in 3-4 weeks       Summary  As of 2/28/2023    Full warfarin instructions:  2 mg every Sun, Thu; 4 mg all other days   Next INR check:  3/28/2023             Detailed voice message left for Victorino with dosing instructions and follow up date.     Contact [phone] to schedule and with any changes, questions or concerns.     Education provided:     Please call back if any changes to your diet, medications or how you've been taking warfarin    Plan made per [facility] anticoagulation protocol    Michael Hodge RN  Anticoagulation Clinic  2/28/2023    _______________________________________________________________________     Anticoagulation Episode Summary     Current INR goal:  2.0-3.0   TTR:  72.0 % (1 y)   Target end date:  Indefinite   Send INR reminders to:  [facility]    Indications    History of deep venous thrombosis  [Z86.718]  Heterozygous factor V Leiden mutation (H) [D68.51]  Chronic anticoagulation [Z79.01]           Comments:           Anticoagulation Care Providers     Provider Role Specialty Phone number    Jez Jack MD Referring Internal Medicine [phone]    Yuri Fnues MD Referring Internal Medicine [phone]

## 2023-02-28 NOTE — PROGRESS NOTES
ANTICOAGULATION CLINIC REFERRAL RENEWAL REQUEST       An annual renewal order is required for all patients referred to [facility].?  Please review and sign the pended referral order for Victorino Khan.       ANTICOAGULATION SUMMARY      Warfarin indication(s)   DVT, Heterozygous Factor V Leiden mutation    Mechanical heart valve present?  NO       Current goal range   INR: 2.0-3.0     Goal appropriate for indication? Goal INR 2-3, standard for indication(s) above     Time in Therapeutic Range (TTR)  (Goal > 60%) 72%       Office visit with referring provider's group within last year yes on 1/12/23       Michael Hodge RN  [facility]

## 2023-03-03 LAB
MDC_IDC_EPISODE_DTM: NORMAL
MDC_IDC_EPISODE_DURATION: 26 S
MDC_IDC_EPISODE_DURATION: 30 S
MDC_IDC_EPISODE_DURATION: 34 S
MDC_IDC_EPISODE_ID: NORMAL
MDC_IDC_EPISODE_TYPE: NORMAL
MDC_IDC_EPISODE_VENDOR_TYPE: NORMAL
MDC_IDC_LEAD_IMPLANT_DT: NORMAL
MDC_IDC_LEAD_IMPLANT_DT: NORMAL
MDC_IDC_LEAD_LOCATION: NORMAL
MDC_IDC_LEAD_LOCATION: NORMAL
MDC_IDC_LEAD_LOCATION_DETAIL_1: NORMAL
MDC_IDC_LEAD_LOCATION_DETAIL_1: NORMAL
MDC_IDC_LEAD_MFG: NORMAL
MDC_IDC_LEAD_MFG: NORMAL
MDC_IDC_LEAD_MODEL: NORMAL
MDC_IDC_LEAD_MODEL: NORMAL
MDC_IDC_LEAD_POLARITY_TYPE: NORMAL
MDC_IDC_LEAD_POLARITY_TYPE: NORMAL
MDC_IDC_LEAD_SERIAL: NORMAL
MDC_IDC_LEAD_SERIAL: NORMAL
MDC_IDC_MSMT_BATTERY_DTM: NORMAL
MDC_IDC_MSMT_BATTERY_REMAINING_LONGEVITY: 41 MO
MDC_IDC_MSMT_BATTERY_REMAINING_PERCENTAGE: 42 %
MDC_IDC_MSMT_BATTERY_RRT_TRIGGER: NORMAL
MDC_IDC_MSMT_BATTERY_STATUS: NORMAL
MDC_IDC_MSMT_BATTERY_VOLTAGE: 2.96 V
MDC_IDC_MSMT_LEADCHNL_RA_IMPEDANCE_VALUE: 450 OHM
MDC_IDC_MSMT_LEADCHNL_RA_LEAD_CHANNEL_STATUS: NORMAL
MDC_IDC_MSMT_LEADCHNL_RA_PACING_THRESHOLD_AMPLITUDE: 0.5 V
MDC_IDC_MSMT_LEADCHNL_RA_PACING_THRESHOLD_PULSEWIDTH: 0.5 MS
MDC_IDC_MSMT_LEADCHNL_RA_SENSING_INTR_AMPL: 5 MV
MDC_IDC_MSMT_LEADCHNL_RV_IMPEDANCE_VALUE: 390 OHM
MDC_IDC_MSMT_LEADCHNL_RV_LEAD_CHANNEL_STATUS: NORMAL
MDC_IDC_MSMT_LEADCHNL_RV_PACING_THRESHOLD_AMPLITUDE: 0.75 V
MDC_IDC_MSMT_LEADCHNL_RV_PACING_THRESHOLD_PULSEWIDTH: 0.5 MS
MDC_IDC_MSMT_LEADCHNL_RV_SENSING_INTR_AMPL: 7.4 MV
MDC_IDC_PG_IMPLANT_DTM: NORMAL
MDC_IDC_PG_MFG: NORMAL
MDC_IDC_PG_MODEL: NORMAL
MDC_IDC_PG_SERIAL: NORMAL
MDC_IDC_PG_TYPE: NORMAL
MDC_IDC_SESS_CLINIC_NAME: NORMAL
MDC_IDC_SESS_DTM: NORMAL
MDC_IDC_SESS_REPROGRAMMED: NO
MDC_IDC_SESS_TYPE: NORMAL
MDC_IDC_SET_BRADY_AT_MODE_SWITCH_MODE: NORMAL
MDC_IDC_SET_BRADY_AT_MODE_SWITCH_RATE: 170 {BEATS}/MIN
MDC_IDC_SET_BRADY_LOWRATE: 60 {BEATS}/MIN
MDC_IDC_SET_BRADY_MAX_SENSOR_RATE: 130 {BEATS}/MIN
MDC_IDC_SET_BRADY_MAX_TRACKING_RATE: 120 {BEATS}/MIN
MDC_IDC_SET_BRADY_MODE: NORMAL
MDC_IDC_SET_BRADY_PAV_DELAY_LOW: 250 MS
MDC_IDC_SET_BRADY_SAV_DELAY_LOW: 250 MS
MDC_IDC_SET_LEADCHNL_RA_PACING_AMPLITUDE: 2 V
MDC_IDC_SET_LEADCHNL_RA_PACING_ANODE_ELECTRODE_1: NORMAL
MDC_IDC_SET_LEADCHNL_RA_PACING_ANODE_LOCATION_1: NORMAL
MDC_IDC_SET_LEADCHNL_RA_PACING_CAPTURE_MODE: NORMAL
MDC_IDC_SET_LEADCHNL_RA_PACING_CATHODE_ELECTRODE_1: NORMAL
MDC_IDC_SET_LEADCHNL_RA_PACING_CATHODE_LOCATION_1: NORMAL
MDC_IDC_SET_LEADCHNL_RA_PACING_POLARITY: NORMAL
MDC_IDC_SET_LEADCHNL_RA_PACING_PULSEWIDTH: 0.5 MS
MDC_IDC_SET_LEADCHNL_RA_SENSING_ADAPTATION_MODE: NORMAL
MDC_IDC_SET_LEADCHNL_RA_SENSING_ANODE_ELECTRODE_1: NORMAL
MDC_IDC_SET_LEADCHNL_RA_SENSING_ANODE_LOCATION_1: NORMAL
MDC_IDC_SET_LEADCHNL_RA_SENSING_CATHODE_ELECTRODE_1: NORMAL
MDC_IDC_SET_LEADCHNL_RA_SENSING_CATHODE_LOCATION_1: NORMAL
MDC_IDC_SET_LEADCHNL_RA_SENSING_POLARITY: NORMAL
MDC_IDC_SET_LEADCHNL_RA_SENSING_SENSITIVITY: 0.3 MV
MDC_IDC_SET_LEADCHNL_RV_PACING_AMPLITUDE: 2 V
MDC_IDC_SET_LEADCHNL_RV_PACING_ANODE_ELECTRODE_1: NORMAL
MDC_IDC_SET_LEADCHNL_RV_PACING_ANODE_LOCATION_1: NORMAL
MDC_IDC_SET_LEADCHNL_RV_PACING_CAPTURE_MODE: NORMAL
MDC_IDC_SET_LEADCHNL_RV_PACING_CATHODE_ELECTRODE_1: NORMAL
MDC_IDC_SET_LEADCHNL_RV_PACING_CATHODE_LOCATION_1: NORMAL
MDC_IDC_SET_LEADCHNL_RV_PACING_POLARITY: NORMAL
MDC_IDC_SET_LEADCHNL_RV_PACING_PULSEWIDTH: 0.5 MS
MDC_IDC_SET_LEADCHNL_RV_SENSING_ADAPTATION_MODE: NORMAL
MDC_IDC_SET_LEADCHNL_RV_SENSING_ANODE_ELECTRODE_1: NORMAL
MDC_IDC_SET_LEADCHNL_RV_SENSING_ANODE_LOCATION_1: NORMAL
MDC_IDC_SET_LEADCHNL_RV_SENSING_CATHODE_ELECTRODE_1: NORMAL
MDC_IDC_SET_LEADCHNL_RV_SENSING_CATHODE_LOCATION_1: NORMAL
MDC_IDC_SET_LEADCHNL_RV_SENSING_POLARITY: NORMAL
MDC_IDC_SET_LEADCHNL_RV_SENSING_SENSITIVITY: 0.5 MV
MDC_IDC_STAT_AT_BURDEN_PERCENT: 1 %
MDC_IDC_STAT_AT_DTM_END: NORMAL
MDC_IDC_STAT_AT_DTM_START: NORMAL
MDC_IDC_STAT_AT_MODE_SW_COUNT: 6
MDC_IDC_STAT_AT_MODE_SW_COUNT_PER_DAY: 0
MDC_IDC_STAT_AT_MODE_SW_MAX_DURATION: 52 S
MDC_IDC_STAT_AT_MODE_SW_PERCENT_TIME: 1 %
MDC_IDC_STAT_BRADY_AP_VP_PERCENT: 26 %
MDC_IDC_STAT_BRADY_AP_VS_PERCENT: 1 %
MDC_IDC_STAT_BRADY_AS_VP_PERCENT: 66 %
MDC_IDC_STAT_BRADY_AS_VS_PERCENT: 6.8 %
MDC_IDC_STAT_BRADY_DTM_END: NORMAL
MDC_IDC_STAT_BRADY_DTM_START: NORMAL
MDC_IDC_STAT_BRADY_RA_PERCENT_PACED: 25 %
MDC_IDC_STAT_BRADY_RV_PERCENT_PACED: 92 %
MDC_IDC_STAT_CRT_DTM_END: NORMAL
MDC_IDC_STAT_CRT_DTM_START: NORMAL
MDC_IDC_STAT_HEART_RATE_ATRIAL_MAX: 300 {BEATS}/MIN
MDC_IDC_STAT_HEART_RATE_ATRIAL_MEAN: 87 {BEATS}/MIN
MDC_IDC_STAT_HEART_RATE_ATRIAL_MIN: 40 {BEATS}/MIN
MDC_IDC_STAT_HEART_RATE_DTM_END: NORMAL
MDC_IDC_STAT_HEART_RATE_DTM_START: NORMAL
MDC_IDC_STAT_HEART_RATE_VENTRICULAR_MAX: 270 {BEATS}/MIN
MDC_IDC_STAT_HEART_RATE_VENTRICULAR_MEAN: 86 {BEATS}/MIN
MDC_IDC_STAT_HEART_RATE_VENTRICULAR_MIN: 40 {BEATS}/MIN

## 2023-03-22 ENCOUNTER — LAB (OUTPATIENT)
Dept: LAB | Facility: CLINIC | Age: 88
End: 2023-03-22
Payer: COMMERCIAL

## 2023-03-22 ENCOUNTER — OFFICE VISIT (OUTPATIENT)
Dept: INTERNAL MEDICINE | Facility: CLINIC | Age: 88
End: 2023-03-22
Payer: COMMERCIAL

## 2023-03-22 ENCOUNTER — ANTICOAGULATION THERAPY VISIT (OUTPATIENT)
Dept: ANTICOAGULATION | Facility: CLINIC | Age: 88
End: 2023-03-22

## 2023-03-22 VITALS
SYSTOLIC BLOOD PRESSURE: 114 MMHG | HEIGHT: 70 IN | OXYGEN SATURATION: 97 % | BODY MASS INDEX: 28.35 KG/M2 | TEMPERATURE: 98.1 F | DIASTOLIC BLOOD PRESSURE: 84 MMHG | HEART RATE: 77 BPM | WEIGHT: 198 LBS | RESPIRATION RATE: 16 BRPM

## 2023-03-22 DIAGNOSIS — D68.51 HETEROZYGOUS FACTOR V LEIDEN MUTATION (H): ICD-10-CM

## 2023-03-22 DIAGNOSIS — Z86.718 HISTORY OF DEEP VENOUS THROMBOSIS: Primary | ICD-10-CM

## 2023-03-22 DIAGNOSIS — Z79.01 CHRONIC ANTICOAGULATION: ICD-10-CM

## 2023-03-22 DIAGNOSIS — Z86.718 HISTORY OF DEEP VENOUS THROMBOSIS: ICD-10-CM

## 2023-03-22 DIAGNOSIS — M67.911 ROTATOR CUFF DISORDER, RIGHT: Primary | ICD-10-CM

## 2023-03-22 DIAGNOSIS — Z96.653 STATUS POST TOTAL BILATERAL KNEE REPLACEMENT: ICD-10-CM

## 2023-03-22 DIAGNOSIS — Z01.818 PRE-OP EVALUATION: ICD-10-CM

## 2023-03-22 PROBLEM — R06.81 BREATHLESSNESS: Status: ACTIVE | Noted: 2023-02-27

## 2023-03-22 PROBLEM — R91.1 PULMONARY NODULE: Status: ACTIVE | Noted: 2023-02-27

## 2023-03-22 PROBLEM — M75.101 ROTATOR CUFF SYNDROME, RIGHT: Status: RESOLVED | Noted: 2022-08-12 | Resolved: 2023-03-22

## 2023-03-22 PROBLEM — J45.30 MILD PERSISTENT ASTHMA WITHOUT COMPLICATION: Status: ACTIVE | Noted: 2023-02-27

## 2023-03-22 LAB — INR BLD: 2.5 (ref 0.9–1.1)

## 2023-03-22 PROCEDURE — 85610 PROTHROMBIN TIME: CPT | Performed by: INTERNAL MEDICINE

## 2023-03-22 PROCEDURE — 99214 OFFICE O/P EST MOD 30 MIN: CPT | Performed by: INTERNAL MEDICINE

## 2023-03-22 PROCEDURE — 93005 ELECTROCARDIOGRAM TRACING: CPT | Performed by: INTERNAL MEDICINE

## 2023-03-22 PROCEDURE — 36416 COLLJ CAPILLARY BLOOD SPEC: CPT | Performed by: INTERNAL MEDICINE

## 2023-03-22 RX ORDER — INFLUENZA A VIRUS A/MICHIGAN/45/2015 X-275 (H1N1) ANTIGEN (FORMALDEHYDE INACTIVATED), INFLUENZA A VIRUS A/SINGAPORE/INFIMH-16-0019/2016 IVR-186 (H3N2) ANTIGEN (FORMALDEHYDE INACTIVATED), INFLUENZA B VIRUS B/PHUKET/3073/2013 ANTIGEN (FORMALDEHYDE INACTIVATED), AND INFLUENZA B VIRUS B/MARYLAND/15/2016 BX-69A ANTIGEN (FORMALDEHYDE INACTIVATED) 60; 60; 60; 60 UG/.7ML; UG/.7ML; UG/.7ML; UG/.7ML
INJECTION, SUSPENSION INTRAMUSCULAR
COMMUNITY
Start: 2022-12-02 | End: 2023-04-19

## 2023-03-22 NOTE — PROGRESS NOTES
[facility]  [address] [place] [address]  Phone: [phone]  Fax: [phone]  Primary Provider: Maximino Stephens  Pre-op Performing Provider: MAXIMINO STEPHENS    PREOPERATIVE EVALUATION:  Today's date: 3/22/2023    Victorino Khan is a 89 year old male who presents for a preoperative evaluation.  Additional Questions 3/22/2023   Roomed by Ivory   Accompanied by -     Forms 3/22/2023   Any forms needing to be completed Yes     Surgical Information:  Surgery/Procedure: Right shoulder reversal arthroplasty  Surgery Location: [facility]  Surgeon: Tim King  Surgery Date: 4/13/2023  Time of Surgery: 12pm   Where patient plans to recover: At home with family  Fax number for surgical facility: [phone]    Assessment & Plan     The proposed surgical procedure is considered LOW risk.    Rotator cuff disorder, right.glenohumeral and acromioclavicular DJD    Chronic anticoagulation  Ongoing with warfarin, for thrombophilia.  He will follow the advice of Dr. King, and stay on anticoagulation prior to surgery.      Pre-op evaluation  He is medically stable. No active heart of lung symptoms. His heart rate is controlled with pacemaker function.  I find no contraindications to having the total shoulder arthroplasty, and recommend proceeding as planned.     RECOMMENDATION:  APPROVAL GIVEN to proceed with proposed procedure, without further diagnostic evaluation.  :[id number]}  Subjective     HPI related to upcoming procedure: He has consented to have total shoulder arthroplasty for chronic rotator cuff syndrome, and DJD. He has had previous rotator cuff replair. He has tolerated surgery well in the past and has bilateral TKA without complications.  He has not been acutely ill and has tolerated anticoagulation well without bleeding.      Preop Questions 3/22/2023   1. Have you ever had a heart attack or stroke? No   2. Have you ever had surgery on your  heart or blood vessels, such as a stent placement, a coronary artery bypass, or surgery on an artery in your head, neck, heart, or legs? YES    3. Do you have chest pain with activity? No   4. Do you have a history of  heart failure? No   5. Do you currently have a cold, bronchitis or symptoms of other infection? No   6. Do you have a cough, shortness of breath, or wheezing? No   7. Do you or anyone in your family have previous history of blood clots? YES    8. Do you or does anyone in your family have a serious bleeding problem such as prolonged bleeding following surgeries or cuts? YES   9. Have you ever had problems with anemia or been told to take iron pills? No   10. Have you had any abnormal blood loss such as black, tarry or bloody stools? No   11. Have you ever had a blood transfusion? No   12. Are you willing to have a blood transfusion if it is medically needed before, during, or after your surgery? Yes   13. Have you or any of your relatives ever had problems with anesthesia? No   14. Do you have sleep apnea, excessive snoring or daytime drowsiness? No   15. Do you have any artifical heart valves or other implanted medical devices like a pacemaker, defibrillator, or continuous glucose monitor? YES    15a. What type of device do you have? unknown   15b. Name of the clinic that manages your device:  unknown at this time   16. Do you have artificial joints? YES   17. Are you allergic to latex? No     Review of Systems  See HPI    Patient Active Problem List    Diagnosis Date Noted     Breathlessness 02/27/2023     Priority: Medium     Mild persistent asthma without complication 02/27/2023     Priority: Medium     Pulmonary nodule 02/27/2023     Priority: Medium     Gastroesophageal reflux disease without esophagitis 01/12/2023     Priority: Medium     H/O reactive hypoglycemia 01/12/2023     Priority: Medium     Heterozygous factor V Leiden mutation (H) 12/02/2022     Priority: Medium     Chronic obstructive  airway disease with asthma (H) 11/04/2022     Priority: Medium     Sick sinus syndrome (H) 11/04/2022     Priority: Medium     Rotator cuff syndrome, right 08/12/2022     Priority: Medium     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     Priority: Medium     S/P ankle joint replacement 04/21/2022     Priority: Medium     Post-traumatic osteoarthritis of multiple joints 11/07/2021     Priority: Medium     Age-related osteoporosis without current pathological fracture 07/13/2021     Priority: Medium     History of deep venous thrombosis 07/11/2021     Priority: Medium     Asthma in adult, mild intermittent, uncomplicated 01/14/2021     Priority: Medium     Thrombocytopenia (H) 01/14/2021     Priority: Medium     Second degree Mobitz II AV block 12/21/2018     Priority: Medium     Status cardiac pacemaker 10/14/2017     Priority: Medium     Chronic anticoagulation 11/29/2011     Priority: Medium     Formatting of this note might be different from the original.  Update from Spring 2018 IMO load.       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  Paraesophageal hernia repaired with mesh        Past Medical History:   Diagnosis Date     Chronic anticoagulation      Gastroesophageal reflux disease without esophagitis      H/O reactive hypoglycemia      Heterozygous factor V Leiden mutation (H)      Osteoporosis      Personal history of DVT (deep vein thrombosis)      Rotator cuff syndrome, right      Status cardiac pacemaker      Vertebral compression fracture (H)      Past Surgical History:   Procedure Laterality Date     ARTHROPLASTY ANKLE Left 4/21/2022    Procedure: LEFT TOTAL ANKLE REPLACEMENT;  Surgeon: Magan Chatman MD;  Location: [facility] OR     IMPLANT PACEMAKER Left     For sick sinus syndrome     LAPAROSCOPIC NISSEN FUNDOPLICATION   11/2011    For large paraesophageal hernia     LENGTHEN TENDON ACHILLES Left 4/21/2022    Procedure: TENDON ACHILLELS LENGTHENING, MEDIAL DISPLACEMENT CALCANEAL OSTOTOMY;  Surgeon: Magan Chatman MD;  Location: [facility] OR     TOTAL KNEE ARTHROPLASTY Right 2000     TOTAL KNEE ARTHROPLASTY Left 2010     Current Outpatient Medications   Medication Sig Dispense Refill     acetaminophen-codeine (TYLENOL #3) 300-30 MG tablet Take 1-2 tablets by mouth every 4 hours as needed for moderate to severe pain 30 tablet 0     albuterol (PROAIR HFA/PROVENTIL HFA/VENTOLIN HFA) 108 (90 Base) MCG/ACT inhaler Inhale 2 puffs into the lungs every 6 hours 18 g 0     aspirin (ASA) 81 MG EC tablet Take 81 mg by mouth daily       calcium carbonate (OS- MG [place]. [place]) 500 MG tablet Take 500 mg by mouth daily        Cholecalciferol (VITAMIN D3 PO) Take 2,000 Units by mouth daily        fluticasone (FLONASE) 50 MCG/ACT nasal spray Spray 1 spray into both nostrils daily       fluticasone-salmeterol (ADVAIR-HFA) 115-21 MCG/ACT inhaler Inhale 2 puffs into the lungs 2 times daily       FLUZONE HIGH-DOSE QUADRIVALENT 0.7 ML MIRZA injection        gabapentin (NEURONTIN) 300 MG capsule Take 1 capsule (300 mg) by mouth daily 90 capsule 3     hydrOXYzine (ATARAX) 10 MG tablet Take 1 tablet (10 mg) by mouth every 6 hours as needed for itching or anxiety (with pain, moderate pain) 30 tablet 0     mometasone-formoterol (DULERA) 100-5 MCG/ACT inhaler Inhale 2 puffs into the lungs 2 times daily 8.6 g 0     multivitamin w/minerals (THERA-VIT-M) tablet Take 1 tablet by mouth daily       senna-docusate (SENOKOT-S/PERICOLACE) 8.6-50 MG tablet Take 1-2 tablets by mouth 2 times daily Take while on oral narcotics to prevent or treat constipation. 10 tablet 0     warfarin ANTICOAGULANT (COUMADIN) 4 MG tablet TAKE ONE-HALF (1/2) TO 1 TABLET DAILY AS DIRECTED, ADJUST DOSE BASED ON INR RESULTS 90 tablet 3     warfarin ANTICOAGULANT (COUMADIN) 5 MG tablet Take  "5 mg twice weekly or as directed based on inr results. Uses in conjunction with 4 mg tabs 30 tablet 1     No Known Allergies     Social History     Tobacco Use     Smoking status: Never     Smokeless tobacco: Never   Substance Use Topics     Alcohol use: No     Family History   Problem Relation Age of Onset     Coronary Artery Disease Early Onset Father      Coronary Artery Disease Early Onset Brother      History   Drug Use Unknown     Objective   PHYSICAL EXAM:  General Appearance: In no acute distress  /84 (BP Location: Left arm, Patient Position: Sitting, Cuff Size: Adult Regular)   Pulse 77   Temp 98.1  F (36.7  C) (Tympanic)   Resp 16   Ht 1.778 m (5' 10\")   Wt 89.8 kg (198 lb)   SpO2 97%   BMI 28.41 kg/m    NECK:  without cervical or axillary adenopathy  RESPIRATORY: Clear   CHEST:  Mild thoracic kyphosis  CARDIOVASCULAR: S1, S2  ABDOMEN: soft, flat, and non-tender  EXTREMITIES: No joint swelling, no ulcer or edema    Recent Labs   Lab Test 03/22/23  1120 02/28/23  1302 01/26/23  1259 01/12/23  1105 04/29/22  1119 04/23/22  1020 04/22/22  0749 04/21/22 2005   HGB  --   --   --  15.0  --  12.5* 13.1* 13.5   PLT  --   --   --  130*  --   --  123* 105*   INR 2.5* 2.6*   < >  --    < > 1.43* 1.22*  --    NA  --   --   --  143  --   --   --  139   POTASSIUM  --   --   --  5.1  --   --   --  5.1   CR  --   --   --  1.04  --   --  1.02 1.22   A1C  --   --   --  5.3  --   --   --   --     < > = values in this interval not displayed.      Diagnostics:  Recent Results (from the past 48 hour(s))   INR point of care    Collection Time: 03/22/23 11:20 AM   Result Value Ref Range    INR 2.5 (H) 0.9 - 1.1      Revised Cardiac Risk Index (RCRI):  The patient has the following serious cardiovascular risks for perioperative complications:   - No serious cardiac risks = 0 points     RCRI Interpretation: 1 point: Class II (low risk - 0.9% complication rate)     Signed Electronically by: Yuri Funes MD  Copy of " this evaluation report is provided to requesting physician.

## 2023-03-22 NOTE — PROGRESS NOTES
ANTICOAGULATION MANAGEMENT     Victorino Khan 89 year old male is on warfarin with therapeutic INR result. (Goal INR 2.0-3.0)    Recent labs: (last 7 days)     03/22/23  1120   INR 2.5*       ASSESSMENT     Warfarin Lab Questionnaire    Dose in Tablet or Mg 3/22/2023   TAB or MG? milligram (mg)     Pt Rptd Dose ALEJANDRINA MONDAY TUESDAY WEDNESDAY THURSDAY FRIDAY SATURDAY   3/22/2023 2 4 4 2 4 4 4     Warfarin Lab Questionnaire 3/22/2023   Missed doses? No   Medication changes? No   Abnormal bleeding? No   Shortness of breath? No   Injuries or illness since last INR? No   Changes in diet or alcohol? No   Upcoming surgery, procedure? Yes   Please explain, date scheduled? apapila  13-- R should replacement on 4/13; per OV with  on 3/3, patient is to remain on warfarin (see recommendations section of OV note)   Best number to call with results? [phone]       Additional findings: Called and left message for ' team regarding warfarin; need to confirm that Victorino is to continue current warfarin dose vs holding prior to surgery.       PLAN     Recommended plan for no diet, medication or health factor changes affecting INR     Dosing Instructions: Continue your current warfarin dose with next INR in 3 weeks       Summary  As of 3/22/2023    Full warfarin instructions:  2 mg every Sun, Wed; 4 mg all other days   Next INR check:  4/12/2023             Telephone call with Victorino who verbalizes understanding and agrees to plan    Patient offered & declined to schedule next visit-- said he will call and schedule based on surgery.    Education provided:     Contact [phone] with any changes, questions or concerns.     Plan made per [facility] anticoagulation protocol    Modesta Mcbride, RN  Anticoagulation Clinic  3/22/2023    _______________________________________________________________________     Anticoagulation Episode Summary     Current INR goal:  2.0-3.0   TTR:  72.1 % (1 y)   Target end date:  Indefinite   Send  INR reminders to:  [facility]    Indications    History of deep venous thrombosis [Z86.718]  Heterozygous factor V Leiden mutation (H) [D68.51]  Chronic anticoagulation [Z79.01]           Comments:           Anticoagulation Care Providers     Provider Role Specialty Phone number    Jez Jack MD Referring Internal Medicine [phone]    Yuri Funes MD Referring Internal Medicine [phone]

## 2023-03-22 NOTE — PROGRESS NOTES
Received call back from Lorne with ' team.   She states that patient is to remain on warfarin, as  would rather have a little more bleeding during surgery than the patient get a clot while being off warfarin. They need INR 2.5 or lower to proceed.    Patient notified to have INR checked a few days prior to surgery

## 2023-03-29 ENCOUNTER — ANCILLARY PROCEDURE (OUTPATIENT)
Dept: GENERAL RADIOLOGY | Facility: CLINIC | Age: 88
End: 2023-03-29
Attending: INTERNAL MEDICINE
Payer: COMMERCIAL

## 2023-03-29 ENCOUNTER — LAB (OUTPATIENT)
Dept: LAB | Facility: CLINIC | Age: 88
End: 2023-03-29
Payer: COMMERCIAL

## 2023-03-29 DIAGNOSIS — Z01.818 PRE-OP EVALUATION: ICD-10-CM

## 2023-03-29 DIAGNOSIS — Z96.653 STATUS POST TOTAL BILATERAL KNEE REPLACEMENT: ICD-10-CM

## 2023-03-29 LAB
ALBUMIN SERPL BCG-MCNC: 4.4 G/DL (ref 3.5–5.2)
ALP SERPL-CCNC: 58 U/L (ref 40–129)
ALT SERPL W P-5'-P-CCNC: 18 U/L (ref 10–50)
ANION GAP SERPL CALCULATED.3IONS-SCNC: 11 MMOL/L (ref 7–15)
AST SERPL W P-5'-P-CCNC: 32 U/L (ref 10–50)
BILIRUB SERPL-MCNC: 1.2 MG/DL
BUN SERPL-MCNC: 23.2 MG/DL (ref 8–23)
CALCIUM SERPL-MCNC: 9.9 MG/DL (ref 8.8–10.2)
CHLORIDE SERPL-SCNC: 105 MMOL/L (ref 98–107)
CREAT SERPL-MCNC: 1.09 MG/DL (ref 0.67–1.17)
DEPRECATED HCO3 PLAS-SCNC: 26 MMOL/L (ref 22–29)
ERYTHROCYTE [DISTWIDTH] IN BLOOD BY AUTOMATED COUNT: 12.7 % (ref 10–15)
GFR SERPL CREATININE-BSD FRML MDRD: 65 ML/MIN/1.73M2
GLUCOSE SERPL-MCNC: 93 MG/DL (ref 70–99)
HCT VFR BLD AUTO: 46.8 % (ref 40–53)
HGB BLD-MCNC: 15.2 G/DL (ref 13.3–17.7)
MCH RBC QN AUTO: 33 PG (ref 26.5–33)
MCHC RBC AUTO-ENTMCNC: 32.5 G/DL (ref 31.5–36.5)
MCV RBC AUTO: 102 FL (ref 78–100)
PLATELET # BLD AUTO: 129 10E3/UL (ref 150–450)
POTASSIUM SERPL-SCNC: 5.1 MMOL/L (ref 3.4–5.3)
PROT SERPL-MCNC: 7 G/DL (ref 6.4–8.3)
RBC # BLD AUTO: 4.6 10E6/UL (ref 4.4–5.9)
SODIUM SERPL-SCNC: 142 MMOL/L (ref 136–145)
WBC # BLD AUTO: 5.8 10E3/UL (ref 4–11)

## 2023-03-29 PROCEDURE — 80053 COMPREHEN METABOLIC PANEL: CPT

## 2023-03-29 PROCEDURE — 36415 COLL VENOUS BLD VENIPUNCTURE: CPT

## 2023-03-29 PROCEDURE — 73562 X-RAY EXAM OF KNEE 3: CPT | Mod: TC | Performed by: RADIOLOGY

## 2023-03-29 PROCEDURE — 85027 COMPLETE CBC AUTOMATED: CPT

## 2023-04-04 ENCOUNTER — TRANSFERRED RECORDS (OUTPATIENT)
Dept: HEALTH INFORMATION MANAGEMENT | Facility: CLINIC | Age: 88
End: 2023-04-04
Payer: COMMERCIAL

## 2023-04-19 ENCOUNTER — ANTICOAGULATION THERAPY VISIT (OUTPATIENT)
Dept: ANTICOAGULATION | Facility: CLINIC | Age: 88
End: 2023-04-19

## 2023-04-19 ENCOUNTER — TELEPHONE (OUTPATIENT)
Dept: INTERNAL MEDICINE | Facility: CLINIC | Age: 88
End: 2023-04-19

## 2023-04-19 ENCOUNTER — OFFICE VISIT (OUTPATIENT)
Dept: INTERNAL MEDICINE | Facility: CLINIC | Age: 88
End: 2023-04-19
Payer: COMMERCIAL

## 2023-04-19 VITALS
DIASTOLIC BLOOD PRESSURE: 64 MMHG | HEIGHT: 71 IN | TEMPERATURE: 97.9 F | BODY MASS INDEX: 27.3 KG/M2 | RESPIRATION RATE: 14 BRPM | SYSTOLIC BLOOD PRESSURE: 92 MMHG | HEART RATE: 96 BPM | OXYGEN SATURATION: 92 % | WEIGHT: 195 LBS

## 2023-04-19 DIAGNOSIS — Z79.01 CHRONIC ANTICOAGULATION: ICD-10-CM

## 2023-04-19 DIAGNOSIS — M67.911 ROTATOR CUFF DISORDER, RIGHT: Primary | ICD-10-CM

## 2023-04-19 DIAGNOSIS — I49.5 SICK SINUS SYNDROME (H): ICD-10-CM

## 2023-04-19 DIAGNOSIS — D68.51 HETEROZYGOUS FACTOR V LEIDEN MUTATION (H): ICD-10-CM

## 2023-04-19 DIAGNOSIS — D64.9 POSTOPERATIVE ANEMIA: ICD-10-CM

## 2023-04-19 DIAGNOSIS — I95.9 HYPOTENSION, UNSPECIFIED HYPOTENSION TYPE: ICD-10-CM

## 2023-04-19 DIAGNOSIS — Z86.718 HISTORY OF DEEP VENOUS THROMBOSIS: ICD-10-CM

## 2023-04-19 PROBLEM — R06.81 BREATHLESSNESS: Status: RESOLVED | Noted: 2023-02-27 | Resolved: 2023-04-19

## 2023-04-19 LAB
ANION GAP SERPL CALCULATED.3IONS-SCNC: 11 MMOL/L (ref 7–15)
BUN SERPL-MCNC: 19.8 MG/DL (ref 8–23)
CALCIUM SERPL-MCNC: 9.3 MG/DL (ref 8.8–10.2)
CHLORIDE SERPL-SCNC: 103 MMOL/L (ref 98–107)
CREAT SERPL-MCNC: 0.94 MG/DL (ref 0.67–1.17)
DEPRECATED HCO3 PLAS-SCNC: 25 MMOL/L (ref 22–29)
ERYTHROCYTE [DISTWIDTH] IN BLOOD BY AUTOMATED COUNT: 13.1 % (ref 10–15)
GFR SERPL CREATININE-BSD FRML MDRD: 77 ML/MIN/1.73M2
GLUCOSE SERPL-MCNC: 112 MG/DL (ref 70–99)
HCT VFR BLD AUTO: 31.8 % (ref 40–53)
HGB BLD-MCNC: 10.1 G/DL (ref 13.3–17.7)
INR BLD: 3.1 (ref 0.9–1.1)
MCH RBC QN AUTO: 32.8 PG (ref 26.5–33)
MCHC RBC AUTO-ENTMCNC: 31.8 G/DL (ref 31.5–36.5)
MCV RBC AUTO: 103 FL (ref 78–100)
PLATELET # BLD AUTO: 182 10E3/UL (ref 150–450)
POTASSIUM SERPL-SCNC: 4.9 MMOL/L (ref 3.4–5.3)
RBC # BLD AUTO: 3.08 10E6/UL (ref 4.4–5.9)
SODIUM SERPL-SCNC: 139 MMOL/L (ref 136–145)
WBC # BLD AUTO: 4.6 10E3/UL (ref 4–11)

## 2023-04-19 PROCEDURE — 85610 PROTHROMBIN TIME: CPT | Performed by: INTERNAL MEDICINE

## 2023-04-19 PROCEDURE — 80048 BASIC METABOLIC PNL TOTAL CA: CPT | Performed by: INTERNAL MEDICINE

## 2023-04-19 PROCEDURE — 36415 COLL VENOUS BLD VENIPUNCTURE: CPT | Performed by: INTERNAL MEDICINE

## 2023-04-19 PROCEDURE — 99214 OFFICE O/P EST MOD 30 MIN: CPT | Performed by: INTERNAL MEDICINE

## 2023-04-19 PROCEDURE — 36416 COLLJ CAPILLARY BLOOD SPEC: CPT | Performed by: INTERNAL MEDICINE

## 2023-04-19 PROCEDURE — 85027 COMPLETE CBC AUTOMATED: CPT | Performed by: INTERNAL MEDICINE

## 2023-04-19 ASSESSMENT — PAIN SCALES - GENERAL: PAINLEVEL: MILD PAIN (3)

## 2023-04-19 NOTE — PROGRESS NOTES
ANTICOAGULATION MANAGEMENT     Victorino Khan 89 year old male is on warfarin with supratherapeutic INR result. (Goal INR 2.0-3.0)    Recent labs: (last 7 days)     04/19/23  0819   INR 3.1*       ASSESSMENT       Source(s): Chart Review    Previous INR was Therapeutic last 2(+) visits     Pt recently had shoulder surgery on 4/13/23. Left a detailed voicemail message advising pt to take usual dose of warfarin 2 mg today 4/19/23 then take a reduced of warfarin tomorrow 4/20/23 (2 mg) then continue maintenance dose. Recommend INR recheck in 10 days around 4/28/23 or 5/1/23 to make sure that the INR does not elevate and stays therapeutic.INR level could elevate due to inflammation and pain. Mychart message sent as well. Pt to call back or respond if questions, updates or concerns.             PLAN       Dosing Instructions: partial hold then continue your current warfarin dose with next INR in 10 days       Summary  As of 4/19/2023    Full warfarin instructions:  4/20: 2 mg; Otherwise 2 mg every Sun, Wed; 4 mg all other days   Next INR check:  5/1/2023             Detailed voice message left for Victorino with dosing instructions and follow up date.   Sent [employer] message with dosing and follow up instructions    Contact [phone] to schedule and with any changes, questions or concerns.     Education provided:     Please call back if any changes to your diet, medications or how you've been taking warfarin    Contact [phone] with any changes, questions or concerns.     Plan made per [facility] anticoagulation protocol    Naida Hayes, RN  Anticoagulation Clinic  4/19/2023    _______________________________________________________________________     Anticoagulation Episode Summary     Current INR goal:  2.0-3.0   TTR:  72.7 % (1 y)   Target end date:  Indefinite   Send INR reminders to:  [facility]    Indications    History of deep venous thrombosis [Z86.718]  Heterozygous factor V Leiden mutation (H)  [D68.51]  Chronic anticoagulation [Z79.01]           Comments:           Anticoagulation Care Providers     Provider Role Specialty Phone number    Jez Jack MD Referring Internal Medicine [phone]    Yuri Funes MD Referring Internal Medicine [phone]

## 2023-04-19 NOTE — TELEPHONE ENCOUNTER
FYI - Status Update    Who is Calling: patient    Update: Calling back regarding INR, no number left in chart from nurse    Does caller want a call/response back: Yes     Could we send this information to you in [facility] or would you prefer to receive a phone call?:   Patient would prefer a phone call   Okay to leave a detailed message?: No at Cell number on file:    Telephone Information:   Mobile [phone]

## 2023-04-19 NOTE — PROGRESS NOTES
1. Rotator cuff disorder, right  Doing well postoperatively.  Pain controlled off opioid pain medication    2. Postoperative anemia  He remained on warfarin through surgery and reports there was significant blood loss with the surgery.  His hemoglobin did drop from 15 prior to surgery to 10.  He does not believe he has had ongoing bleeding and we will recheck labs as below  - CBC with platelets; Future  - Basic metabolic panel  (Ca, Cl, CO2, Creat, Gluc, K, Na, BUN); Future  - CBC with platelets  - Basic metabolic panel  (Ca, Cl, CO2, Creat, Gluc, K, Na, BUN)    3. Hypotension, unspecified hypotension type  Not on any antihypertensive medication.  His oral intake has been decreased.  Encourage more fluid intake and to make sure not to limit salt.    4. History of deep venous thrombosis  - INR point of care    5. Heterozygous factor V Leiden mutation (H)  - INR; Future  - INR point of care    6. Sick sinus syndrome (H)  He has a pacemaker    7. Chronic anticoagulation  - INR point of care      Dinorah Gleason is a 89 year old, presenting for the following health issues:  Hospital F/U (Rotator cuff tear arthropathy of right shoulder (Primary Dx); Admission 4/13/23 - Discharge 4/14/23) and Labs (INR )        3/22/2023    11:47 AM   Additional Questions   Roomed by Ivory HATHAWAY       Hospital Follow-up Visit:    Hospital/Nursing Home/ Rehab Facility: [facility]  Date of Admission:4/13/23  Date of Discharge: 4/14/23  Reason(s) for Admission: Rotator cuff tear arthropathy of right shoulder (Primary Dx);       Was your hospitalization related to COVID-19? No   Problems taking medications regularly:  None  Medication changes since discharge: None  Problems adhering to non-medication therapy:  None    Summary of hospitalization:  CareEverywhere information obtained and reviewed  Diagnostic Tests/Treatments reviewed.  Follow up needed: none  Other Healthcare Providers Involved in Patient s Care:          "Specialist appointment - As scheduled  Update since discharge: stable.         Plan of care communicated with patient and family/   /          Review of Systems       Objective    BP 92/64 (BP Location: Left arm, Patient Position: Sitting, Cuff Size: Adult Regular)   Pulse 96   Temp 97.9  F (36.6  C) (Tympanic)   Resp 14   Ht 1.803 m (5' 10.98\")   Wt 88.5 kg (195 lb)   SpO2 92%   BMI 27.21 kg/m    Body mass index is 27.21 kg/m .  Physical Exam   Right arm is in a sling.  Neuromuscularly intact in the right hand.  Heart rate is controlled rhythm regular lungs clear to auscultation bilaterally no edema                "

## 2023-04-19 NOTE — PROGRESS NOTES
Pt called back. Pt states that he is eating and not in much pain but he is very fatigued. Pt reports that his blood pressure is low as well. Pt advised to take usual 2 mg of warfarin today 4/19/23 then take 2 mg of warfarin tomorrow 4/20/23 then continue warfarin maintenance dose. Pt scheduled to recheck INR on Thursday 4/27/23 for closer monitoring.

## 2023-04-27 ENCOUNTER — LAB (OUTPATIENT)
Dept: LAB | Facility: CLINIC | Age: 88
End: 2023-04-27
Payer: COMMERCIAL

## 2023-04-27 ENCOUNTER — ANTICOAGULATION THERAPY VISIT (OUTPATIENT)
Dept: ANTICOAGULATION | Facility: CLINIC | Age: 88
End: 2023-04-27

## 2023-04-27 DIAGNOSIS — Z86.718 HISTORY OF DEEP VENOUS THROMBOSIS: Primary | ICD-10-CM

## 2023-04-27 DIAGNOSIS — Z86.718 HISTORY OF DEEP VENOUS THROMBOSIS: ICD-10-CM

## 2023-04-27 DIAGNOSIS — D68.51 HETEROZYGOUS FACTOR V LEIDEN MUTATION (H): ICD-10-CM

## 2023-04-27 DIAGNOSIS — Z79.01 CHRONIC ANTICOAGULATION: ICD-10-CM

## 2023-04-27 LAB — INR BLD: 3.7 (ref 0.9–1.1)

## 2023-04-27 PROCEDURE — 85610 PROTHROMBIN TIME: CPT

## 2023-04-27 PROCEDURE — 36416 COLLJ CAPILLARY BLOOD SPEC: CPT

## 2023-04-27 NOTE — PROGRESS NOTES
ANTICOAGULATION MANAGEMENT     Victorino Khan 89 year old male is on warfarin with supratherapeutic INR result. (Goal INR 2.0-3.0)    Recent labs: (last 7 days)     04/27/23  0940   INR 3.7*       ASSESSMENT       Source(s): Chart Review and Patient/Caregiver Call       Warfarin doses taken: Warfarin taken as instructed    Diet: No new diet changes identified    Medication/supplement changes: continues rtc tylenol for arm pain    New illness, injury, or hospitalization: No    Signs or symptoms of bleeding or clotting: No    Previous result: Supratherapeutic    Additional findings: None         PLAN     Recommended plan for no diet, medication or health factor changes affecting INR     Dosing Instructions: hold dose then decrease your warfarin dose (8.3% change) with next INR in 2 weeks       Summary  As of 4/27/2023    Full warfarin instructions:  4/27: Hold; Otherwise 2 mg every Sun, Wed, Fri; 4 mg all other days   Next INR check:  5/11/2023             Telephone call with Victorino who verbalizes understanding and agrees to plan    Lab visit scheduled    Education provided:     Contact [phone] with any changes, questions or concerns.     Plan made per [facility]agulation protocol    Luís Williamson RN  Anticoagulation Clinic  4/27/2023    _______________________________________________________________________     Anticoagulation Episode Summary     Current INR goal:  2.0-3.0   TTR:  72.3 % (1 y)   Target end date:  Indefinite   Send INR reminders to:  [facility]    Indications    History of deep venous thrombosis [Z86.718]  Heterozygous factor V Leiden mutation (H) [D68.51]  Chronic anticoagulation [Z79.01]           Comments:           Anticoagulation Care Providers     Provider Role Specialty Phone number    Jez Jack MD Referring Internal Medicine [phone]    Yuri Funes MD Referring Internal Medicine [phone]

## 2023-05-10 ENCOUNTER — ANTICOAGULATION THERAPY VISIT (OUTPATIENT)
Dept: ANTICOAGULATION | Facility: CLINIC | Age: 88
End: 2023-05-10

## 2023-05-10 ENCOUNTER — LAB (OUTPATIENT)
Dept: LAB | Facility: CLINIC | Age: 88
End: 2023-05-10
Payer: COMMERCIAL

## 2023-05-10 DIAGNOSIS — D68.51 HETEROZYGOUS FACTOR V LEIDEN MUTATION (H): ICD-10-CM

## 2023-05-10 DIAGNOSIS — Z79.01 CHRONIC ANTICOAGULATION: ICD-10-CM

## 2023-05-10 DIAGNOSIS — Z86.718 HISTORY OF DEEP VENOUS THROMBOSIS: Primary | ICD-10-CM

## 2023-05-10 DIAGNOSIS — Z86.718 HISTORY OF DEEP VENOUS THROMBOSIS: ICD-10-CM

## 2023-05-10 LAB — INR BLD: 2.7 (ref 0.9–1.1)

## 2023-05-10 PROCEDURE — 85610 PROTHROMBIN TIME: CPT

## 2023-05-10 PROCEDURE — 36416 COLLJ CAPILLARY BLOOD SPEC: CPT

## 2023-05-10 NOTE — PROGRESS NOTES
ANTICOAGULATION MANAGEMENT     Victorino Khan 89 year old male is on warfarin with therapeutic INR result. (Goal INR 2.0-3.0)    Recent labs: (last 7 days)     05/10/23  1118   INR 2.7*       ASSESSMENT     Warfarin Lab Questionnaire    Warfarin Doses Last 7 Days      5/10/2023    11:05 AM   Dose in Tablet or Mg   TAB or MG? milligram (mg)     Pt Rptd Dose ALEJANDRINA MONDAY TUESDAY WED THURS FRIDAY SATURDAY   5/10/2023  11:05 AM 4 2 4 2 4 2 4         5/10/2023   Warfarin Lab Questionnaire   Missed doses within past 14 days? No   Changes in diet or alcohol within past 14 days? No   Medication changes since last result? No   Injuries or illness since last result? No   New shortness of breath, severe headaches or sudden changes in vision since last result? No   Abnormal bleeding since last result? No   Upcoming surgery, procedure? No   Best number to call with results? [phone]        Previous result: Supratherapeutic  Additional findings: None       PLAN     Recommended plan for no diet, medication or health factor changes affecting INR     Dosing Instructions: Continue your current warfarin dose with next INR in 2 weeks       Summary  As of 5/10/2023    Full warfarin instructions:  2 mg every Sun, Wed, Fri; 4 mg all other days   Next INR check:  5/25/2023             Telephone call with Victorino who verbalizes understanding and agrees to plan    Check at provider office visit 5/25    Education provided:     Please call back if any changes to your diet, medications or how you've been taking warfarin    Contact [phone] with any changes, questions or concerns.     Plan made per [facility] anticoagulation protocol    Luís Williamson RN  Anticoagulation Clinic  5/10/2023    _______________________________________________________________________     Anticoagulation Episode Summary     Current INR goal:  2.0-3.0   TTR:  71.3 % (1 y)   Target end date:  Indefinite   Send INR reminders to:  [facility]    Indications    History of  deep venous thrombosis [Z86.718]  Heterozygous factor V Leiden mutation (H) [D68.51]  Chronic anticoagulation [Z79.01]           Comments:           Anticoagulation Care Providers     Provider Role Specialty Phone number    Jez Jack MD Referring Internal Medicine [phone]    Yuri Funes MD Referring Internal Medicine [phone]

## 2023-05-15 ENCOUNTER — ANCILLARY PROCEDURE (OUTPATIENT)
Dept: CARDIOLOGY | Facility: CLINIC | Age: 88
End: 2023-05-15
Attending: INTERNAL MEDICINE
Payer: COMMERCIAL

## 2023-05-15 DIAGNOSIS — Z95.0 CARDIAC PACEMAKER IN SITU: ICD-10-CM

## 2023-05-15 PROCEDURE — 93296 REM INTERROG EVL PM/IDS: CPT | Performed by: INTERNAL MEDICINE

## 2023-05-15 PROCEDURE — 93294 REM INTERROG EVL PM/LDLS PM: CPT | Performed by: INTERNAL MEDICINE

## 2023-05-19 LAB
MDC_IDC_LEAD_IMPLANT_DT: NORMAL
MDC_IDC_LEAD_IMPLANT_DT: NORMAL
MDC_IDC_LEAD_LOCATION: NORMAL
MDC_IDC_LEAD_LOCATION: NORMAL
MDC_IDC_LEAD_LOCATION_DETAIL_1: NORMAL
MDC_IDC_LEAD_LOCATION_DETAIL_1: NORMAL
MDC_IDC_LEAD_MFG: NORMAL
MDC_IDC_LEAD_MFG: NORMAL
MDC_IDC_LEAD_MODEL: NORMAL
MDC_IDC_LEAD_MODEL: NORMAL
MDC_IDC_LEAD_POLARITY_TYPE: NORMAL
MDC_IDC_LEAD_POLARITY_TYPE: NORMAL
MDC_IDC_LEAD_SERIAL: NORMAL
MDC_IDC_LEAD_SERIAL: NORMAL
MDC_IDC_MSMT_BATTERY_DTM: NORMAL
MDC_IDC_MSMT_BATTERY_REMAINING_LONGEVITY: 41 MO
MDC_IDC_MSMT_BATTERY_REMAINING_PERCENTAGE: 39 %
MDC_IDC_MSMT_BATTERY_RRT_TRIGGER: NORMAL
MDC_IDC_MSMT_BATTERY_STATUS: NORMAL
MDC_IDC_MSMT_BATTERY_VOLTAGE: 2.96 V
MDC_IDC_MSMT_LEADCHNL_RA_IMPEDANCE_VALUE: 440 OHM
MDC_IDC_MSMT_LEADCHNL_RA_LEAD_CHANNEL_STATUS: NORMAL
MDC_IDC_MSMT_LEADCHNL_RA_PACING_THRESHOLD_AMPLITUDE: 0.38 V
MDC_IDC_MSMT_LEADCHNL_RA_PACING_THRESHOLD_PULSEWIDTH: 0.5 MS
MDC_IDC_MSMT_LEADCHNL_RA_SENSING_INTR_AMPL: 5 MV
MDC_IDC_MSMT_LEADCHNL_RV_IMPEDANCE_VALUE: 390 OHM
MDC_IDC_MSMT_LEADCHNL_RV_LEAD_CHANNEL_STATUS: NORMAL
MDC_IDC_MSMT_LEADCHNL_RV_PACING_THRESHOLD_AMPLITUDE: 0.75 V
MDC_IDC_MSMT_LEADCHNL_RV_PACING_THRESHOLD_PULSEWIDTH: 0.5 MS
MDC_IDC_MSMT_LEADCHNL_RV_SENSING_INTR_AMPL: 7.4 MV
MDC_IDC_PG_IMPLANT_DTM: NORMAL
MDC_IDC_PG_MFG: NORMAL
MDC_IDC_PG_MODEL: NORMAL
MDC_IDC_PG_SERIAL: NORMAL
MDC_IDC_PG_TYPE: NORMAL
MDC_IDC_SESS_CLINIC_NAME: NORMAL
MDC_IDC_SESS_DTM: NORMAL
MDC_IDC_SESS_REPROGRAMMED: NO
MDC_IDC_SESS_TYPE: NORMAL
MDC_IDC_SET_BRADY_AT_MODE_SWITCH_MODE: NORMAL
MDC_IDC_SET_BRADY_AT_MODE_SWITCH_RATE: 170 {BEATS}/MIN
MDC_IDC_SET_BRADY_LOWRATE: 60 {BEATS}/MIN
MDC_IDC_SET_BRADY_MAX_SENSOR_RATE: 130 {BEATS}/MIN
MDC_IDC_SET_BRADY_MAX_TRACKING_RATE: 120 {BEATS}/MIN
MDC_IDC_SET_BRADY_MODE: NORMAL
MDC_IDC_SET_BRADY_PAV_DELAY_LOW: 250 MS
MDC_IDC_SET_BRADY_SAV_DELAY_LOW: 250 MS
MDC_IDC_SET_LEADCHNL_RA_PACING_AMPLITUDE: 2 V
MDC_IDC_SET_LEADCHNL_RA_PACING_ANODE_ELECTRODE_1: NORMAL
MDC_IDC_SET_LEADCHNL_RA_PACING_ANODE_LOCATION_1: NORMAL
MDC_IDC_SET_LEADCHNL_RA_PACING_CAPTURE_MODE: NORMAL
MDC_IDC_SET_LEADCHNL_RA_PACING_CATHODE_ELECTRODE_1: NORMAL
MDC_IDC_SET_LEADCHNL_RA_PACING_CATHODE_LOCATION_1: NORMAL
MDC_IDC_SET_LEADCHNL_RA_PACING_POLARITY: NORMAL
MDC_IDC_SET_LEADCHNL_RA_PACING_PULSEWIDTH: 0.5 MS
MDC_IDC_SET_LEADCHNL_RA_SENSING_ADAPTATION_MODE: NORMAL
MDC_IDC_SET_LEADCHNL_RA_SENSING_ANODE_ELECTRODE_1: NORMAL
MDC_IDC_SET_LEADCHNL_RA_SENSING_ANODE_LOCATION_1: NORMAL
MDC_IDC_SET_LEADCHNL_RA_SENSING_CATHODE_ELECTRODE_1: NORMAL
MDC_IDC_SET_LEADCHNL_RA_SENSING_CATHODE_LOCATION_1: NORMAL
MDC_IDC_SET_LEADCHNL_RA_SENSING_POLARITY: NORMAL
MDC_IDC_SET_LEADCHNL_RA_SENSING_SENSITIVITY: 0.3 MV
MDC_IDC_SET_LEADCHNL_RV_PACING_AMPLITUDE: 2 V
MDC_IDC_SET_LEADCHNL_RV_PACING_ANODE_ELECTRODE_1: NORMAL
MDC_IDC_SET_LEADCHNL_RV_PACING_ANODE_LOCATION_1: NORMAL
MDC_IDC_SET_LEADCHNL_RV_PACING_CAPTURE_MODE: NORMAL
MDC_IDC_SET_LEADCHNL_RV_PACING_CATHODE_ELECTRODE_1: NORMAL
MDC_IDC_SET_LEADCHNL_RV_PACING_CATHODE_LOCATION_1: NORMAL
MDC_IDC_SET_LEADCHNL_RV_PACING_POLARITY: NORMAL
MDC_IDC_SET_LEADCHNL_RV_PACING_PULSEWIDTH: 0.5 MS
MDC_IDC_SET_LEADCHNL_RV_SENSING_ADAPTATION_MODE: NORMAL
MDC_IDC_SET_LEADCHNL_RV_SENSING_ANODE_ELECTRODE_1: NORMAL
MDC_IDC_SET_LEADCHNL_RV_SENSING_ANODE_LOCATION_1: NORMAL
MDC_IDC_SET_LEADCHNL_RV_SENSING_CATHODE_ELECTRODE_1: NORMAL
MDC_IDC_SET_LEADCHNL_RV_SENSING_CATHODE_LOCATION_1: NORMAL
MDC_IDC_SET_LEADCHNL_RV_SENSING_POLARITY: NORMAL
MDC_IDC_SET_LEADCHNL_RV_SENSING_SENSITIVITY: 0.5 MV
MDC_IDC_STAT_AT_BURDEN_PERCENT: 1 %
MDC_IDC_STAT_AT_DTM_END: NORMAL
MDC_IDC_STAT_AT_DTM_START: NORMAL
MDC_IDC_STAT_AT_MODE_SW_COUNT: 3
MDC_IDC_STAT_AT_MODE_SW_COUNT_PER_DAY: 0
MDC_IDC_STAT_AT_MODE_SW_MAX_DURATION: 110 S
MDC_IDC_STAT_AT_MODE_SW_PERCENT_TIME: 1 %
MDC_IDC_STAT_BRADY_AP_VP_PERCENT: 23 %
MDC_IDC_STAT_BRADY_AP_VS_PERCENT: 3.6 %
MDC_IDC_STAT_BRADY_AS_VP_PERCENT: 46 %
MDC_IDC_STAT_BRADY_AS_VS_PERCENT: 27 %
MDC_IDC_STAT_BRADY_DTM_END: NORMAL
MDC_IDC_STAT_BRADY_DTM_START: NORMAL
MDC_IDC_STAT_BRADY_RA_PERCENT_PACED: 25 %
MDC_IDC_STAT_BRADY_RV_PERCENT_PACED: 68 %
MDC_IDC_STAT_CRT_DTM_END: NORMAL
MDC_IDC_STAT_CRT_DTM_START: NORMAL
MDC_IDC_STAT_HEART_RATE_ATRIAL_MAX: 330 {BEATS}/MIN
MDC_IDC_STAT_HEART_RATE_ATRIAL_MEAN: 82 {BEATS}/MIN
MDC_IDC_STAT_HEART_RATE_ATRIAL_MIN: 50 {BEATS}/MIN
MDC_IDC_STAT_HEART_RATE_DTM_END: NORMAL
MDC_IDC_STAT_HEART_RATE_DTM_START: NORMAL
MDC_IDC_STAT_HEART_RATE_VENTRICULAR_MAX: 330 {BEATS}/MIN
MDC_IDC_STAT_HEART_RATE_VENTRICULAR_MEAN: 81 {BEATS}/MIN
MDC_IDC_STAT_HEART_RATE_VENTRICULAR_MIN: 40 {BEATS}/MIN

## 2023-05-25 ENCOUNTER — ANTICOAGULATION THERAPY VISIT (OUTPATIENT)
Dept: ANTICOAGULATION | Facility: CLINIC | Age: 88
End: 2023-05-25

## 2023-05-25 ENCOUNTER — OFFICE VISIT (OUTPATIENT)
Dept: INTERNAL MEDICINE | Facility: CLINIC | Age: 88
End: 2023-05-25
Payer: COMMERCIAL

## 2023-05-25 VITALS
TEMPERATURE: 98.2 F | RESPIRATION RATE: 18 BRPM | HEART RATE: 74 BPM | WEIGHT: 190 LBS | DIASTOLIC BLOOD PRESSURE: 62 MMHG | HEIGHT: 70 IN | OXYGEN SATURATION: 96 % | SYSTOLIC BLOOD PRESSURE: 118 MMHG | BODY MASS INDEX: 27.2 KG/M2

## 2023-05-25 DIAGNOSIS — Z79.01 CHRONIC ANTICOAGULATION: ICD-10-CM

## 2023-05-25 DIAGNOSIS — R27.0 ATAXIA: ICD-10-CM

## 2023-05-25 DIAGNOSIS — Z95.0 STATUS CARDIAC PACEMAKER: ICD-10-CM

## 2023-05-25 DIAGNOSIS — Z96.611 S/P REVERSE TOTAL SHOULDER ARTHROPLASTY, RIGHT: ICD-10-CM

## 2023-05-25 DIAGNOSIS — D68.51 HETEROZYGOUS FACTOR V LEIDEN MUTATION (H): ICD-10-CM

## 2023-05-25 DIAGNOSIS — J44.89 CHRONIC OBSTRUCTIVE AIRWAY DISEASE WITH ASTHMA (H): Primary | ICD-10-CM

## 2023-05-25 DIAGNOSIS — Z86.718 HISTORY OF DEEP VENOUS THROMBOSIS: Primary | ICD-10-CM

## 2023-05-25 DIAGNOSIS — M81.0 AGE-RELATED OSTEOPOROSIS WITHOUT CURRENT PATHOLOGICAL FRACTURE: ICD-10-CM

## 2023-05-25 DIAGNOSIS — Z86.718 HISTORY OF DEEP VENOUS THROMBOSIS: ICD-10-CM

## 2023-05-25 DIAGNOSIS — D62 POSTOPERATIVE ANEMIA DUE TO ACUTE BLOOD LOSS: ICD-10-CM

## 2023-05-25 LAB
ALBUMIN SERPL BCG-MCNC: 4.1 G/DL (ref 3.5–5.2)
ALP SERPL-CCNC: 69 U/L (ref 40–129)
ALT SERPL W P-5'-P-CCNC: 14 U/L (ref 10–50)
ANION GAP SERPL CALCULATED.3IONS-SCNC: 13 MMOL/L (ref 7–15)
AST SERPL W P-5'-P-CCNC: 26 U/L (ref 10–50)
BILIRUB SERPL-MCNC: 0.9 MG/DL
BUN SERPL-MCNC: 21 MG/DL (ref 8–23)
CALCIUM SERPL-MCNC: 9.4 MG/DL (ref 8.8–10.2)
CHLORIDE SERPL-SCNC: 105 MMOL/L (ref 98–107)
CREAT SERPL-MCNC: 1.07 MG/DL (ref 0.67–1.17)
DEPRECATED HCO3 PLAS-SCNC: 25 MMOL/L (ref 22–29)
ERYTHROCYTE [DISTWIDTH] IN BLOOD BY AUTOMATED COUNT: 13.1 % (ref 10–15)
FERRITIN SERPL-MCNC: 100 NG/ML (ref 31–409)
GFR SERPL CREATININE-BSD FRML MDRD: 66 ML/MIN/1.73M2
GLUCOSE SERPL-MCNC: 96 MG/DL (ref 70–99)
HCT VFR BLD AUTO: 41 % (ref 40–53)
HGB BLD-MCNC: 13 G/DL (ref 13.3–17.7)
INR BLD: 3 (ref 0.9–1.1)
MCH RBC QN AUTO: 32.3 PG (ref 26.5–33)
MCHC RBC AUTO-ENTMCNC: 31.7 G/DL (ref 31.5–36.5)
MCV RBC AUTO: 102 FL (ref 78–100)
PLATELET # BLD AUTO: 162 10E3/UL (ref 150–450)
POTASSIUM SERPL-SCNC: 5.4 MMOL/L (ref 3.4–5.3)
PROT SERPL-MCNC: 6.5 G/DL (ref 6.4–8.3)
RBC # BLD AUTO: 4.02 10E6/UL (ref 4.4–5.9)
SODIUM SERPL-SCNC: 143 MMOL/L (ref 136–145)
WBC # BLD AUTO: 4.5 10E3/UL (ref 4–11)

## 2023-05-25 PROCEDURE — 36416 COLLJ CAPILLARY BLOOD SPEC: CPT | Performed by: INTERNAL MEDICINE

## 2023-05-25 PROCEDURE — 80053 COMPREHEN METABOLIC PANEL: CPT | Performed by: INTERNAL MEDICINE

## 2023-05-25 PROCEDURE — 99214 OFFICE O/P EST MOD 30 MIN: CPT | Performed by: INTERNAL MEDICINE

## 2023-05-25 PROCEDURE — 85610 PROTHROMBIN TIME: CPT | Performed by: INTERNAL MEDICINE

## 2023-05-25 PROCEDURE — 36415 COLL VENOUS BLD VENIPUNCTURE: CPT | Performed by: INTERNAL MEDICINE

## 2023-05-25 PROCEDURE — 82728 ASSAY OF FERRITIN: CPT | Performed by: INTERNAL MEDICINE

## 2023-05-25 PROCEDURE — 85027 COMPLETE CBC AUTOMATED: CPT | Performed by: INTERNAL MEDICINE

## 2023-05-25 NOTE — PROGRESS NOTES
ASSESSMENT AND PLAN:    1. Chronic obstructive airway disease with asthma  From chronic obstructive asthma. Tbere may be an element created by kyphosis, clinically this is stable.     2. S/P reverse total shoulder arthroplasty, right  S/p 6 weeks, having follow up today and ongoing PT    3. History of deep venous thrombosis    4. Chronic anticoagulation    5. Heterozygous factor V Leiden mutation     6. Status cardiac pacemaker    7. Ataxia  A chronic unsteadiness, likely related to aging.  Wants PTfor gait training  - Physical Therapy Referral; Future    8. Osteoporosis  Ongoing prolia therapy, will get follow up DXA    Follow up 6 months.     CHIEF COMPLAINT:  Follow up    HISTORY OF PRESENT ILLNESS:  Victorino Khan is a 89 year old male here doing well 6 weeks post total shoulder arthroplasty, reverse method.  Doing well. No unusual dyspnea or cough.  No bowel or bladder issues.  He feels he needs PT for gait training for chronic unsteadiness, exacerbated by his recent immobilization from shoulder surgery. No falls or injury.     REVIEW OF SYSTEMS:   See HPI, all other systems on review are negative.    Past Medical History:   Diagnosis Date     Chronic anticoagulation      Gastroesophageal reflux disease without esophagitis      H/O reactive hypoglycemia      Heterozygous factor V Leiden mutation (H)      Osteoporosis      Personal history of DVT (deep vein thrombosis)      Primary osteoarthritis of right shoulder      Rotator cuff disorder, right      Rotator cuff syndrome, right      Status cardiac pacemaker      Vertebral compression fracture (H)      History   Smoking Status     Never   Smokeless Tobacco     Never     Family History   Problem Relation Age of Onset     Coronary Artery Disease Early Onset Father      Coronary Artery Disease Early Onset Brother      Past Surgical History:   Procedure Laterality Date     ARTHROPLASTY ANKLE Left 4/21/2022    Procedure: LEFT TOTAL ANKLE REPLACEMENT;  Surgeon:  "Magan Chatman MD;  Location: [facility] OR     IMPLANT PACEMAKER Left     For sick sinus syndrome     LAPAROSCOPIC NISSEN FUNDOPLICATION  11/2011    For large paraesophageal hernia     LENGTHEN TENDON ACHILLES Left 4/21/2022    Procedure: TENDON ACHILLELS LENGTHENING, MEDIAL DISPLACEMENT CALCANEAL OSTOTOMY;  Surgeon: Magan Chatman MD;  Location: [facility] OR     TOTAL KNEE ARTHROPLASTY Right 2000     TOTAL KNEE ARTHROPLASTY Left 2010     VITALS:  Vitals:    05/25/23 0825   BP: 118/62   BP Location: Left arm   Patient Position: Sitting   Cuff Size: Adult Regular   Pulse: 74   Resp: 18   Temp: 98.2  F (36.8  C)   TempSrc: Tympanic   SpO2: 96%   Weight: 86.2 kg (190 lb)   Height: 1.778 m (5' 10\")     Wt Readings from Last 3 Encounters:   05/25/23 86.2 kg (190 lb)   04/19/23 88.5 kg (195 lb)   03/22/23 89.8 kg (198 lb)     PHYSICAL EXAM:  Constitutional:  In NAD, alert and oriented  Neck: no cervical or axillary adenopathy  Cardiac:  S1 S2   Lungs: Clear    DECISION TO OBTAIN OLD RECORDS AND/OR OBTAIN HISTORY FROM SOMEONE OTHER THAN PATIENT, AND/OR ACCESSING CARE EVERYWHERE):  1  0     REVIEW AND SUMMARIZATION OF OLD RECORDS, AND/OR OBTAINING HISTORY FROM SOMEONE OTHER THAN PATIENT, AND/OR DISCUSSION OF CASE WITH ANOTHER HEALTH CARE PROVIDER:  2 reviewed past health notes    REVIEW AND/OR ORDER OF OF CLINICAL LAB TESTS: 1  ordered.    REVIEW AND/OR ORDER OF RADIOLOGY TESTS: 1 ordered DXA.    REVIEW AND/OR ORDER OF MEDICAL TESTS (EKG/ECHO/COLONOSCOPY/EGD): 1  0    INDEPENDENT  VISUALIZATION OF IMAGE, TRACING, OR SPECIMEN ITSELF (2 EACH):  0     TOTAL: 4    Current Outpatient Medications   Medication Sig Dispense Refill     albuterol (PROAIR HFA/PROVENTIL HFA/VENTOLIN HFA) 108 (90 Base) MCG/ACT inhaler Inhale 2 puffs into the lungs every 6 hours 18 g 0     aspirin (ASA) 81 MG EC tablet Take 81 mg by mouth daily       calcium carbonate (OS- MG [place]. CA) 500 MG tablet Take 500 mg by mouth daily        " Cholecalciferol (VITAMIN D3 PO) Take 2,000 Units by mouth daily        fluticasone (FLONASE) 50 MCG/ACT nasal spray Spray 1 spray into both nostrils daily       fluticasone-salmeterol (ADVAIR-HFA) 115-21 MCG/ACT inhaler Inhale 2 puffs into the lungs 2 times daily       multivitamin w/minerals (THERA-VIT-M) tablet Take 1 tablet by mouth daily       senna-docusate (SENOKOT-S/PERICOLACE) 8.6-50 MG tablet Take 1-2 tablets by mouth 2 times daily Take while on oral narcotics to prevent or treat constipation. 10 tablet 0     warfarin ANTICOAGULANT (COUMADIN) 4 MG tablet TAKE ONE-HALF (1/2) TO 1 TABLET DAILY AS DIRECTED, ADJUST DOSE BASED ON INR RESULTS 90 tablet 3     warfarin ANTICOAGULANT (COUMADIN) 5 MG tablet Take 5 mg twice weekly or as directed based on inr results. Uses in conjunction with 4 mg tabs 30 tablet 1     Yuri Funes MD  Internal Medicine  [facility]

## 2023-05-25 NOTE — PROGRESS NOTES
ANTICOAGULATION MANAGEMENT     Victorino Khan 89 year old male is on warfarin with therapeutic INR result. (Goal INR 2.0-3.0)    Recent labs: (last 7 days)     05/25/23  0929   INR 3.0*       ASSESSMENT       Source(s): Chart Review and Patient/Caregiver Call       Warfarin doses taken: Warfarin taken as instructed    Diet: No new diet changes identified    Medication/supplement changes: None noted    New illness, injury, or hospitalization: No    Signs or symptoms of bleeding or clotting: No    Previous result: Therapeutic last visit; previously outside of goal range    Additional findings: None         PLAN     Recommended plan for no diet, medication or health factor changes affecting INR     Dosing Instructions: Continue your current warfarin dose with next INR in 4 weeks       Summary  As of 5/25/2023    Full warfarin instructions:  2 mg every Sun, Wed, Fri; 4 mg all other days   Next INR check:  6/22/2023             Telephone call with Victorino who verbalizes understanding and agrees to plan    Lab visit scheduled    Education provided:     Contact [phone] with any changes, questions or concerns.     Plan made per [facility] anticoagulation protocol    Luís Williamson RN  Anticoagulation Clinic  5/25/2023    _______________________________________________________________________     Anticoagulation Episode Summary     Current INR goal:  2.0-3.0   TTR:  71.2 % (1 y)   Target end date:  Indefinite   Send INR reminders to:  [facility]    Indications    History of deep venous thrombosis [Z86.718]  Heterozygous factor V Leiden mutation (H) [D68.51]  Chronic anticoagulation [Z79.01]           Comments:           Anticoagulation Care Providers     Provider Role Specialty Phone number    Jez Jack MD Referring Internal Medicine [phone]    Yuri Funes MD Referring Internal Medicine [phone]

## 2023-06-01 ENCOUNTER — HEALTH MAINTENANCE LETTER (OUTPATIENT)
Age: 88
End: 2023-06-01

## 2023-06-01 ENCOUNTER — ANCILLARY PROCEDURE (OUTPATIENT)
Dept: BONE DENSITY | Facility: CLINIC | Age: 88
End: 2023-06-01
Attending: INTERNAL MEDICINE
Payer: COMMERCIAL

## 2023-06-01 DIAGNOSIS — M81.0 AGE-RELATED OSTEOPOROSIS WITHOUT CURRENT PATHOLOGICAL FRACTURE: ICD-10-CM

## 2023-06-01 PROCEDURE — 77080 DXA BONE DENSITY AXIAL: CPT | Mod: TC | Performed by: RADIOLOGY

## 2023-06-15 ENCOUNTER — THERAPY VISIT (OUTPATIENT)
Dept: PHYSICAL THERAPY | Facility: REHABILITATION | Age: 88
End: 2023-06-15
Attending: INTERNAL MEDICINE
Payer: COMMERCIAL

## 2023-06-15 DIAGNOSIS — R27.0 ATAXIA: ICD-10-CM

## 2023-06-15 DIAGNOSIS — R26.9 ABNORMAL GAIT: Primary | ICD-10-CM

## 2023-06-15 DIAGNOSIS — M62.81 GENERALIZED MUSCLE WEAKNESS: ICD-10-CM

## 2023-06-15 PROCEDURE — 97112 NEUROMUSCULAR REEDUCATION: CPT | Mod: GP | Performed by: PHYSICAL THERAPIST

## 2023-06-15 PROCEDURE — 97161 PT EVAL LOW COMPLEX 20 MIN: CPT | Mod: GP | Performed by: PHYSICAL THERAPIST

## 2023-06-15 NOTE — PROGRESS NOTES
PHYSICAL THERAPY EVALUATION  Type of Visit: Evaluation    See electronic medical record for Abuse and Falls Screening details.    Subjective      Presenting condition or subjective complaint: trouble with walking  Date of onset: 05/25/23    Relevant medical history: Asthma; DVT (blood clot); Hearing problems; Implanted device; Pacemaker; Vision problems macular degeneration   Dates & types of surgery: L shoulder TSA, B TKA    Prior diagnostic imaging/testing results:     none for balance issues  Prior therapy history for the same diagnosis, illness or injury: No      Prior Level of Function   Transfers: Independent  Ambulation: Independent  ADL: Independent  IADL: Driving, Finances, Meal preparation, Work, Yard work    Living Environment  Social support: With a significant other or spouse   Type of home: House   Stairs to enter the home: Yes 1 Is there a railing: No   Ramp: No   Stairs inside the home: Yes 8 Is there a railing: Yes   Help at home: Home and Yard maintenance tasks; Home management tasks (cooking, cleaning)  Equipment owned: -- (reports refuses use of device, walker and cane availiable refuses to use)     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  Hobbies/Interests: 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    Patient goals for therapy: walk straight    Pain assessment: no pain generally speaking     Objective   Cognitive Status Examination  Orientation: Oriented to person, place and time   Level of Consciousness: Alert  Follows Commands and Answers Questions: 100% of the time  Personal Safety and Judgement: refused use of device for ambulation safety  Memory: Intact noting decreased multitasking abilities      INTEGUMENTARY: Intact, bruises easily  POSTURE: Standing Posture: Rounded shoulders, Forward head, Lordosis decreased, Thoracic kyphosis increased  Sitting Posture: Rounded shoulders, Forward head, Lordosis increased, Thoracic kyphosis increased  RANGE OF MOTION: decreased  lumbar/thoracic mobility and hip tissue exetensibility  STRENGTH: B hip weakness of glut med, max and hamstring, 5/5 knee flex/ext & DF, 2/5 PF strength limited WB heel raises ROM    BED MOBILITY: Independent    TRANSFERS: compensatory use of UE and poor weight shift        GAIT:   Level of [place]: able to ambulate unsteadily with modified gait pattern  Assistive Device(s): None  Gait Deviations: Base of support increased  Stride length decreased  Ivonne decreased poor trunk rotation, poor push off, decreased hip ext, decreased step height and length, trendelenburg pattern B  Gait Distance: 70 feet   Stairs: alternating ascending/descending (backwards descending) use of railing    BALANCE: Sitting Balance (static):Good, fatigue, fatigues quickly without support  Sitting Balance (dynamic):Fair, increased sway poor endurance    SPECIAL TESTS  Functional Gait Assessment (FGA) TOTAL SCORE: (MAXIMUM SCORE 30): 11 11/30 increased risk of falls   10 Meter Walk Test (Comfortable)     10 Meter Walk Test (Fast)     6 Minute Walk Test (6MWT)           [place] Balance Scale (BBS)     5 Times Sit-to-Stand (5TSTS)       Dynamic Gait Index (DGI)     Timed Up and Go (TUG) - sec    Single Leg Stance Right (sec)    Single Leg Stance Left (sec)    Modified CTSIB Conditions (sec) Cond 1:   Cond 2:   Cond 4:   Cond 5 :    Romberg  (sec)    Sharpened Romberg (sec)    30 Second Sit to Stand (reps/height)            SENSATION: reports no dx of neuropathy    COORDINATION: no assessed at this visit  MUSCLE TONE: WNL      Assessment & Plan   CLINICAL IMPRESSIONS   Medical Diagnosis: R27.0 (ICD-10-CM) - Ataxia    Treatment Diagnosis: gait abnormality, fall risk, generalized weakness   Impression/Assessment: Patient is a 89 year old male with limited balance decreasing his confidence and sense of safety at home and in the community.  The following significant findings have been identified: Pain, Decreased ROM/flexibility, Decreased joint  mobility, Decreased strength, Impaired balance, Decreased proprioception, Impaired gait, Impaired muscle performance, Decreased activity tolerance, Impaired posture and Instability. Also impaired vision. These impairments interfere with their ability to perform recreational activities, household chores, household mobility and community mobility as compared to previous level of function.      Clinical Decision Making (Complexity):   Clinical Presentation: Stable/Uncomplicated  Clinical Presentation Rationale: based on medical and personal factors listed in PT evaluation  Clinical Decision Making (Complexity): Low complexity    PLAN OF CARE  Treatment Interventions:  Interventions: Gait Training, Manual Therapy, Neuromuscular Re-education, Therapeutic Activity, Therapeutic Exercise    Long Term Goals     PT Goal 1  Goal Identifier: gait speed  Goal Description: Patient will be able to ambulate safely with appropriate gait speed 25 feet in 7  seconds.  Rationale: to maximize safety and independence within the community  Goal Progress: initiated  Target Date: 09/13/23  PT Goal 2  Goal Identifier: FGA  Goal Description: Patient will improve 6 points on FGA indicating decreased fall risk.  Rationale: to maximize safety and independence with performance of ADLs and functional tasks;to maximize safety and independence within the home;to maximize safety and independence within the community  Goal Progress: initiated  Target Date: 09/13/23  PT Goal 3  Goal Identifier: sit <> stand off standard height chair  Goal Description: Patient will be able to complete 5 x sit <> stand off standard height chair in 12 seconds or less.  Rationale: to maximize safety and independence with performance of ADLs and functional tasks;to maximize safety and independence within the home;to maximize safety and independence within the community  Goal Progress: initiated  Target Date: 09/13/23  PT Goal 4  Goal Identifier: HEP  Goal Description:  Patient will be able to complete 6 exercises without assistance for progression to independent management.  Rationale: to maximize safety and independence with performance of ADLs and functional tasks;to maximize safety and independence within the home;to maximize safety and independence within the community  Goal Progress: initiated  Target Date: 09/13/23      Frequency of Treatment: 1  Duration of Treatment: 10-12 weeks    Recommended Referrals to Other Professionals: not at this time  Education Assessment:        Risks and benefits of evaluation/treatment have been explained.   Patient/Family/caregiver agrees with Plan of Care.     Evaluation Time:     PT Eval, Low Complexity Minutes (03715): 40      Signing Clinician: Latasha Cosme PT      [facility]                                                                                   OUTPATIENT PHYSICAL THERAPY      PLAN OF TREATMENT FOR OUTPATIENT REHABILITATION   Patient's Last Name, First Name, ROSELYN KhanVictorino MCGRATH YOB: 1934   Provider's Name   [facility]   Medical Record No.  [id number]     Onset Date: 05/25/23  Start of Care Date: 06/15/23     Medical Diagnosis:  R27.0 (ICD-10-CM) - Ataxia      PT Treatment Diagnosis:  gait abnormality, fall risk, generalized weakness Plan of Treatment  Frequency/Duration: 1/ 10-12 weeks    Certification date from 06/15/23 to 09/13/23         See note for plan of treatment details and functional goals     Latasha Cosme PT                         I CERTIFY THE NEED FOR THESE SERVICES FURNISHED UNDER        THIS PLAN OF TREATMENT AND WHILE UNDER MY CARE     (Physician attestation of this document indicates review and certification of the therapy plan).                  Referring Provider:  Yuri Funes      Initial Assessment  See Epic Evaluation- Start of Care Date: 06/15/23

## 2023-06-22 ENCOUNTER — ANTICOAGULATION THERAPY VISIT (OUTPATIENT)
Dept: ANTICOAGULATION | Facility: CLINIC | Age: 88
End: 2023-06-22

## 2023-06-22 ENCOUNTER — LAB (OUTPATIENT)
Dept: LAB | Facility: CLINIC | Age: 88
End: 2023-06-22
Payer: COMMERCIAL

## 2023-06-22 DIAGNOSIS — Z79.01 CHRONIC ANTICOAGULATION: ICD-10-CM

## 2023-06-22 DIAGNOSIS — D68.51 HETEROZYGOUS FACTOR V LEIDEN MUTATION (H): ICD-10-CM

## 2023-06-22 DIAGNOSIS — Z86.718 HISTORY OF DEEP VENOUS THROMBOSIS: Primary | ICD-10-CM

## 2023-06-22 DIAGNOSIS — Z86.718 HISTORY OF DEEP VENOUS THROMBOSIS: ICD-10-CM

## 2023-06-22 LAB — INR BLD: 3.3 (ref 0.9–1.1)

## 2023-06-22 PROCEDURE — 36416 COLLJ CAPILLARY BLOOD SPEC: CPT

## 2023-06-22 PROCEDURE — 85610 PROTHROMBIN TIME: CPT

## 2023-06-22 NOTE — PROGRESS NOTES
ANTICOAGULATION MANAGEMENT     Victorino Khan 89 year old male is on warfarin with supratherapeutic INR result. (Goal INR 2.0-3.0)    Recent labs: (last 7 days)     06/22/23  1312   INR 3.3*       ASSESSMENT     Warfarin Lab Questionnaire    Warfarin Doses Last 7 Days      6/22/2023     1:10 PM   Dose in Tablet or Mg   TAB or MG? milligram (mg)     Pt Rptd Dose SUNDAY MONDAY TUESDAY WED THURS FRIDAY SATURDAY 6/22/2023   1:10 PM 4 4 4 4 3 3 2         6/22/2023   Warfarin Lab Questionnaire   Missed doses within past 14 days? No   Changes in diet or alcohol within past 14 days? No   Medication changes since last result? No   Injuries or illness since last result? No   New shortness of breath, severe headaches or sudden changes in vision since last result? No   Abnormal bleeding since last result? No   Upcoming surgery, procedure? No   Best number to call with results? no     Previous result: Therapeutic last 2(+) visits  Additional findings: None       PLAN     Recommended plan for no diet, medication or health factor changes affecting INR     Dosing Instructions: decrease your warfarin dose (9.1% change) with next INR in 2 weeks       Summary  As of 6/22/2023    Full warfarin instructions:  4 mg every Mon, Thu, Sat; 2 mg all other days   Next INR check:  7/6/2023             Detailed voice message left for Eddy with dosing instructions and follow up date.     Lab visit scheduled    Education provided:     Contact [phone] with any changes, questions or concerns.     Plan made per [facility] anticoagulation protocol    Luís Williamson RN  Anticoagulation Clinic  6/23/2023    _______________________________________________________________________     Anticoagulation Episode Summary     Current INR goal:  2.0-3.0   TTR:  63.4 % (1 y)   Target end date:  Indefinite   Send INR reminders to:  [facility]    Indications    History of deep venous thrombosis [Z86.718]  Heterozygous factor V Leiden mutation (H)  [D68.51]  Chronic anticoagulation [Z79.01]           Comments:           Anticoagulation Care Providers     Provider Role Specialty Phone number    Jez Jack MD Referring Internal Medicine [phone]    Yuri Funes MD Referring Internal Medicine [phone]

## 2023-06-26 ENCOUNTER — THERAPY VISIT (OUTPATIENT)
Dept: PHYSICAL THERAPY | Facility: REHABILITATION | Age: 88
End: 2023-06-26
Payer: COMMERCIAL

## 2023-06-26 DIAGNOSIS — M62.81 GENERALIZED MUSCLE WEAKNESS: ICD-10-CM

## 2023-06-26 DIAGNOSIS — R27.0 ATAXIA: Primary | ICD-10-CM

## 2023-06-26 DIAGNOSIS — R26.9 ABNORMAL GAIT: ICD-10-CM

## 2023-06-26 PROCEDURE — 97110 THERAPEUTIC EXERCISES: CPT | Mod: GP | Performed by: PHYSICAL THERAPIST

## 2023-07-06 ENCOUNTER — THERAPY VISIT (OUTPATIENT)
Dept: PHYSICAL THERAPY | Facility: REHABILITATION | Age: 88
End: 2023-07-06
Payer: COMMERCIAL

## 2023-07-06 DIAGNOSIS — R26.9 ABNORMAL GAIT: ICD-10-CM

## 2023-07-06 DIAGNOSIS — R27.0 ATAXIA: Primary | ICD-10-CM

## 2023-07-06 DIAGNOSIS — M62.81 GENERALIZED MUSCLE WEAKNESS: ICD-10-CM

## 2023-07-06 PROCEDURE — 97110 THERAPEUTIC EXERCISES: CPT | Mod: GP | Performed by: PHYSICAL THERAPIST

## 2023-07-13 ENCOUNTER — TELEPHONE (OUTPATIENT)
Dept: ANTICOAGULATION | Facility: CLINIC | Age: 88
End: 2023-07-13
Payer: COMMERCIAL

## 2023-07-13 NOTE — TELEPHONE ENCOUNTER
ANTICOAGULATION     Victorino Khan is overdue for INR check.      Spoke with Eddy  and scheduled lab appointment on 7/21   - also scheduled for PT    Karey Baca RN

## 2023-07-21 ENCOUNTER — ANTICOAGULATION THERAPY VISIT (OUTPATIENT)
Dept: ANTICOAGULATION | Facility: CLINIC | Age: 88
End: 2023-07-21

## 2023-07-21 ENCOUNTER — ALLIED HEALTH/NURSE VISIT (OUTPATIENT)
Dept: FAMILY MEDICINE | Facility: CLINIC | Age: 88
End: 2023-07-21
Payer: COMMERCIAL

## 2023-07-21 ENCOUNTER — THERAPY VISIT (OUTPATIENT)
Dept: PHYSICAL THERAPY | Facility: REHABILITATION | Age: 88
End: 2023-07-21
Payer: COMMERCIAL

## 2023-07-21 ENCOUNTER — LAB (OUTPATIENT)
Dept: LAB | Facility: CLINIC | Age: 88
End: 2023-07-21
Payer: COMMERCIAL

## 2023-07-21 DIAGNOSIS — M62.81 GENERALIZED MUSCLE WEAKNESS: ICD-10-CM

## 2023-07-21 DIAGNOSIS — D68.51 HETEROZYGOUS FACTOR V LEIDEN MUTATION (H): ICD-10-CM

## 2023-07-21 DIAGNOSIS — Z86.718 HISTORY OF DEEP VENOUS THROMBOSIS: Primary | ICD-10-CM

## 2023-07-21 DIAGNOSIS — S69.90XA THUMB INJURY: Primary | ICD-10-CM

## 2023-07-21 DIAGNOSIS — R26.9 ABNORMAL GAIT: ICD-10-CM

## 2023-07-21 DIAGNOSIS — Z79.01 CHRONIC ANTICOAGULATION: ICD-10-CM

## 2023-07-21 DIAGNOSIS — Z86.718 HISTORY OF DEEP VENOUS THROMBOSIS: ICD-10-CM

## 2023-07-21 DIAGNOSIS — R27.0 ATAXIA: Primary | ICD-10-CM

## 2023-07-21 LAB — INR BLD: 1.8 (ref 0.9–1.1)

## 2023-07-21 PROCEDURE — 99207 PR NO CHARGE NURSE ONLY: CPT

## 2023-07-21 PROCEDURE — 85610 PROTHROMBIN TIME: CPT

## 2023-07-21 PROCEDURE — 36416 COLLJ CAPILLARY BLOOD SPEC: CPT

## 2023-07-21 PROCEDURE — 97112 NEUROMUSCULAR REEDUCATION: CPT | Mod: GP | Performed by: PHYSICAL THERAPIST

## 2023-07-21 PROCEDURE — 97110 THERAPEUTIC EXERCISES: CPT | Mod: GP | Performed by: PHYSICAL THERAPIST

## 2023-07-21 NOTE — PROGRESS NOTES
ANTICOAGULATION MANAGEMENT     Victorino Khan 89 year old male is on warfarin with therapeutic INR result. (Goal INR 2.0-3.0)    Recent labs: (last 7 days)     07/21/23  1027   INR 1.8*       ASSESSMENT     Warfarin Lab Questionnaire    Warfarin Doses Last 7 Days      7/21/2023    10:20 AM   Dose in Tablet or Mg   TAB or MG? milligram (mg)     Pt Rptd Dose SUNDAY MONDAY TUESDAY WED THURS FRIDAY SATURDAY 7/21/2023  10:20 AM 4 4 4 4 2 2 2         7/21/2023   Warfarin Lab Questionnaire   Missed doses within past 14 days? No   Changes in diet or alcohol within past 14 days? No   Medication changes since last result? No   Injuries or illness since last result? No   New shortness of breath, severe headaches or sudden changes in vision since last result? No   Abnormal bleeding since last result? No   Upcoming surgery, procedure? No   Best number to call with results? thumb is bleeding     Previous result: Therapeutic last 2(+) visits  Additional findings: None       PLAN     Recommended plan for no diet, medication or health factor changes affecting INR     Dosing Instructions: booster dose then continue your current warfarin dose with next INR in 2 weeks       Summary  As of 7/21/2023      Full warfarin instructions:  7/21: 6 mg; Otherwise 2 mg every Sun, Tue, Thu; 4 mg all other days   Next INR check:  8/1/2023               Detailed voice message left for Eddy with dosing instructions and follow up date.     Check at provider office visit    Education provided:   Contact [phone] with any changes, questions or concerns.     Plan made per [facility] anticoagulation protocol    Luís Williamson RN  Anticoagulation Clinic  7/21/2023    _______________________________________________________________________     Anticoagulation Episode Summary       Current INR goal:  2.0-3.0   TTR:  60.9 % (1 y)   Target end date:  Indefinite   Send INR reminders to:  [facility]    Indications    History of deep venous thrombosis  [Z86.718]  Heterozygous factor V Leiden mutation (H) [D68.51]  Chronic anticoagulation [Z79.01]             Comments:               Anticoagulation Care Providers       Provider Role Specialty Phone number    Jez Jack MD Referring Internal Medicine [phone]    Yuri Funes MD Referring Internal Medicine [phone]

## 2023-07-21 NOTE — PROGRESS NOTES
Patient came in for PT today and requests a bandage change for his right thumb. States he cut it on a table saw last weekend. Wound assessed and sutures intact. Bandaged with non-adherent dressing and bacitracin, secured with paper tape. Patient has a follow up visit in about 2 weeks to have stiches removed.

## 2023-08-01 ENCOUNTER — OFFICE VISIT (OUTPATIENT)
Dept: INTERNAL MEDICINE | Facility: CLINIC | Age: 88
End: 2023-08-01
Payer: COMMERCIAL

## 2023-08-01 VITALS
HEIGHT: 70 IN | BODY MASS INDEX: 27.16 KG/M2 | SYSTOLIC BLOOD PRESSURE: 118 MMHG | HEART RATE: 79 BPM | TEMPERATURE: 98 F | OXYGEN SATURATION: 94 % | DIASTOLIC BLOOD PRESSURE: 67 MMHG | RESPIRATION RATE: 15 BRPM | WEIGHT: 189.7 LBS

## 2023-08-01 DIAGNOSIS — S61.111D LACERATION OF RIGHT THUMB WITHOUT FOREIGN BODY WITH DAMAGE TO NAIL, SUBSEQUENT ENCOUNTER: Primary | ICD-10-CM

## 2023-08-01 PROCEDURE — 99213 OFFICE O/P EST LOW 20 MIN: CPT | Performed by: INTERNAL MEDICINE

## 2023-08-01 ASSESSMENT — PAIN SCALES - GENERAL: PAINLEVEL: MILD PAIN (3)

## 2023-08-04 ENCOUNTER — THERAPY VISIT (OUTPATIENT)
Dept: PHYSICAL THERAPY | Facility: REHABILITATION | Age: 88
End: 2023-08-04
Payer: COMMERCIAL

## 2023-08-04 DIAGNOSIS — R27.0 ATAXIA: ICD-10-CM

## 2023-08-04 DIAGNOSIS — R26.9 ABNORMAL GAIT: ICD-10-CM

## 2023-08-04 DIAGNOSIS — M62.81 GENERALIZED MUSCLE WEAKNESS: Primary | ICD-10-CM

## 2023-08-04 PROCEDURE — 97112 NEUROMUSCULAR REEDUCATION: CPT | Mod: GP | Performed by: PHYSICAL THERAPIST

## 2023-08-14 ENCOUNTER — THERAPY VISIT (OUTPATIENT)
Dept: PHYSICAL THERAPY | Facility: REHABILITATION | Age: 88
End: 2023-08-14
Payer: COMMERCIAL

## 2023-08-14 ENCOUNTER — LAB (OUTPATIENT)
Dept: LAB | Facility: CLINIC | Age: 88
End: 2023-08-14
Payer: COMMERCIAL

## 2023-08-14 ENCOUNTER — ANTICOAGULATION THERAPY VISIT (OUTPATIENT)
Dept: ANTICOAGULATION | Facility: CLINIC | Age: 88
End: 2023-08-14

## 2023-08-14 DIAGNOSIS — Z79.01 CHRONIC ANTICOAGULATION: ICD-10-CM

## 2023-08-14 DIAGNOSIS — Z86.718 HISTORY OF DEEP VENOUS THROMBOSIS: Primary | ICD-10-CM

## 2023-08-14 DIAGNOSIS — M62.81 GENERALIZED MUSCLE WEAKNESS: Primary | ICD-10-CM

## 2023-08-14 DIAGNOSIS — D68.51 HETEROZYGOUS FACTOR V LEIDEN MUTATION (H): ICD-10-CM

## 2023-08-14 DIAGNOSIS — Z86.718 HISTORY OF DEEP VENOUS THROMBOSIS: ICD-10-CM

## 2023-08-14 DIAGNOSIS — R27.0 ATAXIA: ICD-10-CM

## 2023-08-14 DIAGNOSIS — R26.9 ABNORMAL GAIT: ICD-10-CM

## 2023-08-14 LAB — INR BLD: 1.4 (ref 0.9–1.1)

## 2023-08-14 PROCEDURE — 97112 NEUROMUSCULAR REEDUCATION: CPT | Mod: GP | Performed by: PHYSICAL THERAPIST

## 2023-08-14 PROCEDURE — 97110 THERAPEUTIC EXERCISES: CPT | Mod: GP | Performed by: PHYSICAL THERAPIST

## 2023-08-14 PROCEDURE — 85610 PROTHROMBIN TIME: CPT

## 2023-08-14 PROCEDURE — 36416 COLLJ CAPILLARY BLOOD SPEC: CPT

## 2023-08-14 NOTE — PROGRESS NOTES
ANTICOAGULATION MANAGEMENT     Victorino Khan 89 year old male is on warfarin with subtherapeutic INR result. (Goal INR 2.0-3.0)    Recent labs: (last 7 days)     08/14/23  1020   INR 1.4*       ASSESSMENT     Warfarin Lab Questionnaire    Warfarin Doses Last 7 Days      8/14/2023    10:11 AM   Dose in Tablet or Mg   TAB or MG? milligram (mg)     Pt Rptd Dose SUNDAY MONDAY TUESDAY WED THURS FRIDAY SATURDAY 8/14/2023  10:11 AM 4 3 4 3 4 3 4         8/14/2023   Warfarin Lab Questionnaire   Missed doses within past 14 days? No   Changes in diet or alcohol within past 14 days? No   Medication changes since last result? No   Injuries or illness since last result? No   New shortness of breath, severe headaches or sudden changes in vision since last result? No   Abnormal bleeding since last result? No   Upcoming surgery, procedure? No   Best number to call with results? [phone]     Previous result: Therapeutic last 2(+) visits  Additional findings: None       PLAN     Recommended plan for no diet, medication or health factor changes affecting INR     Dosing Instructions: booster dose then Increase your warfarin dose (9.1% change) with next INR in 1 week       Summary  As of 8/14/2023      Full warfarin instructions:  8/14: 6 mg; Otherwise 2 mg every Tue, Thu; 4 mg all other days   Next INR check:  8/21/2023               Telephone call with Eddy who verbalizes understanding and agrees to plan    Lab visit scheduled    Education provided:   None required    Plan made per [facility] anticoagulation protocol    Luís Williamson RN  Anticoagulation Clinic  8/14/2023    _______________________________________________________________________     Anticoagulation Episode Summary       Current INR goal:  2.0-3.0   TTR:  54.3 % (1 y)   Target end date:  Indefinite   Send INR reminders to:  [facility]    Indications    History of deep venous thrombosis [Z86.718]  Heterozygous factor V Leiden mutation (H) [D68.51]  Chronic  anticoagulation [Z79.01]             Comments:               Anticoagulation Care Providers       Provider Role Specialty Phone number    Jez Jack MD Referring Internal Medicine [phone]    Yuri Funes MD Referring Internal Medicine [phone]

## 2023-08-15 ENCOUNTER — OFFICE VISIT (OUTPATIENT)
Dept: INTERNAL MEDICINE | Facility: CLINIC | Age: 88
End: 2023-08-15
Payer: COMMERCIAL

## 2023-08-15 VITALS
TEMPERATURE: 97.4 F | HEIGHT: 70 IN | HEART RATE: 92 BPM | RESPIRATION RATE: 14 BRPM | BODY MASS INDEX: 26.76 KG/M2 | DIASTOLIC BLOOD PRESSURE: 68 MMHG | OXYGEN SATURATION: 94 % | WEIGHT: 186.9 LBS | SYSTOLIC BLOOD PRESSURE: 122 MMHG

## 2023-08-15 DIAGNOSIS — S61.111D LACERATION OF RIGHT THUMB WITHOUT FOREIGN BODY WITH DAMAGE TO NAIL, SUBSEQUENT ENCOUNTER: ICD-10-CM

## 2023-08-15 DIAGNOSIS — Z23 NEED FOR SHINGLES VACCINE: ICD-10-CM

## 2023-08-15 DIAGNOSIS — J44.89 CHRONIC OBSTRUCTIVE AIRWAY DISEASE WITH ASTHMA (H): Primary | ICD-10-CM

## 2023-08-15 PROBLEM — J45.30 MILD PERSISTENT ASTHMA WITHOUT COMPLICATION: Status: RESOLVED | Noted: 2023-02-27 | Resolved: 2023-08-15

## 2023-08-15 PROBLEM — S61.111A LACERATION OF RIGHT THUMB WITHOUT FOREIGN BODY WITH DAMAGE TO NAIL: Status: ACTIVE | Noted: 2023-08-15

## 2023-08-15 PROBLEM — J45.20 ASTHMA IN ADULT, MILD INTERMITTENT, UNCOMPLICATED: Status: RESOLVED | Noted: 2021-01-14 | Resolved: 2023-08-15

## 2023-08-15 PROCEDURE — 99214 OFFICE O/P EST MOD 30 MIN: CPT | Performed by: INTERNAL MEDICINE

## 2023-08-15 RX ORDER — CEPHALEXIN 500 MG/1
500 CAPSULE ORAL 2 TIMES DAILY
Qty: 14 CAPSULE | Refills: 0 | Status: SHIPPED | OUTPATIENT
Start: 2023-08-15 | End: 2024-02-01

## 2023-08-15 ASSESSMENT — PAIN SCALES - GENERAL: PAINLEVEL: NO PAIN (0)

## 2023-08-15 NOTE — PROGRESS NOTES
ASSESSMENT AND PLAN:    1. Need for shingles vaccine  Referred to pharmacy.     2. Chronic obstructive airway disease with asthma   Mucupurulent productive cough and some increase in MENENDEZ.  Clinically consistent with mild exacerbation.  No wheezes heard today. Will treat with cephalexin 500 mg po bid for 7 days. He will increase his advair to bid, and follow up if fails to improve.     3. Thumb laceration  Healing well, all sutures removed.     Follow up 6 months.   CHIEF COMPLAINT:  Follow up suture removal, and COPD symptoms    HISTORY OF PRESENT ILLNESS:  Victorino Khan is a 89 year old male with one week of increased sputum production - mucopurulent, and mild increased MENENDEZ.  No palpitations or naren orthopnea, or LE edema.  Finger laceration is healing.  No chest pain.  Overall otherwise feels well.      REVIEW OF SYSTEMS:   See HPI, all other systems on review are negative.    Past Medical History:   Diagnosis Date    Chronic anticoagulation     Gastroesophageal reflux disease without esophagitis     H/O reactive hypoglycemia     Heterozygous factor V Leiden mutation (H)     Osteoporosis     Personal history of DVT (deep vein thrombosis)     Primary osteoarthritis of right shoulder     Rotator cuff disorder, right     Rotator cuff syndrome, right     Status cardiac pacemaker     Vertebral compression fracture (H)      History   Smoking Status    Never   Smokeless Tobacco    Never     Family History   Problem Relation Age of Onset    Coronary Artery Disease Early Onset Father     Coronary Artery Disease Early Onset Brother      Past Surgical History:   Procedure Laterality Date    ARTHROPLASTY ANKLE Left 4/21/2022    Procedure: LEFT TOTAL ANKLE REPLACEMENT;  Surgeon: Magan Chatman MD;  Location: [facility] OR    IMPLANT PACEMAKER Left     For sick sinus syndrome    LAPAROSCOPIC NISSEN FUNDOPLICATION  11/2011    For large paraesophageal hernia    LENGTHEN TENDON ACHILLES Left 4/21/2022    Procedure: TENDON  Discussed results with patient. Patient mentioned this was his boss's cow. Will change yesterday's visit to work comp.   "ACHILLELS LENGTHENING, MEDIAL DISPLACEMENT CALCANEAL OSTOTOMY;  Surgeon: Magan Chatman MD;  Location: [facility] OR    TOTAL KNEE ARTHROPLASTY Right 2000    TOTAL KNEE ARTHROPLASTY Left 2010     VITALS:  Vitals:    08/15/23 0856   BP: 122/68   BP Location: Left arm   Patient Position: Sitting   Cuff Size: Adult Large   Pulse: 92   Resp: 14   Temp: 97.4  F (36.3  C)   SpO2: 94%   Weight: 84.8 kg (186 lb 14.4 oz)   Height: 1.778 m (5' 10\")     Wt Readings from Last 3 Encounters:   08/15/23 84.8 kg (186 lb 14.4 oz)   08/01/23 86 kg (189 lb 11.2 oz)   05/25/23 86.2 kg (190 lb)     PHYSICAL EXAM:  Constitutional:  In NAD, alert and oriented  Neck: no cervical or axillary adenopathy  Cardiac:  S1 S2   Lungs: Clear, no rales or rhonchi are heard  Extremities: right DIP of thumb, laceration healing well, all sutures are removed.      DECISION TO OBTAIN OLD RECORDS AND/OR OBTAIN HISTORY FROM SOMEONE OTHER THAN PATIENT, AND/OR ACCESSING CARE EVERYWHERE):  1  0     REVIEW AND SUMMARIZATION OF OLD RECORDS, AND/OR OBTAINING HISTORY FROM SOMEONE OTHER THAN PATIENT, AND/OR DISCUSSION OF CASE WITH ANOTHER HEALTH CARE PROVIDER:  2 reviewed past notes    REVIEW AND/OR ORDER OF OF CLINICAL LAB TESTS: 1  reviewed. .    REVIEW AND/OR ORDER OF RADIOLOGY TESTS: 1 reviewed CXR last year.    REVIEW AND/OR ORDER OF MEDICAL TESTS (EKG/ECHO/COLONOSCOPY/EGD): 1  0    INDEPENDENT  VISUALIZATION OF IMAGE, TRACING, OR SPECIMEN ITSELF (2 EACH):  0     TOTAL: 4    Current Outpatient Medications   Medication Sig Dispense Refill    albuterol (PROAIR HFA/PROVENTIL HFA/VENTOLIN HFA) 108 (90 Base) MCG/ACT inhaler Inhale 2 puffs into the lungs every 6 hours 18 g 0    aspirin (ASA) 81 MG EC tablet Take 81 mg by mouth daily      calcium carbonate (OS- MG [place]. [place]) 500 MG tablet Take 500 mg by mouth daily       Cholecalciferol (VITAMIN D3 PO) Take 2,000 Units by mouth daily       fluticasone (FLONASE) 50 MCG/ACT nasal spray Spray 1 spray into both " nostrils daily      fluticasone-salmeterol (ADVAIR-HFA) 115-21 MCG/ACT inhaler Inhale 2 puffs into the lungs 2 times daily      multivitamin w/minerals (THERA-VIT-M) tablet Take 1 tablet by mouth daily      senna-docusate (SENOKOT-S/PERICOLACE) 8.6-50 MG tablet Take 1-2 tablets by mouth 2 times daily Take while on oral narcotics to prevent or treat constipation. 10 tablet 0    warfarin ANTICOAGULANT (COUMADIN) 4 MG tablet TAKE ONE-HALF (1/2) TO 1 TABLET DAILY AS DIRECTED, ADJUST DOSE BASED ON INR RESULTS 90 tablet 3    warfarin ANTICOAGULANT (COUMADIN) 5 MG tablet Take 5 mg twice weekly or as directed based on inr results. Uses in conjunction with 4 mg tabs 30 tablet 1     Yuri Funse MD  Internal Medicine  [facility]

## 2023-08-18 ENCOUNTER — ANCILLARY PROCEDURE (OUTPATIENT)
Dept: CARDIOLOGY | Facility: CLINIC | Age: 88
End: 2023-08-18
Attending: INTERNAL MEDICINE
Payer: COMMERCIAL

## 2023-08-18 DIAGNOSIS — Z95.0 CARDIAC PACEMAKER IN SITU: ICD-10-CM

## 2023-08-18 DIAGNOSIS — I49.5 SICK SINUS SYNDROME (H): Primary | ICD-10-CM

## 2023-08-18 PROCEDURE — 93280 PM DEVICE PROGR EVAL DUAL: CPT | Performed by: INTERNAL MEDICINE

## 2023-08-21 ENCOUNTER — LAB (OUTPATIENT)
Dept: LAB | Facility: CLINIC | Age: 88
End: 2023-08-21
Payer: COMMERCIAL

## 2023-08-21 ENCOUNTER — ANTICOAGULATION THERAPY VISIT (OUTPATIENT)
Dept: ANTICOAGULATION | Facility: CLINIC | Age: 88
End: 2023-08-21

## 2023-08-21 DIAGNOSIS — D68.51 HETEROZYGOUS FACTOR V LEIDEN MUTATION (H): ICD-10-CM

## 2023-08-21 DIAGNOSIS — Z79.01 CHRONIC ANTICOAGULATION: ICD-10-CM

## 2023-08-21 DIAGNOSIS — Z86.718 HISTORY OF DEEP VENOUS THROMBOSIS: ICD-10-CM

## 2023-08-21 DIAGNOSIS — Z86.718 HISTORY OF DEEP VENOUS THROMBOSIS: Primary | ICD-10-CM

## 2023-08-21 LAB
INR BLD: 2.2 (ref 0.9–1.1)
MDC_IDC_LEAD_IMPLANT_DT: NORMAL
MDC_IDC_LEAD_IMPLANT_DT: NORMAL
MDC_IDC_LEAD_LOCATION: NORMAL
MDC_IDC_LEAD_LOCATION: NORMAL
MDC_IDC_LEAD_LOCATION_DETAIL_1: NORMAL
MDC_IDC_LEAD_LOCATION_DETAIL_1: NORMAL
MDC_IDC_LEAD_MFG: NORMAL
MDC_IDC_LEAD_MFG: NORMAL
MDC_IDC_LEAD_MODEL: NORMAL
MDC_IDC_LEAD_MODEL: NORMAL
MDC_IDC_LEAD_POLARITY_TYPE: NORMAL
MDC_IDC_LEAD_POLARITY_TYPE: NORMAL
MDC_IDC_LEAD_SERIAL: NORMAL
MDC_IDC_LEAD_SERIAL: NORMAL
MDC_IDC_MSMT_BATTERY_REMAINING_LONGEVITY: 39 MO
MDC_IDC_MSMT_BATTERY_STATUS: NORMAL
MDC_IDC_MSMT_BATTERY_VOLTAGE: 2.96 V
MDC_IDC_MSMT_LEADCHNL_RA_IMPEDANCE_VALUE: 487.5 OHM
MDC_IDC_MSMT_LEADCHNL_RA_PACING_THRESHOLD_AMPLITUDE: 0.75 V
MDC_IDC_MSMT_LEADCHNL_RA_PACING_THRESHOLD_AMPLITUDE: 0.75 V
MDC_IDC_MSMT_LEADCHNL_RA_PACING_THRESHOLD_PULSEWIDTH: 0.5 MS
MDC_IDC_MSMT_LEADCHNL_RA_PACING_THRESHOLD_PULSEWIDTH: 0.5 MS
MDC_IDC_MSMT_LEADCHNL_RA_SENSING_INTR_AMPL: 5 MV
MDC_IDC_MSMT_LEADCHNL_RV_IMPEDANCE_VALUE: 412.5 OHM
MDC_IDC_MSMT_LEADCHNL_RV_PACING_THRESHOLD_AMPLITUDE: 1 V
MDC_IDC_MSMT_LEADCHNL_RV_PACING_THRESHOLD_AMPLITUDE: 1 V
MDC_IDC_MSMT_LEADCHNL_RV_PACING_THRESHOLD_PULSEWIDTH: 0.5 MS
MDC_IDC_MSMT_LEADCHNL_RV_PACING_THRESHOLD_PULSEWIDTH: 0.5 MS
MDC_IDC_MSMT_LEADCHNL_RV_SENSING_INTR_AMPL: 6.1 MV
MDC_IDC_PG_IMPLANT_DTM: NORMAL
MDC_IDC_PG_MFG: NORMAL
MDC_IDC_PG_MODEL: NORMAL
MDC_IDC_PG_SERIAL: NORMAL
MDC_IDC_PG_TYPE: NORMAL
MDC_IDC_SESS_CLINIC_NAME: NORMAL
MDC_IDC_SESS_DTM: NORMAL
MDC_IDC_SESS_TYPE: NORMAL
MDC_IDC_SET_BRADY_AT_MODE_SWITCH_MODE: NORMAL
MDC_IDC_SET_BRADY_AT_MODE_SWITCH_RATE: 170 {BEATS}/MIN
MDC_IDC_SET_BRADY_HYSTRATE: NORMAL
MDC_IDC_SET_BRADY_LOWRATE: 60 {BEATS}/MIN
MDC_IDC_SET_BRADY_MAX_SENSOR_RATE: 130 {BEATS}/MIN
MDC_IDC_SET_BRADY_MAX_TRACKING_RATE: 120 {BEATS}/MIN
MDC_IDC_SET_BRADY_MODE: NORMAL
MDC_IDC_SET_BRADY_NIGHT_RATE: NORMAL
MDC_IDC_SET_BRADY_PAV_DELAY_LOW: 250 MS
MDC_IDC_SET_BRADY_SAV_DELAY_LOW: 250 MS
MDC_IDC_SET_LEADCHNL_RA_PACING_AMPLITUDE: 2 V
MDC_IDC_SET_LEADCHNL_RA_PACING_CAPTURE_MODE: NORMAL
MDC_IDC_SET_LEADCHNL_RA_PACING_POLARITY: NORMAL
MDC_IDC_SET_LEADCHNL_RA_PACING_PULSEWIDTH: 0.5 MS
MDC_IDC_SET_LEADCHNL_RA_SENSING_ADAPTATION_MODE: NORMAL
MDC_IDC_SET_LEADCHNL_RA_SENSING_POLARITY: NORMAL
MDC_IDC_SET_LEADCHNL_RA_SENSING_SENSITIVITY: 0.3 MV
MDC_IDC_SET_LEADCHNL_RV_PACING_AMPLITUDE: 2 V
MDC_IDC_SET_LEADCHNL_RV_PACING_CAPTURE_MODE: NORMAL
MDC_IDC_SET_LEADCHNL_RV_PACING_POLARITY: NORMAL
MDC_IDC_SET_LEADCHNL_RV_PACING_PULSEWIDTH: 0.5 MS
MDC_IDC_SET_LEADCHNL_RV_SENSING_POLARITY: NORMAL
MDC_IDC_SET_LEADCHNL_RV_SENSING_SENSITIVITY: 0.5 MV
MDC_IDC_STAT_AT_MODE_SW_COUNT: 0
MDC_IDC_STAT_BRADY_RA_PERCENT_PACED: 24 %
MDC_IDC_STAT_BRADY_RV_PERCENT_PACED: 68 %

## 2023-08-21 PROCEDURE — 85610 PROTHROMBIN TIME: CPT

## 2023-08-21 PROCEDURE — 36416 COLLJ CAPILLARY BLOOD SPEC: CPT

## 2023-08-21 NOTE — PROGRESS NOTES
ANTICOAGULATION MANAGEMENT     Victorino Khan 89 year old male is on warfarin with therapeutic INR result. (Goal INR 2.0-3.0)    Recent labs: (last 7 days)     08/21/23  1211   INR 2.2*       ASSESSMENT     Warfarin Lab Questionnaire    Warfarin Doses Last 7 Days      8/21/2023    12:04 PM   Dose in Tablet or Mg   TAB or MG? milligram (mg)     Pt Rptd Dose SUNDAY MONDAY TUESDAY WED THURS FRIDAY SATURDAY 8/21/2023  12:04 PM 4 2 4 4 4 2 4         8/21/2023   Warfarin Lab Questionnaire   Missed doses within past 14 days? No   Changes in diet or alcohol within past 14 days? No   Medication changes since last result? No   Injuries or illness since last result? No   New shortness of breath, severe headaches or sudden changes in vision since last result? No   Abnormal bleeding since last result? No   Upcoming surgery, procedure? No     Previous result: Subtherapeutic  Additional findings: None and template incorrect, asked for callback       PLAN     Recommended plan for no diet, medication or health factor changes affecting INR     Dosing Instructions: Continue your current warfarin dose with next INR in 10 days       Summary  As of 8/21/2023      Full warfarin instructions:  2 mg every Tue, Thu; 4 mg all other days   Next INR check:  9/1/2023               Detailed voice message left for Eddy with dosing instructions and follow up date.     Contact [phone] to schedule and with any changes, questions or concerns.     Education provided:   Please call back if any changes to your diet, medications or how you've been taking warfarin    Plan made per [facility] anticoagulation protocol    Luís Williamson RN  Anticoagulation Clinic  8/21/2023    _______________________________________________________________________     Anticoagulation Episode Summary       Current INR goal:  2.0-3.0   TTR:  52.9 % (1 y)   Target end date:  Indefinite   Send INR reminders to:  [facility]    Indications    History of deep venous thrombosis  [Z86.718]  Heterozygous factor V Leiden mutation (H) [D68.51]  Chronic anticoagulation [Z79.01]             Comments:               Anticoagulation Care Providers       Provider Role Specialty Phone number    Jez Jack MD Referring Internal Medicine [phone]    Yuri Funes MD Referring Internal Medicine [phone]

## 2023-09-01 ENCOUNTER — THERAPY VISIT (OUTPATIENT)
Dept: PHYSICAL THERAPY | Facility: REHABILITATION | Age: 88
End: 2023-09-01
Payer: COMMERCIAL

## 2023-09-01 ENCOUNTER — ANTICOAGULATION THERAPY VISIT (OUTPATIENT)
Dept: ANTICOAGULATION | Facility: CLINIC | Age: 88
End: 2023-09-01

## 2023-09-01 ENCOUNTER — LAB (OUTPATIENT)
Dept: LAB | Facility: CLINIC | Age: 88
End: 2023-09-01
Payer: COMMERCIAL

## 2023-09-01 DIAGNOSIS — Z79.01 CHRONIC ANTICOAGULATION: ICD-10-CM

## 2023-09-01 DIAGNOSIS — R27.0 ATAXIA: ICD-10-CM

## 2023-09-01 DIAGNOSIS — Z86.718 HISTORY OF DEEP VENOUS THROMBOSIS: Primary | ICD-10-CM

## 2023-09-01 DIAGNOSIS — M62.81 GENERALIZED MUSCLE WEAKNESS: Primary | ICD-10-CM

## 2023-09-01 DIAGNOSIS — R26.9 ABNORMAL GAIT: ICD-10-CM

## 2023-09-01 DIAGNOSIS — Z86.718 HISTORY OF DEEP VENOUS THROMBOSIS: ICD-10-CM

## 2023-09-01 DIAGNOSIS — D68.51 HETEROZYGOUS FACTOR V LEIDEN MUTATION (H): ICD-10-CM

## 2023-09-01 LAB — INR BLD: 3.1 (ref 0.9–1.1)

## 2023-09-01 PROCEDURE — 97750 PHYSICAL PERFORMANCE TEST: CPT | Mod: GP | Performed by: PHYSICAL THERAPIST

## 2023-09-01 PROCEDURE — 97110 THERAPEUTIC EXERCISES: CPT | Mod: GP | Performed by: PHYSICAL THERAPIST

## 2023-09-01 PROCEDURE — 85610 PROTHROMBIN TIME: CPT

## 2023-09-01 PROCEDURE — 36416 COLLJ CAPILLARY BLOOD SPEC: CPT

## 2023-09-01 NOTE — PROGRESS NOTES
ANTICOAGULATION MANAGEMENT     Victorino Khan 89 year old male is on warfarin with supratherapeutic INR result. (Goal INR 2.0-3.0)    Recent labs: (last 7 days)     09/01/23  1324   INR 3.1*       ASSESSMENT     Warfarin Lab Questionnaire    Warfarin Doses Last 7 Days      9/1/2023     1:16 PM   Dose in Tablet or Mg   TAB or MG? milligram (mg)     Pt Rptd Dose SUNDAY MONDAY TUESDAY WED THURS FRIDAY SATURDAY 9/1/2023   1:16 PM 4 4 4 4 2 2 2         9/1/2023   Warfarin Lab Questionnaire   Missed doses within past 14 days? No   Changes in diet or alcohol within past 14 days? No   Medication changes since last result? No   Injuries or illness since last result? No   New shortness of breath, severe headaches or sudden changes in vision since last result? No   Abnormal bleeding since last result? No   Upcoming surgery, procedure? No   Best number to call with results? [phone]     Previous result: Therapeutic last visit; previously outside of goal range  Additional findings: None       PLAN     Recommended plan for no diet, medication or health factor changes affecting INR     Dosing Instructions: Continue your current warfarin dose with next INR in 2 weeks       Summary  As of 9/1/2023      Full warfarin instructions:  2 mg every Tue, Thu; 4 mg all other days   Next INR check:  9/15/2023               Telephone call with Eddy who verbalizes understanding and agrees to plan    Lab visit scheduled    Education provided:   Contact [phone] with any changes, questions or concerns.     Plan made per [facility] anticoagulation protocol    Luís Williamson RN  Anticoagulation Clinic  9/1/2023    _______________________________________________________________________     Anticoagulation Episode Summary       Current INR goal:  2.0-3.0   TTR:  52.6 % (1 y)   Target end date:  Indefinite   Send INR reminders to:  [facility]    Indications    History of deep venous thrombosis [Z86.718]  Heterozygous factor V Leiden mutation (H)  [D68.51]  Chronic anticoagulation [Z79.01]             Comments:               Anticoagulation Care Providers       Provider Role Specialty Phone number    Jez Jack MD Referring Internal Medicine [phone]    Yuri Funes MD Referring Internal Medicine [phone]

## 2023-09-14 ENCOUNTER — ANTICOAGULATION THERAPY VISIT (OUTPATIENT)
Dept: ANTICOAGULATION | Facility: CLINIC | Age: 88
End: 2023-09-14

## 2023-09-14 ENCOUNTER — THERAPY VISIT (OUTPATIENT)
Dept: PHYSICAL THERAPY | Facility: REHABILITATION | Age: 88
End: 2023-09-14
Payer: COMMERCIAL

## 2023-09-14 ENCOUNTER — LAB (OUTPATIENT)
Dept: LAB | Facility: CLINIC | Age: 88
End: 2023-09-14
Payer: COMMERCIAL

## 2023-09-14 DIAGNOSIS — Z86.718 HISTORY OF DEEP VENOUS THROMBOSIS: Primary | ICD-10-CM

## 2023-09-14 DIAGNOSIS — Z86.718 HISTORY OF DEEP VENOUS THROMBOSIS: ICD-10-CM

## 2023-09-14 DIAGNOSIS — Z79.01 CHRONIC ANTICOAGULATION: ICD-10-CM

## 2023-09-14 DIAGNOSIS — D68.51 HETEROZYGOUS FACTOR V LEIDEN MUTATION (H): ICD-10-CM

## 2023-09-14 DIAGNOSIS — R26.9 ABNORMAL GAIT: ICD-10-CM

## 2023-09-14 DIAGNOSIS — M62.81 GENERALIZED MUSCLE WEAKNESS: Primary | ICD-10-CM

## 2023-09-14 DIAGNOSIS — R27.0 ATAXIA: ICD-10-CM

## 2023-09-14 LAB — INR BLD: 2.3 (ref 0.9–1.1)

## 2023-09-14 PROCEDURE — 85610 PROTHROMBIN TIME: CPT

## 2023-09-14 PROCEDURE — 97110 THERAPEUTIC EXERCISES: CPT | Mod: GP | Performed by: PHYSICAL THERAPIST

## 2023-09-14 PROCEDURE — 36416 COLLJ CAPILLARY BLOOD SPEC: CPT

## 2023-09-14 NOTE — PROGRESS NOTES
ANTICOAGULATION MANAGEMENT     Victorino Khan 89 year old male is on warfarin with therapeutic INR result. (Goal INR 2.0-3.0)    Recent labs: (last 7 days)     09/14/23  0957   INR 2.3*       ASSESSMENT     Warfarin Lab Questionnaire    Warfarin Doses Last 7 Days      9/14/2023     9:55 AM   Dose in Tablet or Mg   TAB or MG? milligram (mg)     Pt Rptd Dose SUNDAY MONDAY TUESDAY WED THURS FRIDAY SATURDAY 9/14/2023   9:55 AM 4 2 4 2 4 2 4         9/14/2023   Warfarin Lab Questionnaire   Missed doses within past 14 days? No   Changes in diet or alcohol within past 14 days? No   Medication changes since last result? No   Injuries or illness since last result? No   New shortness of breath, severe headaches or sudden changes in vision since last result? No   Abnormal bleeding since last result? No   Upcoming surgery, procedure? No   Best number to call with results? [phone]     Previous result: Therapeutic last 2(+) visits  Additional findings: None       PLAN     Recommended plan for no diet, medication or health factor changes affecting INR     Dosing Instructions: Continue your current warfarin dose with next INR in 2 weeks       Summary  As of 9/14/2023      Full warfarin instructions:  2 mg every Tue, Thu; 4 mg all other days   Next INR check:  9/28/2023               Detailed voice message left for Eddy with dosing instructions and follow up date.     Lab visit scheduled    Education provided:   Contact [phone] with any changes, questions or concerns.     Plan made per [facility] anticoagulation protocol    Luís Williamson RN  Anticoagulation Clinic  9/14/2023    _______________________________________________________________________     Anticoagulation Episode Summary       Current INR goal:  2.0-3.0   TTR:  52.1 % (1 y)   Target end date:  Indefinite   Send INR reminders to:  [facility]    Indications    History of deep venous thrombosis [Z86.718]  Heterozygous factor V Leiden mutation (H) [D68.51]  Chronic  anticoagulation [Z79.01]             Comments:               Anticoagulation Care Providers       Provider Role Specialty Phone number    Jez Jack MD Referring Internal Medicine [phone]    Yuri Funes MD Referring Internal Medicine [phone]

## 2023-09-15 NOTE — PROGRESS NOTES
09/14/23 0500   Appointment Info   Signing clinician's name / credentials Genna Cosme DPT, PT   Visits Used 8/12   Medical Diagnosis R27.0 (ICD-10-CM) - Ataxia   PT Tx Diagnosis gait abnormality, fall risk, generalized weakness   Precautions/Limitations fall risk   Progress Note/Certification   Start of Care Date 06/15/23   Onset of illness/injury or Date of Surgery 05/25/23   Therapy Frequency 1   Predicted Duration 10-12 weeks   Certification date from 09/14/23   Certification date to 11/10/23   Progress Note Due Date 11/10/23   Progress Note Completed Date 09/14/23   PT Goal 1   Goal Identifier gait speed   Goal Description Patient will be able to ambulate safely with appropriate gait speed 25 feet in 7  seconds.   Rationale to maximize safety and independence within the community   Goal Progress MET. 7 seconds 10 meter, 1.4 m/s   Target Date 09/13/23   Date Met 09/14/23   PT Goal 2   Goal Identifier FGA   Goal Description Patient will improve 6 points on FGA indicating decreased fall risk.   Rationale to maximize safety and independence with performance of ADLs and functional tasks;to maximize safety and independence within the home;to maximize safety and independence within the community   Goal Progress MET  improved from 11/30 to 17/30   Target Date 09/13/23   Date Met 09/14/23   PT Goal 3   Goal Identifier sit <> stand off standard height chair   Goal Description Patient will be able to complete 5 x sit <> stand off standard height chair in 12 seconds or less.   Rationale to maximize safety and independence with performance of ADLs and functional tasks;to maximize safety and independence within the home;to maximize safety and independence within the community   Goal Progress MET 10 seconds   Target Date 09/13/23   Date Met 09/14/23   PT Goal 4   Goal Identifier HEP   Goal Description Patient will be able to complete 6 exercises without assistance for progression to independent management.   Rationale to  maximize safety and independence with performance of ADLs and functional tasks;to maximize safety and independence within the home;to maximize safety and independence within the community   Goal Progress continues to required progression and revision   Target Date 11/10/23   PT Goal 5   Goal Identifier FGA-progression   Goal Description Patient improve FGA by 4 points to 21/30 or more.   Rationale to maximize safety and independence within the home;to maximize safety and independence within the community   Goal Progress initiated   Target Date 11/10/23   Subjective Report   Subjective Report Finished PT for R shoulder replacement. Has appointment to get stitches removed with MD plans to discuss how fatigued he has been feeling. Had company so didn't do exercises as normal. Up to 45# with . Continues to notice slow progress.   Objective Measure 1   Objective Measure Tandem stance : ~15 sec without UE support   Therapeutic Procedure/Exercise   Therapeutic Procedures Ther Proc 2   Ther Proc 1 NU step   Ther Proc 1 - Details level 5 x 6 minutes 340 steps for increased strength and endurance   Ther Proc 2 Reviewed: already completing at home   Ther Proc 2 - Details LTR x 5 reps, bicycle x 10 reps, QL stretch in hooklying, clamshell x 5 reps B , SL hip abd x 5 reps B   PTRx Ther Proc 1 Tandem Stance   PTRx Ther Proc 1 - Details not completed   PTRx Ther Proc 2 [place] Stretch   PTRx Ther Proc 2 - Details not completed   PTRx Ther Proc 3 Cervical Retraction With Patient Overpressure   PTRx Ther Proc 3 - Details not completed   PTRx Ther Proc 4 Isometric Shoulder External Rotation   PTRx Ther Proc 4 - Details s/p shoulder replacement isometrics from another PT along with ROM for shoulder exercises   PTRx Ther Proc 5 Sidestep   PTRx Ther Proc 5 - Details 5 x 10 feet each direction step over low cones without support required cue for increased step length and proper form   PTRx Ther Proc 6 Walking Backwards    PTRx Ther Proc 6 - Details verbal review continues with training   PTRx Ther Proc 7 Calf Raises with Support   PTRx Ther Proc 7 - Details verbal review   PTRx Ther Proc 8 Toe Raises with Support   PTRx Ther Proc 8 - Details verbal review   PTRx Ther Proc 9 Sit to Stand   PTRx Ther Proc 9 - Details verbal review   PTRx Ther Proc 10 Bridging #2A Weight Shift   PTRx Ther Proc 10 - Details review x 5 each LE   PTRx Ther Proc 11 Hip Abduction Straight Leg Raise   PTRx Ther Proc 11 - Details x 8 each LE   PTRx Ther Proc 12 Hip Flexion Straight Leg Raise   PTRx Ther Proc 12 - Details x 8 each LE   PTRx Ther Proc 13 Standing Hip Flexor Stretch   PTRx Ther Proc 13 - Details 3 x 30 seconds   PTRx Ther Proc 14 Standing Gastroc Stretch   PTRx Ther Proc 14 - Details 3 x 30 seconds combined with hip flex strep   PTRx Ther Proc 15 Supine Hamstring Stretch   PTRx Ther Proc 15 - Details verbal review   Skilled Intervention iproved balance, gait pattern, decreased fall risk   Patient Response/Progress verabalized understanding and stretch   PTRx Ther Proc 16 Prone Trunk Extension Position A   PTRx Ther Proc 16 - Details 2 x 8 with arms at sides instead of behing back   Therapeutic Activity   PTRx Ther Act 1 Education Sheet General   PTRx Ther Act 1 - Details demonstrated without support added arm swing to today continued to improve   Neuromuscular Re-education   Neuro Re-ed 1 - Details education on purpose of balance exercises   PTRx Neuro Re-ed 1 Romberg Eyes Open   PTRx Neuro Re-ed 1 - Details not completed   PTRx Neuro Re-ed 2 Tandem Walking and Balance   PTRx Neuro Re-ed 2 - Details not completed today   Skilled Intervention static and dynamic balance to reduce falls risk and improve function   Physical Performance Test/measures   Physical Performance Test/Measurement Details FGA   Skilled Intervention 17/30 improved by 6 points from prior score   Patient Response/Progress improved balance   Progress decreased risk of falls    Plan   Home program PTRx   Plan for next session review new balance exercises, progress with dynamic balance as able   Comments   Comments Pt appropriate for continued skilled PT intervention. Respondeing well to balance HEP and tolerating progression with further narrow SUNNI exercises.       [facility]                                                                                   OUTPATIENT PHYSICAL THERAPY    PLAN OF TREATMENT FOR OUTPATIENT REHABILITATION   Patient's Last Name, First Name, Victorino Sinclair YOB: 1934   Provider's Name   [facility]   Medical Record No.  [id number]     Onset Date: 05/25/23  Start of Care Date: 06/15/23     Medical Diagnosis:  R27.0 (ICD-10-CM) - Ataxia      PT Treatment Diagnosis:  gait abnormality, fall risk, generalized weakness Plan of Treatment  Frequency/Duration: 1/ 10-12 weeks    Certification date from 09/14/23 to 11/10/23         See note for plan of treatment details and functional goals     Latasha Cosme, PT                         I CERTIFY THE NEED FOR THESE SERVICES FURNISHED UNDER        THIS PLAN OF TREATMENT AND WHILE UNDER MY CARE     (Physician attestation of this document indicates review and certification of the therapy plan).                Referring Provider:  Yuri Funes      Initial Assessment  See Epic Evaluation- Start of Care Date: 06/15/23            PLAN  Continue therapy per current plan of care.    Beginning/End Dates of Progress Note Reporting Period:  06/15/2023 to 09/14/2023    Referring Provider:  Yuri Funes

## 2023-09-18 ENCOUNTER — TELEPHONE (OUTPATIENT)
Dept: INTERNAL MEDICINE | Facility: CLINIC | Age: 88
End: 2023-09-18
Payer: COMMERCIAL

## 2023-09-18 NOTE — TELEPHONE ENCOUNTER
Reason for Call:  Appointment Request    Patient requesting this type of appt:  Hospital/ED Follow-Up     Requested provider: Yuri Funes    Reason patient unable to be scheduled: Not within requested timeframe    When does patient want to be seen/preferred time: 3-7 days    Comments: Pt was in PT, and started to feel weak and couldn't walk. PT brought patient down to . Pt has an appt set up for 10/3. Patient would like to be seen sooner if possible or is it ok to wait? Pt does not want a virtual appointment     [facility] or would you prefer to receive a phone call?:   Patient would prefer a phone call   Okay to leave a detailed message?: Yes at Cell number on file:    Telephone Information:   Mobile [phone]       Call taken on 9/18/2023 at 1:58 PM by Isela Fernandes

## 2023-09-20 NOTE — TELEPHONE ENCOUNTER
Spoke with patient and relayed message from Dr. Funes.     Yuri Funes MD  [place] [facility] minutes ago (12:12 PM)     JR  A blood sugar of 62 is not likely significant.  I suspect that his symptoms are due to something else.  It is OK to wait until his next scheduled appointment.  Dr. Funes, DM       Patient verbalized understanding of message. Patient offered nurse triage line in the instance another episode were to happen and patient is not sure what to do. Patient declined.     Patient advised he can call to see if appointments open up prior to 10/03 visit. Patient declined.     No further question at time of call.

## 2023-09-20 NOTE — TELEPHONE ENCOUNTER
Per chart review from Urgent care visit 09/07/23:    Victorino Khan is a 89 y.o. male who came in with complaint of an episode of dizziness, fatigue and diaphoresis. He reports that he has had about 20-30 similar episodes within the past 10 years. He attributes the episodes to hypoglycemia. The episodes usually resolve with glucose gel. His finger stick glucose at the clinic today was 62. He had breakfast at 11 am.     Spoke with patient who states that this episodes have been occurring for 10 years. Reports that they are happening at random times. He is unsure what triggers it. Reports eating three meals a day with snacks.     Patient reports that he gets so weak when this occurs that he has to call for help to take oral glucose. Reports having wife that helps him. Verbalized that he knows how to handle the situation and would just like to discuss with PCP.    Patient denies any symptoms of low blood sugar at time of call.

## 2023-10-02 ENCOUNTER — THERAPY VISIT (OUTPATIENT)
Dept: PHYSICAL THERAPY | Facility: REHABILITATION | Age: 88
End: 2023-10-02
Payer: COMMERCIAL

## 2023-10-02 DIAGNOSIS — R26.9 ABNORMAL GAIT: ICD-10-CM

## 2023-10-02 DIAGNOSIS — R27.0 ATAXIA: ICD-10-CM

## 2023-10-02 DIAGNOSIS — M62.81 GENERALIZED MUSCLE WEAKNESS: Primary | ICD-10-CM

## 2023-10-02 PROCEDURE — 97110 THERAPEUTIC EXERCISES: CPT | Mod: GP

## 2023-10-02 PROCEDURE — 97112 NEUROMUSCULAR REEDUCATION: CPT | Mod: GP

## 2023-10-03 ENCOUNTER — OFFICE VISIT (OUTPATIENT)
Dept: INTERNAL MEDICINE | Facility: CLINIC | Age: 88
End: 2023-10-03
Payer: COMMERCIAL

## 2023-10-03 ENCOUNTER — LAB (OUTPATIENT)
Dept: LAB | Facility: CLINIC | Age: 88
End: 2023-10-03
Payer: COMMERCIAL

## 2023-10-03 VITALS
BODY MASS INDEX: 26.96 KG/M2 | TEMPERATURE: 97.7 F | HEIGHT: 70 IN | RESPIRATION RATE: 16 BRPM | SYSTOLIC BLOOD PRESSURE: 106 MMHG | HEART RATE: 70 BPM | OXYGEN SATURATION: 97 % | DIASTOLIC BLOOD PRESSURE: 70 MMHG | WEIGHT: 188.3 LBS

## 2023-10-03 DIAGNOSIS — R23.2 FLUSHING: ICD-10-CM

## 2023-10-03 DIAGNOSIS — Z79.01 CHRONIC ANTICOAGULATION: ICD-10-CM

## 2023-10-03 DIAGNOSIS — D68.51 HETEROZYGOUS FACTOR V LEIDEN MUTATION (H): ICD-10-CM

## 2023-10-03 DIAGNOSIS — Z86.718 HISTORY OF DEEP VENOUS THROMBOSIS: ICD-10-CM

## 2023-10-03 DIAGNOSIS — Z95.0 CARDIAC PACEMAKER IN SITU: Primary | ICD-10-CM

## 2023-10-03 DIAGNOSIS — Z98.890 S/P NISSEN FUNDOPLICATION (WITHOUT GASTROSTOMY TUBE) PROCEDURE: ICD-10-CM

## 2023-10-03 PROCEDURE — 99214 OFFICE O/P EST MOD 30 MIN: CPT | Performed by: INTERNAL MEDICINE

## 2023-10-03 NOTE — PROGRESS NOTES
ASSESSMENT AND PLAN:    1. Cardiac pacemaker in situ    2. S/P Nissen fundoplication (without gastrostomy tube) procedure    3. Flushing episodes    Follow up in 6 months.     CHIEF COMPLAINT:  Flushing episodes    HISTORY OF PRESENT ILLNESS:  Victorino Khan is a 89 year old male with infrequent flushing episodes.  Life long and infrequent.  He thinks they are low blood sugar.  Diaphoresis, and weakness, that resolves with sugar and rest.  No palpitations or chest pain, or nausea or fever.  Occur 3 or so times per year.  Does not have pre-syncope but can feel lightheaded.  Has a pacer in place and has had them with it in place, no palpitations occur.  Otherwise feels well.  GERD is OK.  Has polyarthralgia from DJD.      REVIEW OF SYSTEMS:   See HPI, all other systems on review are negative.    Past Medical History:   Diagnosis Date    Chronic anticoagulation     Gastroesophageal reflux disease without esophagitis     H/O reactive hypoglycemia     Heterozygous factor V Leiden mutation (H24)     Osteoporosis     Personal history of DVT (deep vein thrombosis)     Primary osteoarthritis of right shoulder     Rotator cuff disorder, right     Rotator cuff syndrome, right     Status cardiac pacemaker     Vertebral compression fracture (H)      History   Smoking Status    Never   Smokeless Tobacco    Never     Family History   Problem Relation Age of Onset    Coronary Artery Disease Early Onset Father     Coronary Artery Disease Early Onset Brother      Past Surgical History:   Procedure Laterality Date    ARTHROPLASTY ANKLE Left 4/21/2022    Procedure: LEFT TOTAL ANKLE REPLACEMENT;  Surgeon: Magan Chatman MD;  Location: [facility]    IMPLANT PACEMAKER Left     For sick sinus syndrome    LAPAROSCOPIC NISSEN FUNDOPLICATION  11/2011    For large paraesophageal hernia    LENGTHEN TENDON ACHILLES Left 4/21/2022    Procedure: TENDON ACHILLELS LENGTHENING, MEDIAL DISPLACEMENT CALCANEAL OSTOTOMY;  Surgeon: Magan Chatman  "MD Tim;  Location: [facility] OR    TOTAL KNEE ARTHROPLASTY Right 2000    TOTAL KNEE ARTHROPLASTY Left 2010     VITALS:  Vitals:    10/03/23 1041   BP: 106/70   BP Location: Left arm   Patient Position: Sitting   Cuff Size: Adult Regular   Pulse: 70   Resp: 16   Temp: 97.7  F (36.5  C)   TempSrc: Tympanic   SpO2: 97%   Weight: 85.4 kg (188 lb 4.8 oz)   Height: 1.778 m (5' 10\")     Wt Readings from Last 3 Encounters:   10/03/23 85.4 kg (188 lb 4.8 oz)   08/15/23 84.8 kg (186 lb 14.4 oz)   08/01/23 86 kg (189 lb 11.2 oz)     PHYSICAL EXAM:  Constitutional:  In NAD, alert and oriented  Neck: no cervical or axillary adenopathy  Cardiac:  S1 S2   Lungs: Clear   Abdomen:   Soft, flat and non-tender  Extremities: djd dystrophy of small hand joints.  No active synovitis.     DECISION TO OBTAIN OLD RECORDS AND/OR OBTAIN HISTORY FROM SOMEONE OTHER THAN PATIENT, AND/OR ACCESSING CARE EVERYWHERE):  1  0     REVIEW AND SUMMARIZATION OF OLD RECORDS, AND/OR OBTAINING HISTORY FROM SOMEONE OTHER THAN PATIENT, AND/OR DISCUSSION OF CASE WITH ANOTHER HEALTH CARE PROVIDER:  2 reviewed past health notes.     REVIEW AND/OR ORDER OF OF CLINICAL LAB TESTS: 1  ordered.    REVIEW AND/OR ORDER OF RADIOLOGY TESTS: 1 reviewed hadn xrays    REVIEW AND/OR ORDER OF MEDICAL TESTS (EKG/ECHO/COLONOSCOPY/EGD): 1  0    INDEPENDENT  VISUALIZATION OF IMAGE, TRACING, OR SPECIMEN ITSELF (2 EACH):  0     TOTAL: 4    Current Outpatient Medications   Medication Sig Dispense Refill    albuterol (PROAIR HFA/PROVENTIL HFA/VENTOLIN HFA) 108 (90 Base) MCG/ACT inhaler Inhale 2 puffs into the lungs every 6 hours 18 g 0    aspirin (ASA) 81 MG EC tablet Take 81 mg by mouth daily      calcium carbonate (OS- MG [place]. [place]) 500 MG tablet Take 500 mg by mouth daily       cephALEXin (KEFLEX) 500 MG capsule Take 1 capsule (500 mg) by mouth 2 times daily 14 capsule 0    Cholecalciferol (VITAMIN D3 PO) Take 2,000 Units by mouth daily       fluticasone (FLONASE) 50 MCG/ACT " nasal spray Spray 1 spray into both nostrils daily      fluticasone-salmeterol (ADVAIR-HFA) 115-21 MCG/ACT inhaler Inhale 2 puffs into the lungs 2 times daily      multivitamin w/minerals (THERA-VIT-M) tablet Take 1 tablet by mouth daily      senna-docusate (SENOKOT-S/PERICOLACE) 8.6-50 MG tablet Take 1-2 tablets by mouth 2 times daily Take while on oral narcotics to prevent or treat constipation. 10 tablet 0    warfarin ANTICOAGULANT (COUMADIN) 4 MG tablet TAKE ONE-HALF (1/2) TO 1 TABLET DAILY AS DIRECTED, ADJUST DOSE BASED ON INR RESULTS 90 tablet 3    warfarin ANTICOAGULANT (COUMADIN) 5 MG tablet Take 5 mg twice weekly or as directed based on inr results. Uses in conjunction with 4 mg tabs 30 tablet 1     Yuri Funes MD  Internal Medicine  [facility]

## 2023-10-05 ENCOUNTER — LAB (OUTPATIENT)
Dept: LAB | Facility: CLINIC | Age: 88
End: 2023-10-05
Payer: COMMERCIAL

## 2023-10-05 ENCOUNTER — ANTICOAGULATION THERAPY VISIT (OUTPATIENT)
Dept: ANTICOAGULATION | Facility: CLINIC | Age: 88
End: 2023-10-05

## 2023-10-05 DIAGNOSIS — D68.51 HETEROZYGOUS FACTOR V LEIDEN MUTATION (H): ICD-10-CM

## 2023-10-05 DIAGNOSIS — R23.2 FLUSHING: ICD-10-CM

## 2023-10-05 DIAGNOSIS — Z86.718 HISTORY OF DEEP VENOUS THROMBOSIS: Primary | ICD-10-CM

## 2023-10-05 DIAGNOSIS — Z79.01 CHRONIC ANTICOAGULATION: ICD-10-CM

## 2023-10-05 DIAGNOSIS — Z86.718 HISTORY OF DEEP VENOUS THROMBOSIS: ICD-10-CM

## 2023-10-05 LAB
ALBUMIN SERPL BCG-MCNC: 4.2 G/DL (ref 3.5–5.2)
ALP SERPL-CCNC: 72 U/L (ref 40–129)
ALT SERPL W P-5'-P-CCNC: 16 U/L (ref 0–70)
ANION GAP SERPL CALCULATED.3IONS-SCNC: 11 MMOL/L (ref 7–15)
AST SERPL W P-5'-P-CCNC: 27 U/L (ref 0–45)
BILIRUB SERPL-MCNC: 1.1 MG/DL
BUN SERPL-MCNC: 25.7 MG/DL (ref 8–23)
CALCIUM SERPL-MCNC: 9.7 MG/DL (ref 8.8–10.2)
CHLORIDE SERPL-SCNC: 105 MMOL/L (ref 98–107)
CK SERPL-CCNC: 70 U/L (ref 39–308)
CREAT SERPL-MCNC: 1.02 MG/DL (ref 0.67–1.17)
DEPRECATED HCO3 PLAS-SCNC: 27 MMOL/L (ref 22–29)
EGFRCR SERPLBLD CKD-EPI 2021: 70 ML/MIN/1.73M2
ERYTHROCYTE [DISTWIDTH] IN BLOOD BY AUTOMATED COUNT: 13.6 % (ref 10–15)
GLUCOSE SERPL-MCNC: 90 MG/DL (ref 70–99)
HCT VFR BLD AUTO: 43 % (ref 40–53)
HGB BLD-MCNC: 13.8 G/DL (ref 13.3–17.7)
INR BLD: 3.6 (ref 0.9–1.1)
MAGNESIUM SERPL-MCNC: 2.2 MG/DL (ref 1.7–2.3)
MCH RBC QN AUTO: 32.9 PG (ref 26.5–33)
MCHC RBC AUTO-ENTMCNC: 32.1 G/DL (ref 31.5–36.5)
MCV RBC AUTO: 103 FL (ref 78–100)
PLATELET # BLD AUTO: 130 10E3/UL (ref 150–450)
POTASSIUM SERPL-SCNC: 5.1 MMOL/L (ref 3.4–5.3)
PROT SERPL-MCNC: 6.4 G/DL (ref 6.4–8.3)
RBC # BLD AUTO: 4.19 10E6/UL (ref 4.4–5.9)
SODIUM SERPL-SCNC: 143 MMOL/L (ref 135–145)
TSH SERPL DL<=0.005 MIU/L-ACNC: 1.35 UIU/ML (ref 0.3–4.2)
WBC # BLD AUTO: 3.8 10E3/UL (ref 4–11)

## 2023-10-05 PROCEDURE — 84443 ASSAY THYROID STIM HORMONE: CPT

## 2023-10-05 PROCEDURE — 80053 COMPREHEN METABOLIC PANEL: CPT

## 2023-10-05 PROCEDURE — 85027 COMPLETE CBC AUTOMATED: CPT

## 2023-10-05 PROCEDURE — 83735 ASSAY OF MAGNESIUM: CPT

## 2023-10-05 PROCEDURE — 85610 PROTHROMBIN TIME: CPT

## 2023-10-05 PROCEDURE — 82550 ASSAY OF CK (CPK): CPT

## 2023-10-05 PROCEDURE — 36415 COLL VENOUS BLD VENIPUNCTURE: CPT

## 2023-10-05 NOTE — PROGRESS NOTES
ANTICOAGULATION MANAGEMENT     Victorino Khan 89 year old male is on warfarin with supratherapeutic INR result. (Goal INR 2.0-3.0)    Recent labs: (last 7 days)     10/05/23  1049   INR 3.6*       ASSESSMENT     Warfarin Lab Questionnaire    Warfarin Doses Last 7 Days      10/5/2023    10:25 AM   Dose in Tablet or Mg   TAB or MG? - 4mg and 5mg warfarin tablets. milligram (mg)     Pt Rptd Dose ALEJANDRINA MONDAY TUESDAY WED THURS FRIDAY SATURDAY   10/5/2023  10:25 AM 4 3 4 3 4 3 4         10/5/2023   Warfarin Lab Questionnaire   Missed doses within past 14 days? No - VERIFY WARFARIN DOSE.        - wrote wrong warfarin dose on template.     Changes in diet or alcohol within past 14 days? No   Medication changes since last result? No   Injuries or illness since last result? No   New shortness of breath, severe headaches or sudden changes in vision since last result? No   Abnormal bleeding since last result? No   Upcoming surgery, procedure? No   Best number to call with results? [phone]     Previous result: Therapeutic last visit at 2.3; previously outside of goal range at 3.1    Additional findings: None       PLAN     Unable to reach Eddy today.    Left message to hold warfarin tonight. Request call back for assessment.    Follow up required to confirm warfarin dose taken and assess for changes    Karey Baca RN  Anticoagulation Clinic  10/5/2023

## 2023-10-06 NOTE — PROGRESS NOTES
ANTICOAGULATION MANAGEMENT     Victorino Khan 89 year old male is on warfarin with supratherapeutic INR result. (Goal INR 2.0-3.0)    Recent labs: (last 7 days)     10/05/23  1049   INR 3.6*       ASSESSMENT     Warfarin Lab Questionnaire    Warfarin Doses Last 7 Days      10/5/2023    10:25 AM   Dose in Tablet or Mg   TAB or MG? - 4mg and 5mg warfarin tablets.  milligram (mg)     Pt Rptd Dose ALEJANDRINA MONDAY TUESDAY WED THURS FRIDAY SATURDAY   10/5/2023  10:25 AM 4 3 4 3 4 3 4         10/5/2023   Warfarin Lab Questionnaire   Missed doses within past 14 days? No - wrong dose on template -    Changes in diet or alcohol within past 14 days? No   Medication changes since last result? No   Injuries or illness since last result? No   New shortness of breath, severe headaches or sudden changes in vision since last result? No   Abnormal bleeding since last result? No   Upcoming surgery, procedure? No   Best number to call with results? [phone]     Previous result: Therapeutic last visit at 2.3; previously outside of goal range at 3.1    Additional findings: None       PLAN     Recommended plan for no diet, medication or health factor changes affecting INR     Dosing Instructions:   - advised to HOLD warfarin dose on 10/5/23.  - then Continue your current warfarin dose with next INR in 2 weeks       Summary  As of 10/5/2023      Full warfarin instructions:  10/5: Hold; Otherwise 2 mg every Tue, Thu; 4 mg all other days   Next INR check:  10/12/2023               Detailed voice message left for Eddy with dosing instructions and follow up date.  - chart reviewed  - Advised to call back if taking different warfarin dose or if any new changes with daily regimen.     Contact [phone] to schedule and with any changes, questions or concerns.     Education provided:   Please call back if any changes to your diet, medications or how you've been taking warfarin  Taking warfarin: Importance of taking warfarin as instructed  Goal  range and lab monitoring: goal range and significance of current result    Plan made per [facility] anticoagulation protocol    Karey Baca RN  Anticoagulation Clinic  10/6/2023    _______________________________________________________________________     Anticoagulation Episode Summary       Current INR goal:  2.0-3.0   TTR:  52.5 % (1 y)   Target end date:  Indefinite   Send INR reminders to:  [facility]    Indications    History of deep venous thrombosis [Z86.718]  Heterozygous factor V Leiden mutation (H24) [D68.51]  Chronic anticoagulation [Z79.01]             Comments:               Anticoagulation Care Providers       Provider Role Specialty Phone number    Jez Jack MD Referring Internal Medicine [phone]    Yuri Funes MD Referring Internal Medicine [phone]

## 2023-10-09 ENCOUNTER — TELEPHONE (OUTPATIENT)
Dept: ANTICOAGULATION | Facility: CLINIC | Age: 88
End: 2023-10-09
Payer: COMMERCIAL

## 2023-10-09 DIAGNOSIS — D68.51 HETEROZYGOUS FACTOR V LEIDEN MUTATION (H): ICD-10-CM

## 2023-10-09 DIAGNOSIS — Z86.718 HISTORY OF DEEP VENOUS THROMBOSIS: Primary | ICD-10-CM

## 2023-10-09 DIAGNOSIS — Z79.01 CHRONIC ANTICOAGULATION: ICD-10-CM

## 2023-10-09 NOTE — TELEPHONE ENCOUNTER
General Call      Reason for Call:  Needs dosing instructions    What are your questions or concerns:  Needs dosing instructions for his INR    Date of last appointment with provider: 10/05/23    Could we send this information to you in [facility] or would you prefer to receive a phone call?:   Patient would prefer a phone call   Okay to leave a detailed message?: No at Cell number on file:    Telephone Information:   Mobile [phone]

## 2023-10-16 ENCOUNTER — OFFICE VISIT (OUTPATIENT)
Dept: CARDIOLOGY | Facility: CLINIC | Age: 88
End: 2023-10-16
Attending: INTERNAL MEDICINE
Payer: COMMERCIAL

## 2023-10-16 VITALS
SYSTOLIC BLOOD PRESSURE: 111 MMHG | HEART RATE: 88 BPM | HEIGHT: 68 IN | BODY MASS INDEX: 28.49 KG/M2 | WEIGHT: 188 LBS | DIASTOLIC BLOOD PRESSURE: 73 MMHG | OXYGEN SATURATION: 96 %

## 2023-10-16 DIAGNOSIS — I47.29 NSVT (NONSUSTAINED VENTRICULAR TACHYCARDIA) (H): Primary | ICD-10-CM

## 2023-10-16 DIAGNOSIS — Z95.0 CARDIAC PACEMAKER IN SITU: ICD-10-CM

## 2023-10-16 DIAGNOSIS — I49.5 SICK SINUS SYNDROME (H): ICD-10-CM

## 2023-10-16 PROCEDURE — 99214 OFFICE O/P EST MOD 30 MIN: CPT | Performed by: NURSE PRACTITIONER

## 2023-10-16 NOTE — LETTER
10/16/2023    Yuri Funes MD  [address] [place] [address]    RE: Victorino Khan       Dear Colleague,     I had the pleasure of seeing Victorino Khan in the [facility].    Electrophysiology Clinic Progress Note  Victorino Khan MRN# [id number]   YOB: 1934 Age: 89 year old     Primary cardiologist: Dr. aCrrera    Reason for visit: Annual follow up    History of presenting illness:    Victorino Khan is a pleasant 89 year old patient with past medical history significant for:    Mobitz II 2nd degree AVB and intermittent CHB: [facility] Walter dual-chamber PPM 10/2017.    NSVT  Factor V Leiden deficiency.  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  He is doing overall well from a cardiovascular standpoint.  Unfortunately this last summer he injured his right thumb while using a table saw and had to have multiple stitches.  This caused him to have decreased sensation in his thumb resulting in decreased use of his thumb.  He continues to workout once a week with a  and is going to restart cardio workouts with his recumbent bike.  On exam he does have a murmur and we will update his echocardiogram given his history of NSVT.    Diagnotic studies:  Device check (8/18/2023): AP 24%,  68%. No atrial arrhythmias. Two episodes of NSVT 8-22 beats up to 160-170 bpm.    Device check (5/15/2023): AP 25%,  68%. NSVT 7-8 beats in with rates in the 140-160 bpm.   Echocardiogram (2017): LVEF 55-60%. No wall motion abnormalities.             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      NSVT  Noted in device checks both recent and historically.     History of DVT  Noted to have Factor V Leiden on Coumadin    PLAN:     Echocardiogram  Follow-up with routine device clinic appointments  Return to clinic in 1 year or sooner if needed       Orders this  "Visit:  Orders Placed This Encounter   Procedures    Follow-Up with Cardiology KARLIE    Echocardiogram Complete     No orders of the defined types were placed in this encounter.    There are no discontinued medications.    Today's clinic visit entailed:  Review of the result(s) of each unique test - Echo, device checks  Ordering of each unique test  40 minutes spent by me on the date of the encounter doing chart review, history and exam, documentation and further activities per the note  Provider  Link to MDM Help Grid     The level of medical decision making during this visit was of moderate complexity.           Review of Systems:     Review of Systems:  Skin:  Negative     Eyes:  Negative    ENT:  Negative    Respiratory:  Positive for dyspnea on exertion  Cardiovascular:  Negative    Gastroenterology:      Genitourinary:  Negative    Musculoskeletal:  Negative    Neurologic:  Negative    Psychiatric:  Negative    Heme/Lymph/Imm:  Negative    Endocrine:  Negative              Physical Exam:     Vitals: /73   Pulse 88   Ht 1.727 m (5' 8\")   Wt 85.3 kg (188 lb)   SpO2 96%   BMI 28.59 kg/m    Constitutional: Well nourished and in no apparent distress.  Eyes: Pupils equal, round. Sclerae anicteric.   HEENT: Normocephalic, atraumatic.   Neck: Supple.  Respiratory: Breathing non-labored. Lungs clear to auscultation bilaterally. No crackles, wheezes, rhonchi, or rales.  Cardiovascular:  Regular rate and rhythm, normal S1 and S2. Murmur  Skin: Warm, dry. No rashes, cyanosis, or xanthelasma.  Extremities: No edema.  Neurologic: No gross motor deficits. Alert, awake, and oriented to person, place and time.  Psychiatric: Affect appropriate.        CURRENT MEDICATIONS:  Current Outpatient Medications   Medication Sig Dispense Refill    albuterol (PROAIR HFA/PROVENTIL HFA/VENTOLIN HFA) 108 (90 Base) MCG/ACT inhaler Inhale 2 puffs into the lungs every 6 hours 18 g 0    aspirin (ASA) 81 MG EC tablet Take 81 mg by " mouth daily      blood glucose monitoring (NO BRAND SPECIFIED) meter device kit Use to test blood sugar one times daily or as directed. 1 kit 3    calcium carbonate (OS- MG [place]. [place]) 500 MG tablet Take 500 mg by mouth daily       Cholecalciferol (VITAMIN D3 PO) Take 2,000 Units by mouth daily       fluticasone (FLONASE) 50 MCG/ACT nasal spray Spray 1 spray into both nostrils daily      fluticasone-salmeterol (ADVAIR-HFA) 115-21 MCG/ACT inhaler Inhale 2 puffs into the lungs 2 times daily      multivitamin w/minerals (THERA-VIT-M) tablet Take 1 tablet by mouth daily      senna-docusate (SENOKOT-S/PERICOLACE) 8.6-50 MG tablet Take 1-2 tablets by mouth 2 times daily Take while on oral narcotics to prevent or treat constipation. 10 tablet 0    warfarin ANTICOAGULANT (COUMADIN) 4 MG tablet TAKE ONE-HALF (1/2) TO 1 TABLET DAILY AS DIRECTED, ADJUST DOSE BASED ON INR RESULTS 90 tablet 3    warfarin ANTICOAGULANT (COUMADIN) 5 MG tablet Take 5 mg twice weekly or as directed based on inr results. Uses in conjunction with 4 mg tabs 30 tablet 1    cephALEXin (KEFLEX) 500 MG capsule Take 1 capsule (500 mg) by mouth 2 times daily (Patient not taking: Reported on 10/16/2023) 14 capsule 0       ALLERGIES  No Known Allergies      PAST MEDICAL HISTORY:  Past Medical History:   Diagnosis Date    Chronic anticoagulation     Gastroesophageal reflux disease without esophagitis     H/O reactive hypoglycemia     Heterozygous factor V Leiden mutation (H24)     Osteoporosis     Personal history of DVT (deep vein thrombosis)     Primary osteoarthritis of right shoulder     Rotator cuff disorder, right     Rotator cuff syndrome, right     Status cardiac pacemaker     Vertebral compression fracture (H)        PAST SURGICAL HISTORY:  Past Surgical History:   Procedure Laterality Date    ARTHROPLASTY ANKLE Left 4/21/2022    Procedure: LEFT TOTAL ANKLE REPLACEMENT;  Surgeon: Magan Chatman MD;  Location: [facility]    IMPLANT PACEMAKER  Left     For sick sinus syndrome    LAPAROSCOPIC NISSEN FUNDOPLICATION  11/2011    For large paraesophageal hernia    LENGTHEN TENDON ACHILLES Left 4/21/2022    Procedure: TENDON ACHILLELS LENGTHENING, MEDIAL DISPLACEMENT CALCANEAL OSTOTOMY;  Surgeon: Magan Chatman MD;  Location: [facility]    TOTAL KNEE ARTHROPLASTY Right 2000    TOTAL KNEE ARTHROPLASTY Left 2010       FAMILY HISTORY:  Family History   Problem Relation Age of Onset    Coronary Artery Disease Early Onset Father     Coronary Artery Disease Early Onset Brother        SOCIAL HISTORY:  Social History     Socioeconomic History    Marital status: Single   Tobacco Use    Smoking status: Never    Smokeless tobacco: Never   Vaping Use    Vaping Use: Never used   Substance and Sexual Activity    Alcohol use: No    Drug use: Never    Sexual activity: Yes     Partners: Female   Social History Narrative    .  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   and has been with SO for 30+ years, has 4 children.  Moved back to [place].      Social Determinants of Health     Financial Resource Strain: Low Risk  (10/3/2023)    Financial Resource Strain     Within the past 12 months, have you or your family members you live with been unable to get utilities (heat, electricity) when it was really needed?: No   Food Insecurity: Low Risk  (10/3/2023)    Food Insecurity     Within the past 12 months, did you worry that your food would run out before you got money to buy more?: No     Within the past 12 months, did the food you bought just not last and you didn t have money to get more?: No   Transportation Needs: Low Risk  (10/3/2023)    Transportation Needs     Within the past 12 months, has lack of transportation kept you from medical appointments, getting your medicines, non-medical meetings or appointments, work, or from getting things that you need?: No   Interpersonal Safety: Low Risk  (10/3/2023)     Interpersonal Safety     Do you feel physically and emotionally safe where you currently live?: Yes     Within the past 12 months, have you been hit, slapped, kicked or otherwise physically hurt by someone?: No     Within the past 12 months, have you been humiliated or emotionally abused in other ways by your partner or ex-partner?: No   Housing Stability: Low Risk  (10/3/2023)    Housing Stability     Do you have housing? : Yes     Are you worried about losing your housing?: No       Thank you for allowing me to participate in the care of your patient.      Sincerely,     JOHN Carreno [facility]  cc:   Evaristo Hernandez MD  [address]  [place] [address]

## 2023-10-16 NOTE — PATIENT INSTRUCTIONS
Today's Recommendations    Routine device checks  Echocardiogram   Continue all medications without changes.  Please follow up with cardiology in 1 year.    Please send [employer] message or call [phone] to the RN team with questions or concerns.     Scheduling and after hours number [phone]    JOHN Pulliam, CNP

## 2023-10-16 NOTE — PROGRESS NOTES
Electrophysiology Clinic Progress Note  Victorino Khan MRN# [id number]   YOB: 1934 Age: 89 year old     Primary cardiologist: Dr. 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    NSVT  Factor V Leiden deficiency.  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  He is doing overall well from a cardiovascular standpoint.  Unfortunately this last summer he injured his right thumb while using a table saw and had to have multiple stitches.  This caused him to have decreased sensation in his thumb resulting in decreased use of his thumb.  He continues to workout once a week with a  and is going to restart cardio workouts with his recumbent bike.  On exam he does have a murmur and we will update his echocardiogram given his history of NSVT.    Diagnotic studies:  Device check (8/18/2023): AP 24%,  68%. No atrial arrhythmias. Two episodes of NSVT 8-22 beats up to 160-170 bpm.    Device check (5/15/2023): AP 25%,  68%. NSVT 7-8 beats in with rates in the 140-160 bpm.   Echocardiogram (2017): LVEF 55-60%. No wall motion abnormalities.             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      NSVT  Noted in device checks both recent and historically.     History of DVT  Noted to have Factor V Leiden on Coumadin    PLAN:     Echocardiogram  Follow-up with routine device clinic appointments  Return to clinic in 1 year or sooner if needed       Orders this Visit:  Orders Placed This Encounter   Procedures    Follow-Up with Cardiology KARLIE    Echocardiogram Complete     No orders of the defined types were placed in this encounter.    There are no discontinued  "medications.    Today's clinic visit entailed:  Review of the result(s) of each unique test - Echo, device checks  Ordering of each unique test  40 minutes spent by me on the date of the encounter doing chart review, history and exam, documentation and further activities per the note  Provider  Link to [facility] Help Grid     The level of medical decision making during this visit was of moderate complexity.           Review of Systems:     Review of Systems:  Skin:  Negative     Eyes:  Negative    ENT:  Negative    Respiratory:  Positive for dyspnea on exertion  Cardiovascular:  Negative    Gastroenterology:      Genitourinary:  Negative    Musculoskeletal:  Negative    Neurologic:  Negative    Psychiatric:  Negative    Heme/Lymph/Imm:  Negative    Endocrine:  Negative              Physical Exam:     Vitals: /73   Pulse 88   Ht 1.727 m (5' 8\")   Wt 85.3 kg (188 lb)   SpO2 96%   BMI 28.59 kg/m    Constitutional: Well nourished and in no apparent distress.  Eyes: Pupils equal, round. Sclerae anicteric.   HEENT: Normocephalic, atraumatic.   Neck: Supple.  Respiratory: Breathing non-labored. Lungs clear to auscultation bilaterally. No crackles, wheezes, rhonchi, or rales.  Cardiovascular:  Regular rate and rhythm, normal S1 and S2. Murmur  Skin: Warm, dry. No rashes, cyanosis, or xanthelasma.  Extremities: No edema.  Neurologic: No gross motor deficits. Alert, awake, and oriented to person, place and time.  Psychiatric: Affect appropriate.        CURRENT MEDICATIONS:  Current Outpatient Medications   Medication Sig Dispense Refill    albuterol (PROAIR HFA/PROVENTIL HFA/VENTOLIN HFA) 108 (90 Base) MCG/ACT inhaler Inhale 2 puffs into the lungs every 6 hours 18 g 0    aspirin (ASA) 81 MG EC tablet Take 81 mg by mouth daily      blood glucose monitoring (NO BRAND SPECIFIED) meter device kit Use to test blood sugar one times daily or as directed. 1 kit 3    calcium carbonate (OS- MG [place]. CA) 500 MG tablet Take " 500 mg by mouth daily       Cholecalciferol (VITAMIN D3 PO) Take 2,000 Units by mouth daily       fluticasone (FLONASE) 50 MCG/ACT nasal spray Spray 1 spray into both nostrils daily      fluticasone-salmeterol (ADVAIR-HFA) 115-21 MCG/ACT inhaler Inhale 2 puffs into the lungs 2 times daily      multivitamin w/minerals (THERA-VIT-M) tablet Take 1 tablet by mouth daily      senna-docusate (SENOKOT-S/PERICOLACE) 8.6-50 MG tablet Take 1-2 tablets by mouth 2 times daily Take while on oral narcotics to prevent or treat constipation. 10 tablet 0    warfarin ANTICOAGULANT (COUMADIN) 4 MG tablet TAKE ONE-HALF (1/2) TO 1 TABLET DAILY AS DIRECTED, ADJUST DOSE BASED ON INR RESULTS 90 tablet 3    warfarin ANTICOAGULANT (COUMADIN) 5 MG tablet Take 5 mg twice weekly or as directed based on inr results. Uses in conjunction with 4 mg tabs 30 tablet 1    cephALEXin (KEFLEX) 500 MG capsule Take 1 capsule (500 mg) by mouth 2 times daily (Patient not taking: Reported on 10/16/2023) 14 capsule 0       ALLERGIES  No Known Allergies      PAST MEDICAL HISTORY:  Past Medical History:   Diagnosis Date    Chronic anticoagulation     Gastroesophageal reflux disease without esophagitis     H/O reactive hypoglycemia     Heterozygous factor V Leiden mutation (H24)     Osteoporosis     Personal history of DVT (deep vein thrombosis)     Primary osteoarthritis of right shoulder     Rotator cuff disorder, right     Rotator cuff syndrome, right     Status cardiac pacemaker     Vertebral compression fracture (H)        PAST SURGICAL HISTORY:  Past Surgical History:   Procedure Laterality Date    ARTHROPLASTY ANKLE Left 4/21/2022    Procedure: LEFT TOTAL ANKLE REPLACEMENT;  Surgeon: Magan Chatman MD;  Location: [facility] OR    IMPLANT PACEMAKER Left     For sick sinus syndrome    LAPAROSCOPIC NISSEN FUNDOPLICATION  11/2011    For large paraesophageal hernia    LENGTHEN TENDON ACHILLES Left 4/21/2022    Procedure: TENDON ACHILLELS LENGTHENING, MEDIAL  DISPLACEMENT CALCANEAL OSTOTOMY;  Surgeon: Magan Chatman MD;  Location: [facility] OR    TOTAL KNEE ARTHROPLASTY Right 2000    TOTAL KNEE ARTHROPLASTY Left 2010       FAMILY HISTORY:  Family History   Problem Relation Age of Onset    Coronary Artery Disease Early Onset Father     Coronary Artery Disease Early Onset Brother        SOCIAL HISTORY:  Social History     Socioeconomic History    Marital status: Single   Tobacco Use    Smoking status: Never    Smokeless tobacco: Never   Vaping Use    Vaping Use: Never used   Substance and Sexual Activity    Alcohol use: No    Drug use: Never    Sexual activity: Yes     Partners: Female   Social History Narrative    .  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   and has been with SO for 30+ years, has 4 children.  Moved back to [place].      Social Determinants of Health     Financial Resource Strain: Low Risk  (10/3/2023)    Financial Resource Strain     Within the past 12 months, have you or your family members you live with been unable to get utilities (heat, electricity) when it was really needed?: No   Food Insecurity: Low Risk  (10/3/2023)    Food Insecurity     Within the past 12 months, did you worry that your food would run out before you got money to buy more?: No     Within the past 12 months, did the food you bought just not last and you didn t have money to get more?: No   Transportation Needs: Low Risk  (10/3/2023)    Transportation Needs     Within the past 12 months, has lack of transportation kept you from medical appointments, getting your medicines, non-medical meetings or appointments, work, or from getting things that you need?: No   Interpersonal Safety: Low Risk  (10/3/2023)    Interpersonal Safety     Do you feel physically and emotionally safe where you currently live?: Yes     Within the past 12 months, have you been hit, slapped, kicked or otherwise physically hurt by someone?: No      Within the past 12 months, have you been humiliated or emotionally abused in other ways by your partner or ex-partner?: No   Housing Stability: Low Risk  (10/3/2023)    Housing Stability     Do you have housing? : Yes     Are you worried about losing your housing?: No

## 2023-10-26 ENCOUNTER — ANTICOAGULATION THERAPY VISIT (OUTPATIENT)
Dept: ANTICOAGULATION | Facility: CLINIC | Age: 88
End: 2023-10-26

## 2023-10-26 ENCOUNTER — LAB (OUTPATIENT)
Dept: LAB | Facility: CLINIC | Age: 88
End: 2023-10-26
Payer: COMMERCIAL

## 2023-10-26 ENCOUNTER — THERAPY VISIT (OUTPATIENT)
Dept: PHYSICAL THERAPY | Facility: REHABILITATION | Age: 88
End: 2023-10-26
Payer: COMMERCIAL

## 2023-10-26 DIAGNOSIS — R27.0 ATAXIA: ICD-10-CM

## 2023-10-26 DIAGNOSIS — R26.9 ABNORMAL GAIT: ICD-10-CM

## 2023-10-26 DIAGNOSIS — M62.81 GENERALIZED MUSCLE WEAKNESS: Primary | ICD-10-CM

## 2023-10-26 DIAGNOSIS — Z79.01 CHRONIC ANTICOAGULATION: ICD-10-CM

## 2023-10-26 DIAGNOSIS — Z86.718 HISTORY OF DEEP VENOUS THROMBOSIS: Primary | ICD-10-CM

## 2023-10-26 DIAGNOSIS — D68.51 HETEROZYGOUS FACTOR V LEIDEN MUTATION (H): ICD-10-CM

## 2023-10-26 DIAGNOSIS — Z86.718 HISTORY OF DEEP VENOUS THROMBOSIS: ICD-10-CM

## 2023-10-26 LAB — INR BLD: 2.6 (ref 0.9–1.1)

## 2023-10-26 PROCEDURE — 85610 PROTHROMBIN TIME: CPT

## 2023-10-26 PROCEDURE — 97110 THERAPEUTIC EXERCISES: CPT | Mod: GP | Performed by: PHYSICAL THERAPIST

## 2023-10-26 PROCEDURE — 36416 COLLJ CAPILLARY BLOOD SPEC: CPT

## 2023-10-26 PROCEDURE — 97112 NEUROMUSCULAR REEDUCATION: CPT | Mod: GP | Performed by: PHYSICAL THERAPIST

## 2023-10-26 NOTE — PROGRESS NOTES
ANTICOAGULATION MANAGEMENT     Victorino Khan 89 year old male is on warfarin with therapeutic INR result. (Goal INR 2.0-3.0)    Recent labs: (last 7 days)     10/26/23  1025   INR 2.6*       ASSESSMENT     Warfarin Lab Questionnaire    Warfarin Doses Last 7 Days      10/26/2023    10:18 AM   Dose in Tablet or Mg   TAB or MG? milligram (mg)     Pt Rptd Dose ALEJANDRINA MONDAY TUESDAY WED THURS FRIDAY SATURDAY   10/26/2023  10:18 AM 4 2 4 2 4 2 4         10/26/2023   Warfarin Lab Questionnaire   Missed doses within past 14 days? No   Changes in diet or alcohol within past 14 days? No   Medication changes since last result? No   Injuries or illness since last result? No   New shortness of breath, severe headaches or sudden changes in vision since last result? No   Abnormal bleeding since last result? No   Upcoming surgery, procedure? No   Best number to call with results? [phone]     Previous result: Supratherapeutic  Additional findings: None       PLAN     Recommended plan for no diet, medication or health factor changes affecting INR     Dosing Instructions: Continue your current warfarin dose with next INR in 2 weeks       Summary  As of 10/26/2023      Full warfarin instructions:  2 mg every Tue, Thu, Sat; 4 mg all other days   Next INR check:  11/9/2023               Telephone call with Eddy who verbalizes understanding and agrees to plan    Lab visit scheduled    Education provided:   Contact [phone] with any changes, questions or concerns.     Plan made per [facility] anticoagulation protocol    Luís Williamson RN  Anticoagulation Clinic  10/26/2023    _______________________________________________________________________     Anticoagulation Episode Summary       Current INR goal:  2.0-3.0   TTR:  54.6% (1 y)   Target end date:  Indefinite   Send INR reminders to:  [facility]    Indications    History of deep venous thrombosis [Z86.718]  Heterozygous factor V Leiden mutation (H24) [D68.51]  Chronic  anticoagulation [Z79.01]             Comments:               Anticoagulation Care Providers       Provider Role Specialty Phone number    Jez Jack MD Referring Internal Medicine [phone]    Yuri Funes MD Referring Internal Medicine [phone]

## 2023-11-06 DIAGNOSIS — D68.51 FACTOR V LEIDEN (H): ICD-10-CM

## 2023-11-06 DIAGNOSIS — Z86.718 HISTORY OF DEEP VENOUS THROMBOSIS: ICD-10-CM

## 2023-11-06 DIAGNOSIS — D69.6 THROMBOCYTOPENIA (H): ICD-10-CM

## 2023-11-06 RX ORDER — WARFARIN SODIUM 4 MG/1
TABLET ORAL
Qty: 90 TABLET | Refills: 3 | Status: SHIPPED | OUTPATIENT
Start: 2023-11-06 | End: 2024-02-01

## 2023-11-06 RX ORDER — FLUTICASONE PROPIONATE AND SALMETEROL XINAFOATE 115; 21 UG/1; UG/1
2 AEROSOL, METERED RESPIRATORY (INHALATION) 2 TIMES DAILY
Qty: 12 G | Refills: 3 | Status: SHIPPED | OUTPATIENT
Start: 2023-11-06

## 2023-11-09 ENCOUNTER — LAB (OUTPATIENT)
Dept: LAB | Facility: CLINIC | Age: 88
End: 2023-11-09
Payer: COMMERCIAL

## 2023-11-09 ENCOUNTER — THERAPY VISIT (OUTPATIENT)
Dept: PHYSICAL THERAPY | Facility: REHABILITATION | Age: 88
End: 2023-11-09
Payer: COMMERCIAL

## 2023-11-09 ENCOUNTER — ANTICOAGULATION THERAPY VISIT (OUTPATIENT)
Dept: ANTICOAGULATION | Facility: CLINIC | Age: 88
End: 2023-11-09

## 2023-11-09 DIAGNOSIS — M62.81 GENERALIZED MUSCLE WEAKNESS: Primary | ICD-10-CM

## 2023-11-09 DIAGNOSIS — Z79.01 CHRONIC ANTICOAGULATION: ICD-10-CM

## 2023-11-09 DIAGNOSIS — D68.51 HETEROZYGOUS FACTOR V LEIDEN MUTATION (H): ICD-10-CM

## 2023-11-09 DIAGNOSIS — R26.9 ABNORMAL GAIT: ICD-10-CM

## 2023-11-09 DIAGNOSIS — Z86.718 HISTORY OF DEEP VENOUS THROMBOSIS: Primary | ICD-10-CM

## 2023-11-09 DIAGNOSIS — Z86.718 HISTORY OF DEEP VENOUS THROMBOSIS: ICD-10-CM

## 2023-11-09 DIAGNOSIS — R27.0 ATAXIA: ICD-10-CM

## 2023-11-09 LAB — INR BLD: 2.1 (ref 0.9–1.1)

## 2023-11-09 PROCEDURE — 97110 THERAPEUTIC EXERCISES: CPT | Mod: GP | Performed by: PHYSICAL THERAPIST

## 2023-11-09 PROCEDURE — 85610 PROTHROMBIN TIME: CPT

## 2023-11-09 PROCEDURE — 97112 NEUROMUSCULAR REEDUCATION: CPT | Mod: GP | Performed by: PHYSICAL THERAPIST

## 2023-11-09 PROCEDURE — 36415 COLL VENOUS BLD VENIPUNCTURE: CPT

## 2023-11-09 NOTE — PROGRESS NOTES
ANTICOAGULATION MANAGEMENT     Victorino Khan 89 year old male is on warfarin with therapeutic INR result. (Goal INR 2.0-3.0)    Recent labs: (last 7 days)     11/09/23  0904   INR 2.1*       ASSESSMENT     Warfarin Lab Questionnaire    Warfarin Doses Last 7 Days      11/9/2023     8:53 AM   Dose in Tablet or Mg   TAB or MG? milligram (mg)     Pt Rptd Dose SUNDAY MONDAY TUESDAY WED THURS FRIDAY SATURDAY 11/9/2023   8:53 AM 4 2 4 2 4 2 4         11/9/2023   Warfarin Lab Questionnaire   Missed doses within past 14 days? No   Changes in diet or alcohol within past 14 days? No   Medication changes since last result? No   Injuries or illness since last result? No   New shortness of breath, severe headaches or sudden changes in vision since last result? No   Abnormal bleeding since last result? No   Upcoming surgery, procedure? No   Best number to call with results? [phone]     Previous result: Therapeutic last visit; previously outside of goal range  Additional findings: None       PLAN     Recommended plan for no diet, medication or health factor changes affecting INR     Dosing Instructions: Continue your current warfarin dose with next INR in 4 weeks       Summary  As of 11/9/2023      Full warfarin instructions:  2 mg every Tue, Thu, Sat; 4 mg all other days   Next INR check:  12/7/2023               Telephone call with Eddy who verbalizes understanding and agrees to plan    Lab visit scheduled    Education provided:   Please call back if any changes to your diet, medications or how you've been taking warfarin    Plan made per [facility] anticoagulation protocol    Luís Williamson RN  Anticoagulation Clinic  11/9/2023    _______________________________________________________________________     Anticoagulation Episode Summary       Current INR goal:  2.0-3.0   TTR:  57.5% (1 y)   Target end date:  Indefinite   Send INR reminders to:  [facility]    Indications    History of deep venous thrombosis  [Z86.718]  Heterozygous factor V Leiden mutation (H24) [D68.51]  Chronic anticoagulation [Z79.01]             Comments:               Anticoagulation Care Providers       Provider Role Specialty Phone number    Jez Jack MD Referring Internal Medicine [phone]    Yuri Funes MD Referring Internal Medicine [phone]

## 2023-11-30 ENCOUNTER — TELEPHONE (OUTPATIENT)
Dept: PHYSICAL THERAPY | Facility: REHABILITATION | Age: 88
End: 2023-11-30

## 2023-12-07 ENCOUNTER — ANTICOAGULATION THERAPY VISIT (OUTPATIENT)
Dept: ANTICOAGULATION | Facility: CLINIC | Age: 88
End: 2023-12-07

## 2023-12-07 ENCOUNTER — THERAPY VISIT (OUTPATIENT)
Dept: PHYSICAL THERAPY | Facility: REHABILITATION | Age: 88
End: 2023-12-07
Payer: COMMERCIAL

## 2023-12-07 ENCOUNTER — LAB (OUTPATIENT)
Dept: LAB | Facility: CLINIC | Age: 88
End: 2023-12-07
Payer: COMMERCIAL

## 2023-12-07 DIAGNOSIS — D68.51 HETEROZYGOUS FACTOR V LEIDEN MUTATION (H): ICD-10-CM

## 2023-12-07 DIAGNOSIS — R27.0 ATAXIA: ICD-10-CM

## 2023-12-07 DIAGNOSIS — Z86.718 HISTORY OF DEEP VENOUS THROMBOSIS: Primary | ICD-10-CM

## 2023-12-07 DIAGNOSIS — R26.9 ABNORMAL GAIT: ICD-10-CM

## 2023-12-07 DIAGNOSIS — Z79.01 CHRONIC ANTICOAGULATION: ICD-10-CM

## 2023-12-07 DIAGNOSIS — Z86.718 HISTORY OF DEEP VENOUS THROMBOSIS: ICD-10-CM

## 2023-12-07 DIAGNOSIS — M62.81 GENERALIZED MUSCLE WEAKNESS: Primary | ICD-10-CM

## 2023-12-07 LAB — INR BLD: 2 (ref 0.9–1.1)

## 2023-12-07 PROCEDURE — 36416 COLLJ CAPILLARY BLOOD SPEC: CPT

## 2023-12-07 PROCEDURE — 97750 PHYSICAL PERFORMANCE TEST: CPT | Mod: GP | Performed by: PHYSICAL THERAPIST

## 2023-12-07 PROCEDURE — 85610 PROTHROMBIN TIME: CPT

## 2023-12-07 PROCEDURE — 97110 THERAPEUTIC EXERCISES: CPT | Mod: GP | Performed by: PHYSICAL THERAPIST

## 2023-12-07 NOTE — PROGRESS NOTES
ANTICOAGULATION MANAGEMENT     Victorino Khan 89 year old male is on warfarin with therapeutic INR result. (Goal INR 2.0-3.0)    Recent labs: (last 7 days)     12/07/23  0858   INR 2.0*       ASSESSMENT     Warfarin Lab Questionnaire    Warfarin Doses Last 7 Days      12/7/2023     8:54 AM   Dose in Tablet or Mg   TAB or MG? milligram (mg)     Pt Rptd Dose SUNDAY MONDAY TUESDAY WED THURS FRIDAY SATURDAY 12/7/2023   8:54 AM 4 2 4 2 4 2 4         12/7/2023   Warfarin Lab Questionnaire   Missed doses within past 14 days? No   Changes in diet or alcohol within past 14 days? No   Medication changes since last result? No   Injuries or illness since last result? No   New shortness of breath, severe headaches or sudden changes in vision since last result? No   Abnormal bleeding since last result? No   Upcoming surgery, procedure? No   Best number to call with results? [phone]     Previous result: Therapeutic last 2(+) visits  Additional findings: None       PLAN     Recommended plan for no diet, medication or health factor changes affecting INR     Dosing Instructions: Continue your current warfarin dose with next INR in 2 weeks       Summary  As of 12/7/2023      Full warfarin instructions:  2 mg every Tue, Thu, Sat; 4 mg all other days   Next INR check:  1/4/2024               Telephone call with Eddy who verbalizes understanding and agrees to plan    Lab visit scheduled    Education provided:   Contact [phone] with any changes, questions or concerns.     Plan made per [facility] anticoagulation protocol    Luís Williamson RN  Anticoagulation Clinic  12/7/2023    _______________________________________________________________________     Anticoagulation Episode Summary       Current INR goal:  2.0-3.0   TTR:  61.2% (1 y)   Target end date:  Indefinite   Send INR reminders to:  [facility]    Indications    History of deep venous thrombosis [Z86.718]  Heterozygous factor V Leiden mutation (H24) [D68.51]  Chronic  anticoagulation [Z79.01]             Comments:               Anticoagulation Care Providers       Provider Role Specialty Phone number    Jez Jack MD Referring Internal Medicine [phone]    Yuri Funes MD Referring Internal Medicine [phone]

## 2023-12-08 ENCOUNTER — ANCILLARY PROCEDURE (OUTPATIENT)
Dept: CARDIOLOGY | Facility: CLINIC | Age: 88
End: 2023-12-08
Attending: INTERNAL MEDICINE
Payer: COMMERCIAL

## 2023-12-08 DIAGNOSIS — Z95.0 CARDIAC PACEMAKER IN SITU: ICD-10-CM

## 2023-12-08 DIAGNOSIS — I49.5 SICK SINUS SYNDROME (H): ICD-10-CM

## 2023-12-08 PROCEDURE — 93294 REM INTERROG EVL PM/LDLS PM: CPT | Performed by: INTERNAL MEDICINE

## 2023-12-08 PROCEDURE — 93296 REM INTERROG EVL PM/IDS: CPT | Performed by: INTERNAL MEDICINE

## 2023-12-11 LAB
MDC_IDC_EPISODE_DTM: NORMAL
MDC_IDC_EPISODE_ID: NORMAL
MDC_IDC_EPISODE_TYPE: NORMAL
MDC_IDC_LEAD_CONNECTION_STATUS: NORMAL
MDC_IDC_LEAD_CONNECTION_STATUS: NORMAL
MDC_IDC_LEAD_IMPLANT_DT: NORMAL
MDC_IDC_LEAD_IMPLANT_DT: NORMAL
MDC_IDC_LEAD_LOCATION: NORMAL
MDC_IDC_LEAD_LOCATION: NORMAL
MDC_IDC_LEAD_LOCATION_DETAIL_1: NORMAL
MDC_IDC_LEAD_LOCATION_DETAIL_1: NORMAL
MDC_IDC_LEAD_MFG: NORMAL
MDC_IDC_LEAD_MFG: NORMAL
MDC_IDC_LEAD_MODEL: NORMAL
MDC_IDC_LEAD_MODEL: NORMAL
MDC_IDC_LEAD_POLARITY_TYPE: NORMAL
MDC_IDC_LEAD_POLARITY_TYPE: NORMAL
MDC_IDC_LEAD_SERIAL: NORMAL
MDC_IDC_LEAD_SERIAL: NORMAL
MDC_IDC_MSMT_BATTERY_DTM: NORMAL
MDC_IDC_MSMT_BATTERY_REMAINING_LONGEVITY: 35 MO
MDC_IDC_MSMT_BATTERY_REMAINING_PERCENTAGE: 33 %
MDC_IDC_MSMT_BATTERY_RRT_TRIGGER: NORMAL
MDC_IDC_MSMT_BATTERY_STATUS: NORMAL
MDC_IDC_MSMT_BATTERY_VOLTAGE: 2.95 V
MDC_IDC_MSMT_LEADCHNL_RA_IMPEDANCE_VALUE: 460 OHM
MDC_IDC_MSMT_LEADCHNL_RA_LEAD_CHANNEL_STATUS: NORMAL
MDC_IDC_MSMT_LEADCHNL_RA_PACING_THRESHOLD_AMPLITUDE: 0.5 V
MDC_IDC_MSMT_LEADCHNL_RA_PACING_THRESHOLD_PULSEWIDTH: 0.5 MS
MDC_IDC_MSMT_LEADCHNL_RA_SENSING_INTR_AMPL: 5 MV
MDC_IDC_MSMT_LEADCHNL_RV_IMPEDANCE_VALUE: 390 OHM
MDC_IDC_MSMT_LEADCHNL_RV_LEAD_CHANNEL_STATUS: NORMAL
MDC_IDC_MSMT_LEADCHNL_RV_PACING_THRESHOLD_AMPLITUDE: 1 V
MDC_IDC_MSMT_LEADCHNL_RV_PACING_THRESHOLD_PULSEWIDTH: 0.5 MS
MDC_IDC_MSMT_LEADCHNL_RV_SENSING_INTR_AMPL: 6.4 MV
MDC_IDC_PG_IMPLANT_DTM: NORMAL
MDC_IDC_PG_MFG: NORMAL
MDC_IDC_PG_MODEL: NORMAL
MDC_IDC_PG_SERIAL: NORMAL
MDC_IDC_PG_TYPE: NORMAL
MDC_IDC_SESS_CLINIC_NAME: NORMAL
MDC_IDC_SESS_DTM: NORMAL
MDC_IDC_SESS_REPROGRAMMED: NO
MDC_IDC_SESS_TYPE: NORMAL
MDC_IDC_SET_BRADY_AT_MODE_SWITCH_MODE: NORMAL
MDC_IDC_SET_BRADY_AT_MODE_SWITCH_RATE: 170 {BEATS}/MIN
MDC_IDC_SET_BRADY_LOWRATE: 60 {BEATS}/MIN
MDC_IDC_SET_BRADY_MAX_SENSOR_RATE: 130 {BEATS}/MIN
MDC_IDC_SET_BRADY_MAX_TRACKING_RATE: 120 {BEATS}/MIN
MDC_IDC_SET_BRADY_MODE: NORMAL
MDC_IDC_SET_BRADY_PAV_DELAY_LOW: 250 MS
MDC_IDC_SET_BRADY_SAV_DELAY_LOW: 250 MS
MDC_IDC_SET_LEADCHNL_RA_PACING_AMPLITUDE: 2 V
MDC_IDC_SET_LEADCHNL_RA_PACING_ANODE_ELECTRODE_1: NORMAL
MDC_IDC_SET_LEADCHNL_RA_PACING_ANODE_LOCATION_1: NORMAL
MDC_IDC_SET_LEADCHNL_RA_PACING_CAPTURE_MODE: NORMAL
MDC_IDC_SET_LEADCHNL_RA_PACING_CATHODE_ELECTRODE_1: NORMAL
MDC_IDC_SET_LEADCHNL_RA_PACING_CATHODE_LOCATION_1: NORMAL
MDC_IDC_SET_LEADCHNL_RA_PACING_POLARITY: NORMAL
MDC_IDC_SET_LEADCHNL_RA_PACING_PULSEWIDTH: 0.5 MS
MDC_IDC_SET_LEADCHNL_RA_SENSING_ADAPTATION_MODE: NORMAL
MDC_IDC_SET_LEADCHNL_RA_SENSING_ANODE_ELECTRODE_1: NORMAL
MDC_IDC_SET_LEADCHNL_RA_SENSING_ANODE_LOCATION_1: NORMAL
MDC_IDC_SET_LEADCHNL_RA_SENSING_CATHODE_ELECTRODE_1: NORMAL
MDC_IDC_SET_LEADCHNL_RA_SENSING_CATHODE_LOCATION_1: NORMAL
MDC_IDC_SET_LEADCHNL_RA_SENSING_POLARITY: NORMAL
MDC_IDC_SET_LEADCHNL_RA_SENSING_SENSITIVITY: 0.3 MV
MDC_IDC_SET_LEADCHNL_RV_PACING_AMPLITUDE: 2 V
MDC_IDC_SET_LEADCHNL_RV_PACING_ANODE_ELECTRODE_1: NORMAL
MDC_IDC_SET_LEADCHNL_RV_PACING_ANODE_LOCATION_1: NORMAL
MDC_IDC_SET_LEADCHNL_RV_PACING_CAPTURE_MODE: NORMAL
MDC_IDC_SET_LEADCHNL_RV_PACING_CATHODE_ELECTRODE_1: NORMAL
MDC_IDC_SET_LEADCHNL_RV_PACING_CATHODE_LOCATION_1: NORMAL
MDC_IDC_SET_LEADCHNL_RV_PACING_POLARITY: NORMAL
MDC_IDC_SET_LEADCHNL_RV_PACING_PULSEWIDTH: 0.5 MS
MDC_IDC_SET_LEADCHNL_RV_SENSING_ADAPTATION_MODE: NORMAL
MDC_IDC_SET_LEADCHNL_RV_SENSING_ANODE_ELECTRODE_1: NORMAL
MDC_IDC_SET_LEADCHNL_RV_SENSING_ANODE_LOCATION_1: NORMAL
MDC_IDC_SET_LEADCHNL_RV_SENSING_CATHODE_ELECTRODE_1: NORMAL
MDC_IDC_SET_LEADCHNL_RV_SENSING_CATHODE_LOCATION_1: NORMAL
MDC_IDC_SET_LEADCHNL_RV_SENSING_POLARITY: NORMAL
MDC_IDC_SET_LEADCHNL_RV_SENSING_SENSITIVITY: 0.5 MV
MDC_IDC_STAT_AT_BURDEN_PERCENT: 0 %
MDC_IDC_STAT_AT_DTM_END: NORMAL
MDC_IDC_STAT_AT_DTM_START: NORMAL
MDC_IDC_STAT_AT_MODE_SW_COUNT: 2
MDC_IDC_STAT_AT_MODE_SW_COUNT_PER_DAY: 0
MDC_IDC_STAT_AT_MODE_SW_MAX_DURATION: 12 S
MDC_IDC_STAT_AT_MODE_SW_PERCENT_TIME: 1 %
MDC_IDC_STAT_BRADY_AP_VP_PERCENT: 24 %
MDC_IDC_STAT_BRADY_AP_VS_PERCENT: 7.3 %
MDC_IDC_STAT_BRADY_AS_VP_PERCENT: 32 %
MDC_IDC_STAT_BRADY_AS_VS_PERCENT: 36 %
MDC_IDC_STAT_BRADY_DTM_END: NORMAL
MDC_IDC_STAT_BRADY_DTM_START: NORMAL
MDC_IDC_STAT_BRADY_RA_PERCENT_PACED: 30 %
MDC_IDC_STAT_BRADY_RV_PERCENT_PACED: 56 %
MDC_IDC_STAT_CRT_DTM_END: NORMAL
MDC_IDC_STAT_CRT_DTM_START: NORMAL
MDC_IDC_STAT_HEART_RATE_ATRIAL_MAX: 280 {BEATS}/MIN
MDC_IDC_STAT_HEART_RATE_ATRIAL_MEAN: 80 {BEATS}/MIN
MDC_IDC_STAT_HEART_RATE_ATRIAL_MIN: 60 {BEATS}/MIN
MDC_IDC_STAT_HEART_RATE_DTM_END: NORMAL
MDC_IDC_STAT_HEART_RATE_DTM_START: NORMAL
MDC_IDC_STAT_HEART_RATE_VENTRICULAR_MAX: 250 {BEATS}/MIN
MDC_IDC_STAT_HEART_RATE_VENTRICULAR_MEAN: 79 {BEATS}/MIN
MDC_IDC_STAT_HEART_RATE_VENTRICULAR_MIN: 40 {BEATS}/MIN

## 2023-12-18 ENCOUNTER — HOSPITAL ENCOUNTER (OUTPATIENT)
Dept: CARDIOLOGY | Facility: CLINIC | Age: 88
Discharge: HOME | End: 2023-12-18
Attending: NURSE PRACTITIONER | Admitting: NURSE PRACTITIONER
Payer: COMMERCIAL

## 2023-12-18 DIAGNOSIS — I47.29 NSVT (NONSUSTAINED VENTRICULAR TACHYCARDIA) (H): ICD-10-CM

## 2023-12-18 DIAGNOSIS — Z95.0 CARDIAC PACEMAKER IN SITU: ICD-10-CM

## 2023-12-18 LAB — LVEF ECHO: NORMAL

## 2023-12-18 PROCEDURE — 999N000208 ECHOCARDIOGRAM COMPLETE

## 2023-12-18 PROCEDURE — 93306 TTE W/DOPPLER COMPLETE: CPT | Mod: 26 | Performed by: INTERNAL MEDICINE

## 2023-12-18 PROCEDURE — 255N000002 HC RX 255 OP 636: Performed by: NURSE PRACTITIONER

## 2023-12-18 RX ADMIN — HUMAN ALBUMIN MICROSPHERES AND PERFLUTREN 9 ML: 10; .22 INJECTION, SOLUTION INTRAVENOUS at 11:44

## 2023-12-21 ENCOUNTER — LAB (OUTPATIENT)
Dept: LAB | Facility: CLINIC | Age: 88
End: 2023-12-21
Payer: COMMERCIAL

## 2023-12-21 ENCOUNTER — ANTICOAGULATION THERAPY VISIT (OUTPATIENT)
Dept: ANTICOAGULATION | Facility: CLINIC | Age: 88
End: 2023-12-21

## 2023-12-21 ENCOUNTER — THERAPY VISIT (OUTPATIENT)
Dept: PHYSICAL THERAPY | Facility: REHABILITATION | Age: 88
End: 2023-12-21
Payer: COMMERCIAL

## 2023-12-21 DIAGNOSIS — Z79.01 CHRONIC ANTICOAGULATION: ICD-10-CM

## 2023-12-21 DIAGNOSIS — Z86.718 HISTORY OF DEEP VENOUS THROMBOSIS: Primary | ICD-10-CM

## 2023-12-21 DIAGNOSIS — R26.9 ABNORMAL GAIT: ICD-10-CM

## 2023-12-21 DIAGNOSIS — M62.81 GENERALIZED MUSCLE WEAKNESS: Primary | ICD-10-CM

## 2023-12-21 DIAGNOSIS — Z86.718 HISTORY OF DEEP VENOUS THROMBOSIS: ICD-10-CM

## 2023-12-21 DIAGNOSIS — D68.51 HETEROZYGOUS FACTOR V LEIDEN MUTATION (H): ICD-10-CM

## 2023-12-21 DIAGNOSIS — R27.0 ATAXIA: ICD-10-CM

## 2023-12-21 LAB — INR BLD: 2 (ref 0.9–1.1)

## 2023-12-21 PROCEDURE — 85610 PROTHROMBIN TIME: CPT

## 2023-12-21 PROCEDURE — 36416 COLLJ CAPILLARY BLOOD SPEC: CPT

## 2023-12-21 PROCEDURE — 97112 NEUROMUSCULAR REEDUCATION: CPT | Mod: GP | Performed by: PHYSICAL THERAPIST

## 2023-12-21 PROCEDURE — 97110 THERAPEUTIC EXERCISES: CPT | Mod: GP | Performed by: PHYSICAL THERAPIST

## 2023-12-21 NOTE — PROGRESS NOTES
ANTICOAGULATION MANAGEMENT     Victorino Khan 89 year old male is on warfarin with therapeutic INR result. (Goal INR 2.0-3.0)    Recent labs: (last 7 days)     12/21/23  0845   INR 2.0*       ASSESSMENT     Warfarin Lab Questionnaire    Warfarin Doses Last 7 Days      12/21/2023     8:43 AM   Dose in Tablet or Mg   TAB or MG? milligram (mg)     Pt Rptd Dose SUNDAY MONDAY TUESDAY WED THURS FRIDAY SATURDAY 12/21/2023   8:43 AM 4 2 4 2 4 2 4         12/21/2023   Warfarin Lab Questionnaire   Missed doses within past 14 days? No   Changes in diet or alcohol within past 14 days? No   Medication changes since last result? No   Injuries or illness since last result? No   New shortness of breath, severe headaches or sudden changes in vision since last result? No   Abnormal bleeding since last result? No   Upcoming surgery, procedure? No   Best number to call with results? [phone]     Previous result: Therapeutic last 2(+) visits  Additional findings: None       PLAN     Recommended plan for no diet, medication or health factor changes affecting INR     Dosing Instructions: Continue your current warfarin dose with next INR in 4 weeks       Summary  As of 12/21/2023      Full warfarin instructions:  2 mg every Tue, Thu, Sat; 4 mg all other days   Next INR check:  1/18/2024               Telephone call with Eddy who verbalizes understanding and agrees to plan    Lab visit scheduled    Education provided:   Contact [phone] with any changes, questions or concerns.     Plan made per [facility] anticoagulation protocol    Luís Williamson RN  Anticoagulation Clinic  12/21/2023    _______________________________________________________________________     Anticoagulation Episode Summary       Current INR goal:  2.0-3.0   TTR:  63.1% (1 y)   Target end date:  Indefinite   Send INR reminders to:  [facility]    Indications    History of deep venous thrombosis [Z86.718]  Heterozygous factor V Leiden mutation (H24) [D68.51]  Chronic  anticoagulation [Z79.01]             Comments:               Anticoagulation Care Providers       Provider Role Specialty Phone number    Jez Jack MD Referring Internal Medicine [phone]    Yuri Funes MD Referring Internal Medicine [phone]

## 2024-01-23 ENCOUNTER — ANTICOAGULATION THERAPY VISIT (OUTPATIENT)
Dept: ANTICOAGULATION | Facility: CLINIC | Age: 89
End: 2024-01-23

## 2024-01-23 ENCOUNTER — LAB (OUTPATIENT)
Dept: LAB | Facility: CLINIC | Age: 89
End: 2024-01-23
Payer: COMMERCIAL

## 2024-01-23 ENCOUNTER — DOCUMENTATION ONLY (OUTPATIENT)
Dept: ANTICOAGULATION | Facility: CLINIC | Age: 89
End: 2024-01-23

## 2024-01-23 DIAGNOSIS — D68.51 HETEROZYGOUS FACTOR V LEIDEN MUTATION (H): ICD-10-CM

## 2024-01-23 DIAGNOSIS — Z86.718 HISTORY OF DEEP VENOUS THROMBOSIS: Primary | ICD-10-CM

## 2024-01-23 DIAGNOSIS — Z86.718 HISTORY OF DEEP VENOUS THROMBOSIS: ICD-10-CM

## 2024-01-23 DIAGNOSIS — Z79.01 CHRONIC ANTICOAGULATION: ICD-10-CM

## 2024-01-23 LAB — INR BLD: 2.1 (ref 0.9–1.1)

## 2024-01-23 PROCEDURE — 36416 COLLJ CAPILLARY BLOOD SPEC: CPT

## 2024-01-23 PROCEDURE — 85610 PROTHROMBIN TIME: CPT

## 2024-01-23 NOTE — PROGRESS NOTES
ANTICOAGULATION CLINIC REFERRAL RENEWAL REQUEST       An annual renewal order is required for all patients referred to [facility].?  Please review and sign the pended referral order for Victorino Khan.       ANTICOAGULATION SUMMARY      Warfarin indication(s)   DVT and Heterozygous Factor V Leiden    Mechanical heart valve present?  NO       Current goal range   INR: 2.0-3.0     Goal appropriate for indication? Goal INR 2-3, standard for indication(s) above     Time in Therapeutic Range (TTR)  (Goal > 60%) 67.0%       Office visit with referring provider's group within last year yes on 10/3/23       Luís Williamsno RN  [facility]

## 2024-01-23 NOTE — PROGRESS NOTES
ANTICOAGULATION MANAGEMENT     Victorino Khan 89 year old male is on warfarin with therapeutic INR result. (Goal INR 2.0-3.0)    Recent labs: (last 7 days)     01/23/24  1020   INR 2.1*       ASSESSMENT     Warfarin Lab Questionnaire    Warfarin Doses Last 7 Days      1/23/2024    10:17 AM   Dose in Tablet or Mg   TAB or MG? milligram (mg)     Pt Rptd Dose SUNDAY MONDAY TUESDAY WED THURS FRIDAY SATURDAY 1/23/2024  10:17 AM 4 2 4 2 4 2 4         1/23/2024   Warfarin Lab Questionnaire   Missed doses within past 14 days? No   Changes in diet or alcohol within past 14 days? No   Medication changes since last result? No   Injuries or illness since last result? No   New shortness of breath, severe headaches or sudden changes in vision since last result? No   Abnormal bleeding since last result? No   Upcoming surgery, procedure? No   Best number to call with results? [phone]     Previous result: Therapeutic last 2(+) visits  Additional findings: None       PLAN     Recommended plan for no diet, medication or health factor changes affecting INR     Dosing Instructions: Continue your current warfarin dose with next INR in 4 weeks       Summary  As of 1/23/2024      Full warfarin instructions:  2 mg every Tue, Thu, Sat; 4 mg all other days   Next INR check:  2/20/2024               Telephone call with Eddy who verbalizes understanding and agrees to plan    Lab visit scheduled    Education provided:   Contact [phone] with any changes, questions or concerns.     Plan made per [facility] anticoagulation protocol    Luís Williamson RN  Anticoagulation Clinic  1/23/2024    _______________________________________________________________________     Anticoagulation Episode Summary       Current INR goal:  2.0-3.0   TTR:  67.0% (1 y)   Target end date:  Indefinite   Send INR reminders to:  [facility]    Indications    History of deep venous thrombosis [Z86.718]  Heterozygous factor V Leiden mutation (H24) [D68.51]  Chronic  anticoagulation [Z79.01]             Comments:               Anticoagulation Care Providers       Provider Role Specialty Phone number    Jez Jack MD Referring Internal Medicine [phone]    Yuri Funes MD Referring Internal Medicine [phone]

## 2024-02-01 ENCOUNTER — VIRTUAL VISIT (OUTPATIENT)
Dept: INTERNAL MEDICINE | Facility: CLINIC | Age: 89
End: 2024-02-01
Payer: COMMERCIAL

## 2024-02-01 DIAGNOSIS — D68.51 HETEROZYGOUS FACTOR V LEIDEN MUTATION (H): ICD-10-CM

## 2024-02-01 DIAGNOSIS — M54.50 CHRONIC BILATERAL LOW BACK PAIN WITHOUT SCIATICA: Primary | ICD-10-CM

## 2024-02-01 DIAGNOSIS — J44.9 CHRONIC OBSTRUCTIVE PULMONARY DISEASE, UNSPECIFIED COPD TYPE (H): ICD-10-CM

## 2024-02-01 DIAGNOSIS — Z86.718 HISTORY OF DEEP VENOUS THROMBOSIS: ICD-10-CM

## 2024-02-01 DIAGNOSIS — G89.29 CHRONIC BILATERAL LOW BACK PAIN WITHOUT SCIATICA: Primary | ICD-10-CM

## 2024-02-01 DIAGNOSIS — Z95.0 CARDIAC PACEMAKER IN SITU: ICD-10-CM

## 2024-02-01 PROBLEM — I49.5 SICK SINUS SYNDROME (H): Status: RESOLVED | Noted: 2022-11-04 | Resolved: 2024-02-01

## 2024-02-01 PROBLEM — S61.111A LACERATION OF RIGHT THUMB WITHOUT FOREIGN BODY WITH DAMAGE TO NAIL: Status: RESOLVED | Noted: 2023-08-15 | Resolved: 2024-02-01

## 2024-02-01 PROBLEM — I82.290: Status: RESOLVED | Noted: 2022-04-22 | Resolved: 2024-02-01

## 2024-02-01 PROCEDURE — 99443 PR PHYSICIAN TELEPHONE EVALUATION 21-30 MIN: CPT | Mod: 93 | Performed by: INTERNAL MEDICINE

## 2024-02-01 RX ORDER — WARFARIN SODIUM 4 MG/1
TABLET ORAL
COMMUNITY
Start: 2024-02-01

## 2024-02-01 RX ORDER — CYANOCOBALAMIN 1000 UG/ML
1000 INJECTION, SOLUTION INTRAMUSCULAR; SUBCUTANEOUS
Status: ACTIVE | OUTPATIENT
Start: 2024-02-02

## 2024-02-01 RX ORDER — WARFARIN SODIUM 5 MG/1
TABLET ORAL
COMMUNITY
Start: 2024-02-01

## 2024-02-01 ASSESSMENT — ASTHMA QUESTIONNAIRES
ACT_TOTALSCORE: 20
QUESTION_3 LAST FOUR WEEKS HOW OFTEN DID YOUR ASTHMA SYMPTOMS (WHEEZING, COUGHING, SHORTNESS OF BREATH, CHEST TIGHTNESS OR PAIN) WAKE YOU UP AT NIGHT OR EARLIER THAN USUAL IN THE MORNING: NOT AT ALL
QUESTION_2 LAST FOUR WEEKS HOW OFTEN HAVE YOU HAD SHORTNESS OF BREATH: NOT AT ALL
QUESTION_4 LAST FOUR WEEKS HOW OFTEN HAVE YOU USED YOUR RESCUE INHALER OR NEBULIZER MEDICATION (SUCH AS ALBUTEROL): NOT AT ALL
ACT_TOTALSCORE: 20
QUESTION_1 LAST FOUR WEEKS HOW MUCH OF THE TIME DID YOUR ASTHMA KEEP YOU FROM GETTING AS MUCH DONE AT WORK, SCHOOL OR AT HOME: ALL OF THE TIME
QUESTION_5 LAST FOUR WEEKS HOW WOULD YOU RATE YOUR ASTHMA CONTROL: WELL CONTROLLED

## 2024-02-01 NOTE — PROGRESS NOTES
Eddy is a 89 year old who is being evaluated via a billable telephone visit.      What phone number would you like to be contacted at? [phone]  How would you like to obtain your AVS? MyChart  {PROVIDER LOCATION On-site should be selected for visits conducted from your clinic location or adjoining [facility] hospital, academic office, or other nearby [facility] building. Off-site should be selected for all other provider locations, including home:[id number]}  Distant Location (provider location):  Off-site    {PROVIDER CHARTING PREFERENCE:503123}    Subjective   Eddy is a 89 year old, presenting for the following health issues:  Office Visit (Bad back has had many surgeries. Pt says there is nothing left for doctor to do. Pt would like a referral to Dr. Jarvis Juárez. Pt pain scale at 7. Constant pain- can be worse or better, but pain is always there.) and Recheck Medication (Pt is taking OTC Tylenol for pain.)      2/1/2024    11:55 AM   Additional Questions   Roomed by amos nelson   Accompanied by self         2/1/2024    11:55 AM   Patient Reported Additional Medications   Patient reports taking the following new medications Tylenol     History of Present Illness       Back Pain:  He presents for follow up of back pain. Patient's back pain is a chronic problem.  Location of back pain:  Right lower back and left lower back  Description of back pain: gnawing  Back pain spreads: nowhere    Since patient first noticed back pain, pain is: always present, but gets better and worse  Does back pain interfere with his job:  Not applicable       He eats 4 or more servings of fruits and vegetables daily.He consumes 0 sweetened beverage(s) daily.He exercises with enough effort to increase his heart rate 20 to 29 minutes per day.  He exercises with enough effort to increase his heart rate 7 days per week.   He is taking medications regularly.       Chronic/Recurring Back Pain Follow Up    Where is your back pain located? (Select all that apply)  "low back both  How would you describe your back pain?  gnawing  Where does your back pain spread? nowhere  Since your last clinic visit for back pain, how has your pain changed? always present, but gets better and worse  Does your back pain interfere with your job? YES, No, Interferes with daily living.  Since your last visit, have you tried any new treatment? No  How many servings of fruits and vegetables do you eat daily?  4 or more  On average, how many sweetened beverages do you drink each day (Examples: soda, juice, sweet tea, etc.  Do NOT count diet or artificially sweetened beverages)?   0  How many days per week do you exercise enough to make your heart beat faster? 7  How many minutes a day do you exercise enough to make your heart beat faster? 20 - 29  How many days per week do you miss taking your medication? 0  {additonal problems for provider to add (Optional):057267}    {ROS Picklists (Optional):911557}      Objective    Vitals - Patient Reported  Systolic (Patient Reported): 120  Diastolic (Patient Reported): 70  Weight (Patient Reported): 83.9 kg (185 lb)  Height (Patient Reported): 180.3 cm (5' 11\")  BMI (Based on Pt Reported Ht/Wt): 25.8  Pain Score: Severe Pain (7)  Pain Loc: Low Back        Physical Exam   General: Alert and no distress //Respiratory: No audible wheeze, cough, or shortness of breath // Psychiatric:  Appropriate affect, tone, and pace of words      {Diagnostic Test Results (Optional):282264}      Phone call duration: *** minutes  Signed Electronically by: Neto Ford MD  {Email feedback regarding this note to [email or web address]   :259343}  "

## 2024-02-01 NOTE — PATIENT INSTRUCTIONS
Referral to Dr. Jarvis Juárez neurosurgery per request    Consider MRI of lumbar spine but has pacemaker so defer decision to him    Uric acid and vitamin B12 level    Trial of vitamin B12 shot

## 2024-02-01 NOTE — PROGRESS NOTES
Victorino is a 89 year old male being evaluated via a billable phone visit, and would like to be contacted via the following  Home number [phone] (home)    ASSESSMENT and PLAN:  1. Chronic bilateral low back pain without sciatica  Okay for second opinion with neurosurgery.  Will probably need updated imaging but hesitant to order MRI pacemaker.  Will let neurosurgery decide  Previous trials of gabapentin and steroids have been ineffective.  Consider uric acid or vitamin B12.  Unable to take anti-inflammatories due to warfarin  - Adult Neurosurgery  Referral; Future  - Vitamin B12; Future  - Uric acid; Future  - cyanocobalamin injection 1,000 mcg    2. Chronic obstructive pulmonary disease, unspecified COPD type (H)  Breathing stable on current inhalers    3. History of deep venous thrombosis  Reviewed indication.  Problem list corrected  - warfarin ANTICOAGULANT (COUMADIN) 4 MG tablet; TAKE ONE-HALF (1/2) TO 1 TABLET DAILY AS DIRECTED, ADJUST DOSE BASED ON INR RESULTS  - warfarin ANTICOAGULANT (COUMADIN) 5 MG tablet; Take 5 mg twice weekly or as directed based on inr results. Uses in conjunction with 4 mg tabs    4. Cardiac pacemaker in situ  Secondary to sick sinus syndrome    5. Heterozygous factor V Leiden mutation (H24)  Chronic anticoagulation  - warfarin ANTICOAGULANT (COUMADIN) 4 MG tablet; TAKE ONE-HALF (1/2) TO 1 TABLET DAILY AS DIRECTED, ADJUST DOSE BASED ON INR RESULTS  - warfarin ANTICOAGULANT (COUMADIN) 5 MG tablet; Take 5 mg twice weekly or as directed based on inr results. Uses in conjunction with 4 mg tabs       Patient Instructions   Referral to Dr. Jarvis Juárez neurosurgery per request    Consider MRI of lumbar spine but has pacemaker so defer decision to him    Uric acid and vitamin B12 level    Trial of vitamin B12 shot            Return in about 6 months (around 8/1/2024) for using a video visit.         CHIEF COMPLAINT:  Chief Complaint   Patient presents with    Office Visit     Bad  back has had many surgeries. Pt says there is nothing left for doctor to do. Pt would like a referral to Dr. Jarvis Juárez. Pt pain scale at 7. Constant pain- can be worse or better, but pain is always there.    Recheck Medication     Pt is taking OTC Tylenol for pain.           2/1/2024    11:55 AM   Additional Questions   Roomed by amos nelson   Accompanied by self         2/1/2024    11:55 AM   Patient Reported Additional Medications   Patient reports taking the following new medications Tylenol       HISTORY OF PRESENT ILLNESS:  Victorino is a 89 year old male contacting the clinic today via phone for request for referral.  He would like to see Dr. Juárez of neurosurgery to see whether anything can be done for his back.  He reports having osteoporotic compression fractures, injections, and surgery but is left with chronic low back pain.  No radiation, change recently, or abnormal bowel or bladder complaints.  Prednisone for other reasons has not helped.  Gabapentin and active.  Unable to take anti-inflammatories due to warfarin.  Tylenol helps very slightly.  Wishes to consider further intervention    Takes warfarin for history of DVT and factor V Leiden    Breathing stable with use of inhalers    History of Present Illness       Back Pain:  He presents for follow up of back pain. Patient's back pain is a chronic problem.  Location of back pain:  Right lower back and left lower back  Description of back pain: gnawing  Back pain spreads: nowhere    Since patient first noticed back pain, pain is: always present, but gets better and worse  Does back pain interfere with his job:  Not applicable       He eats 4 or more servings of fruits and vegetables daily.He consumes 0 sweetened beverage(s) daily.He exercises with enough effort to increase his heart rate 20 to 29 minutes per day.  He exercises with enough effort to increase his heart rate 7 days per week.   He is taking medications regularly.      REVIEW OF  "SYSTEMS:  Otherwise quite healthy    PFSH:  Social History     Social History Narrative    .  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   and has been with SO for 30+ years, has 4 children.  Moved back to [place].        TOBACCO USE:  History   Smoking Status    Never   Smokeless Tobacco    Never       VITALS:  There were no vitals filed for this visit.  There were no vitals taken for this visit. Estimated body mass index is 28.59 kg/m  as calculated from the following:    Height as of 10/16/23: 1.727 m (5' 8\").    Weight as of 10/16/23: 85.3 kg (188 lb).    PHYSICAL EXAM:  (observations via Phone)  Alert and oriented    MEDICATIONS  Current Outpatient Medications   Medication Sig Dispense Refill    albuterol (PROAIR HFA/PROVENTIL HFA/VENTOLIN HFA) 108 (90 Base) MCG/ACT inhaler Inhale 2 puffs into the lungs every 6 hours 18 g 0    aspirin (ASA) 81 MG EC tablet Take 81 mg by mouth daily      blood glucose monitoring (NO BRAND SPECIFIED) meter device kit Use to test blood sugar one times daily or as directed. 1 kit 3    calcium carbonate (OS- MG [place]. [place]) 500 MG tablet Take 500 mg by mouth daily       Cholecalciferol (VITAMIN D3 PO) Take 2,000 Units by mouth daily       fluticasone (FLONASE) 50 MCG/ACT nasal spray Spray 1 spray into both nostrils daily      fluticasone-salmeterol (ADVAIR HFA) 115-21 MCG/ACT inhaler Inhale 2 puffs into the lungs 2 times daily 12 g 3    multivitamin w/minerals (THERA-VIT-M) tablet Take 1 tablet by mouth daily      senna-docusate (SENOKOT-S/PERICOLACE) 8.6-50 MG tablet Take 1-2 tablets by mouth 2 times daily Take while on oral narcotics to prevent or treat constipation. 10 tablet 0    warfarin ANTICOAGULANT (COUMADIN) 4 MG tablet TAKE ONE-HALF (1/2) TO 1 TABLET DAILY AS DIRECTED, ADJUST DOSE BASED ON INR RESULTS      warfarin ANTICOAGULANT (COUMADIN) 5 MG tablet Take 5 mg twice weekly or as directed based on inr " results. Uses in conjunction with 4 mg tabs         Notes summarized:   Labs, x-rays, cardiology, GI tests reviewed: No recent MRI or CT of back.  No B12 level or uric acid  Recent Labs   Lab Test 10/05/23  1049 05/25/23  0929 03/29/23  1127 01/12/23  1105   HGB 13.8 13.0*   < > 15.0   WBC 3.8* 4.5   < > 4.7    143   < > 143   POTASSIUM 5.1 5.4*   < > 5.1   CR 1.02 1.07   < > 1.04   A1C  --   --   --  5.3   TSH 1.35  --   --  1.27    < > = values in this interval not displayed.     Lab Results   Component Value Date    OAIQA05GRC Negative 04/18/2022     Lab Results   Component Value Date    CHOL 182 03/15/2022     New orders:   Orders Placed This Encounter   Procedures    Vitamin B12    Uric acid    Adult Neurosurgery  Referral       Independent review of:    Patient would like to receive their AVS by Irene Ford MD  [facility]    Phone-Visit Details  Type of service:  Phone Visit  Patient has given verbal consent to a Phone visit?  Yes  How would you like to obtain your AVS?  Niranjanhart  Will anyone else be joining your phone visit, giving supplemental history?  Wife occasionally calling out information in the background  Originating location (pt location): Home    Distant Location (provider location):  Off-site    Phone Start Time: 12:28 PM  Phone End time:  12:50 PM  Conversation plus orders: 22 minutes  Dictation time:  3 minutes    The visit lasted a total of 25 minutes

## 2024-02-06 ENCOUNTER — TELEPHONE (OUTPATIENT)
Dept: NEUROSURGERY | Facility: CLINIC | Age: 89
End: 2024-02-06
Payer: COMMERCIAL

## 2024-02-06 NOTE — TELEPHONE ENCOUNTER
Called patient and scheduled with an KARLIE. Per patient he wanted to also keep appt with Dr. Juárez. I advised him that I could leave it for now until after his appointment on 02/19/2024 with sonal and will cancel if not needed.

## 2024-02-06 NOTE — TELEPHONE ENCOUNTER
----- Message from Jarvis Juárez MD sent at 2/6/2024 12:34 PM CST -----  Regarding: RE: Question  I think this would be best evaluated by [facility] first.    There is no appropriate imaging.  ----- Message -----  From: Brent Lopez  Sent: 2/5/2024   3:57 PM CST  To: Jarvis Juárez MD; #  Subject: Question                                         Direct referral to Dr. Juárez. Per referral note patient unable to get MRI due to pacemaker. Ok to keep patient on schedule? Or r/s to an cole?

## 2024-02-12 NOTE — CONFIDENTIAL NOTE
NEUROSURGERY- NEW PREVISIT PLANNING       Record Status/Location     Referring Provider Referral Neto Ford MD    Diagnosis Referral M54.50, G89.29 (ICD-10-CM) - Chronic bilateral low back pain without sciatica   MRI (HEAD, NECK, SPINE) Na    [facility]   [facility] Pacs Pelvis 6/1/23 [facility] [place]   INJECTION [id number]    PHYSICAL THERAPY Na    SURGERY Na

## 2024-02-19 ENCOUNTER — PRE VISIT (OUTPATIENT)
Dept: NEUROSURGERY | Facility: CLINIC | Age: 89
End: 2024-02-19

## 2024-02-19 ENCOUNTER — OFFICE VISIT (OUTPATIENT)
Dept: NEUROSURGERY | Facility: CLINIC | Age: 89
End: 2024-02-19
Payer: COMMERCIAL

## 2024-02-19 VITALS
OXYGEN SATURATION: 94 % | HEIGHT: 68 IN | BODY MASS INDEX: 29.55 KG/M2 | DIASTOLIC BLOOD PRESSURE: 67 MMHG | HEART RATE: 81 BPM | SYSTOLIC BLOOD PRESSURE: 106 MMHG | WEIGHT: 195 LBS

## 2024-02-19 DIAGNOSIS — Z98.890 HISTORY OF VERTEBROPLASTY: ICD-10-CM

## 2024-02-19 DIAGNOSIS — G89.29 CHRONIC MIDLINE LOW BACK PAIN WITHOUT SCIATICA: Primary | ICD-10-CM

## 2024-02-19 DIAGNOSIS — M54.50 CHRONIC MIDLINE LOW BACK PAIN WITHOUT SCIATICA: Primary | ICD-10-CM

## 2024-02-19 PROCEDURE — 99204 OFFICE O/P NEW MOD 45 MIN: CPT | Performed by: NURSE PRACTITIONER

## 2024-02-19 ASSESSMENT — PAIN SCALES - GENERAL: PAINLEVEL: MILD PAIN (3)

## 2024-02-19 NOTE — NURSING NOTE
"Victorino Khan is a 89 year old male who presents for:  Chief Complaint   Patient presents with    Consult     Chronic bilateral low back pain without sciatica. Pain worsens when walking, goes away mostly when laying down.        Initial Vitals:  /67   Pulse 81   Ht 5' 8\" (1.727 m)   Wt 195 lb (88.5 kg)   SpO2 94%   BMI 29.65 kg/m   Estimated body mass index is 29.65 kg/m  as calculated from the following:    Height as of this encounter: 5' 8\" (1.727 m).    Weight as of this encounter: 195 lb (88.5 kg).. Body surface area is 2.06 meters squared. BP completed using cuff size: regular  Mild Pain (3)        Tremayne Romeo   "

## 2024-02-19 NOTE — LETTER
2/19/2024         RE: Victorino Khan  [address] [place] [address]        Dear Colleague,    Thank you for referring your patient, Victorino Khan, to the [facility] NEUROLOG[facility]. Please see a copy of my visit note below.    [facility] Neurosurgery Clinic Visit      CC: low back pain     Primary Care Provider: Yuri Funes    Reason For Visit:   I was asked by Dr. Ford to see this patient in consultation.       HPI: Victorino Khan is a 89 year old male who presents for evaluation of chronic low back pain. Patient has a history of osteoporosis and chronic low back pain for 10 years. He reports previous  L4 and L5 vertebroplasty with [facility] about 4 years ago. Back pain has gradually worsened over the past few years. Today, he describes daily, aching low back pain that limits his walking and daily activity. Denies any leg pain, weakness, numbness, falls, foot drop, saddle anesthesia, or bladder/bowel incontinence. Patient takes tylenol for pain management. He tried PT in the past and continues to work with a .     Patient reports his  pacemaker is MRI compatible.     Patient is scheduled to meet with Dr. Juárez after MRI is completed.       Past Medical History:   Diagnosis Date     Chronic anticoagulation      Gastroesophageal reflux disease without esophagitis      H/O reactive hypoglycemia      Heterozygous factor V Leiden mutation (H24)      Osteoporosis      Personal history of DVT (deep vein thrombosis)      Primary osteoarthritis of right shoulder      Rotator cuff disorder, right      Rotator cuff syndrome, right      Status cardiac pacemaker      Vertebral compression fracture (H)        Past Medical History reviewed with patient during visit.    Past Surgical History:   Procedure Laterality Date     ARTHROPLASTY ANKLE Left 4/21/2022    Procedure: LEFT TOTAL ANKLE REPLACEMENT;  Surgeon: Magan Chatman MD;  Location: [facility] OR     IMPLANT PACEMAKER  Left     For sick sinus syndrome     LAPAROSCOPIC NISSEN FUNDOPLICATION  11/2011    For large paraesophageal hernia     LENGTHEN TENDON ACHILLES Left 4/21/2022    Procedure: TENDON ACHILLELS LENGTHENING, MEDIAL DISPLACEMENT CALCANEAL OSTOTOMY;  Surgeon: Magan Chatman MD;  Location: [facility] OR     TOTAL KNEE ARTHROPLASTY Right 2000     TOTAL KNEE ARTHROPLASTY Left 2010     Past Surgical History reviewed with patient during visit.    Current Outpatient Medications   Medication     albuterol (PROAIR HFA/PROVENTIL HFA/VENTOLIN HFA) 108 (90 Base) MCG/ACT inhaler     aspirin (ASA) 81 MG EC tablet     blood glucose monitoring (NO BRAND SPECIFIED) meter device kit     calcium carbonate (OS- MG [place]. [place]) 500 MG tablet     Cholecalciferol (VITAMIN D3 PO)     fluticasone (FLONASE) 50 MCG/ACT nasal spray     fluticasone-salmeterol (ADVAIR HFA) 115-21 MCG/ACT inhaler     multivitamin w/minerals (THERA-VIT-M) tablet     senna-docusate (SENOKOT-S/PERICOLACE) 8.6-50 MG tablet     warfarin ANTICOAGULANT (COUMADIN) 4 MG tablet     warfarin ANTICOAGULANT (COUMADIN) 5 MG tablet     Current Facility-Administered Medications   Medication     cyanocobalamin injection 1,000 mcg       No Known Allergies    Social History     Socioeconomic History     Marital status: Single     Spouse name: None     Number of children: None     Years of education: None     Highest education level: None   Tobacco Use     Smoking status: Never     Smokeless tobacco: Never   Vaping Use     Vaping Use: Never used   Substance and Sexual Activity     Alcohol use: No     Drug use: Never     Sexual activity: Yes     Partners: Female   Social History Narrative    .  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   and has been with SO for 30+ years, has 4 children.  Moved back to [place].      Social Determinants of Health     Financial Resource Strain: Low Risk  (2/1/2024)    Financial Resource  "Strain      Within the past 12 months, have you or your family members you live with been unable to get utilities (heat, electricity) when it was really needed?: No   Food Insecurity: Low Risk  (2/1/2024)    Food Insecurity      Within the past 12 months, did you worry that your food would run out before you got money to buy more?: No      Within the past 12 months, did the food you bought just not last and you didn t have money to get more?: No   Transportation Needs: Low Risk  (2/1/2024)    Transportation Needs      Within the past 12 months, has lack of transportation kept you from medical appointments, getting your medicines, non-medical meetings or appointments, work, or from getting things that you need?: No   Interpersonal Safety: Low Risk  (10/3/2023)    Interpersonal Safety      Do you feel physically and emotionally safe where you currently live?: Yes      Within the past 12 months, have you been hit, slapped, kicked or otherwise physically hurt by someone?: No      Within the past 12 months, have you been humiliated or emotionally abused in other ways by your partner or ex-partner?: No   Housing Stability: Low Risk  (2/1/2024)    Housing Stability      Do you have housing? : Yes      Are you worried about losing your housing?: No       Family History   Problem Relation Age of Onset     Coronary Artery Disease Early Onset Father      Coronary Artery Disease Early Onset Brother        ROS: 10 point ROS neg other than the symptoms noted above in the HPI.    Vital Signs: /67   Pulse 81   Ht 1.727 m (5' 8\")   Wt 88.5 kg (195 lb)   SpO2 94%   BMI 29.65 kg/m      Neurological Examination:  Awake  Alert  Oriented x 3  Speech clear    Motor exam:  Iliopsoas  (hip flexion)               Right: 5/5  Left:  5/5  Quadriceps  (knee extension)       Right:  5/5  Left:  5/5  Hamstrings  (knee flexion)            Right:  5/5  Left:  5/5  Gastroc Soleus  (PF)                          Right:  5/5  Left:  " 5/5  Tibialis Ant  (DF)                          Right:  5/5  Left:  5/5  EHL                          Right:  5/5  Left:  5/5         Sensation normal to BLE   Reflexes are 2+ in the patellar and Achilles.    Negative clonus bilaterally.     Musculoskeletal:  Gait: Able to stand from a seated position. Normal non-antalgic, non-myelopathic gait.   No tenderness of the lumbar spine or paraspinous muscles.   Negative SI joint tenderness bilaterally.  Negative straight leg raise bilaterally.      Imaging:   No recent imaging has been completed of the lumbar spine     Assessment/Plan:   89 year old male with history of osteoporosis and L4 and L5 vertebroplasty with [facility]. He presents for evaluation of worsening, chronic low back pain that limits his walking and daily activity. No recent imaging has been completed of the lumbar spine. Patient reports his  pacemaker is MRI compatible. Patient is scheduled to meet with Dr. Juárez after MRI is completed.     - Lumbar spine MRI ordered for further evaluation. Radiology team will coordinate with pacemaker rep.   - Follow up with Dr. Juárez as scheduled     Advised patient to call our clinic with any questions or concerns.   Patient voiced understanding and agreement.      Bia Lynch CNP  [facility] Neurosurgery  Tel [phone]  Pager [phone]        Again, thank you for allowing me to participate in the care of your patient.        Sincerely,        Bia Lynch NP

## 2024-02-19 NOTE — PATIENT INSTRUCTIONS
Order for a lumbar spine MRI. Please stop at the  to schedule. Then follow-up with Dr. Juárez in clinic as scheduled.     Please call our clinic if symptoms persist, change, or worsen at any time.

## 2024-02-19 NOTE — PROGRESS NOTES
[facility] Neurosurgery Clinic Visit      CC: low back pain     Primary Care Provider: Yuri Funes    Reason For Visit:   I was asked by Dr. Ford to see this patient in consultation.       HPI: Victorino Khan is a 89 year old male who presents for evaluation of chronic low back pain. Patient has a history of osteoporosis and chronic low back pain for 10 years. He reports previous  L4 and L5 vertebroplasty with [facility] about 4 years ago. Back pain has gradually worsened over the past few years. Today, he describes daily, aching low back pain that limits his walking and daily activity. Denies any leg pain, weakness, numbness, falls, foot drop, saddle anesthesia, or bladder/bowel incontinence. Patient takes tylenol for pain management. He tried PT in the past and continues to work with a .     Patient reports his  pacemaker is MRI compatible.     Patient is scheduled to meet with Dr. Juárez after MRI is completed.       Past Medical History:   Diagnosis Date    Chronic anticoagulation     Gastroesophageal reflux disease without esophagitis     H/O reactive hypoglycemia     Heterozygous factor V Leiden mutation (H24)     Osteoporosis     Personal history of DVT (deep vein thrombosis)     Primary osteoarthritis of right shoulder     Rotator cuff disorder, right     Rotator cuff syndrome, right     Status cardiac pacemaker     Vertebral compression fracture (H)        Past Medical History reviewed with patient during visit.    Past Surgical History:   Procedure Laterality Date    ARTHROPLASTY ANKLE Left 4/21/2022    Procedure: LEFT TOTAL ANKLE REPLACEMENT;  Surgeon: Magan Chatman MD;  Location: [facility]    IMPLANT PACEMAKER Left     For sick sinus syndrome    LAPAROSCOPIC NISSEN FUNDOPLICATION  11/2011    For large paraesophageal hernia    LENGTHEN TENDON ACHILLES Left 4/21/2022    Procedure: TENDON ACHILLELS LENGTHENING, MEDIAL DISPLACEMENT CALCANEAL OSTOTOMY;  Surgeon: Magan Chatman  MD Tim;  Location: [facility] OR    TOTAL KNEE ARTHROPLASTY Right 2000    TOTAL KNEE ARTHROPLASTY Left 2010     Past Surgical History reviewed with patient during visit.    Current Outpatient Medications   Medication    albuterol (PROAIR HFA/PROVENTIL HFA/VENTOLIN HFA) 108 (90 Base) MCG/ACT inhaler    aspirin (ASA) 81 MG EC tablet    blood glucose monitoring (NO BRAND SPECIFIED) meter device kit    calcium carbonate (OS- MG [place]. [place]) 500 MG tablet    Cholecalciferol (VITAMIN D3 PO)    fluticasone (FLONASE) 50 MCG/ACT nasal spray    fluticasone-salmeterol (ADVAIR HFA) 115-21 MCG/ACT inhaler    multivitamin w/minerals (THERA-VIT-M) tablet    senna-docusate (SENOKOT-S/PERICOLACE) 8.6-50 MG tablet    warfarin ANTICOAGULANT (COUMADIN) 4 MG tablet    warfarin ANTICOAGULANT (COUMADIN) 5 MG tablet     Current Facility-Administered Medications   Medication    cyanocobalamin injection 1,000 mcg       No Known Allergies    Social History     Socioeconomic History    Marital status: Single     Spouse name: None    Number of children: None    Years of education: None    Highest education level: None   Tobacco Use    Smoking status: Never    Smokeless tobacco: Never   Vaping Use    Vaping Use: Never used   Substance and Sexual Activity    Alcohol use: No    Drug use: Never    Sexual activity: Yes     Partners: Female   Social History Narrative    .  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   and has been with SO for 30+ years, has 4 children.  Moved back to [place].      Social Determinants of Health     Financial Resource Strain: Low Risk  (2/1/2024)    Financial Resource Strain     Within the past 12 months, have you or your family members you live with been unable to get utilities (heat, electricity) when it was really needed?: No   Food Insecurity: Low Risk  (2/1/2024)    Food Insecurity     Within the past 12 months, did you worry that your food would run out  "before you got money to buy more?: No     Within the past 12 months, did the food you bought just not last and you didn t have money to get more?: No   Transportation Needs: Low Risk  (2/1/2024)    Transportation Needs     Within the past 12 months, has lack of transportation kept you from medical appointments, getting your medicines, non-medical meetings or appointments, work, or from getting things that you need?: No   Interpersonal Safety: Low Risk  (10/3/2023)    Interpersonal Safety     Do you feel physically and emotionally safe where you currently live?: Yes     Within the past 12 months, have you been hit, slapped, kicked or otherwise physically hurt by someone?: No     Within the past 12 months, have you been humiliated or emotionally abused in other ways by your partner or ex-partner?: No   Housing Stability: Low Risk  (2/1/2024)    Housing Stability     Do you have housing? : Yes     Are you worried about losing your housing?: No       Family History   Problem Relation Age of Onset    Coronary Artery Disease Early Onset Father     Coronary Artery Disease Early Onset Brother        ROS: 10 point ROS neg other than the symptoms noted above in the HPI.    Vital Signs: /67   Pulse 81   Ht 1.727 m (5' 8\")   Wt 88.5 kg (195 lb)   SpO2 94%   BMI 29.65 kg/m      Neurological Examination:  Awake  Alert  Oriented x 3  Speech clear    Motor exam:  Iliopsoas  (hip flexion)               Right: 5/5  Left:  5/5  Quadriceps  (knee extension)       Right:  5/5  Left:  5/5  Hamstrings  (knee flexion)            Right:  5/5  Left:  5/5  Gastroc Soleus  (PF)                          Right:  5/5  Left:  5/5  Tibialis Ant  (DF)                          Right:  5/5  Left:  5/5  EHL                          Right:  5/5  Left:  5/5         Sensation normal to BLE   Reflexes are 2+ in the patellar and Achilles.    Negative clonus bilaterally.     Musculoskeletal:  Gait: Able to stand from a seated position. Normal " non-antalgic, non-myelopathic gait.   No tenderness of the lumbar spine or paraspinous muscles.   Negative SI joint tenderness bilaterally.  Negative straight leg raise bilaterally.      Imaging:   No recent imaging has been completed of the lumbar spine     Assessment/Plan:   89 year old male with history of osteoporosis and L4 and L5 vertebroplasty with [facility]. He presents for evaluation of worsening, chronic low back pain that limits his walking and daily activity. No recent imaging has been completed of the lumbar spine. Patient reports his  pacemaker is MRI compatible. Patient is scheduled to meet with Dr. Juárez after MRI is completed.     - Lumbar spine MRI ordered for further evaluation. Radiology team will coordinate with pacemaker rep.   - Follow up with Dr. Juárez as scheduled     Advised patient to call our clinic with any questions or concerns.   Patient voiced understanding and agreement.      Bia Lynch [facility] Neurosurgery  Tel [phone]  Pager [phone]

## 2024-02-20 ENCOUNTER — DOCUMENTATION ONLY (OUTPATIENT)
Dept: CARDIOLOGY | Facility: CLINIC | Age: 89
End: 2024-02-20

## 2024-02-20 ENCOUNTER — ANTICOAGULATION THERAPY VISIT (OUTPATIENT)
Dept: ANTICOAGULATION | Facility: CLINIC | Age: 89
End: 2024-02-20

## 2024-02-20 ENCOUNTER — LAB (OUTPATIENT)
Dept: LAB | Facility: CLINIC | Age: 89
End: 2024-02-20
Payer: COMMERCIAL

## 2024-02-20 DIAGNOSIS — M54.50 CHRONIC BILATERAL LOW BACK PAIN WITHOUT SCIATICA: ICD-10-CM

## 2024-02-20 DIAGNOSIS — D68.51 HETEROZYGOUS FACTOR V LEIDEN MUTATION (H): ICD-10-CM

## 2024-02-20 DIAGNOSIS — G89.29 CHRONIC BILATERAL LOW BACK PAIN WITHOUT SCIATICA: ICD-10-CM

## 2024-02-20 DIAGNOSIS — Z86.718 HISTORY OF DEEP VENOUS THROMBOSIS: Primary | ICD-10-CM

## 2024-02-20 DIAGNOSIS — Z79.01 CHRONIC ANTICOAGULATION: ICD-10-CM

## 2024-02-20 DIAGNOSIS — Z86.718 HISTORY OF DEEP VENOUS THROMBOSIS: ICD-10-CM

## 2024-02-20 LAB — INR BLD: 2.2 (ref 0.9–1.1)

## 2024-02-20 PROCEDURE — 36415 COLL VENOUS BLD VENIPUNCTURE: CPT

## 2024-02-20 PROCEDURE — 36416 COLLJ CAPILLARY BLOOD SPEC: CPT

## 2024-02-20 PROCEDURE — 85610 PROTHROMBIN TIME: CPT

## 2024-02-20 PROCEDURE — 84550 ASSAY OF BLOOD/URIC ACID: CPT

## 2024-02-20 PROCEDURE — 82607 VITAMIN B-12: CPT

## 2024-02-20 NOTE — PROGRESS NOTES
ANTICOAGULATION MANAGEMENT     Victorino Khan 89 year old male is on warfarin with therapeutic INR result. (Goal INR 2.0-3.0)    Recent labs: (last 7 days)     02/20/24  1337   INR 2.2*       ASSESSMENT     Warfarin Lab Questionnaire    Warfarin Doses Last 7 Days      2/20/2024     1:35 PM   Dose in Tablet or Mg   TAB or MG? tablet (tab)     Pt Rptd Dose SUNDAY MONDAY TUESDAY WED THURS FRIDAY SATURDAY 2/20/2024   1:35 PM 4 2 4 2 4 2 4         2/20/2024   Warfarin Lab Questionnaire   Missed doses within past 14 days? No   Changes in diet or alcohol within past 14 days? No   Medication changes since last result? No   Injuries or illness since last result? No   New shortness of breath, severe headaches or sudden changes in vision since last result? No   Abnormal bleeding since last result? No   Upcoming surgery, procedure? No   Best number to call with results? [phone]     Previous result: Therapeutic last 2(+) visits  Additional findings: None       PLAN     Recommended plan for no diet, medication or health factor changes affecting INR     Dosing Instructions: Continue your current warfarin dose with next INR in 4 weeks       Summary  As of 2/20/2024      Full warfarin instructions:  2 mg every Tue, Thu, Sat; 4 mg all other days   Next INR check:  3/19/2024               Detailed voice message left for Eddy with dosing instructions and follow up date.     Contact [phone] to schedule and with any changes, questions or concerns.     Education provided:   Please call back if any changes to your diet, medications or how you've been taking warfarin    Plan made per [facility] anticoagulation protocol    Luís Williamson RN  Anticoagulation Clinic  2/20/2024    _______________________________________________________________________     Anticoagulation Episode Summary       Current INR goal:  2.0-3.0   TTR:  68.8% (1 y)   Target end date:  Indefinite   Send INR reminders to:  [facility]    Indications    History of deep  venous thrombosis [Z86.718]  Heterozygous factor V Leiden mutation (H24) [D68.51]  Chronic anticoagulation [Z79.01]             Comments:               Anticoagulation Care Providers       Provider Role Specialty Phone number    Yuri Funes MD Referring Internal Medicine [phone]

## 2024-02-20 NOTE — PROGRESS NOTES
Received MRI Cardiology Clearance form from [facility] MRI department yesterday. It was filled out by Ida WINTERS.       Form signed today by Dr. Meyer, and faxed back to MRI at [phone].

## 2024-02-21 LAB
URATE SERPL-MCNC: 3.8 MG/DL (ref 3.4–7)
VIT B12 SERPL-MCNC: 843 PG/ML (ref 232–1245)

## 2024-02-22 ENCOUNTER — TELEPHONE (OUTPATIENT)
Dept: NEUROSURGERY | Facility: CLINIC | Age: 89
End: 2024-02-22
Payer: COMMERCIAL

## 2024-02-22 NOTE — TELEPHONE ENCOUNTER
Spoke with patient, he had questions regarding the imaging he has scheduled. He just had a question regarding the need for the XR prior. Was unable to answer that question for patient.     He also asked for a reminder on the time and date of the appt, I provided him that information.

## 2024-02-22 NOTE — TELEPHONE ENCOUNTER
Patient could have been calling to confirm he wanted to keep appt with Dr. Juárez per sonal's recommendation. I will call patient back to discuss.

## 2024-02-22 NOTE — TELEPHONE ENCOUNTER
[facility] Call Center    Phone Message    May a detailed message be left on voicemail: yes     Reason for Call: Other: Patient is returning a missed call but he wasn't sure what it was about. Please call back when available.      Action Taken: Other: Neurosurgery    Travel Screening: Not Applicable

## 2024-02-22 NOTE — TELEPHONE ENCOUNTER
Called patient to discuss below message. Patent reports he did not have questions regarding the need for an XR and meant to follow-up with his other provider.     He will call us back with any further questions or concerns.

## 2024-02-27 ENCOUNTER — TELEPHONE (OUTPATIENT)
Dept: MEDSURG UNIT | Facility: CLINIC | Age: 89
End: 2024-02-27
Payer: COMMERCIAL

## 2024-03-06 ENCOUNTER — TELEPHONE (OUTPATIENT)
Dept: MEDSURG UNIT | Facility: CLINIC | Age: 89
End: 2024-03-06
Payer: COMMERCIAL

## 2024-03-08 ENCOUNTER — HOSPITAL ENCOUNTER (OUTPATIENT)
Facility: CLINIC | Age: 89
Discharge: HOME | End: 2024-03-08
Admitting: PREVENTIVE MEDICINE
Payer: COMMERCIAL

## 2024-03-08 ENCOUNTER — HOSPITAL ENCOUNTER (OUTPATIENT)
Dept: MRI IMAGING | Facility: CLINIC | Age: 89
Discharge: HOME | End: 2024-03-08
Attending: NURSE PRACTITIONER
Payer: COMMERCIAL

## 2024-03-08 ENCOUNTER — HOSPITAL ENCOUNTER (OUTPATIENT)
Dept: GENERAL RADIOLOGY | Facility: CLINIC | Age: 89
Discharge: HOME | End: 2024-03-08
Attending: NURSE PRACTITIONER
Payer: COMMERCIAL

## 2024-03-08 VITALS
RESPIRATION RATE: 16 BRPM | HEART RATE: 69 BPM | DIASTOLIC BLOOD PRESSURE: 63 MMHG | OXYGEN SATURATION: 94 % | SYSTOLIC BLOOD PRESSURE: 111 MMHG

## 2024-03-08 DIAGNOSIS — G89.29 CHRONIC MIDLINE LOW BACK PAIN WITHOUT SCIATICA: ICD-10-CM

## 2024-03-08 DIAGNOSIS — Z95.0 PACEMAKER: ICD-10-CM

## 2024-03-08 DIAGNOSIS — M54.50 CHRONIC MIDLINE LOW BACK PAIN WITHOUT SCIATICA: ICD-10-CM

## 2024-03-08 PROCEDURE — 72148 MRI LUMBAR SPINE W/O DYE: CPT

## 2024-03-08 PROCEDURE — 999N000154 HC STATISTIC RADIOLOGY XRAY, US, CT, MAR, NM

## 2024-03-08 PROCEDURE — 71046 X-RAY EXAM CHEST 2 VIEWS: CPT

## 2024-03-08 ASSESSMENT — ACTIVITIES OF DAILY LIVING (ADL)
ADLS_ACUITY_SCORE: 39
ADLS_ACUITY_SCORE: 39

## 2024-03-08 NOTE — PROGRESS NOTES
1100 Pt here for MRI w/ PPM.      1105 rep (Amari) here from [employer]. PPM placed in Asynchronus mode - rate 70.    1113 MRI initiated. HR & sat documented Q 5 min. See Doc Flowsheet.    1136 MRI completed.  [employer] rep called to return PPM to previous settings.    1140 PPM returned to previous settings.    1150 Pt discharged per ambulatory. All personal belongings taken with pt.

## 2024-03-12 NOTE — PROGRESS NOTES
12/21/23 0500   Appointment Info   Signing clinician's name / credentials Genna Flemingari DPT, PT   Visits Used 13/16   Medical Diagnosis R27.0 (ICD-10-CM) - Ataxia   PT Tx Diagnosis gait abnormality, fall risk, generalized weakness   Precautions/Limitations fall risk   Progress Note/Certification   Start of Care Date 06/15/23   Onset of illness/injury or Date of Surgery 05/25/23   Therapy Frequency 1 x weekly every 1-3 weeks   Predicted Duration 12 weeks   Certification date from 11/11/23   Certification date to 02/03/23   Progress Note Due Date 02/03/23   Progress Note Completed Date 12/08/23   PT Goal 1   Goal Identifier gait speed   Goal Description Patient will be able to ambulate safely with appropriate gait speed 25 feet in 7  seconds.   Rationale to maximize safety and independence within the community   Goal Progress MET. 7 seconds 10 meter, 1.4 m/s   Target Date 09/13/23   Date Met 09/14/23   PT Goal 2   Goal Identifier FGA   Goal Description Patient will improve 6 points on FGA indicating decreased fall risk.   Rationale to maximize safety and independence with performance of ADLs and functional tasks;to maximize safety and independence within the home;to maximize safety and independence within the community   Goal Progress MET  improved from 11/30 to 17/30   Target Date 09/13/23   Date Met 09/14/23   PT Goal 3   Goal Identifier sit <> stand off standard height chair   Goal Description Patient will be able to complete 5 x sit <> stand off standard height chair in 12 seconds or less.   Rationale to maximize safety and independence with performance of ADLs and functional tasks;to maximize safety and independence within the home;to maximize safety and independence within the community   Goal Progress MET 10 seconds   Target Date 09/13/23   Date Met 09/14/23   PT Goal 4   Goal Identifier HEP   Goal Description Patient will be able to complete 6 exercises without assistance for progression to independent  management.   Rationale to maximize safety and independence with performance of ADLs and functional tasks;to maximize safety and independence within the home;to maximize safety and independence within the community   Goal Progress continues to required progression and revision   Target Date 11/10/23   PT Goal 5   Goal Identifier FGA-progression   Goal Description Patient improve FGA by 4 points to 21/30 or more.   Rationale to maximize safety and independence within the home;to maximize safety and independence within the community   Goal Progress nearly MET 20/30   Target Date 11/10/23   Subjective Report   Subjective Report Patient reports he is doing some of his home exercises not all.  Needs to get in a better routine.   Objective Measure 1   Objective Measure Tandem stance : ~15 sec without UE support   Objective Measure 2   Objective Measure 6 min walk test   Details next visit   Therapeutic Procedure/Exercise   Therapeutic Procedures: strength, endurance, ROM, flexibility minutes (76867) 30   Ther Proc 1 NU step   Ther Proc 1 - Details level 5 x 6 min for increased strength and endurance   Ther Proc 2 walking program/recumbant bike   Ther Proc 2 - Details PT dicussed importance in progression of endurance using walking or recumbant bike 2 x 10 min daily   Ther Proc 3 other HEP   Ther Proc 3 - Details supine abdominal deat bug LE 2 x 10 each LE   PTRx Ther Proc 1 Tandem Stance   PTRx Ther Proc 1 - Details x 30 second each foot forward able to demonstrate   PTRx Ther Proc 2 Sidestep   PTRx Ther Proc 2 - Details 2 x 10 feet L <> R   PTRx Ther Proc 3 Walking Backwards   PTRx Ther Proc 3 - Details 2 x 10 feet   PTRx Ther Proc 4 Calf Raises with Support   PTRx Ther Proc 4 - Details verbal review   PTRx Ther Proc 5 Toe Raises with Support   PTRx Ther Proc 5 - Details verbal review   PTRx Ther Proc 6 [place] Stretch   PTRx Ther Proc 6 - Details x 8 each arm   PTRx Ther Proc 7 Cervical Retraction With Patient  Overpressure   PTRx Ther Proc 7 - Details verbal review and demonstration   PTRx Ther Proc 8 Sit to Stand   PTRx Ther Proc 8 - Details verbal review   PTRx Ther Proc 9 Bridging #2A Weight Shift   PTRx Ther Proc 9 - Details x 3 bridges with 1 LE lift each side   PTRx Ther Proc 10 Hip Abduction Straight Leg Raise   PTRx Ther Proc 10 - Details demonstrated with L LE x 5 3 way forward back and IR   PTRx Ther Proc 11 Hip Flexion Straight Leg Raise   PTRx Ther Proc 11 - Details verbal review   PTRx Ther Proc 12 Prone Trunk Extension Position A   PTRx Ther Proc 12 - Details x 5 UE at sides palm down required minimal cue   PTRx Ther Proc 13 Prone Lumbar Extension Exercise #3 (Leg Extension)   PTRx Ther Proc 13 - Details x 10 each LE required verbal cue for glut contraction   PTRx Ther Proc 14 Deadbug - LE Variation   PTRx Ther Proc 14 - Details x 10 each LE needed cue for focus on back flat for core strength   PTRx Ther Proc 15 Standing Gastroc Stretch   PTRx Ther Proc 15 - Details x 30 seconds verbal review   PTRx Ther Proc 16 Standing Hip Flexor Stretch   PTRx Ther Proc 16 - Details x 30 seconds each LE   PTRx Ther Proc 17 Supine Hamstring Stretch   PTRx Ther Proc 17 - Details x 30 seconds each LE   PTRx Ther Proc 18 Diaphragmatic Breathing in Sitting   PTRx Ther Proc 18 - Details verbal review -education regarding recovery and restful breathing along with proper breathing in through nose and out through mouth seated    PTRx Ther Proc 19 Diaphragmatic Breathing in Sitting   PTRx Ther Proc 19 - Details education regarding recovery and restful breathing along with proper breathing in through nose and out through mouth seated and supine practice x 10 min   Skilled Intervention Functional strength, patient education on proper form/technique.   Patient Response/Progress verabalized understanding and stretch   Therapeutic Activity   PTRx Ther Act 1 Education Sheet General   PTRx Ther Act 1 - Details demonstrated without support  added arm swing to today continued to improve   Neuromuscular Re-education   Neuromuscular re-ed of mvmt, balance, coord, kinesthetic sense, posture, proprioception minutes (48264) 15   Neuro Re-ed 1 gait speed & balance   Neuro Re-ed 1 - Details fast walking 70 feet x 4 head turns & 180 degree turns L & R   PTRx Neuro Re-ed 1 Romberg Eyes Open   PTRx Neuro Re-ed 1 - Details  x 30 seconds demonstrated   PTRx Neuro Re-ed 2 Tandem Walking and Balance   PTRx Neuro Re-ed 2 - Details  x 10 feet required verbal cue   Skilled Intervention static and dynamic balance to reduce falls risk and improve function   Patient Response/Progress Tolerated well, challenged with split stance on box   Physical Performance Test/measures   Physical Performance Test/Measurement Details FGA   Skilled Intervention 20/30 improved by 3 points from prior score   Patient Response/Progress improved balance   Progress decreased risk of falls   Plan   Home program PTRx   Plan for next session review new balance exercises, progress with dynamic balance as able   Comments   Comments Pt appropriate for continued skilled PT intervention. Respondeing well to balance HEP and tolerating progression with further narrow SUNNI exercises. Will be following up with docotor to discuss if future appointments needed, otherwise pt demonstrating good motivation and understanding for management of condition with exercises for home.   Total Session Time   Timed Code Treatment Minutes 45   Total Treatment Time (sum of timed and untimed services) 45         DISCHARGE  Reason for Discharge: Patient has met all goals.        Discharge Plan: Patient to continue home program.    Referring Provider:  Yuri Funes

## 2024-03-15 ENCOUNTER — OFFICE VISIT (OUTPATIENT)
Dept: NEUROSURGERY | Facility: CLINIC | Age: 89
End: 2024-03-15
Attending: INTERNAL MEDICINE
Payer: COMMERCIAL

## 2024-03-15 VITALS — SYSTOLIC BLOOD PRESSURE: 114 MMHG | DIASTOLIC BLOOD PRESSURE: 72 MMHG | OXYGEN SATURATION: 91 % | HEART RATE: 68 BPM

## 2024-03-15 DIAGNOSIS — M54.50 CHRONIC BILATERAL LOW BACK PAIN WITHOUT SCIATICA: ICD-10-CM

## 2024-03-15 DIAGNOSIS — G89.29 CHRONIC BILATERAL LOW BACK PAIN WITHOUT SCIATICA: ICD-10-CM

## 2024-03-15 PROCEDURE — 99214 OFFICE O/P EST MOD 30 MIN: CPT | Performed by: NEUROLOGICAL SURGERY

## 2024-03-15 ASSESSMENT — PAIN SCALES - GENERAL: PAINLEVEL: MODERATE PAIN (4)

## 2024-03-15 NOTE — NURSING NOTE
"Victorino Khan is a 89 year old male who presents for:  Chief Complaint   Patient presents with    Follow Up     MRI review, low back pain lvl 4        Initial Vitals:  /72   Pulse 68   SpO2 91%  Estimated body mass index is 29.65 kg/m  as calculated from the following:    Height as of 2/19/24: 5' 8\" (1.727 m).    Weight as of 2/19/24: 195 lb (88.5 kg).. There is no height or weight on file to calculate BSA. BP completed using cuff size: regular  Moderate Pain (4)    Nursing Comments:     Kirsten Skaggs    "

## 2024-03-15 NOTE — PROGRESS NOTES
"It was a pleasure to see Victorino Khan today in Neurosurgery Clinic. He is a 89 year old male who has chronic axial low back pain.    He has a history of previous vertebroplasty's.  He had a recent MRI and is here to discuss his symptoms.    He denies any radicular symptoms.    Vitals:    03/15/24 1002   BP: 114/72   Pulse: 68   SpO2: 91%     Estimated body mass index is 29.65 kg/m  as calculated from the following:    Height as of 2/19/24: 1.727 m (5' 8\").    Weight as of 2/19/24: 88.5 kg (195 lb).  Moderate Pain (4)    Bilateral lower extremity strength is 5 out of 5 in all muscle groups  Reflexes symmetric and normal.  Sensation intact to light touch.    Imaging: Review of his MRIs show multiple compression fractures with degenerative changes and L5-S1 spondylolisthesis which appears chronic.  There are also Modic changes at L5-S1.  Imaging was reviewed the patient shown to the patient clinic today.    Assessment: Chronic axial low back pain.  Lumbar spondylolisthesis.  Osteoporosis with history of lumbar compression fractures.    Plan: I do not think there is surgical intervention that is likely to help this patient given his comorbidities.  I have recommended evaluation by one of our medical spine providers to see if there are nonsurgical therapies that may be beneficial to him.     "

## 2024-03-15 NOTE — PATIENT INSTRUCTIONS
Patient Next Steps:    Referral to medical spine:  [facility] will call you to coordinate your care as prescribed by your provider. If you don't hear from a representative within 2 business days, please call [phone].     Please call us if you have any further questions or concerns.    [facility] Neurosurgery Clinic   Phone: [phone]  Fax: [phone]

## 2024-03-15 NOTE — LETTER
"    3/15/2024         RE: Victorino Khan  [address]  [place] [address]        Dear Colleague,    Thank you for referring your patient, Victorino Khan, to the [facility]. Please see a copy of my visit note below.    It was a pleasure to see Victorino Khan today in Neurosurgery Clinic. He is a 89 year old male who has chronic axial low back pain.    He has a history of previous vertebroplasty's.  He had a recent MRI and is here to discuss his symptoms.    He denies any radicular symptoms.    Vitals:    03/15/24 1002   BP: 114/72   Pulse: 68   SpO2: 91%     Estimated body mass index is 29.65 kg/m  as calculated from the following:    Height as of 2/19/24: 1.727 m (5' 8\").    Weight as of 2/19/24: 88.5 kg (195 lb).  Moderate Pain (4)    Bilateral lower extremity strength is 5 out of 5 in all muscle groups  Reflexes symmetric and normal.  Sensation intact to light touch.    Imaging: Review of his MRIs show multiple compression fractures with degenerative changes and L5-S1 spondylolisthesis which appears chronic.  There are also Modic changes at L5-S1.  Imaging was reviewed the patient shown to the patient clinic today.    Assessment: Chronic axial low back pain.  Lumbar spondylolisthesis.  Osteoporosis with history of lumbar compression fractures.    Plan: I do not think there is surgical intervention that is likely to help this patient given his comorbidities.  I have recommended evaluation by one of our medical spine providers to see if there are nonsurgical therapies that may be beneficial to him.         Again, thank you for allowing me to participate in the care of your patient.        Sincerely,        Jarvis Juárez MD  "

## 2024-03-26 ENCOUNTER — TELEPHONE (OUTPATIENT)
Dept: ANTICOAGULATION | Facility: CLINIC | Age: 89
End: 2024-03-26
Payer: COMMERCIAL

## 2024-03-26 NOTE — TELEPHONE ENCOUNTER
ANTICOAGULATION     Victorino Khan is overdue for an INR check.     Spoke with Eddy and scheduled lab appointment on 4/1    Luís Williamson RN

## 2024-04-01 ENCOUNTER — ANTICOAGULATION THERAPY VISIT (OUTPATIENT)
Dept: ANTICOAGULATION | Facility: CLINIC | Age: 89
End: 2024-04-01

## 2024-04-01 ENCOUNTER — LAB (OUTPATIENT)
Dept: LAB | Facility: CLINIC | Age: 89
End: 2024-04-01
Payer: COMMERCIAL

## 2024-04-01 DIAGNOSIS — Z86.718 HISTORY OF DEEP VENOUS THROMBOSIS: Primary | ICD-10-CM

## 2024-04-01 DIAGNOSIS — Z86.718 HISTORY OF DEEP VENOUS THROMBOSIS: ICD-10-CM

## 2024-04-01 DIAGNOSIS — D68.51 HETEROZYGOUS FACTOR V LEIDEN MUTATION (H): ICD-10-CM

## 2024-04-01 DIAGNOSIS — Z79.01 CHRONIC ANTICOAGULATION: ICD-10-CM

## 2024-04-01 LAB — INR BLD: 2 (ref 0.9–1.1)

## 2024-04-01 PROCEDURE — 36416 COLLJ CAPILLARY BLOOD SPEC: CPT

## 2024-04-01 PROCEDURE — 85610 PROTHROMBIN TIME: CPT

## 2024-04-01 NOTE — PROGRESS NOTES
ANTICOAGULATION MANAGEMENT     Victorino Khan 89 year old male is on warfarin with therapeutic INR result. (Goal INR 2.0-3.0)    Recent labs: (last 7 days)     04/01/24  1121   INR 2.0*       ASSESSMENT     Warfarin Lab Questionnaire    Warfarin Doses Last 7 Days      4/1/2024    11:21 AM   Dose in Tablet or Mg   TAB or MG? tablet (tab)     Pt Rptd Dose SUNDAY MONDAY TUESDAY WED THURS FRIDAY SATURDAY 4/1/2024  11:21 AM 3 3 3 2 2 2 3         4/1/2024   Warfarin Lab Questionnaire   Missed doses within past 14 days? No   Changes in diet or alcohol within past 14 days? No   Medication changes since last result? No   Injuries or illness since last result? No   New shortness of breath, severe headaches or sudden changes in vision since last result? No   Abnormal bleeding since last result? No   Upcoming surgery, procedure? No   Best number to call with results? [phone]     Previous result: Therapeutic last 2(+) visits  Additional findings: None       PLAN     Recommended plan for no diet, medication or health factor changes affecting INR     Dosing Instructions: Continue your current warfarin dose with next INR in 6 weeks       Summary  As of 4/1/2024      Full warfarin instructions:  2 mg every Tue, Thu, Sat; 4 mg all other days   Next INR check:  5/14/2024               Telephone call with Eddy who verbalizes understanding and agrees to plan    Lab visit scheduled    Education provided:   Please call back if any changes to your diet, medications or how you've been taking warfarin    Plan made per [facility] anticoagulation protocol    Luís Williamson RN  Anticoagulation Clinic  4/1/2024    _______________________________________________________________________     Anticoagulation Episode Summary       Current INR goal:  2.0-3.0   TTR:  68.8% (1 y)   Target end date:  Indefinite   Send INR reminders to:  [facility]    Indications    History of deep venous thrombosis [Z86.718]  Heterozygous factor V Leiden mutation (H24)  [D68.51]  Chronic anticoagulation [Z79.01]             Comments:               Anticoagulation Care Providers       Provider Role Specialty Phone number    Yuri Funes MD Referring Internal Medicine [phone]

## 2024-04-08 ENCOUNTER — ANCILLARY PROCEDURE (OUTPATIENT)
Dept: CARDIOLOGY | Facility: CLINIC | Age: 89
End: 2024-04-08
Attending: INTERNAL MEDICINE
Payer: COMMERCIAL

## 2024-04-08 DIAGNOSIS — Z95.0 CARDIAC PACEMAKER IN SITU: ICD-10-CM

## 2024-04-08 DIAGNOSIS — I49.5 SICK SINUS SYNDROME (H): ICD-10-CM

## 2024-04-08 LAB
MDC_IDC_EPISODE_DTM: NORMAL
MDC_IDC_EPISODE_DURATION: 20 S
MDC_IDC_EPISODE_ID: NORMAL
MDC_IDC_EPISODE_TYPE: NORMAL
MDC_IDC_EPISODE_VENDOR_TYPE: NORMAL
MDC_IDC_LEAD_CONNECTION_STATUS: NORMAL
MDC_IDC_LEAD_CONNECTION_STATUS: NORMAL
MDC_IDC_LEAD_IMPLANT_DT: NORMAL
MDC_IDC_LEAD_IMPLANT_DT: NORMAL
MDC_IDC_LEAD_LOCATION: NORMAL
MDC_IDC_LEAD_LOCATION: NORMAL
MDC_IDC_LEAD_LOCATION_DETAIL_1: NORMAL
MDC_IDC_LEAD_LOCATION_DETAIL_1: NORMAL
MDC_IDC_LEAD_MFG: NORMAL
MDC_IDC_LEAD_MFG: NORMAL
MDC_IDC_LEAD_MODEL: NORMAL
MDC_IDC_LEAD_MODEL: NORMAL
MDC_IDC_LEAD_POLARITY_TYPE: NORMAL
MDC_IDC_LEAD_POLARITY_TYPE: NORMAL
MDC_IDC_LEAD_SERIAL: NORMAL
MDC_IDC_LEAD_SERIAL: NORMAL
MDC_IDC_MSMT_BATTERY_DTM: NORMAL
MDC_IDC_MSMT_BATTERY_REMAINING_LONGEVITY: 33 MO
MDC_IDC_MSMT_BATTERY_REMAINING_PERCENTAGE: 29 %
MDC_IDC_MSMT_BATTERY_RRT_TRIGGER: NORMAL
MDC_IDC_MSMT_BATTERY_STATUS: NORMAL
MDC_IDC_MSMT_BATTERY_VOLTAGE: 2.93 V
MDC_IDC_MSMT_LEADCHNL_RA_IMPEDANCE_VALUE: 450 OHM
MDC_IDC_MSMT_LEADCHNL_RA_LEAD_CHANNEL_STATUS: NORMAL
MDC_IDC_MSMT_LEADCHNL_RA_PACING_THRESHOLD_AMPLITUDE: 0.5 V
MDC_IDC_MSMT_LEADCHNL_RA_PACING_THRESHOLD_PULSEWIDTH: 0.5 MS
MDC_IDC_MSMT_LEADCHNL_RA_SENSING_INTR_AMPL: 4.8 MV
MDC_IDC_MSMT_LEADCHNL_RV_IMPEDANCE_VALUE: 390 OHM
MDC_IDC_MSMT_LEADCHNL_RV_LEAD_CHANNEL_STATUS: NORMAL
MDC_IDC_MSMT_LEADCHNL_RV_PACING_THRESHOLD_AMPLITUDE: 1 V
MDC_IDC_MSMT_LEADCHNL_RV_PACING_THRESHOLD_PULSEWIDTH: 0.5 MS
MDC_IDC_MSMT_LEADCHNL_RV_SENSING_INTR_AMPL: 5.8 MV
MDC_IDC_PG_IMPLANT_DTM: NORMAL
MDC_IDC_PG_MFG: NORMAL
MDC_IDC_PG_MODEL: NORMAL
MDC_IDC_PG_SERIAL: NORMAL
MDC_IDC_PG_TYPE: NORMAL
MDC_IDC_SESS_CLINIC_NAME: NORMAL
MDC_IDC_SESS_DTM: NORMAL
MDC_IDC_SESS_REPROGRAMMED: NO
MDC_IDC_SESS_TYPE: NORMAL
MDC_IDC_SET_BRADY_AT_MODE_SWITCH_MODE: NORMAL
MDC_IDC_SET_BRADY_AT_MODE_SWITCH_RATE: 170 {BEATS}/MIN
MDC_IDC_SET_BRADY_LOWRATE: 60 {BEATS}/MIN
MDC_IDC_SET_BRADY_MAX_SENSOR_RATE: 130 {BEATS}/MIN
MDC_IDC_SET_BRADY_MAX_TRACKING_RATE: 120 {BEATS}/MIN
MDC_IDC_SET_BRADY_MODE: NORMAL
MDC_IDC_SET_BRADY_PAV_DELAY_LOW: 250 MS
MDC_IDC_SET_BRADY_SAV_DELAY_LOW: 250 MS
MDC_IDC_SET_LEADCHNL_RA_PACING_AMPLITUDE: 2 V
MDC_IDC_SET_LEADCHNL_RA_PACING_ANODE_ELECTRODE_1: NORMAL
MDC_IDC_SET_LEADCHNL_RA_PACING_ANODE_LOCATION_1: NORMAL
MDC_IDC_SET_LEADCHNL_RA_PACING_CAPTURE_MODE: NORMAL
MDC_IDC_SET_LEADCHNL_RA_PACING_CATHODE_ELECTRODE_1: NORMAL
MDC_IDC_SET_LEADCHNL_RA_PACING_CATHODE_LOCATION_1: NORMAL
MDC_IDC_SET_LEADCHNL_RA_PACING_POLARITY: NORMAL
MDC_IDC_SET_LEADCHNL_RA_PACING_PULSEWIDTH: 0.5 MS
MDC_IDC_SET_LEADCHNL_RA_SENSING_ADAPTATION_MODE: NORMAL
MDC_IDC_SET_LEADCHNL_RA_SENSING_ANODE_ELECTRODE_1: NORMAL
MDC_IDC_SET_LEADCHNL_RA_SENSING_ANODE_LOCATION_1: NORMAL
MDC_IDC_SET_LEADCHNL_RA_SENSING_CATHODE_ELECTRODE_1: NORMAL
MDC_IDC_SET_LEADCHNL_RA_SENSING_CATHODE_LOCATION_1: NORMAL
MDC_IDC_SET_LEADCHNL_RA_SENSING_POLARITY: NORMAL
MDC_IDC_SET_LEADCHNL_RA_SENSING_SENSITIVITY: 0.3 MV
MDC_IDC_SET_LEADCHNL_RV_PACING_AMPLITUDE: 2 V
MDC_IDC_SET_LEADCHNL_RV_PACING_ANODE_ELECTRODE_1: NORMAL
MDC_IDC_SET_LEADCHNL_RV_PACING_ANODE_LOCATION_1: NORMAL
MDC_IDC_SET_LEADCHNL_RV_PACING_CAPTURE_MODE: NORMAL
MDC_IDC_SET_LEADCHNL_RV_PACING_CATHODE_ELECTRODE_1: NORMAL
MDC_IDC_SET_LEADCHNL_RV_PACING_CATHODE_LOCATION_1: NORMAL
MDC_IDC_SET_LEADCHNL_RV_PACING_POLARITY: NORMAL
MDC_IDC_SET_LEADCHNL_RV_PACING_PULSEWIDTH: 0.5 MS
MDC_IDC_SET_LEADCHNL_RV_SENSING_ADAPTATION_MODE: NORMAL
MDC_IDC_SET_LEADCHNL_RV_SENSING_ANODE_ELECTRODE_1: NORMAL
MDC_IDC_SET_LEADCHNL_RV_SENSING_ANODE_LOCATION_1: NORMAL
MDC_IDC_SET_LEADCHNL_RV_SENSING_CATHODE_ELECTRODE_1: NORMAL
MDC_IDC_SET_LEADCHNL_RV_SENSING_CATHODE_LOCATION_1: NORMAL
MDC_IDC_SET_LEADCHNL_RV_SENSING_POLARITY: NORMAL
MDC_IDC_SET_LEADCHNL_RV_SENSING_SENSITIVITY: 0.5 MV
MDC_IDC_STAT_AT_BURDEN_PERCENT: 1 %
MDC_IDC_STAT_AT_DTM_END: NORMAL
MDC_IDC_STAT_AT_DTM_START: NORMAL
MDC_IDC_STAT_AT_MODE_SW_COUNT: 8
MDC_IDC_STAT_AT_MODE_SW_COUNT_PER_DAY: 0
MDC_IDC_STAT_AT_MODE_SW_MAX_DURATION: NORMAL S
MDC_IDC_STAT_AT_MODE_SW_PERCENT_TIME: 1 %
MDC_IDC_STAT_BRADY_AP_VP_PERCENT: 20 %
MDC_IDC_STAT_BRADY_AP_VS_PERCENT: 13 %
MDC_IDC_STAT_BRADY_AS_VP_PERCENT: 13 %
MDC_IDC_STAT_BRADY_AS_VS_PERCENT: 53 %
MDC_IDC_STAT_BRADY_DTM_END: NORMAL
MDC_IDC_STAT_BRADY_DTM_START: NORMAL
MDC_IDC_STAT_BRADY_RA_PERCENT_PACED: 33 %
MDC_IDC_STAT_BRADY_RV_PERCENT_PACED: 34 %
MDC_IDC_STAT_CRT_DTM_END: NORMAL
MDC_IDC_STAT_CRT_DTM_START: NORMAL
MDC_IDC_STAT_HEART_RATE_ATRIAL_MAX: 330 {BEATS}/MIN
MDC_IDC_STAT_HEART_RATE_ATRIAL_MEAN: 80 {BEATS}/MIN
MDC_IDC_STAT_HEART_RATE_ATRIAL_MIN: 40 {BEATS}/MIN
MDC_IDC_STAT_HEART_RATE_DTM_END: NORMAL
MDC_IDC_STAT_HEART_RATE_DTM_START: NORMAL
MDC_IDC_STAT_HEART_RATE_VENTRICULAR_MAX: 240 {BEATS}/MIN
MDC_IDC_STAT_HEART_RATE_VENTRICULAR_MEAN: 79 {BEATS}/MIN
MDC_IDC_STAT_HEART_RATE_VENTRICULAR_MIN: 40 {BEATS}/MIN

## 2024-04-08 PROCEDURE — 93296 REM INTERROG EVL PM/IDS: CPT | Performed by: INTERNAL MEDICINE

## 2024-04-08 PROCEDURE — 93294 REM INTERROG EVL PM/LDLS PM: CPT | Performed by: INTERNAL MEDICINE

## 2024-04-17 ENCOUNTER — TRANSFERRED RECORDS (OUTPATIENT)
Dept: HEALTH INFORMATION MANAGEMENT | Facility: CLINIC | Age: 89
End: 2024-04-17
Payer: COMMERCIAL

## 2024-04-24 ENCOUNTER — OFFICE VISIT (OUTPATIENT)
Dept: PHYSICAL MEDICINE AND REHAB | Facility: CLINIC | Age: 89
End: 2024-04-24
Attending: NEUROLOGICAL SURGERY
Payer: COMMERCIAL

## 2024-04-24 VITALS
DIASTOLIC BLOOD PRESSURE: 85 MMHG | OXYGEN SATURATION: 95 % | HEART RATE: 68 BPM | SYSTOLIC BLOOD PRESSURE: 132 MMHG | RESPIRATION RATE: 16 BRPM

## 2024-04-24 DIAGNOSIS — M47.816 LUMBAR SPONDYLOSIS: ICD-10-CM

## 2024-04-24 DIAGNOSIS — M54.50 CHRONIC BILATERAL LOW BACK PAIN WITHOUT SCIATICA: ICD-10-CM

## 2024-04-24 DIAGNOSIS — M47.816 LUMBAR FACET ARTHROPATHY: ICD-10-CM

## 2024-04-24 DIAGNOSIS — G89.29 CHRONIC BILATERAL LOW BACK PAIN WITHOUT SCIATICA: ICD-10-CM

## 2024-04-24 DIAGNOSIS — M53.3 SACROILIAC JOINT PAIN: Primary | ICD-10-CM

## 2024-04-24 PROCEDURE — 99204 OFFICE O/P NEW MOD 45 MIN: CPT | Performed by: PHYSICAL MEDICINE & REHABILITATION

## 2024-04-24 NOTE — NURSING NOTE
Chief Complaint   Patient presents with    New Patient     Here for consult, confirmed with patient     Shaina Mcgrath

## 2024-04-24 NOTE — PATIENT INSTRUCTIONS
It was a pleasure seeing you today in our [facility]. We discussed the following:    #. [facility] [place] Injection Scheduling    An order for a Sacroiliac joint injection procedure at [facility] has been added today.   You should receive a call to arrange the appointment.   You may also call /[phone] if you do not hear from the schedulers in the next couple of days.    Location:  [facility]   Address: [address] [place] [address] [address], [place] [address]        #. Order provided: Breg Sacroiliac Joint Belt     #.    Medial Branch Blocks and Radiofrequency Ablation (RFA) aka Neurotomy  Back or neck pain may be due to problems with certain nerves near your spine. If so, a medial branch neurotomy can help relieve your pain. Neurotomy destroys a nerve to relieve pain or stop involuntary movements. In some cases, your doctor may give you a nerve block to see how your body will respond to neurotomy. The treatment uses heat, cold, radiofrequency, or chemicals to destroy the nerves near a problem joint. This keeps some pain messages from traveling to your brain, and helps relieve your symptoms.   Medial branch nerves  Each vertebra in your spine has facets (flat surfaces). They touch where the vertebrae fit together. This forms a facet joint. Each facet joint has at least 2 medial branch nerves. They are part of the nerve pathway to and from each facet joint. A facet joint in your back or neck can become inflamed (swollen and irritated). Pain messages may then travel along the nerve pathway from the facet joint to your brain.     Blocking pain messages  Medial branch nerves in each facet joint send and carry messages about back or neck pain. Destroying a few of these nerves can keep certain pain messages from reaching your brain. This can help bring you relief. The relief typically lasts for months to years.   Risks and  complications  Risks and complications are rare, but can include:  Infection  Increased pain, numbness, or weakness  Nerve damage  Bleeding  Failure to relieve pain  Swetha last reviewed this educational content on 4/1/2018 2000-2021 [employer]. All rights reserved. This information is not intended as a substitute for professional medical care. Always follow your healthcare professional's instructions.

## 2024-04-24 NOTE — PROGRESS NOTES
Patient seen at the request of Dr Juárez for an opinion and evaluation of low back pain.      HISTORY OF PRESENT ILLNESS:  Victorino Khan is a 89 year old male who presents with a chief complaint of low back pain.  He is accompanied today by his significant other.    He reports a longstanding history of chronic low back pain which has been worse in the last several months.  He has been seen and evaluated through the neurosurgery clinic and referred to our [facility] for further evaluation management.    Today, the pain is localized to the lumbosacral junction bilaterally (equal on the right and left); he denies any new or significant numbness/tingling; described as throbbing and cramping in nature. Pain is reported as 5/10 at rest today and up to 10/10 at worst in the last week. Symptoms are worse with prolonged positioning especially when seated, as well as, position changes such as getting up from a seated position; and improved when lying down. He does not report pain-related sleep disturbance.     Additionally, he reports working with a  twice a week.  He has relief with a  places a resistance band around the lumbosacral region and pelvis.    Functional limitations: He can walk up to 4 blocks prior to needing to sit or rest due to pain.       PRIOR INJURIES/TREATMENT:   Ice/Heat: +  Physical Therapy:   Had been in long-term physical therapy in 2023 for several months  Works with a  x2 per week.      - Current Pain Medications -   None    - Prior/Trialed Pain Medications -   NSAIDs: Does not take due to systemic oral anticoagulation    Prior Procedures:  No recent injection therapies               Prior Related Surgery:   ~2020 L4 and L5 vertebroplasty ([facility])  Total Knee Arthroplasty (Right, 2000)  Total Knee Arthroplasty (Left, 2010)  Arthroplasty ankle (Left, 4/21/2022)  Right reverse total shoulder arthroplasty (2023)  Other (acupuncture, OMT, CMM, TENS,  DME, etc.):     Specialists Seen - (with most recent, available notes and clinic visits reviewed)   1.  Neurosurgery -Dr. Juárez, Bia Shaffer, CNP  2.  Orthopedic surgery ([place]) - Dr King    IMAGING - reviewed   03/08/24 MRI Lumbar spine w/o   IMPRESSION:  1.  Diffuse multilevel lumbar spondylosis, as above. High-grade neural  foraminal stenosis is noted at L5-S1 secondary to spondylolisthesis  and facet degenerative change. No high-grade canal stenosis at any  level.  2.  Multilevel vertebral body compression fractures and kyphoplasty  changes. Abnormal signal within the opposing endplates at L5-S1  favored to be degenerative fibrovascular signal, however an S1  superior endplate fracture can appear similar on MRI.  3.  Advanced posterior paraspinal muscular fatty infiltration/atrophy.  Finding has been reported to be a source of chronic low back pain.  Consider a dedicated rehabilitation/strength training program as a  component of treatment.     Review Of Systems:  I am responding to those symptoms which are directly relevant to the specific indication for my consultation. I recommend that the patient follow up with their primary or referring provider to pursue any other symptoms which may be of concern.       Medical History:  He  has a past medical history of Chronic anticoagulation, Gastroesophageal reflux disease without esophagitis, H/O reactive hypoglycemia, Heterozygous factor V Leiden mutation (H24), Osteoporosis, Personal history of DVT (deep vein thrombosis), Primary osteoarthritis of right shoulder, Rotator cuff disorder, right, Rotator cuff syndrome, right, Status cardiac pacemaker, and Vertebral compression fracture (H).     He  has a past surgical history that includes Laparoscopic nissen fundoplication (11/2011); Implant pacemaker (Left); Total Knee Arthroplasty (Right, 2000); Total Knee Arthroplasty (Left, 2010); Arthroplasty ankle (Left, 4/21/2022); and Lengthen tendon achilles (Left,  4/21/2022).    Family History  His family history includes Coronary Artery Disease Early Onset in his brother and father.     Social History:  Work: Retired  [facility] situation: [facility].  He  reports that he has never smoked. He has never used smokeless tobacco. He reports that he does not drink alcohol and does not use drugs.        Current Medications:   He has a current medication list which includes the following prescription(s): albuterol, aspirin, blood glucose monitoring, calcium carbonate, cholecalciferol, fluticasone, fluticasone-salmeterol, multivitamin w/minerals, senna-docusate, warfarin anticoagulant, and warfarin anticoagulant, and the following Facility-Administered Medications: cyanocobalamin.     Allergies:   No Known Allergies    PHYSICAL EXAMINATION:  /85 (BP Location: Left arm, Patient Position: Sitting, Cuff Size: Adult Large)   Pulse 68   Resp 16   SpO2 95%    General: Pleasant, straightforward, WDWN individual.  Mental Status: Pleasant, direct, appropriate mood and affect  Resp: breathing is unlabored without audible wheeze  Vascular: No visible cyanosis, no venous stasis changes  Heme: No visible ecchymosis or erythema on extremities  Skin: No notable rash    Neurologic:  Strength: All major muscle groups of the bilateral lower extremities have normal and symmetric muscle strength     Sensation: SILT in lower extremities bilaterally L3-S1     Gait is somewhat unsteady with careful foot placement and plodding    Musculoskeletal:  Lumbar Spine: Mod reduced ROM all planes, tender to palpation lumbosacral junction, facet loading with pain bilaterally, Str Leg Raise neg (LBP only), hip provocative maneuvers negative for typical pain    Bilateral SI joint maneuvers: TTP SIJ (sacral sulcus/PSIS), Alejandra (pt pointing) pos, Jacoby pos, Gaenslen pos, lat pelvic compression pos , posterior spring pos     ASSESSMENT:  Victorino Khan is a pleasant 89 year old male who  presents with:    #. Sacroiliac joint pain  (primary encounter diagnosis)  #. Chronic bilateral low back pain without sciatica  #. Lumbar facet arthropathy  #. Lumbar spondylosis        Complicating co-morbidities include:   #.  Factor V Leiden  #.  Systemic oral anticoagulation + warfarin  #.  Cardiac PPM  #.  Osteoporosis with history of vertebral compression fracture s/p L4, L5 vertebroplasty     PLAN:  -Continue HEP and work with his   -Refer for fluoroscopically guided bilateral sacroiliac joint corticosteroid injections  -The pathway from diagnostic medial branch blocks to radiofrequency denervation was discussed in detail with the patient today.  Patient would like to consider  -DME: Breg Sacroiliac Joint Belt / Trochanteric Brace support   -Can try OTC lidoderm patch (eg salonpas) or Acetaminophen 500 - 1,000mg (1-2 tablets) every 8 hours as needed for pain. Max acetaminophen 3,000mg per day (or 6 of the 500mg tablets).   -RTC following procedure.     Ready to learn, no apparent learning barriers.  Education provided on treatment plan according to patient's preferred learning style.  Patient verbalizes understanding.   __________________________________  Ganesh Arndt MD  Physical Medicine & Rehabilitation        I spent a total of 45 minutes on the day of the visit.   Time spent by me doing chart review, history and exam, documentation and further activities per the note

## 2024-04-24 NOTE — LETTER
4/24/2024       RE: Victorino Khan  [address]  [place] [address]     Dear Colleague,    Thank you for referring your patient, Victorino Khan, to the [facility] [place] at [facility]. Please see a copy of my visit note below.    Patient seen at the request of Dr Juárez for an opinion and evaluation of low back pain.      HISTORY OF PRESENT ILLNESS:  Victorino Khan is a 89 year old male who presents with a chief complaint of low back pain.  He is accompanied today by his significant other.    He reports a longstanding history of chronic low back pain which has been worse in the last several months.  He has been seen and evaluated through the neurosurgery clinic and referred to our [facility] for further evaluation management.    Today, the pain is localized to the lumbosacral junction bilaterally (equal on the right and left); he denies any new or significant numbness/tingling; described as throbbing and cramping in nature. Pain is reported as 5/10 at rest today and up to 10/10 at worst in the last week. Symptoms are worse with prolonged positioning especially when seated, as well as, position changes such as getting up from a seated position; and improved when lying down. He does not report pain-related sleep disturbance.     Additionally, he reports working with a  twice a week.  He has relief with a  places a resistance band around the lumbosacral region and pelvis.    Functional limitations: He can walk up to 4 blocks prior to needing to sit or rest due to pain.       PRIOR INJURIES/TREATMENT:   Ice/Heat: +  Physical Therapy:   Had been in long-term physical therapy in 2023 for several months  Works with a  x2 per week.      - Current Pain Medications -   None    - Prior/Trialed Pain Medications -   NSAIDs: Does not take due to systemic oral anticoagulation    Prior  Procedures:  No recent injection therapies               Prior Related Surgery:   ~2020 L4 and L5 vertebroplasty ([facility])  Total Knee Arthroplasty (Right, 2000)  Total Knee Arthroplasty (Left, 2010)  Arthroplasty ankle (Left, 4/21/2022)  Right reverse total shoulder arthroplasty (2023)  Other (acupuncture, OMT, CMM, TENS, DME, etc.):     Specialists Seen - (with most recent, available notes and clinic visits reviewed)   1.  Neurosurgery -Dr. Juárez, Bia Shaffer, CNP  2.  Orthopedic surgery ([place]) - Dr King    IMAGING - reviewed   03/08/24 MRI Lumbar spine w/o   IMPRESSION:  1.  Diffuse multilevel lumbar spondylosis, as above. High-grade neural  foraminal stenosis is noted at L5-S1 secondary to spondylolisthesis  and facet degenerative change. No high-grade canal stenosis at any  level.  2.  Multilevel vertebral body compression fractures and kyphoplasty  changes. Abnormal signal within the opposing endplates at L5-S1  favored to be degenerative fibrovascular signal, however an S1  superior endplate fracture can appear similar on MRI.  3.  Advanced posterior paraspinal muscular fatty infiltration/atrophy.  Finding has been reported to be a source of chronic low back pain.  Consider a dedicated rehabilitation/strength training program as a  component of treatment.     Review Of Systems:  I am responding to those symptoms which are directly relevant to the specific indication for my consultation. I recommend that the patient follow up with their primary or referring provider to pursue any other symptoms which may be of concern.       Medical History:  He  has a past medical history of Chronic anticoagulation, Gastroesophageal reflux disease without esophagitis, H/O reactive hypoglycemia, Heterozygous factor V Leiden mutation (H24), Osteoporosis, Personal history of DVT (deep vein thrombosis), Primary osteoarthritis of right shoulder, Rotator cuff disorder, right, Rotator cuff syndrome, right, Status  cardiac pacemaker, and Vertebral compression fracture (H).     He  has a past surgical history that includes Laparoscopic nissen fundoplication (11/2011); Implant pacemaker (Left); Total Knee Arthroplasty (Right, 2000); Total Knee Arthroplasty (Left, 2010); Arthroplasty ankle (Left, 4/21/2022); and Lengthen tendon achilles (Left, 4/21/2022).    Family History  His family history includes Coronary Artery Disease Early Onset in his brother and father.     Social History:  Work: Retired  Current living situation: lives in [facility] with [facility].  He  reports that he has never smoked. He has never used smokeless tobacco. He reports that he does not drink alcohol and does not use drugs.        Current Medications:   He has a current medication list which includes the following prescription(s): albuterol, aspirin, blood glucose monitoring, calcium carbonate, cholecalciferol, fluticasone, fluticasone-salmeterol, multivitamin w/minerals, senna-docusate, warfarin anticoagulant, and warfarin anticoagulant, and the following Facility-Administered Medications: cyanocobalamin.     Allergies:   No Known Allergies    PHYSICAL EXAMINATION:  /85 (BP Location: Left arm, Patient Position: Sitting, Cuff Size: Adult Large)   Pulse 68   Resp 16   SpO2 95%    General: Pleasant, straightforward, WDWN individual.  Mental Status: Pleasant, direct, appropriate mood and affect  Resp: breathing is unlabored without audible wheeze  Vascular: No visible cyanosis, no venous stasis changes  Heme: No visible ecchymosis or erythema on extremities  Skin: No notable rash    Neurologic:  Strength: All major muscle groups of the bilateral lower extremities have normal and symmetric muscle strength     Sensation: SILT in lower extremities bilaterally L3-S1     Gait is somewhat unsteady with careful foot placement and plodding    Musculoskeletal:  Lumbar Spine: Mod reduced ROM all planes, tender to palpation lumbosacral junction, facet loading with pain  bilaterally, Str Leg Raise neg (LBP only), hip provocative maneuvers negative for typical pain    Bilateral SI joint maneuvers: TTP SIJ (sacral sulcus/PSIS), Alejandra (pt pointing) pos, Jacoby pos, Gaenslen pos, lat pelvic compression pos , posterior spring pos     ASSESSMENT:  Victorino Khan is a pleasant 89 year old male who presents with:    #. Sacroiliac joint pain  (primary encounter diagnosis)  #. Chronic bilateral low back pain without sciatica  #. Lumbar facet arthropathy  #. Lumbar spondylosis        Complicating co-morbidities include:   #.  Factor V Leiden  #.  Systemic oral anticoagulation + warfarin  #.  Cardiac PPM  #.  Osteoporosis with history of vertebral compression fracture s/p L4, L5 vertebroplasty     PLAN:  -Continue HEP and work with his   -Refer for fluoroscopically guided bilateral sacroiliac joint corticosteroid injections  -The pathway from diagnostic medial branch blocks to radiofrequency denervation was discussed in detail with the patient today.  Patient would like to consider  -DME: Breg Sacroiliac Joint Belt / Trochanteric Brace support   -Can try OTC lidoderm patch (eg salonpas) or Acetaminophen 500 - 1,000mg (1-2 tablets) every 8 hours as needed for pain. Max acetaminophen 3,000mg per day (or 6 of the 500mg tablets).   -RTC following procedure.     Ready to learn, no apparent learning barriers.  Education provided on treatment plan according to patient's preferred learning style.  Patient verbalizes understanding.   __________________________________    I spent a total of 45 minutes on the day of the visit.   Time spent by me doing chart review, history and exam, documentation and further activities per the note          Again, thank you for allowing me to participate in the care of your patient.      Sincerely,    Ganesh Arndt MD

## 2024-04-25 ENCOUNTER — TELEPHONE (OUTPATIENT)
Dept: PHYSICAL MEDICINE AND REHAB | Facility: CLINIC | Age: 89
End: 2024-04-25
Payer: COMMERCIAL

## 2024-04-25 DIAGNOSIS — M53.3 SACROILIAC JOINT PAIN: Primary | ICD-10-CM

## 2024-04-25 NOTE — TELEPHONE ENCOUNTER
"Screening Questions for Radiology Injections:    Injection to be done at which interventional clinic site? [place]    If choosing [facility] for location, please inform patient:  \"[facility] is a Hospital based clinic. Before your visit, you should check with your insurance about how it covers the charges for facility services in a hospital-based clinic.     Procedure ordered by Hair    Procedure ordered? Sacroiliac Joint Injection Bilateral  Transforaminal Cervical VANGIE - Send to [facility]) - No [facility] providers perform this procedure    What insurance would patient like us to bill for this procedure? [employer]  IF SCHEDULING IN [place] PAIN OR SPINE PLEASE SCHEDULE AT LEAST 7-10 BUSINESS DAYS OUT SO A PA CAN BE OBTAINED  Worker's comp or MVA (motor vehicle accident) -Any injection DO NOT SCHEDULE and route to Shabana Taylor.    [employer] insurance - For SI joint injections, DO NOT SCHEDULE and route to Dian Reyes.     ALL BCBS, [employer] and [employer] C[employer] - DO NOT SCHEDULE and route to Dian Reyes  MEDICA- ALL INJECTIONS- route to Dian Reyes    Is patient scheduled at [facility]? yes   If YES, route every encounter to [facility]INE [facility]ENTER CARE NAVIGATION POOL [[phone]]    Is an  needed? No     Patient has a  home? (Review Grid) YES: ok    Any chance of pregnancy? NO   If YES, do NOT schedule and route to RN pool  - Dr. Arndt route to Rhona Ceballos and PM&R Nurse  [[id number]]      Is patient actively being treated for cancer or immunocompromised? No  If YES, do NOT schedule and route to RN pool/ Dr. Arndt's Team    Does the patient have a bleeding or clotting disorder? No   If YES, okay to schedule AND route to SINGH burris / Dr. Arndt's Team   (For any patients with platelet count <100, RN must forward to provider)    Is patient taking any Blood Thinners OR Antiplatelet medication?  Yes - Warfarin   If hold needed, do NOT schedule, route to RN pool/ Dr." Hair's Team  Examples:   Blood Thinners: (Coumadin, Warfarin, Jantoven, Pradaxa, Xarelto, Eliquis, Edoxaban, Enoxaparin, Lovenox, Heparin, Arixtra, Fondaparinux or Fragmin)  Antiplatelet Medications: (Plavix, Brilinta or Effient)     Is patient taking any aspirin products (includes Excedrin and Fiorinal)? No   If yes route to RN pool/ Dr. Burdicks Team - Do not schedule      Any allergies to contrast dye, iodine, shellfish, or numbing and steroid medications? No  If YES, schedule and add allergy information to appointment notes AND route to the RN pool/ Dr. Arndt's Team  If VANGIE and Contrast Dye / Iodine Allergy? DO NOT SCHEDULE, route to RN pool/ Dr. Burdicks Team  Allergies: Patient has no known allergies.     Does patient have an active infection or treated for one within the past week? No  Is patient currently taking any antibiotics or steroid medications?  No   For patients on chronic, preventative, or prophylactic antibiotics, procedures may be scheduled.   For patients on antibiotics for active or recent infection, schedule 4 days after completed.  For patients on steroid medications, schedule 4 days after completed.     Has the patient had a flu shot or any other vaccinations within the past 7 days? No  If yes, explain that for the vaccine to work best they need to:     wait 1 week before and 1 week after getting any Vaccine  wait 1 week before and 2 weeks after getting any Covid Vaccine   If patient has concerns about the timing, send to RN pool/ Dr. Arndt's Team    Does patient have an MRI/CT?  Not Applicable Include Date and Check Procedure Scheduling Grid to see if required.  Was the MRI/CT done within the last 3 years?  NA   If no route to RN Pool/ Dr. Burdicks Team  If yes, where was the MRI/CT done?    Refer to PACS Transmissions list for approved external locations and route to RN Pool High Priority/ Dr. Burdicks Team  If MRI was not done at approved external location do NOT schedule and route to  SINGH kearney/ Dr. Arndt's Team    If patient has an imaging disc, the injection MAY be scheduled but patient must bring disc to appt or appt will be cancelled.    Is patient able to transfer to a procedure table with minimal or no assistance? Yes   If no, do NOT schedule and route to RN Pool/ Dr. Arndt's Team    Procedure Specific Instructions:  If celiac plexus block, informed patient NPO for 6 hours and that it is okay to take medications with sips of water, especially blood pressure medications Not Applicable       If this is for a cervical procedure, informed patient that aspirin needs to be held for 6 days.   Not Applicable    Sedation, If Sedation is ordered for any procedure, patient must be NPO for 6 hours prior to procedure Not Applicable    If IV needed:  Do not schedule procedures requiring IV placement in the first appointment of the day or first appointment after lunch. Do NOT schedule at 0745, 0815 or 1245. ok  Instructed patient to arrive 30 minutes early for IV start if required. (Check Procedure Scheduling Grid)  Not Applicable    Reminders:  If you are started on any steroids or antibiotics between now and your appointment, you must contact us because the procedure may need to be cancelled.  Yes    As a reminder, receiving steroids can decrease your body's ability to fight infection.   Would you still like to move forward with scheduling the injection?  Yes    IV Sedation is not provided for procedures. If oral anti-anxiety medication is needed, the patient should request this from their referring provider.    Instruct patient to arrive as directed prior to the scheduled appointment time:  If IV needed 30 minutes before appointment time     For patients 85 or older we recommend having an adult stay w/ them for the remainder of the day.     If the patient is Diabetic, remind them to bring their glucometer.      Does the patient have any questions?  NO  [facility]

## 2024-04-29 ENCOUNTER — TELEPHONE (OUTPATIENT)
Dept: PHYSICAL MEDICINE AND REHAB | Facility: CLINIC | Age: 89
End: 2024-04-29

## 2024-04-29 NOTE — TELEPHONE ENCOUNTER
Looks like this was scheduled 5/9 in [facility]          Shabana Brandon  Complex   [facility]  [facility]

## 2024-04-29 NOTE — TELEPHONE ENCOUNTER
[facility]24 per schedulers on site. Order placed to have done here.     No need to hold anticoagulation for this type of injection.

## 2024-04-29 NOTE — TELEPHONE ENCOUNTER
Procedure ordered? Yes      What insurance are we billing for this procedure?  [employer]  IF SCHEDULING AT [place] [facility] PLEASE SCHEDULE AT LEAST 7-10 BUSINESS DAYS OUT SO A PA CAN BE OBTAINED    Is a  PAIN  order available to link to injection appointment or does one need to be transcribed?  NO   If needed, route to CN to assist in doing so.    Is  needed?: No   If YES, please initiate in-person  request.    Will patient have a ?  Yes    All fluoro procedures require a .  These US procedures also require a : piriformis, pudendal, scalene, & carpal tunnel.    Is patient taking any blood thinners (i.e. Plavix, coumadin, jantoven, warfarin, heparin, Xarelto, Pradaxa, Eliquis, Brilinta, or Effient, etc)? Yes - cumadin   If YES, schedule out 2 weeks, and route to RN pool to determine if medication hold is needed.    Is patient taking aspirin? Yes - baby low dose   For CERVICAL procedures, route message to Care Navigation so they can seek approval from managing provider to hold aspirin for 6 days prior to the procedure.    Does patient have an allergy to contrast dye, iodine or shellfish?  No  If YES, OK to schedule. Route to RN pool AND add allergy information to appointment notes    Is patient diabetic? No If YES, blood glucose level will be checked on day of procedure and will need to be 300mg/dL or below (for steroid injections).    Does patient have an active infection or treated for one within the past week? No   If YES, do NOT schedule and route to Care Navigation.     Is patient currently taking any antibiotics?  No  For patients on chronic, preventative, or prophylactic antibiotics, procedures may be scheduled.   For patients on antibiotics for active or recent infection: antibiotic course must have been completed and symptom-free of infection to safely proceed with injection.  Send to Care Navigation if unsure.    Is patient actively being treated for  cancer or immunocompromised? No  If YES, do NOT schedule and route to RN pool.     Any chance of pregnancy? Not Applicable   If YES, do NOT schedule and route to RN pool.    Reminders:  -  If you are started on any steroids or antibiotics between now and your appointment, you must contact us because it may affect our ability to perform your procedure.   reviewed    -  Informed patient that it is OK to take normal medications before the procedure and must hold blood thinners as instructed.  reviewed    -  Patients scheduled for MBBs should not take pain meds prior to injection and pain rating needs to be 5/10 or greater the day of the procedure.    -  Informed cervical injection patients that an IV will be placed as a precautionary measure.  not discussed    -  Patients should eat a light meal prior to their procedure appointment.  reviewed and discussed briefly    -  All radiofrequency ablations are in a 40 minute time slot and not to be scheduled until in-basket message has been sent by the procedure team that the PA has been  received.  not discussed     -  Spinal cord stimulator trial scheduling is coordinated by the procedure team.    -  Care Navigation (#[phone]) is available to assist patients with additional questions.  reviewed and discussed briefly    -  Cervical TFESIs with Dr. Stover only.

## 2024-04-29 NOTE — TELEPHONE ENCOUNTER
No PA if scheduled before 5/1/24.    **If scheduled after 5/1/24, please schedule at least 2 weeks out to obtain PA. Please route back with date**        Dian POOLE  Complex   [facility]

## 2024-05-09 ENCOUNTER — ANTICOAGULATION THERAPY VISIT (OUTPATIENT)
Dept: ANTICOAGULATION | Facility: CLINIC | Age: 89
End: 2024-05-09

## 2024-05-09 ENCOUNTER — RADIOLOGY INJECTION OFFICE VISIT (OUTPATIENT)
Dept: PHYSICAL MEDICINE AND REHAB | Facility: CLINIC | Age: 89
End: 2024-05-09
Attending: PHYSICAL MEDICINE & REHABILITATION
Payer: COMMERCIAL

## 2024-05-09 DIAGNOSIS — M53.3 SACROILIAC JOINT PAIN: ICD-10-CM

## 2024-05-09 DIAGNOSIS — Z86.718 HISTORY OF DEEP VENOUS THROMBOSIS: Primary | ICD-10-CM

## 2024-05-09 DIAGNOSIS — Z79.01 LONG TERM CURRENT USE OF ANTICOAGULANT THERAPY: Primary | ICD-10-CM

## 2024-05-09 DIAGNOSIS — D68.51 HETEROZYGOUS FACTOR V LEIDEN MUTATION (H): ICD-10-CM

## 2024-05-09 DIAGNOSIS — Z79.01 CHRONIC ANTICOAGULATION: ICD-10-CM

## 2024-05-09 LAB — INR POINT OF CARE: 3.6 (ref 0.9–1.1)

## 2024-05-09 PROCEDURE — 85610 PROTHROMBIN TIME: CPT | Performed by: PHYSICAL MEDICINE & REHABILITATION

## 2024-05-09 PROCEDURE — 36416 COLLJ CAPILLARY BLOOD SPEC: CPT | Performed by: PHYSICAL MEDICINE & REHABILITATION

## 2024-05-09 NOTE — PROGRESS NOTES
ANTICOAGULATION MANAGEMENT     Victorino Khan 89 year old male is on warfarin with supratherapeutic INR result. (Goal INR 2.0-3.0)    Recent labs: (last 7 days)     05/09/24  0855   INR 3.6*       ASSESSMENT     Source(s): Chart Review and Patient/Caregiver Call     Warfarin doses taken: Warfarin taken as instructed  Diet:  has had diarrhea almost a week after eating too much mexican food at a restaurant. He thinks it is resolving now.  Medication/supplement changes: None noted  New illness, injury, or hospitalization: No  Signs or symptoms of bleeding or clotting: No  Previous result: Therapeutic last 2(+) visits  Additional findings:  spine injection was cancelled today for high inr. We will check next one 2 days prior.  Inr needs to be 3.0 or less       PLAN     Recommended plan for temporary change(s) affecting INR     Dosing Instructions: hold dose then continue your current warfarin dose with next INR in 2 weeks       Summary  As of 5/9/2024      Full warfarin instructions:  5/9: Hold; Otherwise 2 mg every Tue, Thu, Sat; 4 mg all other days   Next INR check:  5/23/2024               Telephone call with Eddy who verbalizes understanding and agrees to plan    Lab visit scheduled    Education provided:   Contact [phone] with any changes, questions or concerns.     Plan made per [facility] anticoagulation protocol    Luís Williamson RN  Anticoagulation Clinic  5/9/2024    _______________________________________________________________________     Anticoagulation Episode Summary       Current INR goal:  2.0-3.0   TTR:  70.9% (1 y)   Target end date:  Indefinite   Send INR reminders to:  [facility]    Indications    History of deep venous thrombosis [Z86.718]  Heterozygous factor V Leiden mutation (H24) [D68.51]  Chronic anticoagulation [Z79.01]             Comments:               Anticoagulation Care Providers       Provider Role Specialty Phone number    Yuri Funes MD Referring Internal Medicine [phone]

## 2024-05-09 NOTE — PROGRESS NOTES
Victorino Khan is here today for a SI  injection.  The patient is not able to have the injection today because his INR was 3.6.  The patient will be rescheduled. Contacted INR nurse to reach out to patient for evaluation.      No charge for today s visit.

## 2024-05-21 ENCOUNTER — ANTICOAGULATION THERAPY VISIT (OUTPATIENT)
Dept: ANTICOAGULATION | Facility: CLINIC | Age: 89
End: 2024-05-21

## 2024-05-21 ENCOUNTER — LAB (OUTPATIENT)
Dept: LAB | Facility: CLINIC | Age: 89
End: 2024-05-21
Payer: COMMERCIAL

## 2024-05-21 ENCOUNTER — TELEPHONE (OUTPATIENT)
Dept: INTERNAL MEDICINE | Facility: CLINIC | Age: 89
End: 2024-05-21

## 2024-05-21 DIAGNOSIS — Z86.718 HISTORY OF DEEP VENOUS THROMBOSIS: Primary | ICD-10-CM

## 2024-05-21 DIAGNOSIS — Z86.718 HISTORY OF DEEP VENOUS THROMBOSIS: ICD-10-CM

## 2024-05-21 DIAGNOSIS — D68.51 HETEROZYGOUS FACTOR V LEIDEN MUTATION (H): ICD-10-CM

## 2024-05-21 DIAGNOSIS — Z79.01 CHRONIC ANTICOAGULATION: ICD-10-CM

## 2024-05-21 LAB — INR BLD: 2.9 (ref 0.9–1.1)

## 2024-05-21 PROCEDURE — 36415 COLL VENOUS BLD VENIPUNCTURE: CPT

## 2024-05-21 PROCEDURE — 85610 PROTHROMBIN TIME: CPT

## 2024-05-21 NOTE — TELEPHONE ENCOUNTER
General Call      Reason for Call:  returned call    What are your questions or concerns:  patient returning a call to INR nurse. He did not understand your message    Date of last appointment with provider: 5-21-24    Could we send this information to you in [employer] or would you prefer to receive a phone call?:   Patient would prefer a phone call   Okay to leave a detailed message?: No at Home number on file [phone] (home)

## 2024-05-23 ENCOUNTER — ANTICOAGULATION THERAPY VISIT (OUTPATIENT)
Dept: ANTICOAGULATION | Facility: CLINIC | Age: 89
End: 2024-05-23

## 2024-05-23 ENCOUNTER — RADIOLOGY INJECTION OFFICE VISIT (OUTPATIENT)
Dept: PHYSICAL MEDICINE AND REHAB | Facility: CLINIC | Age: 89
End: 2024-05-23
Attending: PHYSICAL MEDICINE & REHABILITATION
Payer: COMMERCIAL

## 2024-05-23 VITALS
SYSTOLIC BLOOD PRESSURE: 114 MMHG | OXYGEN SATURATION: 98 % | DIASTOLIC BLOOD PRESSURE: 72 MMHG | TEMPERATURE: 97.7 F | HEART RATE: 62 BPM | RESPIRATION RATE: 16 BRPM

## 2024-05-23 DIAGNOSIS — Z79.01 LONG TERM CURRENT USE OF ANTICOAGULANT THERAPY: ICD-10-CM

## 2024-05-23 DIAGNOSIS — Z86.718 HISTORY OF DEEP VENOUS THROMBOSIS: Primary | ICD-10-CM

## 2024-05-23 DIAGNOSIS — D68.51 HETEROZYGOUS FACTOR V LEIDEN MUTATION (H): ICD-10-CM

## 2024-05-23 DIAGNOSIS — Z79.01 CHRONIC ANTICOAGULATION: ICD-10-CM

## 2024-05-23 DIAGNOSIS — E11.9 DIABETES MELLITUS (H): Primary | ICD-10-CM

## 2024-05-23 LAB
GLUCOSE SERPL-MCNC: 132 MG/DL (ref 70–99)
INR POINT OF CARE: 1.5 (ref 0.9–1.1)

## 2024-05-23 PROCEDURE — 36416 COLLJ CAPILLARY BLOOD SPEC: CPT | Performed by: PHYSICAL MEDICINE & REHABILITATION

## 2024-05-23 PROCEDURE — 85610 PROTHROMBIN TIME: CPT | Performed by: PHYSICAL MEDICINE & REHABILITATION

## 2024-05-23 PROCEDURE — 27096 INJECT SACROILIAC JOINT: CPT | Mod: 50 | Performed by: PHYSICAL MEDICINE & REHABILITATION

## 2024-05-23 PROCEDURE — 2894A PAIN JOINT INJECTION SACROILIAC JOINT BIL: CPT | Mod: XS | Performed by: PHYSICAL MEDICINE & REHABILITATION

## 2024-05-23 PROCEDURE — 82962 GLUCOSE BLOOD TEST: CPT | Performed by: PHYSICAL MEDICINE & REHABILITATION

## 2024-05-23 RX ORDER — TRIAMCINOLONE ACETONIDE 40 MG/ML
INJECTION, SUSPENSION INTRA-ARTICULAR; INTRAMUSCULAR
Status: COMPLETED | OUTPATIENT
Start: 2024-05-23 | End: 2024-05-23

## 2024-05-23 RX ORDER — LIDOCAINE HYDROCHLORIDE 10 MG/ML
INJECTION, SOLUTION EPIDURAL; INFILTRATION; INTRACAUDAL; PERINEURAL
Status: COMPLETED | OUTPATIENT
Start: 2024-05-23 | End: 2024-05-23

## 2024-05-23 RX ADMIN — TRIAMCINOLONE ACETONIDE 80 MG: 40 INJECTION, SUSPENSION INTRA-ARTICULAR; INTRAMUSCULAR at 11:28

## 2024-05-23 RX ADMIN — LIDOCAINE HYDROCHLORIDE 5 ML: 10 INJECTION, SOLUTION EPIDURAL; INFILTRATION; INTRACAUDAL; PERINEURAL at 11:27

## 2024-05-23 ASSESSMENT — PAIN SCALES - GENERAL
PAINLEVEL: SEVERE PAIN (6)
PAINLEVEL: SEVERE PAIN (7)

## 2024-05-23 NOTE — PATIENT INSTRUCTIONS
Please be advised that your blood glucose levels may be increased for the next 5-7 days as a result of the steroid you received with your injection today.  It is recommended that you monitor your blood glucose levels closely over the next week and direct any additional questions regarding your blood glucose management to your primary doctor.    DISCHARGE INSTRUCTIONS    During office hours (8:00 a.m.- 4:00 p.m.) questions or concerns may be answered  by calling [facility] Navigation Nurses at  [phone].  Messages received after hours will be returned the following business day.      In the case of an emergency, please dial 911 or seek assistance at the nearest Emergency Room/Urgent Care facility.     All Patients:    You may experience an increase in your symptoms for the first 2 days (It may take anywhere between 2 days- 2 weeks for the steroid to have maximum effect).    You may use ice on the injection site, as frequently as 20 minutes each hour if needed.    You may take your pain medicine.    You may continue taking your regular medication after your injection. If you have had a Medial Branch Block you may resume pain medication once your pain diary is completed.    You may shower. No swimming, tub bath or hot tub for 48 hours.  You may remove your bandaid/bandage as soon as you are home.    You may resume light activities, as tolerated.    Resume your usual diet as tolerated.    It is strongly advised that you do not drive for 1-3 hours post injection.    If you have had oral sedation:  Do not drive for 8 hours post injection.      If you have had IV sedation:  Do not drive for 24 hours post injection.  Do not operate hazardous machinery or make important personal/business decisions for 24 hours.      POSSIBLE STEROID SIDE EFFECTS (If steroid/cortisone was used for your procedure)    -If you experience these symptoms, it should only last for a short period    Swelling of the legs              Skin  redness (flushing)     Mouth (oral) irritation   Blood sugar (glucose) levels            Sweats                    Mood changes  Headache  Sleeplessness  Weakened immune system for up to 14 days, which could increase the risk of jack the COVID-19 virus and/or experiencing more severe symptoms of the disease, if exposed.  Decreased effectiveness of the flu vaccine if given within 2 weeks of the steroid.         POSSIBLE PROCEDURE SIDE EFFECTS  -Call the Spine Center if you are concerned  Increased Pain           Increased numbness/tingling      Nausea/Vomiting          Bruising/bleeding at site      Redness or swelling                                              Difficulty walking      Weakness           Fever greater than 100.5    *In the event of a severe headache after an epidural steroid injection that is relieved by lying down, please call the [facility] to speak with a clinical staff member*

## 2024-05-23 NOTE — PROGRESS NOTES
ANTICOAGULATION MANAGEMENT     Victorino Khan 89 year old male is on warfarin with subtherapeutic INR result. (Goal INR 2.0-3.0)    Recent labs: (last 7 days)     05/23/24  1108   INR 1.5*       ASSESSMENT     Warfarin Lab Questionnaire    Warfarin Doses Last 7 Days      5/21/2024    10:58 AM   Dose in Tablet or Mg   TAB or MG? milligram (mg)     Pt Rptd Dose SUNDAY MONDAY TUESDAY WED THURS FRIDAY SATURDAY 5/21/2024  10:58 AM 4 2 4 2 4 2 4         5/21/2024   Warfarin Lab Questionnaire   Missed doses within past 14 days? No   Changes in diet or alcohol within past 14 days? No   Medication changes since last result? No   Injuries or illness since last result? No   New shortness of breath, severe headaches or sudden changes in vision since last result? No   Abnormal bleeding since last result? No   Upcoming surgery, procedure? No   Best number to call with results? [phone]     Previous result: Therapeutic last visit; previously outside of goal range  Additional findings:  did have an spine injection today for which he held 2 warfarin doses. Plans to resume tonight       PLAN     Recommended plan for temporary change(s) affecting INR     Dosing Instructions: booster dose then continue your current warfarin dose with next INR in 2 weeks as he will be gone next week    Summary  As of 5/23/2024      Full warfarin instructions:  5/23: 4 mg; Otherwise 2 mg every Tue, Thu, Sat; 4 mg all other days   Next INR check:  6/6/2024               Telephone call with Eddy who verbalizes understanding and agrees to plan    Lab visit scheduled    Education provided:   Please call back if any changes to your diet, medications or how you've been taking warfarin    Plan made per [facility] anticoagulation protocol    Luís Williamson RN  Anticoagulation Clinic  5/23/2024    _______________________________________________________________________     Anticoagulation Episode Summary       Current INR goal:  2.0-3.0   TTR:  67.8% (1 y)   Target end  date:  Indefinite   Send INR reminders to:  [facility]    Indications    History of deep venous thrombosis [Z86.718]  Heterozygous factor V Leiden mutation (H24) [D68.51]  Chronic anticoagulation [Z79.01]             Comments:               Anticoagulation Care Providers       Provider Role Specialty Phone number    Yuri Funes MD Referring Internal Medicine [phone]

## 2024-06-07 ENCOUNTER — ANTICOAGULATION THERAPY VISIT (OUTPATIENT)
Dept: ANTICOAGULATION | Facility: CLINIC | Age: 89
End: 2024-06-07

## 2024-06-07 ENCOUNTER — LAB (OUTPATIENT)
Dept: LAB | Facility: CLINIC | Age: 89
End: 2024-06-07
Payer: COMMERCIAL

## 2024-06-07 DIAGNOSIS — D68.51 HETEROZYGOUS FACTOR V LEIDEN MUTATION (H): ICD-10-CM

## 2024-06-07 DIAGNOSIS — Z86.718 HISTORY OF DEEP VENOUS THROMBOSIS: ICD-10-CM

## 2024-06-07 DIAGNOSIS — Z79.01 CHRONIC ANTICOAGULATION: ICD-10-CM

## 2024-06-07 DIAGNOSIS — Z86.718 HISTORY OF DEEP VENOUS THROMBOSIS: Primary | ICD-10-CM

## 2024-06-07 LAB — INR BLD: 1.9 (ref 0.9–1.1)

## 2024-06-07 PROCEDURE — 85610 PROTHROMBIN TIME: CPT

## 2024-06-07 PROCEDURE — 36415 COLL VENOUS BLD VENIPUNCTURE: CPT

## 2024-06-07 NOTE — PROGRESS NOTES
ANTICOAGULATION MANAGEMENT     Victorino Khan 89 year old male is on warfarin with subtherapeutic INR result. (Goal INR 2.0-3.0)    Recent labs: (last 7 days)     06/07/24  1347   INR 1.9*       ASSESSMENT     Warfarin Lab Questionnaire    Warfarin Doses Last 7 Days      6/7/2024     1:44 PM   Dose in Tablet or Mg   TAB or MG? milligram (mg)     Pt Rptd Dose SUNDAY MONDAY TUESDAY WED THURS FRIDAY SATURDAY 6/7/2024   1:44 PM 4 2 4 2 4 2 4         6/7/2024   Warfarin Lab Questionnaire   Missed doses within past 14 days? No   Changes in diet or alcohol within past 14 days? No   Medication changes since last result? No   Injuries or illness since last result? No   New shortness of breath, severe headaches or sudden changes in vision since last result? No   Abnormal bleeding since last result? No   Upcoming surgery, procedure? No     Previous result: Subtherapeutic  Additional findings:  Eddy has 4 mg and 5 mg tabs. He would like to take 1/2 of a 5 tomorrow and this sounds worth a try       PLAN     Recommended plan for no diet, medication or health factor changes affecting INR     Dosing Instructions: Increase your warfarin dose (2.3% change) with next INR in 2 weeks       Summary  As of 6/7/2024      Full warfarin instructions:  2 mg every Tue, Thu; 2.5 mg every Sat; 4 mg all other days   Next INR check:  6/21/2024               Telephone call with Eddy who verbalizes understanding and agrees to plan    Patient offered & declined to schedule next visit    Education provided:   Please call back if any changes to your diet, medications or how you've been taking warfarin    Plan made per [facility] anticoagulation protocol    Luís Williamson RN  Anticoagulation Clinic  6/7/2024    _______________________________________________________________________     Anticoagulation Episode Summary       Current INR goal:  2.0-3.0   TTR:  67.4% (1 y)   Target end date:  Indefinite   Send INR reminders to:  [facility]    Indications     History of deep venous thrombosis [Z86.718]  Heterozygous factor V Leiden mutation (H24) [D68.51]  Chronic anticoagulation [Z79.01]             Comments:               Anticoagulation Care Providers       Provider Role Specialty Phone number    Yuri Funes MD Referring Internal Medicine [phone]

## 2024-06-09 ENCOUNTER — HEALTH MAINTENANCE LETTER (OUTPATIENT)
Age: 89
End: 2024-06-09

## 2024-06-17 ENCOUNTER — OFFICE VISIT (OUTPATIENT)
Dept: PHYSICAL MEDICINE AND REHAB | Facility: CLINIC | Age: 89
End: 2024-06-17
Payer: COMMERCIAL

## 2024-06-17 VITALS
RESPIRATION RATE: 16 BRPM | SYSTOLIC BLOOD PRESSURE: 129 MMHG | HEART RATE: 99 BPM | OXYGEN SATURATION: 93 % | DIASTOLIC BLOOD PRESSURE: 79 MMHG

## 2024-06-17 DIAGNOSIS — M47.816 LUMBAR SPONDYLOSIS: ICD-10-CM

## 2024-06-17 DIAGNOSIS — M47.816 LUMBAR FACET ARTHROPATHY: ICD-10-CM

## 2024-06-17 DIAGNOSIS — M54.50 CHRONIC BILATERAL LOW BACK PAIN WITHOUT SCIATICA: Primary | ICD-10-CM

## 2024-06-17 DIAGNOSIS — M51.369 DDD (DEGENERATIVE DISC DISEASE), LUMBAR: ICD-10-CM

## 2024-06-17 DIAGNOSIS — G89.29 CHRONIC BILATERAL LOW BACK PAIN WITHOUT SCIATICA: Primary | ICD-10-CM

## 2024-06-17 PROCEDURE — 99215 OFFICE O/P EST HI 40 MIN: CPT | Performed by: PHYSICAL MEDICINE & REHABILITATION

## 2024-06-17 RX ORDER — ACETAMINOPHEN 500 MG
500-1000 TABLET ORAL EVERY 6 HOURS PRN
COMMUNITY

## 2024-06-17 RX ORDER — TRAMADOL HYDROCHLORIDE 50 MG/1
50 TABLET ORAL EVERY 6 HOURS PRN
Qty: 10 TABLET | Refills: 0 | Status: SHIPPED | OUTPATIENT
Start: 2024-06-17 | End: 2024-06-20

## 2024-06-17 NOTE — PATIENT INSTRUCTIONS
It was a pleasure seeing you today in our [facility]. We discussed the following:    #. [facility] Injection Scheduling    An order for a Bilateral Lumbar Medial Branch Block #1 procedure at [facility] has been added today.   You should receive a call to arrange the appointment.   You may also call /[phone] if you do not hear from the schedulers in the next couple of days.    Location:  [facility]   Address: [address] [place] [address] [address], [place] [address]        #. Tramadol 50mg as needed for severe pain. Note: this could impact your INR level if you are taking it more routinely. Please continue to work with your anti-coagulation clinic.       Follow up for procedure.

## 2024-06-17 NOTE — PROGRESS NOTES
PM&R Follow-Up Visit -     Date of Initial Visit: 04/24/24  LOV: 05/23/2024  TD: 6/17/2024     Recall: Victorino Khan is a 89 year old male with history of factor V Leiden on warfarin, CHF s/p +PPM, paroxysmal A-fib, h/o bilateral TKA, right TSA, left ankle arthroplasty, and multiple lumbar compression fractures s/p vertebroplasty who follows up for chronic axial lumbosacral pain    INTERVAL HISTORY:  Patient was seen for his initial evaluation April 24, 2024.  He is accompanied today by his significant other.  At that visit, the plan was to refer him for fluoroscopically guided bilateral sacroiliac joint corticosteroid injections which she completed April 23, 2024.  Unfortunately, he denies any significant diagnostic or therapeutic benefit. He was also prescribed an SI joint belt which he does find to provide some benefit.    Since last time, his symptoms are stable and unchanged.  He continues his home exercise program and works with a  several times per month.  He continues to localize pain to the lumbosacral region and near the level of the bilateral facets and SI joints.  Symptoms are worse with standing and walking more than 3-4 blocks; relieved with relative rest, sitting or slight forward flexion    He is not interested in use of any assistive device, and he has hiking poles at home which he does not use.     RECALL HPI Patient seen at the request of Dr Juárez for an opinion and evaluation of low back pain.      HISTORY OF PRESENT ILLNESS:  Victorino Khan is a 89 year old male who presents with a chief complaint of low back pain.  He is accompanied today by his significant other.    He reports a longstanding history of chronic low back pain which has been worse in the last several months.  He has been seen and evaluated through the neurosurgery clinic and referred to [facility] for further evaluation management.    Today, the pain is localized to the lumbosacral junction  bilaterally (equal on the right and left); he denies any new or significant numbness/tingling; described as throbbing and cramping in nature. Pain is reported as 5/10 at rest today and up to 10/10 at worst in the last week. Symptoms are worse with prolonged positioning especially when seated, as well as, position changes such as getting up from a seated position; and improved when lying down. He does not report pain-related sleep disturbance.     Additionally, he reports working with a  twice a week.  He has relief with a  places a resistance band around the lumbosacral region and pelvis.    Functional limitations: He can walk up to 4 blocks prior to needing to sit or rest due to pain.       PRIOR INJURIES/TREATMENT:   Ice/Heat: +  Brace: Breg Sacroiliac Joint Belt / Trochanteric Brace support - provides some benefit   Physical Therapy:   Had been in long-term physical therapy in 2023 for several months  Works with a  x2 per week.      - Current Pain Medications -   None    - Prior/Trialed Pain Medications -   NSAIDs: Does not take due to systemic oral anticoagulation  AED: Gabapentin - did not think it helped  Opioids: Tylenol 3 has been helpful in the past.  Uses very rarely    Prior Procedures:  Date    Procedure   Improvement (%)  05/23/2024 B SIJ   No sig        Prior Related Surgery:   ~2020 L4 and L5 vertebroplasty ([facility])  Total Knee Arthroplasty (Right, 2000)  Total Knee Arthroplasty (Left, 2010)  Arthroplasty ankle (Left, 4/21/2022)  Right reverse total shoulder arthroplasty (2023)  Other (acupuncture, OMT, CMM, TENS, DME, etc.):     Specialists Seen - (with most recent, available notes and clinic visits reviewed)   1.  Neurosurgery -Dr. Juárez, Bia Shaffer, CNP  2.  Orthopedic surgery ([facility]) - Dr King    IMAGING - reviewed   03/08/24 MRI Lumbar spine w/o   IMPRESSION:  1.  Diffuse multilevel lumbar spondylosis, as above. High-grade neural  foraminal stenosis  is noted at L5-S1 secondary to spondylolisthesis  and facet degenerative change. No high-grade canal stenosis at any  level.  2.  Multilevel vertebral body compression fractures and kyphoplasty  changes. Abnormal signal within the opposing endplates at L5-S1  favored to be degenerative fibrovascular signal, however an S1  superior endplate fracture can appear similar on MRI.  3.  Advanced posterior paraspinal muscular fatty infiltration/atrophy.  Finding has been reported to be a source of chronic low back pain.  Consider a dedicated rehabilitation/strength training program as a  component of treatment.        Medical History:  He  has a past medical history of Chronic anticoagulation, Gastroesophageal reflux disease without esophagitis, H/O reactive hypoglycemia, Heterozygous factor V Leiden mutation (H24), Osteoporosis, Personal history of DVT (deep vein thrombosis), Primary osteoarthritis of right shoulder, Rotator cuff disorder, right, Rotator cuff syndrome, right, Status cardiac pacemaker, and Vertebral compression fracture (H).     He  has a past surgical history that includes Laparoscopic nissen fundoplication (11/2011); Implant pacemaker (Left); Total Knee Arthroplasty (Right, 2000); Total Knee Arthroplasty (Left, 2010); Arthroplasty ankle (Left, 4/21/2022); and Lengthen tendon achilles (Left, 4/21/2022).       Family History  His family history includes Coronary Artery Disease Early Onset in his brother and father.     Social History:  Work: Retired  Current living situation: lives in [facility] with [facility] [place] [place].  He  reports that he has never smoked. He has never used smokeless tobacco. He reports that he does not drink alcohol and does not use drugs.        Current Medications:   He has a current medication list which includes the following prescription(s): albuterol, aspirin, blood glucose monitoring, calcium carbonate, cholecalciferol, fluticasone, fluticasone-salmeterol, multivitamin w/minerals, senna-docusate,  warfarin anticoagulant, and warfarin anticoagulant, and the following Facility-Administered Medications: cyanocobalamin.     Allergies:   No Known Allergies    PHYSICAL EXAMINATION:  /85 (BP Location: Left arm, Patient Position: Sitting, Cuff Size: Adult Large)   Pulse 68   Resp 16   SpO2 95%    General: Pleasant, straightforward, WDWN individual.  Mental Status: Pleasant, direct, appropriate mood and affect  Resp: breathing is unlabored without audible wheeze  Vascular: No visible cyanosis, no venous stasis changes  Heme: No visible ecchymosis or erythema on extremities  Skin: No notable rash    Neurologic:  Strength: All major muscle groups of the bilateral lower extremities have normal and symmetric muscle strength     Gait is somewhat unsteady with careful foot placement and plodding    Musculoskeletal:  Lumbar Spine: Mod reduced ROM all planes, tender to palpation lumbosacral junction, facet loading with pain bilaterally, Str Leg Raise neg (LBP only), hip provocative maneuvers negative for typical pain    Bilateral SI joint maneuvers: TTP SIJ (sacral sulcus/PSIS), Alejandra (pt pointing) pos    ASSESSMENT:  Victorino Khan is a pleasant 89 year old male who presents with:    Lumbar spondylosis  #. Chronic bilateral low back pain without sciatica     #. Lumbar facet arthropathy  #. DDD (degenerative disc disease), lumbar   #. Sacroiliac joint pain with concordant symptoms despite lack of response to SI joint corticosteroid injections.  He has had some benefit with SI belt  #. Chronic bilateral low back pain without sciatica  #. Lumbar facet arthropathy  #. Lumbar spondylosis        Complicating co-morbidities include:   #.  Factor V Leiden  #.  Systemic oral anticoagulation + warfarin  #.  Cardiac PPM  #.  Osteoporosis with history of multiple vertebral compression fracture s/p vertebroplasty     PLAN:  -Refer for Bilateral L4-5, L5-S1 medial branch block #1  The pathway from diagnostic medial branch blocks  to radiofrequency denervation was discussed in detail with the patient today.  Risks and benefits were discussed and all questions were answered.  The patient states understanding and wishes to proceed.  There are no contraindications.  The patient understands they will have 2 diagnostic medial branch blocks; and after each block they will be required to keep a pain diary recording their pain scores approximately every hour until the pain has returned to baseline.  During the time after the block, the patient was instructed to perform activities which typically aggravates their pain.  The patient will contact the medical spine staff after each diagnostic block to assess the amount of benefit the patient received.  If the patient has a significantly positive response to each of these diagnostic blocks, they may move forward with the radiofrequency ablation procedure.   -Rx: Tramadol 50mg prn for severe pain #10 R-0  -Continue Breg Sacroiliac Joint Belt / Trochanteric Brace support   -Follow up for procedure    Ready to learn, no apparent learning barriers.  Education provided on treatment plan according to patient's preferred learning style.  Patient verbalizes understanding.   __________________________________  Ganesh Arndt MD  Physical Medicine & Rehabilitation        I spent a total of 44 minutes on the day of the visit.   Time spent by me doing chart review, history and exam, documentation and further activities per the note

## 2024-06-17 NOTE — LETTER
6/17/2024       RE: Victorino Khan  [address]  [place] [address]     Dear Colleague,    Thank you for referring your patient, Victorino Khan, to the [facility] [place] at [facility]. Please see a copy of my visit note below.    PM&R Follow-Up Visit -     Date of Initial Visit: 04/24/24  LOV: 05/23/2024  TD: 6/17/2024     Recall: Victorino Khan is a 89 year old male with history of factor V Leiden on warfarin, CHF s/p +PPM, paroxysmal A-fib, h/o bilateral TKA, right TSA, left ankle arthroplasty, and multiple lumbar compression fractures s/p vertebroplasty who follows up for chronic axial lumbosacral pain    INTERVAL HISTORY:  Patient was seen for his initial evaluation April 24, 2024.  He is accompanied today by his significant other.  At that visit, the plan was to refer him for fluoroscopically guided bilateral sacroiliac joint corticosteroid injections which she completed April 23, 2024.  Unfortunately, he denies any significant diagnostic or therapeutic benefit. He was also prescribed an SI joint belt which he does find to provide some benefit.    Since last time, his symptoms are stable and unchanged.  He continues his home exercise program and works with a  several times per month.  He continues to localize pain to the lumbosacral region and near the level of the bilateral facets and SI joints.  Symptoms are worse with standing and walking more than 3-4 blocks; relieved with relative rest, sitting or slight forward flexion    He is not interested in use of any assistive device, and he has hiking poles at home which he does not use.     RECALL HPI Patient seen at the request of Dr Juárez for an opinion and evaluation of low back pain.      HISTORY OF PRESENT ILLNESS:  Victorino Khan is a 89 year old male who presents with a chief complaint of low back pain.  He is accompanied today by his  significant other.    He reports a longstanding history of chronic low back pain which has been worse in the last several months.  He has been seen and evaluated through the neurosurgery clinic and referred to our [facility] for further evaluation management.    Today, the pain is localized to the lumbosacral junction bilaterally (equal on the right and left); he denies any new or significant numbness/tingling; described as throbbing and cramping in nature. Pain is reported as 5/10 at rest today and up to 10/10 at worst in the last week. Symptoms are worse with prolonged positioning especially when seated, as well as, position changes such as getting up from a seated position; and improved when lying down. He does not report pain-related sleep disturbance.     Additionally, he reports working with a  twice a week.  He has relief with a  places a resistance band around the lumbosacral region and pelvis.    Functional limitations: He can walk up to 4 blocks prior to needing to sit or rest due to pain.       PRIOR INJURIES/TREATMENT:   Ice/Heat: +  Brace: Breg Sacroiliac Joint Belt / Trochanteric Brace support - provides some benefit   Physical Therapy:   Had been in long-term physical therapy in 2023 for several months  Works with a  x2 per week.      - Current Pain Medications -   None    - Prior/Trialed Pain Medications -   NSAIDs: Does not take due to systemic oral anticoagulation  AED: Gabapentin - did not think it helped  Opioids: Tylenol 3 has been helpful in the past.  Uses very rarely    Prior Procedures:  Date    Procedure   Improvement (%)  05/23/2024 B SIJ   No sig        Prior Related Surgery:   ~2020 L4 and L5 vertebroplasty ([facility])  Total Knee Arthroplasty (Right, 2000)  Total Knee Arthroplasty (Left, 2010)  Arthroplasty ankle (Left, 4/21/2022)  Right reverse total shoulder arthroplasty (2023)  Other (acupuncture, OMT, CMM, TENS, DME, etc.):      Specialists Seen - (with most recent, available notes and clinic visits reviewed)   1.  Neurosurgery -Dr. Juárez, Bia Shaffer, CNP  2.  Orthopedic surgery ([facility]) - Dr King    IMAGING - reviewed   03/08/24 MRI Lumbar spine w/o   IMPRESSION:  1.  Diffuse multilevel lumbar spondylosis, as above. High-grade neural  foraminal stenosis is noted at L5-S1 secondary to spondylolisthesis  and facet degenerative change. No high-grade canal stenosis at any  level.  2.  Multilevel vertebral body compression fractures and kyphoplasty  changes. Abnormal signal within the opposing endplates at L5-S1  favored to be degenerative fibrovascular signal, however an S1  superior endplate fracture can appear similar on MRI.  3.  Advanced posterior paraspinal muscular fatty infiltration/atrophy.  Finding has been reported to be a source of chronic low back pain.  Consider a dedicated rehabilitation/strength training program as a  component of treatment.        Medical History:  He  has a past medical history of Chronic anticoagulation, Gastroesophageal reflux disease without esophagitis, H/O reactive hypoglycemia, Heterozygous factor V Leiden mutation (H24), Osteoporosis, Personal history of DVT (deep vein thrombosis), Primary osteoarthritis of right shoulder, Rotator cuff disorder, right, Rotator cuff syndrome, right, Status cardiac pacemaker, and Vertebral compression fracture (H).     He  has a past surgical history that includes Laparoscopic nissen fundoplication (11/2011); Implant pacemaker (Left); Total Knee Arthroplasty (Right, 2000); Total Knee Arthroplasty (Left, 2010); Arthroplasty ankle (Left, 4/21/2022); and Lengthen tendon achilles (Left, 4/21/2022).       Family History  His family history includes Coronary Artery Disease Early Onset in his brother and father.     Social History:  Work: Retired  Current living situation: lives in [facility] with S.O.  He  reports that he has never smoked. He has never used smokeless  tobacco. He reports that he does not drink alcohol and does not use drugs.        Current Medications:   He has a current medication list which includes the following prescription(s): albuterol, aspirin, blood glucose monitoring, calcium carbonate, cholecalciferol, fluticasone, fluticasone-salmeterol, multivitamin w/minerals, senna-docusate, warfarin anticoagulant, and warfarin anticoagulant, and the following Facility-Administered Medications: cyanocobalamin.     Allergies:   No Known Allergies    PHYSICAL EXAMINATION:  /85 (BP Location: Left arm, Patient Position: Sitting, Cuff Size: Adult Large)   Pulse 68   Resp 16   SpO2 95%    General: Pleasant, straightforward, WDWN individual.  Mental Status: Pleasant, direct, appropriate mood and affect  Resp: breathing is unlabored without audible wheeze  Vascular: No visible cyanosis, no venous stasis changes  Heme: No visible ecchymosis or erythema on extremities  Skin: No notable rash    Neurologic:  Strength: All major muscle groups of the bilateral lower extremities have normal and symmetric muscle strength     Gait is somewhat unsteady with careful foot placement and plodding    Musculoskeletal:  Lumbar Spine: Mod reduced ROM all planes, tender to palpation lumbosacral junction, facet loading with pain bilaterally, Str Leg Raise neg (LBP only), hip provocative maneuvers negative for typical pain    Bilateral SI joint maneuvers: TTP SIJ (sacral sulcus/PSIS), Alejandra (pt pointing) pos    ASSESSMENT:  Victorino Khan is a pleasant 89 year old male who presents with:    Lumbar spondylosis  #. Chronic bilateral low back pain without sciatica     #. Lumbar facet arthropathy  #. DDD (degenerative disc disease), lumbar   #. Sacroiliac joint pain with concordant symptoms despite lack of response to SI joint corticosteroid injections.  He has had some benefit with SI belt  #. Chronic bilateral low back pain without sciatica  #. Lumbar facet arthropathy  #. Lumbar  spondylosis        Complicating co-morbidities include:   #.  Factor V Leiden  #.  Systemic oral anticoagulation + warfarin  #.  Cardiac PPM  #.  Osteoporosis with history of multiple vertebral compression fracture s/p vertebroplasty     PLAN:  -Refer for Bilateral L4-5, L5-S1 medial branch block #1  The pathway from diagnostic medial branch blocks to radiofrequency denervation was discussed in detail with the patient today.  Risks and benefits were discussed and all questions were answered.  The patient states understanding and wishes to proceed.  There are no contraindications.  The patient understands they will have 2 diagnostic medial branch blocks; and after each block they will be required to keep a pain diary recording their pain scores approximately every hour until the pain has returned to baseline.  During the time after the block, the patient was instructed to perform activities which typically aggravates their pain.  The patient will contact the medical spine staff after each diagnostic block to assess the amount of benefit the patient received.  If the patient has a significantly positive response to each of these diagnostic blocks, they may move forward with the radiofrequency ablation procedure.   -Rx: Tramadol 50mg prn for severe pain #10 R-0  -Continue Breg Sacroiliac Joint Belt / Trochanteric Brace support   -Follow up for procedure    Ready to learn, no apparent learning barriers.  Education provided on treatment plan according to patient's preferred learning style.  Patient verbalizes understanding.   _________________________________      I spent a total of 44 minutes on the day of the visit.   Time spent by me doing chart review, history and exam, documentation and further activities per the note          Again, thank you for allowing me to participate in the care of your patient.      Sincerely,    Ganesh Arndt MD

## 2024-06-19 ENCOUNTER — TELEPHONE (OUTPATIENT)
Dept: PHYSICAL MEDICINE AND REHAB | Facility: CLINIC | Age: 89
End: 2024-06-19
Payer: COMMERCIAL

## 2024-06-19 NOTE — TELEPHONE ENCOUNTER
Screening questions for MBB Injections:    Injection to be done at which interventional clinic site? [facility] [place]    Procedure ordered by Dr. Arndt    Procedure ordered? Lumbar Medial Branch Block    What insurance would patient like us to bill for this procedure? [employer]    MEDICA: REQUIRES A PA FOR BOTH MBB   Worker's comp- Any injection DO NOT SCHEDULE and route to Shabana Taylor.    [employer] insurance - If scheduling an SI joint injection DO NOT SCHEDULE and route to Dian Reyes.     MBBs must be scheduled with elapsed time interval of at least 2 weeks and not more than 6 months between the First MBB and the Second MBB for insurance purposes     [employer] - Any injection besides hip/shoulder/knee joint DO NOT SCHEDULE and route to Dian Reyes. She will obtain PA and call pt back to schedule procedure or notify pt of denial.     [employer]- PA required for all MBB's    **BCBS- ALL need to be routed to Dian for review if a PA is needed**  IF SCHEDULING IN [facility] PLEASE SCHEDULE AT LEAST 7-10         BUSINESS DAYS OUT SO A PA CAN BE OBTAINED    Genicular Nerve blocks- ALL insurances except for- [employer], Medicare (straight not supplement) and [employer]. Need to be reviewed by Dian before scheduling.       Is patient scheduled at [facility]? yes   If YES, route every encounter to [facility] CARE NAVIGATION POOL [[phone]]    Any chance of pregnancy? NO   If YES, do NOT schedule and route to RN pool  - Dr. Arndt route to Rhona Ceballos and PM&R Nurse  [[id number]]      Is an  needed? No     Patient has a drive home? (mandatory) Yes     Is patient taking any blood thinners (plavix, coumadin, jantoven, warfarin, heparin, pradaxa or dabigatran )? Yes - Warfarin    If hold needed, do NOT schedule, route to RN pool/ Dr. Arndt's Team     Is patient taking any aspirin products? No   If yes route to RN pool/ Dr. Arndt's Team - Do not schedule     Does the patient  have a bleeding or clotting disorder? No  If YES, okay to schedule AND route to RN nurse pool/ Dr. Arndt's Team  **For any patients with platelet count <100, must be forwarded to provider**    Is patient diabetic? no If YES, have them bring their glucometer.    Does patient have an active infection or treated for one within the past week? No      Is patient currently taking any antibiotics?  No  For patients on chronic, preventative, or prophylactic antibiotics, procedures may be scheduled.   For patients on antibiotics for active or recent infection:antibiotic course must have been completed for 4 days    Is patient currently taking any steroid medications? (i.e. Prednisone, Medrol)  No   For patients on steroid medications, course must have been completed for 4 days    Is patient actively being treated for cancer or immunocompromised? No   If YES, do NOT schedule and route to SING[facility]/ Dr. Arndt's Team    Are you able to get on and off an exam table with minimal or no assistance? Yes   If NO, do NOT schedule and route to RN/ Dr. Burdicks Team    Are you able to roll over and lay on your stomach with minimal or no assistance? Yes   If NO, do NOT schedule and route to RN/ Dr. Arndt's Team    Any allergies to contrast dye, iodine, shellfish, or numbing and steroid medications? No  (If so, inform nursing and note in scheduling comments.)    Allergies: Patient has no known allergies.     Does patient have an MRI/CT?  Not Applicable  Check Procedure Scheduling Grid to see if required.    Was the MRI done within the last 3 years?  NA  If yes, where was the MRI done i.e.[facility], [facility], [place], [place], [facility] etc?     If no, do not schedule and route to nursing/ Dr. Arndt's Team  If MRI was not done at [place], [facility], [place] or [facility] do NOT schedule and route to nursing.    If pt has an imaging disc, the injection MAY be scheduled but pt has to bring disc to appt.   If they show up without the disc the  injection cannot be done    Is patient able to transfer to a procedure table with minimal or no assistance? Yes  If NO, do NOT schedule and route to RN/ Dr. Arndt's Team    Medial Branch Block Pre-Procedure Instructions  It is okay to take long acting pain medications (if you are on them) the day of the procedure but try not to take any short acting medications unless absolutely necessary.    YES: ok   Long acting meds would include: Gabapentin (Neurontin), MS Contin, Oxycontin        Short acting meds would include:  Percocet, Oxycodone, Vicodin, Ibuprofen   The day of the procedure, you should try to do things that provoke your pain, since the injection is being done to see if it will relieve your pain . YES: ok   If your pain level is a 4 out of 10 or less on the day of the procedu re, please call [phone] to reschedule.  YES: ok     Reminders:    If you are started on any steroids or antibiotics between now and your appointment, you must contact us because it may affect our ability to perform your procedure no    Instructed pt to arrive 30 minutes early for IV start if required. (Check Procedure Scheduling Grid) INot Applicable     If this is for a cervical MBB aspirin needs to be held for 6 days.  Not Applicable     Do not schedule procedures requiring IV placement in the first appointment of the day or first appointment after lunch. Do NOT schedule at 0745, 0815 or 1245.  ok    For patients 85 or older we recommend having an adult stay w/ them for the remainder of the day.      Does the patient have any questions? no

## 2024-06-19 NOTE — TELEPHONE ENCOUNTER
Phone call to patient to discuss the injections (Dania). Explained he does not need to hold his Coumadin for this procedure. He will have his INR checked that day and it needs to be 3.0 or less to move forward with the injection. Explained he needs to have a pain level of 5/10 or higher to get the injection. Instructed to not take pain medication for 6 hours prior to the injection. Stated understanding.

## 2024-07-03 ENCOUNTER — TELEPHONE (OUTPATIENT)
Dept: ANTICOAGULATION | Facility: CLINIC | Age: 89
End: 2024-07-03
Payer: COMMERCIAL

## 2024-07-03 NOTE — TELEPHONE ENCOUNTER
ANTICOAGULATION     Victorino Khan is overdue for an INR check.     Left message for patient to call and schedule lab appointment as soon as possible. If returning call, please schedule.     Karey Baca RN

## 2024-07-08 DIAGNOSIS — M47.816 SPONDYLOSIS OF LUMBAR REGION WITHOUT MYELOPATHY OR RADICULOPATHY: Primary | ICD-10-CM

## 2024-07-10 ENCOUNTER — TELEPHONE (OUTPATIENT)
Dept: ANTICOAGULATION | Facility: CLINIC | Age: 89
End: 2024-07-10
Payer: COMMERCIAL

## 2024-07-10 NOTE — TELEPHONE ENCOUNTER
ANTICOAGULATION     Victorino Khan is overdue for an INR check.     Spoke with Eddy who declined to schedule a lab appointment at this time. If calling back please schedule as soon as possible.   - currently at the [facility], and leaving to go home today.   - stated to call back tomorrow, since he does not have his schedule.    Karey Baca RN

## 2024-07-11 ENCOUNTER — TELEPHONE (OUTPATIENT)
Dept: ANTICOAGULATION | Facility: CLINIC | Age: 89
End: 2024-07-11

## 2024-07-11 ENCOUNTER — ANTICOAGULATION THERAPY VISIT (OUTPATIENT)
Dept: ANTICOAGULATION | Facility: CLINIC | Age: 89
End: 2024-07-11

## 2024-07-11 ENCOUNTER — TELEPHONE (OUTPATIENT)
Dept: INTERNAL MEDICINE | Facility: CLINIC | Age: 89
End: 2024-07-11

## 2024-07-11 ENCOUNTER — RADIOLOGY INJECTION OFFICE VISIT (OUTPATIENT)
Dept: PHYSICAL MEDICINE AND REHAB | Facility: CLINIC | Age: 89
End: 2024-07-11
Attending: PHYSICAL MEDICINE & REHABILITATION
Payer: COMMERCIAL

## 2024-07-11 VITALS
OXYGEN SATURATION: 96 % | SYSTOLIC BLOOD PRESSURE: 122 MMHG | TEMPERATURE: 98 F | RESPIRATION RATE: 16 BRPM | HEART RATE: 68 BPM | DIASTOLIC BLOOD PRESSURE: 76 MMHG

## 2024-07-11 DIAGNOSIS — Z79.01 ANTICOAGULATED ON WARFARIN: ICD-10-CM

## 2024-07-11 DIAGNOSIS — D68.51 HETEROZYGOUS FACTOR V LEIDEN MUTATION (H): ICD-10-CM

## 2024-07-11 DIAGNOSIS — M47.816 SPONDYLOSIS OF LUMBAR REGION WITHOUT MYELOPATHY OR RADICULOPATHY: Primary | ICD-10-CM

## 2024-07-11 DIAGNOSIS — Z86.718 HISTORY OF DEEP VENOUS THROMBOSIS: Primary | ICD-10-CM

## 2024-07-11 DIAGNOSIS — Z79.01 CHRONIC ANTICOAGULATION: ICD-10-CM

## 2024-07-11 LAB — INR POINT OF CARE: 2.4 (ref 0.9–1.1)

## 2024-07-11 PROCEDURE — 36416 COLLJ CAPILLARY BLOOD SPEC: CPT | Performed by: PHYSICAL MEDICINE & REHABILITATION

## 2024-07-11 PROCEDURE — 2894A PAIN MEDIAL BRANCH BLOCK LUMBAR TWO LVLS BIL: CPT | Mod: XS | Performed by: PHYSICAL MEDICINE & REHABILITATION

## 2024-07-11 PROCEDURE — 64494 INJ PARAVERT F JNT L/S 2 LEV: CPT | Mod: 50 | Performed by: PHYSICAL MEDICINE & REHABILITATION

## 2024-07-11 PROCEDURE — 64493 INJ PARAVERT F JNT L/S 1 LEV: CPT | Mod: 50 | Performed by: PHYSICAL MEDICINE & REHABILITATION

## 2024-07-11 PROCEDURE — 85610 PROTHROMBIN TIME: CPT | Performed by: PHYSICAL MEDICINE & REHABILITATION

## 2024-07-11 RX ORDER — LIDOCAINE HYDROCHLORIDE 10 MG/ML
INJECTION, SOLUTION EPIDURAL; INFILTRATION; INTRACAUDAL; PERINEURAL
Status: COMPLETED | OUTPATIENT
Start: 2024-07-11 | End: 2024-07-11

## 2024-07-11 RX ORDER — BUPIVACAINE HYDROCHLORIDE 5 MG/ML
INJECTION, SOLUTION EPIDURAL; INTRACAUDAL
Status: COMPLETED | OUTPATIENT
Start: 2024-07-11 | End: 2024-07-11

## 2024-07-11 RX ADMIN — BUPIVACAINE HYDROCHLORIDE 3 ML: 5 INJECTION, SOLUTION EPIDURAL; INTRACAUDAL at 10:43

## 2024-07-11 RX ADMIN — LIDOCAINE HYDROCHLORIDE 5 ML: 10 INJECTION, SOLUTION EPIDURAL; INFILTRATION; INTRACAUDAL; PERINEURAL at 10:42

## 2024-07-11 ASSESSMENT — PAIN SCALES - GENERAL
PAINLEVEL: EXTREME PAIN (8)
PAINLEVEL: EXTREME PAIN (8)

## 2024-07-11 NOTE — PATIENT INSTRUCTIONS
Please complete your pain diary and return the diary to the Spine Center at your earliest convenience.  The Spine Center will contact you to schedule your next appointment after your pain diary is reviewed by your provider.  Thank you.    Medial Branch Block (MBB) TEST    This is a TEST injection to find out which nerves are causing your pain.  These diagnostic injections will NOT provide any long-term pain relief because they are a TEST.  Steroid is NOT used for this injection.  Steroid is used to provide long-lasting pain relief.  Since there is no steroid used, there will not be any long-lasting pain relief.    The day of your medial branch block TEST:    Do not take any pain medication prior to the injections.    Try to do activities that increase your pain.  We want you to have a high level of pain on the day of the TEST as it makes it easier to know the results.  A pain rating of 5 out of 10 or greater will be required.      Other Information:    - You may eat and drink on the day of the TEST.  - You should take your other medications (just not pain medications) as prescribed.  - You will be asked to fill out a pain diary for the 6 hours following the TEST.      After the TEST    Please do not take any pain medications for 6 hours following the TEST.  After the pain diary is filled out, you may resume taking your pain medications.  Please return the pain diary to the Spine Center as soon as you are able.  You will be contacted by a member of the clinical team with the provider recommendations for next steps after the pain diary has been reviewed.   A. You may be asked to come in for a follow-up appointment to discuss other treatment options.   B. It may be recommended that you have an injection to help treat your pain.   C. You may need to come in for a second set of medial branch blocks (TESTS).      DISCHARGE INSTRUCTIONS    During office hours (8:00 a.m.- 4:00 p.m.) questions or concerns may be answered  by  calling Spine Center Navigation Nurses at  [phone].  Messages received after hours will be returned the following business day.      In the case of an emergency, please dial 911 or seek assistance at the nearest Emergency Room/Urgent Care facility.     All Patients:  You may experience an increase in your symptoms for the first 2 days, once the numbing medication wears off.    You may resume your regular medication, no pain medication until you have completed your diary    You may shower. No swimming, tub bath or hot tub for 24 hours    Continue your activities that can cause you pain to test the blocks.                         You should not drive for the next 1 to 3 hours (have someone drive you)           POSSIBLE PROCEDURE SIDE EFFECTS  -Call Spine Center if concerned-    Increased Pain  Increased numbness/tingling     Nausea/Vomiting  Bruising/bleeding at site (hematoma)             Swelling at site (edema) Headache  Difficulty walking  Infection        Fever greater than 100.5      Please complete your pain diary and return the diary to the Spine Center at your earliest convenience.  The Spine Center will contact you to schedule your next appointment after your pain diary is reviewed by your doctor.  Thank you.

## 2024-07-11 NOTE — TELEPHONE ENCOUNTER
ANTICOAGULATION     Victorino MCGRATH Montymeka is overdue for an INR check.     Spoke with Eddy and scheduled lab appointment on 7/11, reminded him to get inr while he is at clinic today    Luís Williamson RN

## 2024-07-11 NOTE — TELEPHONE ENCOUNTER
General Call      Reason for Call:  pt wanting to speak to inr nurse    What are your questions or concerns:  see above    Date of last appointment with provider: na    Could we send this information to you in [facility] or would you prefer to receive a phone call?:   Patient would prefer a phone call   Okay to leave a detailed message?: No at Home number on file [phone] (home)

## 2024-07-11 NOTE — PROGRESS NOTES
ANTICOAGULATION MANAGEMENT     Victorino Khan 89 year old male is on warfarin with therapeutic INR result. (Goal INR 2.0-3.0)    Recent labs: (last 7 days)     07/11/24  1020   INR 2.4*       ASSESSMENT     Source(s): Chart Review and Patient/Caregiver Call     Warfarin doses taken: Warfarin taken as instructed  Diet: No new diet changes identified  Medication/supplement changes: None noted  New illness, injury, or hospitalization: No  Signs or symptoms of bleeding or clotting: No  Previous result: Subtherapeutic  Additional findings: None       PLAN     Recommended plan for no diet, medication or health factor changes affecting INR     Dosing Instructions: Continue your current warfarin dose with next INR in 4 weeks       Summary  As of 7/11/2024      Full warfarin instructions:  2.5 mg every Tue, Thu, Sat; 4 mg all other days   Next INR check:  8/8/2024               Telephone call with Eddy who verbalizes understanding and agrees to plan    Patient elected to schedule next visit 8/22    Education provided: Please call back if any changes to your diet, medications or how you've been taking warfarin    Plan made per [facility] anticoagulation protocol    Luís Williamson RN  Anticoagulation Clinic  7/11/2024    _______________________________________________________________________     Anticoagulation Episode Summary       Current INR goal:  2.0-3.0   TTR:  71.1% (1 y)   Target end date:  Indefinite   Send INR reminders to:  [facility]    Indications    History of deep venous thrombosis [Z86.718]  Heterozygous factor V Leiden mutation (H24) [D68.51]  Chronic anticoagulation [Z79.01]             Comments:               Anticoagulation Care Providers       Provider Role Specialty Phone number    Yuri Funes MD Referring Internal Medicine [phone]

## 2024-07-23 ENCOUNTER — OFFICE VISIT (OUTPATIENT)
Dept: PHYSICAL MEDICINE AND REHAB | Facility: CLINIC | Age: 89
End: 2024-07-23
Payer: COMMERCIAL

## 2024-07-23 VITALS
HEIGHT: 68 IN | DIASTOLIC BLOOD PRESSURE: 69 MMHG | WEIGHT: 184.38 LBS | SYSTOLIC BLOOD PRESSURE: 105 MMHG | OXYGEN SATURATION: 96 % | HEART RATE: 60 BPM | BODY MASS INDEX: 27.94 KG/M2

## 2024-07-23 DIAGNOSIS — M51.369 DDD (DEGENERATIVE DISC DISEASE), LUMBAR: ICD-10-CM

## 2024-07-23 DIAGNOSIS — M53.3 SACROILIAC JOINT PAIN: ICD-10-CM

## 2024-07-23 DIAGNOSIS — M54.89 VERTEBROGENIC PAIN: Primary | ICD-10-CM

## 2024-07-23 DIAGNOSIS — M47.816 SPONDYLOSIS OF LUMBAR REGION WITHOUT MYELOPATHY OR RADICULOPATHY: ICD-10-CM

## 2024-07-23 DIAGNOSIS — M47.816 LUMBAR FACET ARTHROPATHY: ICD-10-CM

## 2024-07-23 DIAGNOSIS — M43.17 SPONDYLOLISTHESIS AT L5-S1 LEVEL: ICD-10-CM

## 2024-07-23 DIAGNOSIS — M43.10 PARS DEFECT WITH SPONDYLOLISTHESIS: ICD-10-CM

## 2024-07-23 DIAGNOSIS — Z79.01 LONG TERM CURRENT USE OF ANTICOAGULANT THERAPY: ICD-10-CM

## 2024-07-23 PROCEDURE — 99214 OFFICE O/P EST MOD 30 MIN: CPT | Performed by: PHYSICAL MEDICINE & REHABILITATION

## 2024-07-23 ASSESSMENT — PAIN SCALES - GENERAL: PAINLEVEL: MODERATE PAIN (5)

## 2024-07-23 NOTE — PATIENT INSTRUCTIONS
It was a pleasure seeing you today in our [facility]. We discussed the following:    #. I will refer you to one of the following for evaluation and consideration of basivertebral nerve ablation for vertebrogenic pain.  Unfortunately, I do not currently perform this procedure but the following local physicians may offer you this procedure:    1.  Dr. Rei Olvera ([facility])     Their  clinic should reach out to you for an appointment. Howevr, if you do not hear back, you can also call [phone]    If you would like to review the procedure online, you could certainly look into the Intracept Procedure     ( [email or web address] )

## 2024-07-23 NOTE — NURSING NOTE
"Victorino Khan is a 89 year old male who presents for:  Chief Complaint   Patient presents with    RECHECK     Injection follow up        Initial Vitals:  /69   Pulse 60   Ht 1.727 m (5' 8\")   Wt 83.6 kg (184 lb 6 oz)   SpO2 96%   BMI 28.03 kg/m   Estimated body mass index is 28.03 kg/m  as calculated from the following:    Height as of this encounter: 1.727 m (5' 8\").    Weight as of this encounter: 83.6 kg (184 lb 6 oz).. Body surface area is 2 meters squared. BP completed using cuff size: regular  Moderate Pain (5)        Yolanda Osuna   "

## 2024-07-23 NOTE — PROGRESS NOTES
PM&R Follow-Up Visit -     Date of Initial Visit: 04/24/24  LOV: 6/17/2024  TD: 7/23/2024      Recall: Victorino Khan is a 89 year old male with history of factor V Leiden on warfarin, CHF s/p +PPM, paroxysmal A-fib, h/o bilateral TKA, right TSA, left ankle arthroplasty, and multiple lumbar compression fractures s/p vertebroplasty who follows up for chronic axial lumbosacral pain    INTERVAL HISTORY:  Patient was last seen July 11, 2024.  At that visit, he underwent diagnostic bilateral lumbosacral medial branch blocks.  On review of his pain diary he did not have significant benefit and reports 10% alleviation at best.     He continues to have axial lumbosacral pain that is stable and unchanged.  He tries to remain as active as able but is difficulty with periods of prolonged standing or walking.  Uses the SI belt that was previously prescribed for him which does provide some benefit when applied during activity.  Continues to work with his .      RECALL HPI Patient seen at the request of Dr Juárez for an opinion and evaluation of low back pain.      HISTORY OF PRESENT ILLNESS:  Victorino Khan is a 89 year old male who presents with a chief complaint of low back pain.  He is accompanied today by his significant other.    He reports a longstanding history of chronic low back pain which has been worse in the last several months.  He has been seen and evaluated through the neurosurgery clinic and referred to our [facility] for further evaluation management.    Today, the pain is localized to the lumbosacral junction bilaterally (equal on the right and left); he denies any new or significant numbness/tingling; described as throbbing and cramping in nature. Pain is reported as 5/10 at rest today and up to 10/10 at worst in the last week. Symptoms are worse with prolonged positioning especially when seated, as well as, position changes such as getting up from a seated position; and improved when  lying down. He does not report pain-related sleep disturbance.     Additionally, he reports working with a  twice a week.  He has relief with a  places a resistance band around the lumbosacral region and pelvis.    Functional limitations: He can walk up to 4 blocks prior to needing to sit or rest due to pain.       PRIOR INJURIES/TREATMENT:   Ice/Heat: +  Brace: Breg Sacroiliac Joint Belt / Trochanteric Brace support - provides some benefit   Physical Therapy:   Had been in long-term physical therapy in 2023 for several months  Works with a  x2 per week.      - Current Pain Medications -   None    - Prior/Trialed Pain Medications -   NSAIDs: Does not take due to systemic oral anticoagulation  AED: Gabapentin - did not think it helped  Opioids: Tylenol #3 has been helpful in the past.  Uses very rarely    Prior Procedures:  Date    Procedure    Improvement (%)  05/23/2024 B SIJ    No sig   07/11/24  B L4-5,L5-S1 MBB#1  Negative block         Prior Related Surgery:   ~2020 L4 and L5 vertebroplasty ([facility])  Total Knee Arthroplasty (Right, 2000)  Total Knee Arthroplasty (Left, 2010)  Arthroplasty ankle (Left, 4/21/2022)  Right reverse total shoulder arthroplasty (2023)  Other (acupuncture, OMT, CMM, TENS, DME, etc.):     Specialists Seen - (with most recent, available notes and clinic visits reviewed)   1.  Neurosurgery -Dr. Juárez, Bia Shaffer, CNP  2.  Orthopedic surgery ([facility]) - Dr King  3.  [facility]     IMAGING - reviewed   03/08/24 MRI Lumbar spine w/o   IMPRESSION:  1.  Diffuse multilevel lumbar spondylosis, as above. High-grade neural  foraminal stenosis is noted at L5-S1 secondary to spondylolisthesis  and facet degenerative change. No high-grade canal stenosis at any  level.  2.  Multilevel vertebral body compression fractures and kyphoplasty  changes. Abnormal signal within the opposing endplates at L5-S1  favored to be degenerative fibrovascular signal,  "however an S1  superior endplate fracture can appear similar on MRI.  3.  Advanced posterior paraspinal muscular fatty infiltration/atrophy.  Finding has been reported to be a source of chronic low back pain.  Consider a dedicated rehabilitation/strength training program as a  component of treatment.        Medical History:  He  has a past medical history of Chronic anticoagulation, Gastroesophageal reflux disease without esophagitis, H/O reactive hypoglycemia, Heterozygous factor V Leiden mutation (H24), Osteoporosis, Personal history of DVT (deep vein thrombosis), Primary osteoarthritis of right shoulder, Rotator cuff disorder, right, Rotator cuff syndrome, right, Status cardiac pacemaker, and Vertebral compression fracture (H).     He  has a past surgical history that includes Laparoscopic nissen fundoplication (11/2011); Implant pacemaker (Left); Total Knee Arthroplasty (Right, 2000); Total Knee Arthroplasty (Left, 2010); Arthroplasty ankle (Left, 4/21/2022); and Lengthen tendon achilles (Left, 4/21/2022).     Family History  His family history includes Coronary Artery Disease Early Onset in his brother and father.     Social History:  Work: Retired  Current living situation: lives in [facility] with [facility].  He  reports that he has never smoked. He has never used smokeless tobacco. He reports that he does not drink alcohol and does not use drugs.        Current Medications:   He has a current medication list which includes the following prescription(s): acetaminophen, albuterol, aspirin, blood glucose monitoring, calcium carbonate, cholecalciferol, fluticasone, fluticasone-salmeterol, multivitamin w/minerals, senna-docusate, warfarin anticoagulant, and warfarin anticoagulant, and the following Facility-Administered Medications: cyanocobalamin.       Allergies:   No Known Allergies    PHYSICAL EXAMINATION:  /69   Pulse 60   Ht 1.727 m (5' 8\")   Wt 83.6 kg (184 lb 6 oz)   SpO2 96%   BMI 28.03 kg/m   "   General: Pleasant, straightforward, WDWN individual.  Mental Status: Pleasant, direct, appropriate mood and affect  Resp: breathing is unlabored without audible wheeze  Vascular: No visible cyanosis, no venous stasis changes  Heme: No visible ecchymosis or erythema on extremities  Skin: No notable rash    Neurologic:  Strength: All major muscle groups of the bilateral lower extremities have normal and symmetric muscle strength     Gait is somewhat unsteady with careful foot placement and plodding    Musculoskeletal:  Lumbar Spine: Mod reduced ROM all planes, tender to palpation lumbosacral junction, facet loading with pain bilaterally,      ASSESSMENT:  Victorino Khan is a very pleasant 89 year old male who presents with:    #. Lumbar spondylosis  #. Chronic bilateral low back pain without sciatica     #. Lumbar facet arthropathy  #. DDD (degenerative disc disease), lumbar   #. Sacroiliac joint pain with concordant symptoms despite lack of response to SI joint corticosteroid injections.  He has had some benefit with SI belt  #. L5-S1 spondylolisthesis, grade 1-2 with underlying bilateral pars defects  #. Muscular atrophy of the multifidus, QL muscles and erector spinae  #. Vertebrogenic pain, appears to have type II Modic changes with endplate fibrovascular degenerative change       Complicating co-morbidities include:   #.  Factor V Leiden  #.  Systemic oral anticoagulation + warfarin  #.  Cardiac PPM  #.  Osteoporosis with history of multiple vertebral compression fracture s/p vertebroplasty     He has experienced incomplete/inadequate symptomatic relief from multiple pharmacologic trials, repeated courses of physical therapy, interventional pain procedures, and surgery/surgical evaluation     PLAN:  -Imaging was reviewed in detail  -We did discuss the possibility of basivertebral nerve ablation (BVNA).  Given his underlying spinal listhesis and spondylolysis, I am not sure that he would be a candidate even with  his underlying comorbidities for this but he is interested in further discussing.  I will refer him to [facility] with Dr. Olvera and his team for further evaluation consideration.  -Continue HEP and use of SI belt  -Follow up as needed.     Ready to learn, no apparent learning barriers.  Education provided on treatment plan according to patient's preferred learning style.  Patient verbalizes understanding.   __________________________________  Ganesh Arndt MD  Physical Medicine & Rehabilitation        I spent a total of 30 minutes on the day of the visit.   Time spent by me doing chart review, history and exam, documentation and further activities per the note

## 2024-07-23 NOTE — LETTER
7/23/2024       RE: Victorino Khan  [address]  [place] [address]     Dear Colleague,    Thank you for referring your patient, Victorino Khan, to the [facility]. Please see a copy of my visit note below.    PM&R Follow-Up Visit -     Date of Initial Visit: 04/24/24  LOV: 6/17/2024  TD: 7/23/2024      Recall: Victorino Khan is a 89 year old male with history of factor V Leiden on warfarin, CHF s/p +PPM, paroxysmal A-fib, h/o bilateral TKA, right TSA, left ankle arthroplasty, and multiple lumbar compression fractures s/p vertebroplasty who follows up for chronic axial lumbosacral pain    INTERVAL HISTORY:  Patient was last seen July 11, 2024.  At that visit, he underwent diagnostic bilateral lumbosacral medial branch blocks.  On review of his pain diary he did not have significant benefit and reports 10% alleviation at best.     He continues to have axial lumbosacral pain that is stable and unchanged.  He tries to remain as active as able but is difficulty with periods of prolonged standing or walking.  Uses the SI belt that was previously prescribed for him which does provide some benefit when applied during activity.  Continues to work with his .      RECALL HPI Patient seen at the request of Dr Juárez for an opinion and evaluation of low back pain.      HISTORY OF PRESENT ILLNESS:  Victorino Khan is a 89 year old male who presents with a chief complaint of low back pain.  He is accompanied today by his significant other.    He reports a longstanding history of chronic low back pain which has been worse in the last several months.  He has been seen and evaluated through the neurosurgery clinic and referred to [facility] for further evaluation management.    Today, the pain is localized to the lumbosacral junction bilaterally (equal on the right and left); he denies any new or significant  numbness/tingling; described as throbbing and cramping in nature. Pain is reported as 5/10 at rest today and up to 10/10 at worst in the last week. Symptoms are worse with prolonged positioning especially when seated, as well as, position changes such as getting up from a seated position; and improved when lying down. He does not report pain-related sleep disturbance.     Additionally, he reports working with a  twice a week.  He has relief with a  places a resistance band around the lumbosacral region and pelvis.    Functional limitations: He can walk up to 4 blocks prior to needing to sit or rest due to pain.       PRIOR INJURIES/TREATMENT:   Ice/Heat: +  Brace: Breg Sacroiliac Joint Belt / Trochanteric Brace support - provides some benefit   Physical Therapy:   Had been in long-term physical therapy in 2023 for several months  Works with a  x2 per week.      - Current Pain Medications -   None    - Prior/Trialed Pain Medications -   NSAIDs: Does not take due to systemic oral anticoagulation  AED: Gabapentin - did not think it helped  Opioids: Tylenol #3 has been helpful in the past.  Uses very rarely    Prior Procedures:  Date    Procedure    Improvement (%)  05/23/2024 B SIJ    No sig   07/11/24  B L4-5,L5-S1 MBB#1  Negative block         Prior Related Surgery:   ~2020 L4 and L5 vertebroplasty ([facility])  Total Knee Arthroplasty (Right, 2000)  Total Knee Arthroplasty (Left, 2010)  Arthroplasty ankle (Left, 4/21/2022)  Right reverse total shoulder arthroplasty (2023)  Other (acupuncture, OMT, CMM, TENS, DME, etc.):     Specialists Seen - (with most recent, available notes and clinic visits reviewed)   1.  Neurosurgery -Dr. Juárez, Bia Shaffer, CNP  2.  Orthopedic surgery ([place]) - Dr King  3.  [facility]     IMAGING - reviewed   03/08/24 MRI Lumbar spine w/o   IMPRESSION:  1.  Diffuse multilevel lumbar spondylosis, as above. High-grade neural  foraminal stenosis  is noted at L5-S1 secondary to spondylolisthesis  and facet degenerative change. No high-grade canal stenosis at any  level.  2.  Multilevel vertebral body compression fractures and kyphoplasty  changes. Abnormal signal within the opposing endplates at L5-S1  favored to be degenerative fibrovascular signal, however an S1  superior endplate fracture can appear similar on MRI.  3.  Advanced posterior paraspinal muscular fatty infiltration/atrophy.  Finding has been reported to be a source of chronic low back pain.  Consider a dedicated rehabilitation/strength training program as a  component of treatment.        Medical History:  He  has a past medical history of Chronic anticoagulation, Gastroesophageal reflux disease without esophagitis, H/O reactive hypoglycemia, Heterozygous factor V Leiden mutation (H24), Osteoporosis, Personal history of DVT (deep vein thrombosis), Primary osteoarthritis of right shoulder, Rotator cuff disorder, right, Rotator cuff syndrome, right, Status cardiac pacemaker, and Vertebral compression fracture (H).     He  has a past surgical history that includes Laparoscopic nissen fundoplication (11/2011); Implant pacemaker (Left); Total Knee Arthroplasty (Right, 2000); Total Knee Arthroplasty (Left, 2010); Arthroplasty ankle (Left, 4/21/2022); and Lengthen tendon achilles (Left, 4/21/2022).     Family History  His family history includes Coronary Artery Disease Early Onset in his brother and father.     Social History:  Work: Retired  Current living situation: lives in [facility] with [facility].  He  reports that he has never smoked. He has never used smokeless tobacco. He reports that he does not drink alcohol and does not use drugs.        Current Medications:   He has a current medication list which includes the following prescription(s): acetaminophen, albuterol, aspirin, blood glucose monitoring, calcium carbonate, cholecalciferol, fluticasone, fluticasone-salmeterol, multivitamin w/minerals,  "senna-docusate, warfarin anticoagulant, and warfarin anticoagulant, and the following Facility-Administered Medications: cyanocobalamin.       Allergies:   No Known Allergies    PHYSICAL EXAMINATION:  /69   Pulse 60   Ht 1.727 m (5' 8\")   Wt 83.6 kg (184 lb 6 oz)   SpO2 96%   BMI 28.03 kg/m     General: Pleasant, straightforward, WDWN individual.  Mental Status: Pleasant, direct, appropriate mood and affect  Resp: breathing is unlabored without audible wheeze  Vascular: No visible cyanosis, no venous stasis changes  Heme: No visible ecchymosis or erythema on extremities  Skin: No notable rash    Neurologic:  Strength: All major muscle groups of the bilateral lower extremities have normal and symmetric muscle strength     Gait is somewhat unsteady with careful foot placement and plodding    Musculoskeletal:  Lumbar Spine: Mod reduced ROM all planes, tender to palpation lumbosacral junction, facet loading with pain bilaterally,      ASSESSMENT:  Victorino Khan is a very pleasant 89 year old male who presents with:    #. Lumbar spondylosis  #. Chronic bilateral low back pain without sciatica     #. Lumbar facet arthropathy  #. DDD (degenerative disc disease), lumbar   #. Sacroiliac joint pain with concordant symptoms despite lack of response to SI joint corticosteroid injections.  He has had some benefit with SI belt  #. L5-S1 spondylolisthesis, grade 1-2 with underlying bilateral pars defects  #. Muscular atrophy of the multifidus, QL muscles and erector spinae  #. Vertebrogenic pain, appears to have type II Modic changes with endplate fibrovascular degenerative change       Complicating co-morbidities include:   #.  Factor V Leiden  #.  Systemic oral anticoagulation + warfarin  #.  Cardiac PPM  #.  Osteoporosis with history of multiple vertebral compression fracture s/p vertebroplasty     He has experienced incomplete/inadequate symptomatic relief from multiple pharmacologic trials, repeated courses of " physical therapy, interventional pain procedures, and surgery/surgical evaluation     PLAN:  -Imaging was reviewed in detail  -We did discuss the possibility of basivertebral nerve ablation (BVNA).  Given his underlying spinal listhesis and spondylolysis, I am not sure that he would be a candidate even with his underlying comorbidities for this but he is interested in further discussing.  I will refer him to [facility] with Dr. Olvera and his team for further evaluation consideration.  -Continue HEP and use of SI belt  -Follow up as needed.     Ready to learn, no apparent learning barriers.  Education provided on treatment plan according to patient's preferred learning style.  Patient verbalizes understanding.   _________________________________    I spent a total of 30 minutes on the day of the visit.   Time spent by me doing chart review, history and exam, documentation and further activities per the note          Again, thank you for allowing me to participate in the care of your patient.      Sincerely,    Ganesh Arndt MD

## 2024-07-29 ENCOUNTER — ANCILLARY PROCEDURE (OUTPATIENT)
Dept: CARDIOLOGY | Facility: CLINIC | Age: 89
End: 2024-07-29
Attending: INTERNAL MEDICINE
Payer: COMMERCIAL

## 2024-07-29 DIAGNOSIS — Z95.0 CARDIAC PACEMAKER IN SITU: ICD-10-CM

## 2024-07-29 DIAGNOSIS — I49.5 SICK SINUS SYNDROME (H): ICD-10-CM

## 2024-07-29 LAB
MDC_IDC_EPISODE_DTM: NORMAL
MDC_IDC_EPISODE_DURATION: 8 S
MDC_IDC_EPISODE_ID: NORMAL
MDC_IDC_EPISODE_TYPE: NORMAL
MDC_IDC_EPISODE_VENDOR_TYPE: NORMAL
MDC_IDC_LEAD_CONNECTION_STATUS: NORMAL
MDC_IDC_LEAD_CONNECTION_STATUS: NORMAL
MDC_IDC_LEAD_IMPLANT_DT: NORMAL
MDC_IDC_LEAD_IMPLANT_DT: NORMAL
MDC_IDC_LEAD_LOCATION: NORMAL
MDC_IDC_LEAD_LOCATION: NORMAL
MDC_IDC_LEAD_LOCATION_DETAIL_1: NORMAL
MDC_IDC_LEAD_LOCATION_DETAIL_1: NORMAL
MDC_IDC_LEAD_MFG: NORMAL
MDC_IDC_LEAD_MFG: NORMAL
MDC_IDC_LEAD_MODEL: NORMAL
MDC_IDC_LEAD_MODEL: NORMAL
MDC_IDC_LEAD_POLARITY_TYPE: NORMAL
MDC_IDC_LEAD_POLARITY_TYPE: NORMAL
MDC_IDC_LEAD_SERIAL: NORMAL
MDC_IDC_LEAD_SERIAL: NORMAL
MDC_IDC_MSMT_BATTERY_DTM: NORMAL
MDC_IDC_MSMT_BATTERY_REMAINING_LONGEVITY: 28 MO
MDC_IDC_MSMT_BATTERY_REMAINING_PERCENTAGE: 26 %
MDC_IDC_MSMT_BATTERY_RRT_TRIGGER: NORMAL
MDC_IDC_MSMT_BATTERY_STATUS: NORMAL
MDC_IDC_MSMT_BATTERY_VOLTAGE: 2.9 V
MDC_IDC_MSMT_LEADCHNL_RA_IMPEDANCE_VALUE: 410 OHM
MDC_IDC_MSMT_LEADCHNL_RA_LEAD_CHANNEL_STATUS: NORMAL
MDC_IDC_MSMT_LEADCHNL_RA_PACING_THRESHOLD_AMPLITUDE: 0.5 V
MDC_IDC_MSMT_LEADCHNL_RA_PACING_THRESHOLD_PULSEWIDTH: 0.5 MS
MDC_IDC_MSMT_LEADCHNL_RA_SENSING_INTR_AMPL: 5 MV
MDC_IDC_MSMT_LEADCHNL_RV_IMPEDANCE_VALUE: 390 OHM
MDC_IDC_MSMT_LEADCHNL_RV_LEAD_CHANNEL_STATUS: NORMAL
MDC_IDC_MSMT_LEADCHNL_RV_PACING_THRESHOLD_AMPLITUDE: 1 V
MDC_IDC_MSMT_LEADCHNL_RV_PACING_THRESHOLD_PULSEWIDTH: 0.5 MS
MDC_IDC_MSMT_LEADCHNL_RV_SENSING_INTR_AMPL: 5.8 MV
MDC_IDC_PG_IMPLANT_DTM: NORMAL
MDC_IDC_PG_MFG: NORMAL
MDC_IDC_PG_MODEL: NORMAL
MDC_IDC_PG_SERIAL: NORMAL
MDC_IDC_PG_TYPE: NORMAL
MDC_IDC_SESS_CLINIC_NAME: NORMAL
MDC_IDC_SESS_DTM: NORMAL
MDC_IDC_SESS_REPROGRAMMED: NO
MDC_IDC_SESS_TYPE: NORMAL
MDC_IDC_SET_BRADY_AT_MODE_SWITCH_MODE: NORMAL
MDC_IDC_SET_BRADY_AT_MODE_SWITCH_RATE: 170 {BEATS}/MIN
MDC_IDC_SET_BRADY_LOWRATE: 60 {BEATS}/MIN
MDC_IDC_SET_BRADY_MAX_SENSOR_RATE: 130 {BEATS}/MIN
MDC_IDC_SET_BRADY_MAX_TRACKING_RATE: 120 {BEATS}/MIN
MDC_IDC_SET_BRADY_MODE: NORMAL
MDC_IDC_SET_BRADY_PAV_DELAY_LOW: 250 MS
MDC_IDC_SET_BRADY_SAV_DELAY_LOW: 250 MS
MDC_IDC_SET_LEADCHNL_RA_PACING_AMPLITUDE: 2 V
MDC_IDC_SET_LEADCHNL_RA_PACING_ANODE_ELECTRODE_1: NORMAL
MDC_IDC_SET_LEADCHNL_RA_PACING_ANODE_LOCATION_1: NORMAL
MDC_IDC_SET_LEADCHNL_RA_PACING_CAPTURE_MODE: NORMAL
MDC_IDC_SET_LEADCHNL_RA_PACING_CATHODE_ELECTRODE_1: NORMAL
MDC_IDC_SET_LEADCHNL_RA_PACING_CATHODE_LOCATION_1: NORMAL
MDC_IDC_SET_LEADCHNL_RA_PACING_POLARITY: NORMAL
MDC_IDC_SET_LEADCHNL_RA_PACING_PULSEWIDTH: 0.5 MS
MDC_IDC_SET_LEADCHNL_RA_SENSING_ADAPTATION_MODE: NORMAL
MDC_IDC_SET_LEADCHNL_RA_SENSING_ANODE_ELECTRODE_1: NORMAL
MDC_IDC_SET_LEADCHNL_RA_SENSING_ANODE_LOCATION_1: NORMAL
MDC_IDC_SET_LEADCHNL_RA_SENSING_CATHODE_ELECTRODE_1: NORMAL
MDC_IDC_SET_LEADCHNL_RA_SENSING_CATHODE_LOCATION_1: NORMAL
MDC_IDC_SET_LEADCHNL_RA_SENSING_POLARITY: NORMAL
MDC_IDC_SET_LEADCHNL_RA_SENSING_SENSITIVITY: 0.3 MV
MDC_IDC_SET_LEADCHNL_RV_PACING_AMPLITUDE: 2 V
MDC_IDC_SET_LEADCHNL_RV_PACING_ANODE_ELECTRODE_1: NORMAL
MDC_IDC_SET_LEADCHNL_RV_PACING_ANODE_LOCATION_1: NORMAL
MDC_IDC_SET_LEADCHNL_RV_PACING_CAPTURE_MODE: NORMAL
MDC_IDC_SET_LEADCHNL_RV_PACING_CATHODE_ELECTRODE_1: NORMAL
MDC_IDC_SET_LEADCHNL_RV_PACING_CATHODE_LOCATION_1: NORMAL
MDC_IDC_SET_LEADCHNL_RV_PACING_POLARITY: NORMAL
MDC_IDC_SET_LEADCHNL_RV_PACING_PULSEWIDTH: 0.5 MS
MDC_IDC_SET_LEADCHNL_RV_SENSING_ADAPTATION_MODE: NORMAL
MDC_IDC_SET_LEADCHNL_RV_SENSING_ANODE_ELECTRODE_1: NORMAL
MDC_IDC_SET_LEADCHNL_RV_SENSING_ANODE_LOCATION_1: NORMAL
MDC_IDC_SET_LEADCHNL_RV_SENSING_CATHODE_ELECTRODE_1: NORMAL
MDC_IDC_SET_LEADCHNL_RV_SENSING_CATHODE_LOCATION_1: NORMAL
MDC_IDC_SET_LEADCHNL_RV_SENSING_POLARITY: NORMAL
MDC_IDC_SET_LEADCHNL_RV_SENSING_SENSITIVITY: 0.5 MV
MDC_IDC_STAT_AT_BURDEN_PERCENT: 1 %
MDC_IDC_STAT_AT_DTM_END: NORMAL
MDC_IDC_STAT_AT_DTM_START: NORMAL
MDC_IDC_STAT_AT_MODE_SW_COUNT: 6
MDC_IDC_STAT_AT_MODE_SW_COUNT_PER_DAY: 0
MDC_IDC_STAT_AT_MODE_SW_MAX_DURATION: 66 S
MDC_IDC_STAT_AT_MODE_SW_PERCENT_TIME: 1 %
MDC_IDC_STAT_BRADY_AP_VP_PERCENT: 26 %
MDC_IDC_STAT_BRADY_AP_VS_PERCENT: 11 %
MDC_IDC_STAT_BRADY_AS_VP_PERCENT: 19 %
MDC_IDC_STAT_BRADY_AS_VS_PERCENT: 43 %
MDC_IDC_STAT_BRADY_DTM_END: NORMAL
MDC_IDC_STAT_BRADY_DTM_START: NORMAL
MDC_IDC_STAT_BRADY_RA_PERCENT_PACED: 36 %
MDC_IDC_STAT_BRADY_RV_PERCENT_PACED: 46 %
MDC_IDC_STAT_CRT_DTM_END: NORMAL
MDC_IDC_STAT_CRT_DTM_START: NORMAL
MDC_IDC_STAT_HEART_RATE_ATRIAL_MAX: 280 {BEATS}/MIN
MDC_IDC_STAT_HEART_RATE_ATRIAL_MEAN: 80 {BEATS}/MIN
MDC_IDC_STAT_HEART_RATE_ATRIAL_MIN: 40 {BEATS}/MIN
MDC_IDC_STAT_HEART_RATE_DTM_END: NORMAL
MDC_IDC_STAT_HEART_RATE_DTM_START: NORMAL
MDC_IDC_STAT_HEART_RATE_VENTRICULAR_MAX: 250 {BEATS}/MIN
MDC_IDC_STAT_HEART_RATE_VENTRICULAR_MEAN: 80 {BEATS}/MIN
MDC_IDC_STAT_HEART_RATE_VENTRICULAR_MIN: 40 {BEATS}/MIN

## 2024-07-29 PROCEDURE — 93296 REM INTERROG EVL PM/IDS: CPT | Performed by: INTERNAL MEDICINE

## 2024-07-29 PROCEDURE — 93294 REM INTERROG EVL PM/LDLS PM: CPT | Performed by: INTERNAL MEDICINE

## 2024-07-31 ENCOUNTER — TRANSFERRED RECORDS (OUTPATIENT)
Dept: HEALTH INFORMATION MANAGEMENT | Facility: CLINIC | Age: 89
End: 2024-07-31
Payer: COMMERCIAL

## 2024-08-09 ENCOUNTER — DOCUMENTATION ONLY (OUTPATIENT)
Dept: PHYSICAL MEDICINE AND REHAB | Facility: CLINIC | Age: 89
End: 2024-08-09
Payer: COMMERCIAL

## 2024-08-19 ENCOUNTER — TELEPHONE (OUTPATIENT)
Dept: CARDIOLOGY | Facility: CLINIC | Age: 89
End: 2024-08-19
Payer: COMMERCIAL

## 2024-08-19 NOTE — TELEPHONE ENCOUNTER
Alert recieved for   *Exceeded AT/AF Moon  High V. Rates during AT/AF      1 mode switch episode logged 8/17/2024@ 12:43 am, comprising 8.8 % of the time. 1 EGM for review shows As>Vs lasting 2 days 1hour 21 mins, rates controlled. Presenting rhythm is afib with V rates 70-80's.  Taking warfarin for HX PE's and factor Leiden. Pt has hx PAF however AT AF burden has gone from < 1% to 8.8 %.  Pt is not on BB or antiarrythmic.  Unclear if pt is rhythm control.   Last seen by MARCO ANTONIO Antunez 10/16/2023.   Encounter Routed to Amy.   Pt uses [facility] on [address] in [facility].  Maria Victoria WINTERS

## 2024-08-19 NOTE — TELEPHONE ENCOUNTER
Please see if he is aware of the AF. If he is feeling ok, we can continue rate control.     JOHN Pulliam, CNP

## 2024-08-19 NOTE — TELEPHONE ENCOUNTER
I called and he was un aware of AF and was having no associated symptoms. So I will call him and let him know nothing further at this time.  Thanks Amy !

## 2024-08-21 ENCOUNTER — TRANSFERRED RECORDS (OUTPATIENT)
Dept: HEALTH INFORMATION MANAGEMENT | Facility: CLINIC | Age: 89
End: 2024-08-21
Payer: COMMERCIAL

## 2024-08-22 ENCOUNTER — LAB (OUTPATIENT)
Dept: LAB | Facility: CLINIC | Age: 89
End: 2024-08-22
Payer: COMMERCIAL

## 2024-08-22 ENCOUNTER — ANTICOAGULATION THERAPY VISIT (OUTPATIENT)
Dept: ANTICOAGULATION | Facility: CLINIC | Age: 89
End: 2024-08-22

## 2024-08-22 DIAGNOSIS — Z79.01 CHRONIC ANTICOAGULATION: ICD-10-CM

## 2024-08-22 DIAGNOSIS — Z86.718 HISTORY OF DEEP VENOUS THROMBOSIS: ICD-10-CM

## 2024-08-22 DIAGNOSIS — D68.51 HETEROZYGOUS FACTOR V LEIDEN MUTATION (H): ICD-10-CM

## 2024-08-22 DIAGNOSIS — Z86.718 HISTORY OF DEEP VENOUS THROMBOSIS: Primary | ICD-10-CM

## 2024-08-22 LAB — INR BLD: 2.9 (ref 0.9–1.1)

## 2024-08-22 PROCEDURE — 85610 PROTHROMBIN TIME: CPT

## 2024-08-22 PROCEDURE — 36416 COLLJ CAPILLARY BLOOD SPEC: CPT

## 2024-08-22 NOTE — PROGRESS NOTES
ANTICOAGULATION MANAGEMENT     Victorino Khan 89 year old male is on warfarin with therapeutic INR result. (Goal INR 2.0-3.0)    Recent labs: (last 7 days)     08/22/24  1029   INR 2.9*       ASSESSMENT     Warfarin Lab Questionnaire    Warfarin Doses Last 7 Days      8/22/2024    10:23 AM   Dose in Tablet or Mg   TAB or MG? - 4mg and 5mg warfarin tablets. milligram (mg)     Pt Rptd Dose SUNDAY MONDAY TUESDAY WED THURS FRIDAY SATURDAY 8/22/2024  10:23 AM 4 2 4 2 4 2 4         8/22/2024   Warfarin Lab Questionnaire   Missed doses within past 14 days? No   Changes in diet or alcohol within past 14 days? No   Medication changes since last result? No   Injuries or illness since last result? No   New shortness of breath, severe headaches or sudden changes in vision since last result? No   Abnormal bleeding since last result? No   Upcoming surgery, procedure? No   Best number to call with results? [phone]        Previous result: Therapeutic last visit at 2.4; previously outside of goal range at 1.9    Additional findings:  Chart Reviewed.       PLAN     Recommended plan for no diet, medication or health factor changes affecting INR     Dosing Instructions: Continue your current warfarin dose with next INR in 3 weeks       Summary  As of 8/22/2024      Full warfarin instructions:  2.5 mg every Tue, Thu, Sat; 4 mg all other days   Next INR check:  9/12/2024               Detailed voice message left for Eddy with dosing instructions and follow up date.     Check at provider office visit - INR on 9/19/24 at annual wellness check with Dr. Funes    Education provided: Please call back if any changes to your diet, medications or how you've been taking warfarin  Taking warfarin: Importance of taking warfarin as instructed  Goal range and lab monitoring: goal range and significance of current result    Plan made per [facility] anticoagulation protocol    Karey Baca, RN  Anticoagulation  Clinic  8/22/2024    _______________________________________________________________________     Anticoagulation Episode Summary       Current INR goal:  2.0-3.0   TTR:  80.2% (1 y)   Target end date:  Indefinite   Send INR reminders to:  [facility]    Indications    History of deep venous thrombosis [Z86.718]  Heterozygous factor V Leiden mutation (H24) [D68.51]  Chronic anticoagulation [Z79.01]             Comments:               Anticoagulation Care Providers       Provider Role Specialty Phone number    Yuri Funes MD Referring Internal Medicine [phone]

## 2024-08-29 ENCOUNTER — ANCILLARY PROCEDURE (OUTPATIENT)
Dept: BONE DENSITY | Facility: CLINIC | Age: 89
End: 2024-08-29
Attending: PHYSICAL MEDICINE & REHABILITATION
Payer: COMMERCIAL

## 2024-08-29 DIAGNOSIS — Z87.81 HISTORY OF COMPRESSION FRACTURE OF VERTEBRAL COLUMN: ICD-10-CM

## 2024-08-29 DIAGNOSIS — M81.0 OSTEOPOROSIS, UNSPECIFIED OSTEOPOROSIS TYPE, UNSPECIFIED PATHOLOGICAL FRACTURE PRESENCE: ICD-10-CM

## 2024-08-29 PROCEDURE — 77081 DXA BONE DENSITY APPENDICULR: CPT | Mod: TC | Performed by: PHYSICIAN ASSISTANT

## 2024-08-29 PROCEDURE — 77080 DXA BONE DENSITY AXIAL: CPT | Mod: TC | Performed by: PHYSICIAN ASSISTANT

## 2024-09-17 ENCOUNTER — TRANSFERRED RECORDS (OUTPATIENT)
Dept: HEALTH INFORMATION MANAGEMENT | Facility: CLINIC | Age: 89
End: 2024-09-17
Payer: COMMERCIAL

## 2024-09-19 ENCOUNTER — TELEPHONE (OUTPATIENT)
Dept: INTERNAL MEDICINE | Facility: CLINIC | Age: 89
End: 2024-09-19

## 2024-09-19 ENCOUNTER — OFFICE VISIT (OUTPATIENT)
Dept: INTERNAL MEDICINE | Facility: CLINIC | Age: 89
End: 2024-09-19
Payer: COMMERCIAL

## 2024-09-19 ENCOUNTER — ANTICOAGULATION THERAPY VISIT (OUTPATIENT)
Dept: ANTICOAGULATION | Facility: CLINIC | Age: 89
End: 2024-09-19

## 2024-09-19 VITALS
HEART RATE: 63 BPM | BODY MASS INDEX: 28.39 KG/M2 | RESPIRATION RATE: 15 BRPM | DIASTOLIC BLOOD PRESSURE: 64 MMHG | HEIGHT: 68 IN | OXYGEN SATURATION: 95 % | SYSTOLIC BLOOD PRESSURE: 116 MMHG | TEMPERATURE: 96.8 F | WEIGHT: 187.3 LBS

## 2024-09-19 DIAGNOSIS — D68.51 HETEROZYGOUS FACTOR V LEIDEN MUTATION (H): ICD-10-CM

## 2024-09-19 DIAGNOSIS — M81.0 AGE-RELATED OSTEOPOROSIS WITHOUT CURRENT PATHOLOGICAL FRACTURE: ICD-10-CM

## 2024-09-19 DIAGNOSIS — Z23 ENCOUNTER FOR IMMUNIZATION: ICD-10-CM

## 2024-09-19 DIAGNOSIS — Z86.718 HISTORY OF DEEP VENOUS THROMBOSIS: ICD-10-CM

## 2024-09-19 DIAGNOSIS — E16.1 REACTIVE HYPOGLYCEMIA: ICD-10-CM

## 2024-09-19 DIAGNOSIS — M47.816 SPONDYLOSIS OF LUMBAR REGION WITHOUT MYELOPATHY OR RADICULOPATHY: ICD-10-CM

## 2024-09-19 DIAGNOSIS — Z86.718 HISTORY OF DEEP VENOUS THROMBOSIS: Primary | ICD-10-CM

## 2024-09-19 DIAGNOSIS — Z79.01 CHRONIC ANTICOAGULATION: ICD-10-CM

## 2024-09-19 DIAGNOSIS — J44.9 CHRONIC OBSTRUCTIVE PULMONARY DISEASE, UNSPECIFIED COPD TYPE (H): Primary | ICD-10-CM

## 2024-09-19 PROBLEM — M62.81 GENERALIZED MUSCLE WEAKNESS: Status: RESOLVED | Noted: 2023-07-21 | Resolved: 2024-09-19

## 2024-09-19 LAB
ALBUMIN SERPL BCG-MCNC: 4.2 G/DL (ref 3.5–5.2)
ALP SERPL-CCNC: 75 U/L (ref 40–150)
ALT SERPL W P-5'-P-CCNC: 11 U/L (ref 0–70)
ANION GAP SERPL CALCULATED.3IONS-SCNC: 11 MMOL/L (ref 7–15)
AST SERPL W P-5'-P-CCNC: 25 U/L (ref 0–45)
BILIRUB SERPL-MCNC: 1 MG/DL
BUN SERPL-MCNC: 20.4 MG/DL (ref 8–23)
CALCIUM SERPL-MCNC: 9.6 MG/DL (ref 8.8–10.4)
CHLORIDE SERPL-SCNC: 104 MMOL/L (ref 98–107)
CREAT SERPL-MCNC: 1 MG/DL (ref 0.67–1.17)
EGFRCR SERPLBLD CKD-EPI 2021: 71 ML/MIN/1.73M2
ERYTHROCYTE [DISTWIDTH] IN BLOOD BY AUTOMATED COUNT: 12.1 % (ref 10–15)
EST. AVERAGE GLUCOSE BLD GHB EST-MCNC: 100 MG/DL
GLUCOSE SERPL-MCNC: 109 MG/DL (ref 70–99)
HBA1C MFR BLD: 5.1 % (ref 0–5.6)
HCO3 SERPL-SCNC: 27 MMOL/L (ref 22–29)
HCT VFR BLD AUTO: 43.6 % (ref 40–53)
HGB BLD-MCNC: 14.1 G/DL (ref 13.3–17.7)
INR BLD: 2.2 (ref 0.9–1.1)
MCH RBC QN AUTO: 33.7 PG (ref 26.5–33)
MCHC RBC AUTO-ENTMCNC: 32.3 G/DL (ref 31.5–36.5)
MCV RBC AUTO: 104 FL (ref 78–100)
PLATELET # BLD AUTO: 167 10E3/UL (ref 150–450)
POTASSIUM SERPL-SCNC: 4.4 MMOL/L (ref 3.4–5.3)
PROT SERPL-MCNC: 6.7 G/DL (ref 6.4–8.3)
RBC # BLD AUTO: 4.19 10E6/UL (ref 4.4–5.9)
SODIUM SERPL-SCNC: 142 MMOL/L (ref 135–145)
WBC # BLD AUTO: 3.3 10E3/UL (ref 4–11)

## 2024-09-19 PROCEDURE — 83036 HEMOGLOBIN GLYCOSYLATED A1C: CPT | Performed by: INTERNAL MEDICINE

## 2024-09-19 PROCEDURE — 80053 COMPREHEN METABOLIC PANEL: CPT | Performed by: INTERNAL MEDICINE

## 2024-09-19 PROCEDURE — G0008 ADMIN INFLUENZA VIRUS VAC: HCPCS | Performed by: INTERNAL MEDICINE

## 2024-09-19 PROCEDURE — 90662 IIV NO PRSV INCREASED AG IM: CPT | Performed by: INTERNAL MEDICINE

## 2024-09-19 PROCEDURE — 90480 ADMN SARSCOV2 VAC 1/ONLY CMP: CPT | Performed by: INTERNAL MEDICINE

## 2024-09-19 PROCEDURE — 36415 COLL VENOUS BLD VENIPUNCTURE: CPT | Performed by: INTERNAL MEDICINE

## 2024-09-19 PROCEDURE — 91320 SARSCV2 VAC 30MCG TRS-SUC IM: CPT | Performed by: INTERNAL MEDICINE

## 2024-09-19 PROCEDURE — 85027 COMPLETE CBC AUTOMATED: CPT | Performed by: INTERNAL MEDICINE

## 2024-09-19 PROCEDURE — G0439 PPPS, SUBSEQ VISIT: HCPCS | Performed by: INTERNAL MEDICINE

## 2024-09-19 PROCEDURE — 99214 OFFICE O/P EST MOD 30 MIN: CPT | Mod: 25 | Performed by: INTERNAL MEDICINE

## 2024-09-19 PROCEDURE — 36416 COLLJ CAPILLARY BLOOD SPEC: CPT | Performed by: INTERNAL MEDICINE

## 2024-09-19 PROCEDURE — 85610 PROTHROMBIN TIME: CPT | Performed by: INTERNAL MEDICINE

## 2024-09-19 NOTE — PROGRESS NOTES
ANTICOAGULATION MANAGEMENT     Victorino Khan 89 year old male is on warfarin with therapeutic INR result. (Goal INR 2.0-3.0)    Recent labs: (last 7 days)     09/19/24  1337   INR 2.2*       ASSESSMENT     Source(s): Chart Review  Previous INR was Therapeutic last 2(+) visits  Medication, diet, health changes since last INR chart reviewed; none identified         PLAN     Recommended plan for no diet, medication or health factor changes affecting INR     Dosing Instructions: Continue your current warfarin dose with next INR in 4 weeks       Summary  As of 9/19/2024      Full warfarin instructions:  2.5 mg every Tue, Thu, Sat; 4 mg all other days   Next INR check:  10/24/2024               Detailed voice message left for Eddy with dosing instructions and follow up date.     Contact [phone] to schedule and with any changes, questions or concerns.     Education provided: Please call back if any changes to your diet, medications or how you've been taking warfarin    Plan made per [facility] anticoagulation protocol    Luís Williamson RN  9/19/2024  Anticoagulation Clinic  [employer] for routing messages: abraham [facility]  [facility] patient phone line: [phone]        _______________________________________________________________________     Anticoagulation Episode Summary       Current INR goal:  2.0-3.0   TTR:  81.0% (1 y)   Target end date:  Indefinite   Send INR reminders to:  [facility]    Indications    History of deep venous thrombosis [Z86.718]  Heterozygous factor V Leiden mutation (H24) [D68.51]  Chronic anticoagulation [Z79.01]             Comments:               Anticoagulation Care Providers       Provider Role Specialty Phone number    Yuri Funes MD Referring Internal Medicine [phone]

## 2024-09-19 NOTE — TELEPHONE ENCOUNTER
Tried calling garima back but had bad connection for talking. Left message as in acc encounter.  Pls transfer if calling back   Luís

## 2024-09-19 NOTE — TELEPHONE ENCOUNTER
General Call    Contacts       Contact Date/Time Type Contact Phone/Fax    09/19/2024 03:20 PM CDT Phone (Incoming) Eddy Khan (Self) [phone] (M)          Reason for Call:  returning acn call- no number listed in encounter    What are your questions or concerns:      Date of last appointment with provider:     Could we send this information to you in [facility] or would you prefer to receive a phone call?:   Patient would prefer a phone call   Okay to leave a detailed message?: Yes at Cell number on file:    Telephone Information:   Mobile [phone]

## 2024-09-19 NOTE — LETTER
September 20, 2024      Eddy Khan  [address] [place] [address]        Dear ,    We are writing to inform you of your test results.    Your blood counts are good, the blood chemistries are good. The A1c is normal - there is no diabetes. The minor abnormalities are not significant.     Resulted Orders   HEMOGLOBIN A1C   Result Value Ref Range    Estimated Average Glucose 100 <117 mg/dL    Hemoglobin A1C 5.1 0.0 - 5.6 %      Comment:      Normal <5.7%   Prediabetes 5.7-6.4%    Diabetes 6.5% or higher     Note: Adopted from ADA consensus guidelines.   CBC with platelets   Result Value Ref Range    WBC Count 3.3 (L) 4.0 - 11.0 10e3/uL    RBC Count 4.19 (L) 4.40 - 5.90 10e6/uL    Hemoglobin 14.1 13.3 - 17.7 g/dL    Hematocrit 43.6 40.0 - 53.0 %     (H) 78 - 100 fL    MCH 33.7 (H) 26.5 - 33.0 pg    MCHC 32.3 31.5 - 36.5 g/dL    RDW 12.1 10.0 - 15.0 %    Platelet Count 167 150 - 450 10e3/uL   Comprehensive metabolic panel   Result Value Ref Range    Sodium 142 135 - 145 mmol/L    Potassium 4.4 3.4 - 5.3 mmol/L    Carbon Dioxide (CO2) 27 22 - 29 mmol/L    Anion Gap 11 7 - 15 mmol/L    Urea Nitrogen 20.4 8.0 - 23.0 mg/dL    Creatinine 1.00 0.67 - 1.17 mg/dL    GFR Estimate 71 >60 mL/min/1.73m2      Comment:      eGFR calculated using 2021 CKD-EPI equation.    Calcium 9.6 8.8 - 10.4 mg/dL      Comment:      Reference intervals for this test were updated on 7/16/2024 to reflect our healthy population more accurately. There may be differences in the flagging of prior results with similar values performed with this method. Those prior results can be interpreted in the context of the updated reference intervals.    Chloride 104 98 - 107 mmol/L    Glucose 109 (H) 70 - 99 mg/dL    Alkaline Phosphatase 75 40 - 150 U/L    AST 25 0 - 45 U/L    ALT 11 0 - 70 U/L    Protein Total 6.7 6.4 - 8.3 g/dL    Albumin 4.2 3.5 - 5.2 g/dL    Bilirubin Total 1.0 <=1.2 mg/dL       If you have any questions or concerns,  please call the clinic at the number listed above.       Sincerely,      Yuri Funes MD

## 2024-09-19 NOTE — PROGRESS NOTES
"Preventive Care Visit  [facility]  Yuri Funes MD, Internal Medicine  Sep 19, 2024    Assessment & Plan     Chronic obstructive pulmonary disease, unspecified COPD type   Clinically stable.  Ongoing inhaler therapy.     Spondylosis of lumbar region without myelopathy or radiculopathy  He is going to try a nerve ablation, has chronic pain that is not severe, but worth trying to improve.     Age-related osteoporosis without current pathological fracture  Last DXA revealed osteopenia.      Chronic anticoagulation  Ongoing warfarin anticoagulation    Reactive hypoglycemia  Long standing symptoms.   He anticipates and treats it without serious issue.  Has not had type 2 DM, will get A1c.     BMI  Estimated body mass index is 28.48 kg/m  as calculated from the following:    Height as of this encounter: 1.727 m (5' 8\").    Weight as of this encounter: 85 kg (187 lb 4.8 oz).     Counseling  Appropriate preventive services were addressed with this patient via screening.    Follow up one year    Subjective   Eddy is a 89 year old, presenting for the following:  Annual Visit        9/19/2024    12:23 PM   Additional Questions   Roomed by Hayes NICK RN     Health Care Directive  Patient does not have a Health Care Directive or Living Will: Advance Directive received and scanned. Click on Code in the patient header to view.    HPI  He continues to suffer from chronic back pain.  It is better lying down, so not severe at night, but constant during the day.  Not having increased dyspnea - takes his inhalers.  No voiding issues or bowel issues.  Has not been acutely ill.  Ongoing use of warfarin.  Will be trying lumbar nerve block soon.  Other treatments have not been helpful.  He still gets reactive hypoglycemia symptoms without any LOC.  He has adjusted and as 'sugar' available nearly at all times. Does not have LOC, but gets intense diaphoresis with these episodes.          9/19/2024   General Health "   How would you rate your overall physical health? Good   Feel stress (tense, anxious, or unable to sleep) To some extent      (!) STRESS CONCERN      9/19/2024   Nutrition   Diet: Regular (no restrictions)          9/19/2024   Exercise   Days per week of moderate/strenous exercise 2 days   Average minutes spent exercising at this level 60 min      (!) EXERCISE CONCERN      9/19/2024   Social Factors   Frequency of gathering with friends or relatives More than three times a week   Worry food won't last until get money to buy more No   Food not last or not have enough money for food? No   Do you have housing? (Housing is defined as stable permanent housing and does not include staying ouside in a car, in a tent, in an abandoned building, in an overnight shelter, or couch-surfing.) Yes   Are you worried about losing your housing? No   Lack of transportation? No   Unable to get utilities (heat,electricity)? No          9/19/2024   Fall Risk   Fallen 2 or more times in the past year? No   Trouble with walking or balance? Yes   Gait Speed Test (Document in seconds) 3.98   Gait Speed Test Interpretation Less than or equal to 5.00 seconds - PASS             9/19/2024   Activities of Daily Living- Home Safety   Needs help with the following daily activites None of the above   Safety concerns in the home None of the above          9/19/2024   Dental   Dentist two times every year? Yes          9/19/2024   Hearing Screening   Hearing concerns? (!) I NEED TO ASK PEOPLE TO SPEAK UP OR REPEAT THEMSELVES.          9/19/2024   Driving Risk Screening   Patient/family members have concerns about driving No          9/19/2024   General Alertness/Fatigue Screening   Have you been more tired than usual lately? No          9/19/2024   Urinary Incontinence Screening   Bothered by leaking urine in past 6 months No          9/19/2024   TB Screening   Were you born outside of the [place]? No      Today's PHQ-2 Score:       9/19/2024    12:27 PM    PHQ-2 ( 1999 [employer])   Q1: Little interest or pleasure in doing things 0   Q2: Feeling down, depressed or hopeless 0   PHQ-2 Score 0   Q1: Little interest or pleasure in doing things Not at all   Q2: Feeling down, depressed or hopeless Not at all   PHQ-2 Score 0         9/19/2024   Substance Use   Alcohol more than 3/day or more than 7/wk No   Do you have a current opioid prescription? No   How severe/bad is pain from 1 to 10? 7/10   Do you use any other substances recreationally? No      Social History     Tobacco Use    Smoking status: Never    Smokeless tobacco: Never   Vaping Use    Vaping status: Never Used   Substance Use Topics    Alcohol use: No    Drug use: Never     Reviewed and updated as needed this visit by Provider    Past Medical History:   Diagnosis Date    Chronic anticoagulation     Gastroesophageal reflux disease without esophagitis     H/O reactive hypoglycemia     Heterozygous factor V Leiden mutation (H24)     Osteoporosis     Personal history of DVT (deep vein thrombosis)     Primary osteoarthritis of right shoulder     Reactive hypoglycemia 09/19/2024    Rotator cuff disorder, right     Rotator cuff syndrome, right     Spondylosis of lumbar region without myelopathy or radiculopathy     Status cardiac pacemaker     Vertebral compression fracture (H)      Past Surgical History:   Procedure Laterality Date    ARTHROPLASTY ANKLE Left 4/21/2022    Procedure: LEFT TOTAL ANKLE REPLACEMENT;  Surgeon: Magan Chatman MD;  Location:  OR    IMPLANT PACEMAKER Left     For sick sinus syndrome    LAPAROSCOPIC NISSEN FUNDOPLICATION  11/2011    For large paraesophageal hernia    LENGTHEN TENDON ACHILLES Left 4/21/2022    Procedure: TENDON ACHILLELS LENGTHENING, MEDIAL DISPLACEMENT CALCANEAL OSTOTOMY;  Surgeon: Magan Chatman MD;  Location: [facility] OR    TOTAL KNEE ARTHROPLASTY Right 2000    TOTAL KNEE ARTHROPLASTY Left 2010     Current providers sharing in care for this patient  "include:  Patient Care Team:  Yuri Funes MD as PCP - General (Internal Medicine)  Branden Duque MD as MD (Cardiovascular Disease)  Amy Antunez APRN CNP as Assigned Heart and Vascular Provider  Jarvis Juárez MD as MD (Neurological Surgery)  Ganesh Arndt MD as Assigned Neuroscience Provider    The following health maintenance items are reviewed in Epic and correct as of today:  Health Maintenance   Topic Date Due    MICROALBUMIN  Never done    DIABETIC FOOT EXAM  Never done    SPIROMETRY  Never done    COPD ACTION PLAN  Never done    RSV VACCINE (1 - 1-dose 75+ series) Never done    ZOSTER IMMUNIZATION (2 of 3) 03/01/2011    ANNUAL REVIEW OF HM ORDERS  11/05/2022    MEDICARE ANNUAL WELLNESS VISIT  03/15/2023    LIPID  03/15/2023    A1C  04/12/2023    INFLUENZA VACCINE (1) 09/01/2024    COVID-19 Vaccine (7 - 2024-25 season) 09/01/2024    BMP  10/05/2024    EYE EXAM  08/21/2025    FALL RISK ASSESSMENT  09/19/2025    ADVANCE CARE PLANNING  04/20/2027    DTAP/TDAP/TD IMMUNIZATION (2 - Td or Tdap) 03/15/2032    PHQ-2 (once per calendar year)  Completed    Pneumococcal Vaccine: 65+ Years  Completed    HPV IMMUNIZATION  Aged Out    MENINGITIS IMMUNIZATION  Aged Out    RSV MONOCLONAL ANTIBODY  Aged Out     Review of Systems  See HPI    Objective      PHYSICAL EXAM:  General Appearance: In no acute distress  Vitals:    09/19/24 1234   BP: 116/64   BP Location: Left arm   Patient Position: Sitting   Cuff Size: Adult Regular   Pulse: 63   Resp: 15   Temp: 96.8  F (36  C)   TempSrc: Tympanic   SpO2: 95%   Weight: 85 kg (187 lb 4.8 oz)   Height: 1.727 m (5' 8\")     Estimated body mass index is 28.48 kg/m  as calculated from the following:    Height as of this encounter: 1.727 m (5' 8\").    Weight as of this encounter: 85 kg (187 lb 4.8 oz).  EYES: Clear  HEENT: nose and throat clear, ears normal   NECK:  without cervical or axillary adenopathy  RESPIRATORY: Clear   CARDIOVASCULAR: S1, S2  ABDOMEN: " soft, flat, and non-tender  EXTREMITIES:  multiple areas of senile purpura,   NEUROLOGIC: Speech is clear, no focal arm or leg weakness, gait is stooped, and limited with back flexion related to chronic spondylosis  PSYCHIATRIC: Oriented X 3, behavior and affect normal, thinking is clear         9/19/2024   Mini Cog   Clock Draw Score 2 Normal   3 Item Recall 1 object recalled   Mini Cog Total Score 3      Cognitive function intact today    Signed Electronically by: Yuri Funes MD

## 2024-10-04 ENCOUNTER — DOCUMENTATION ONLY (OUTPATIENT)
Dept: ANTICOAGULATION | Facility: CLINIC | Age: 89
End: 2024-10-04

## 2024-10-04 ENCOUNTER — LAB (OUTPATIENT)
Dept: LAB | Facility: CLINIC | Age: 89
End: 2024-10-04
Payer: COMMERCIAL

## 2024-10-04 ENCOUNTER — ANTICOAGULATION THERAPY VISIT (OUTPATIENT)
Dept: ANTICOAGULATION | Facility: CLINIC | Age: 89
End: 2024-10-04

## 2024-10-04 DIAGNOSIS — Z86.718 HISTORY OF DEEP VENOUS THROMBOSIS: Primary | ICD-10-CM

## 2024-10-04 DIAGNOSIS — D68.51 HETEROZYGOUS FACTOR V LEIDEN MUTATION (H): ICD-10-CM

## 2024-10-04 DIAGNOSIS — Z79.01 CHRONIC ANTICOAGULATION: ICD-10-CM

## 2024-10-04 DIAGNOSIS — Z86.718 HISTORY OF DEEP VENOUS THROMBOSIS: ICD-10-CM

## 2024-10-04 LAB — INR BLD: 2.1 (ref 0.9–1.1)

## 2024-10-04 PROCEDURE — 85610 PROTHROMBIN TIME: CPT

## 2024-10-04 PROCEDURE — 36416 COLLJ CAPILLARY BLOOD SPEC: CPT

## 2024-10-04 NOTE — PROGRESS NOTES
ANTICOAGULATION MANAGEMENT     Victorino Khan 89 year old male is on warfarin with therapeutic INR result. (Goal INR 2.0-3.0)    Recent labs: (last 7 days)     10/04/24  1313   INR 2.1*       ASSESSMENT     Warfarin Lab Questionnaire    Warfarin Doses Last 7 Days      10/4/2024     1:07 PM   Dose in Tablet or Mg   TAB or MG? milligram (mg)     Pt Rptd Dose ALEJANDRINA MONDAY TUESDAY WED THURS FRIDAY SATURDAY   10/4/2024   1:07 PM 4 4 4 4 2 2 2         10/4/2024   Warfarin Lab Questionnaire   Missed doses within past 14 days? No   Changes in diet or alcohol within past 14 days? No   Medication changes since last result? No   Injuries or illness since last result? No   New shortness of breath, severe headaches or sudden changes in vision since last result? No   Abnormal bleeding since last result? No   Upcoming surgery, procedure? No   Best number to call with results? [phone]        Previous result: Therapeutic last 2(+) visits  Additional findings: None       PLAN     Recommended plan for no diet, medication or health factor changes affecting INR     Dosing Instructions: Continue your current warfarin dose with next INR in 4 weeks       Summary  As of 10/4/2024      Full warfarin instructions:  2.5 mg every Tue, Thu, Sat; 4 mg all other days   Next INR check:  11/1/2024               Detailed voice message left for Eddy with dosing instructions and follow up date.     Contact [phone] to schedule and with any changes, questions or concerns.     Education provided: Please call back if any changes to your diet, medications or how you've been taking warfarin    Plan made per [facility] anticoagulation protocol    uLís Williamson RN  10/4/2024  Anticoagulation Clinic  [facility] for routing messages: [facility]  [facility] patient phone line: [phone]        _______________________________________________________________________     Anticoagulation Episode Summary       Current INR goal:  2.0-3.0   TTR:  83.6% (1 y)   Target  end date:  Indefinite   Send INR reminders to:  [facility]    Indications    History of deep venous thrombosis [Z86.718]  Heterozygous factor V Leiden mutation (H) [D68.51]  Chronic anticoagulation [Z79.01]             Comments:               Anticoagulation Care Providers       Provider Role Specialty Phone number    Yuri Funes MD Referring Internal Medicine [phone]             Standing/Walking/Toileting/Moving from bed to chair

## 2024-11-08 ENCOUNTER — DOCUMENTATION ONLY (OUTPATIENT)
Dept: CARDIOLOGY | Facility: CLINIC | Age: 89
End: 2024-11-08

## 2024-11-08 ENCOUNTER — ANCILLARY PROCEDURE (OUTPATIENT)
Dept: CARDIOLOGY | Facility: CLINIC | Age: 89
End: 2024-11-08
Attending: INTERNAL MEDICINE
Payer: COMMERCIAL

## 2024-11-08 DIAGNOSIS — I44.2 CHB (COMPLETE HEART BLOCK) (H): Primary | ICD-10-CM

## 2024-11-08 DIAGNOSIS — Z95.0 CARDIAC PACEMAKER IN SITU: ICD-10-CM

## 2024-11-08 PROCEDURE — 93280 PM DEVICE PROGR EVAL DUAL: CPT | Performed by: INTERNAL MEDICINE

## 2024-11-08 NOTE — PROGRESS NOTES
Faxed pacemaker check report to pt's PMD, Dr Vaughn Evans, [facility]. Fax Number [phone]    ADDENDUM 11/12/2024: received a fax back from Dr. Evans that he no longer follow this pt. Dr. Evans' info was obtained from an older note in [employer].     Reviewed [facility] care teams, Dr. Funes is listed as pt's PMD now, and he is a [place] MD. No need to fax report, it can be viewed in Epic.

## 2024-11-13 ENCOUNTER — LAB (OUTPATIENT)
Dept: LAB | Facility: CLINIC | Age: 89
End: 2024-11-13
Payer: COMMERCIAL

## 2024-11-13 ENCOUNTER — ANTICOAGULATION THERAPY VISIT (OUTPATIENT)
Dept: ANTICOAGULATION | Facility: CLINIC | Age: 89
End: 2024-11-13

## 2024-11-13 DIAGNOSIS — Z86.718 HISTORY OF DEEP VENOUS THROMBOSIS: Primary | ICD-10-CM

## 2024-11-13 DIAGNOSIS — Z79.01 CHRONIC ANTICOAGULATION: ICD-10-CM

## 2024-11-13 DIAGNOSIS — Z86.718 HISTORY OF DEEP VENOUS THROMBOSIS: ICD-10-CM

## 2024-11-13 DIAGNOSIS — D68.51 HETEROZYGOUS FACTOR V LEIDEN MUTATION (H): ICD-10-CM

## 2024-11-13 LAB — INR BLD: 3.9 (ref 0.9–1.1)

## 2024-11-13 PROCEDURE — 85610 PROTHROMBIN TIME: CPT

## 2024-11-13 PROCEDURE — 36415 COLL VENOUS BLD VENIPUNCTURE: CPT

## 2024-11-13 NOTE — PROGRESS NOTES
ANTICOAGULATION MANAGEMENT     Victorino Khan 89 year old male is on warfarin with therapeutic INR result. (Goal INR 2.0-3.0)    Recent labs: (last 7 days)     11/13/24  0958   INR 3.9*       ASSESSMENT     Warfarin Lab Questionnaire    Warfarin Doses Last 7 Days      11/13/2024     9:53 AM   Dose in Tablet or Mg   TAB or MG? milligram (mg)     Pt Rptd Dose SUNDAY MONDAY TUESDAY WED THURS FRIDAY SATURDAY 11/13/2024   9:53 AM 4 2 4 2 4 2 4         11/13/2024   Warfarin Lab Questionnaire   Missed doses within past 14 days? No   Changes in diet or alcohol within past 14 days? No   Medication changes since last result? No   Injuries or illness since last result? No   New shortness of breath, severe headaches or sudden changes in vision since last result? No   Abnormal bleeding since last result? No   Upcoming surgery, procedure? No   Best number to call with results? [phone]        Previous result: Therapeutic last 2(+) visits  Additional findings:  does not use 5 mg tabs anymore, only 4mg.         PLAN     Recommended plan for ongoing change(s) affecting INR     Dosing Instructions: hold dose then decrease your warfarin dose (14.9% change) with next INR in 2 weeks       Summary  As of 11/13/2024      Full warfarin instructions:  11/13: Hold; Otherwise 4 mg every Mon, Wed, Fri; 2 mg all other days   Next INR check:  11/27/2024               Telephone call with Eddy who verbalizes understanding and agrees to plan    Lab visit scheduled    Education provided: Please call back if any changes to your diet, medications or how you've been taking warfarin    Plan made per [facility] anticoagulation protocol    Luís Williamson RN  11/13/2024  Anticoagulation Clinic  [facility] for routing messages: [facility]  [facility] patient phone line: [phone]        _______________________________________________________________________     Anticoagulation Episode Summary       Current INR goal:  2.0-3.0   TTR:  81.6% (1 y)   Target end  date:  Indefinite   Send INR reminders to:  [facility]    Indications    History of deep venous thrombosis [Z86.718]  Heterozygous factor V Leiden mutation (H) [D68.51]  Chronic anticoagulation [Z79.01]             Comments:  --             Anticoagulation Care Providers       Provider Role Specialty Phone number    Yuri Funes MD Referring Internal Medicine [phone]

## 2024-11-14 LAB
MDC_IDC_LEAD_CONNECTION_STATUS: NORMAL
MDC_IDC_LEAD_CONNECTION_STATUS: NORMAL
MDC_IDC_LEAD_IMPLANT_DT: NORMAL
MDC_IDC_LEAD_IMPLANT_DT: NORMAL
MDC_IDC_LEAD_LOCATION: NORMAL
MDC_IDC_LEAD_LOCATION: NORMAL
MDC_IDC_LEAD_LOCATION_DETAIL_1: NORMAL
MDC_IDC_LEAD_LOCATION_DETAIL_1: NORMAL
MDC_IDC_LEAD_MFG: NORMAL
MDC_IDC_LEAD_MFG: NORMAL
MDC_IDC_LEAD_MODEL: NORMAL
MDC_IDC_LEAD_MODEL: NORMAL
MDC_IDC_LEAD_POLARITY_TYPE: NORMAL
MDC_IDC_LEAD_POLARITY_TYPE: NORMAL
MDC_IDC_LEAD_SERIAL: NORMAL
MDC_IDC_LEAD_SERIAL: NORMAL
MDC_IDC_MSMT_BATTERY_REMAINING_LONGEVITY: 26 MO
MDC_IDC_MSMT_BATTERY_STATUS: NORMAL
MDC_IDC_MSMT_BATTERY_VOLTAGE: 2.89 V
MDC_IDC_MSMT_LEADCHNL_RA_IMPEDANCE_VALUE: 475 OHM
MDC_IDC_MSMT_LEADCHNL_RA_PACING_THRESHOLD_AMPLITUDE: 0.5 V
MDC_IDC_MSMT_LEADCHNL_RA_PACING_THRESHOLD_AMPLITUDE: 0.5 V
MDC_IDC_MSMT_LEADCHNL_RA_PACING_THRESHOLD_PULSEWIDTH: 0.5 MS
MDC_IDC_MSMT_LEADCHNL_RA_PACING_THRESHOLD_PULSEWIDTH: 0.5 MS
MDC_IDC_MSMT_LEADCHNL_RA_SENSING_INTR_AMPL: 5 MV
MDC_IDC_MSMT_LEADCHNL_RV_IMPEDANCE_VALUE: 400 OHM
MDC_IDC_MSMT_LEADCHNL_RV_PACING_THRESHOLD_AMPLITUDE: 0.75 V
MDC_IDC_MSMT_LEADCHNL_RV_PACING_THRESHOLD_AMPLITUDE: 0.75 V
MDC_IDC_MSMT_LEADCHNL_RV_PACING_THRESHOLD_PULSEWIDTH: 0.5 MS
MDC_IDC_MSMT_LEADCHNL_RV_PACING_THRESHOLD_PULSEWIDTH: 0.5 MS
MDC_IDC_MSMT_LEADCHNL_RV_SENSING_INTR_AMPL: 5.8 MV
MDC_IDC_PG_IMPLANT_DTM: NORMAL
MDC_IDC_PG_MFG: NORMAL
MDC_IDC_PG_MODEL: NORMAL
MDC_IDC_PG_SERIAL: NORMAL
MDC_IDC_PG_TYPE: NORMAL
MDC_IDC_SESS_CLINIC_NAME: NORMAL
MDC_IDC_SESS_DTM: NORMAL
MDC_IDC_SESS_TYPE: NORMAL
MDC_IDC_SET_BRADY_AT_MODE_SWITCH_MODE: NORMAL
MDC_IDC_SET_BRADY_AT_MODE_SWITCH_RATE: 170 {BEATS}/MIN
MDC_IDC_SET_BRADY_HYSTRATE: NORMAL
MDC_IDC_SET_BRADY_LOWRATE: 60 {BEATS}/MIN
MDC_IDC_SET_BRADY_MAX_SENSOR_RATE: 130 {BEATS}/MIN
MDC_IDC_SET_BRADY_MAX_TRACKING_RATE: 120 {BEATS}/MIN
MDC_IDC_SET_BRADY_MODE: NORMAL
MDC_IDC_SET_BRADY_NIGHT_RATE: NORMAL
MDC_IDC_SET_BRADY_PAV_DELAY_LOW: 250 MS
MDC_IDC_SET_BRADY_SAV_DELAY_LOW: 250 MS
MDC_IDC_SET_LEADCHNL_RA_PACING_AMPLITUDE: 2 V
MDC_IDC_SET_LEADCHNL_RA_PACING_CAPTURE_MODE: NORMAL
MDC_IDC_SET_LEADCHNL_RA_PACING_POLARITY: NORMAL
MDC_IDC_SET_LEADCHNL_RA_PACING_PULSEWIDTH: 0.5 MS
MDC_IDC_SET_LEADCHNL_RA_SENSING_ADAPTATION_MODE: NORMAL
MDC_IDC_SET_LEADCHNL_RA_SENSING_POLARITY: NORMAL
MDC_IDC_SET_LEADCHNL_RA_SENSING_SENSITIVITY: 0.3 MV
MDC_IDC_SET_LEADCHNL_RV_PACING_AMPLITUDE: 2 V
MDC_IDC_SET_LEADCHNL_RV_PACING_CAPTURE_MODE: NORMAL
MDC_IDC_SET_LEADCHNL_RV_PACING_POLARITY: NORMAL
MDC_IDC_SET_LEADCHNL_RV_PACING_PULSEWIDTH: 0.5 MS
MDC_IDC_SET_LEADCHNL_RV_SENSING_POLARITY: NORMAL
MDC_IDC_SET_LEADCHNL_RV_SENSING_SENSITIVITY: 0.5 MV
MDC_IDC_STAT_AT_MODE_SW_COUNT: 0
MDC_IDC_STAT_BRADY_RA_PERCENT_PACED: 33 %
MDC_IDC_STAT_BRADY_RV_PERCENT_PACED: 63 %

## 2024-11-27 ENCOUNTER — ANTICOAGULATION THERAPY VISIT (OUTPATIENT)
Dept: ANTICOAGULATION | Facility: CLINIC | Age: 89
End: 2024-11-27

## 2024-11-27 ENCOUNTER — LAB (OUTPATIENT)
Dept: LAB | Facility: CLINIC | Age: 89
End: 2024-11-27
Payer: COMMERCIAL

## 2024-11-27 DIAGNOSIS — Z86.718 HISTORY OF DEEP VENOUS THROMBOSIS: Primary | ICD-10-CM

## 2024-11-27 DIAGNOSIS — Z79.01 CHRONIC ANTICOAGULATION: ICD-10-CM

## 2024-11-27 DIAGNOSIS — D68.51 HETEROZYGOUS FACTOR V LEIDEN MUTATION (H): ICD-10-CM

## 2024-11-27 DIAGNOSIS — Z86.718 HISTORY OF DEEP VENOUS THROMBOSIS: ICD-10-CM

## 2024-11-27 LAB — INR BLD: 1.8 (ref 0.9–1.1)

## 2024-11-27 PROCEDURE — 36416 COLLJ CAPILLARY BLOOD SPEC: CPT

## 2024-11-27 PROCEDURE — 85610 PROTHROMBIN TIME: CPT

## 2024-11-27 NOTE — PROGRESS NOTES
ANTICOAGULATION MANAGEMENT     Victorino Khan 89 year old male is on warfarin with subtherapeutic INR result. (Goal INR 2.0-3.0)    Recent labs: (last 7 days)     11/27/24  1051   INR 1.8*       ASSESSMENT     Warfarin Lab Questionnaire    Warfarin Doses Last 7 Days      11/27/2024    10:44 AM   Dose in Tablet or Mg   TAB or MG? milligram (mg)     Pt Rptd Dose SUNDAY MONDAY TUESDAY WED THURS FRIDAY SATURDAY 11/27/2024  10:44 AM 4 4 4 4 2 2 2         11/27/2024   Warfarin Lab Questionnaire   Missed doses within past 14 days? No   Changes in diet or alcohol within past 14 days? No   Medication changes since last result? No   Injuries or illness since last result? No   New shortness of breath, severe headaches or sudden changes in vision since last result? No   Abnormal bleeding since last result? No   Upcoming surgery, procedure? No   Best number to call with results? [phone]        Previous result: Supratherapeutic  Additional findings: None       PLAN     Recommended plan for no diet, medication or health factor changes affecting INR     Dosing Instructions: Increase your warfarin dose (10% change) with next INR in 2 weeks       Summary  As of 11/27/2024      Full warfarin instructions:  2 mg every Sun, Tue, Thu; 4 mg all other days   Next INR check:  12/11/2024               Sent e(ye)BRAIN message with dosing and follow up instructions    Contact [phone] to schedule and with any changes, questions or concerns.     Education provided: Please call back if any changes to your diet, medications or how you've been taking warfarin    Plan made per [facility] anticoagulation protocol    Luís Williamson RN  11/27/2024  Anticoagulation Clinic  [facility] for routing messages: [facility]  [facility] patient phone line: [phone]        _______________________________________________________________________     Anticoagulation Episode Summary       Current INR goal:  2.0-3.0   TTR:  79.6% (1 y)   Target end date:  Indefinite    Send INR reminders to:  [facility]    Indications    History of deep venous thrombosis [Z86.718]  Heterozygous factor V Leiden mutation (H) [D68.51]  Chronic anticoagulation [Z79.01]             Comments:  --             Anticoagulation Care Providers       Provider Role Specialty Phone number    Yuri Funes MD Referring Internal Medicine [phone]          ANTICOAGULATION MANAGEMENT     Victorino Khan 89 year old male is on warfarin with subtherapeutic INR result. (Goal INR 2.0-3.0)    Recent labs: (last 7 days)     11/27/24  1051   INR 1.8*       ASSESSMENT     Warfarin Lab Questionnaire    Warfarin Doses Last 7 Days      11/27/2024    10:44 AM   Dose in Tablet or Mg   TAB or MG? milligram (mg)     Pt Rptd Dose SUNDAY MONDAY TUESDAY WED THURS FRIDAY SATURDAY 11/27/2024  10:44 AM 4 4 4 4 2 2 2         11/27/2024   Warfarin Lab Questionnaire   Missed doses within past 14 days? No   Changes in diet or alcohol within past 14 days? No   Medication changes since last result? No   Injuries or illness since last result? No   New shortness of breath, severe headaches or sudden changes in vision since last result? No   Abnormal bleeding since last result? No   Upcoming surgery, procedure? No   Best number to call with results? [phone]        Previous result: Supratherapeutic  Additional findings: None       PLAN     Recommended plan for no diet, medication or health factor changes affecting INR     Dosing Instructions: Increase your warfarin dose (10% change) with next INR in 2 weeks       Summary  As of 11/27/2024      Full warfarin instructions:  2 mg every Tue, Thu, Sat; 4 mg all other days   Next INR check:  12/11/2024               Sent Get[employer] message with dosing and follow up instructions    Contact [phone] to schedule and with any changes, questions or concerns.     Education provided: Please call back if any changes to your diet, medications or how you've been taking warfarin    Plan made  per [facility] anticoagulation protocol    Luís Williamson RN  11/27/2024  Anticoagulation Clinic  [facility] for routing messages: [facility]  [facility] patient phone line: [phone]        _______________________________________________________________________     Anticoagulation Episode Summary       Current INR goal:  2.0-3.0   TTR:  79.6% (1 y)   Target end date:  Indefinite   Send INR reminders to:  [facility]    Indications    History of deep venous thrombosis [Z86.718]  Heterozygous factor V Leiden mutation (H) [D68.51]  Chronic anticoagulation [Z79.01]             Comments:  --             Anticoagulation Care Providers       Provider Role Specialty Phone number    Yuri Funes MD Referring Internal Medicine [phone]

## 2024-12-10 NOTE — PROGRESS NOTES
12/07/23 0500   Appointment Info   Signing clinician's name / credentials Genna Flemingari DPT, PT   Visits Used 12/16   Medical Diagnosis R27.0 (ICD-10-CM) - Ataxia   PT Tx Diagnosis gait abnormality, fall risk, generalized weakness   Precautions/Limitations fall risk   Progress Note/Certification   Start of Care Date 06/15/23   Onset of illness/injury or Date of Surgery 05/25/23   Therapy Frequency 1 x weekly every 1-3 weeks   Predicted Duration 12 weeks   Certification date from 11/11/23   Certification date to 02/03/23   Progress Note Due Date 02/03/23   Progress Note Completed Date 12/08/23   PT Goal 1   Goal Identifier gait speed   Goal Description Patient will be able to ambulate safely with appropriate gait speed 25 feet in 7  seconds.   Rationale to maximize safety and independence within the community   Goal Progress MET. 7 seconds 10 meter, 1.4 m/s   Target Date 09/13/23   Date Met 09/14/23   PT Goal 2   Goal Identifier FGA   Goal Description Patient will improve 6 points on FGA indicating decreased fall risk.   Rationale to maximize safety and independence with performance of ADLs and functional tasks;to maximize safety and independence within the home;to maximize safety and independence within the community   Goal Progress MET  improved from 11/30 to 17/30   Target Date 09/13/23   Date Met 09/14/23   PT Goal 3   Goal Identifier sit <> stand off standard height chair   Goal Description Patient will be able to complete 5 x sit <> stand off standard height chair in 12 seconds or less.   Rationale to maximize safety and independence with performance of ADLs and functional tasks;to maximize safety and independence within the home;to maximize safety and independence within the community   Goal Progress MET 10 seconds   Target Date 09/13/23   Date Met 09/14/23   PT Goal 4   Goal Identifier HEP   Goal Description Patient will be able to complete 6 exercises without assistance for progression to independent  Detail Level: Generalized management.   Rationale to maximize safety and independence with performance of ADLs and functional tasks;to maximize safety and independence within the home;to maximize safety and independence within the community   Goal Progress continues to required progression and revision   Target Date 11/10/23   PT Goal 5   Goal Identifier FGA-progression   Goal Description Patient improve FGA by 4 points to 21/30 or more.   Rationale to maximize safety and independence within the home;to maximize safety and independence within the community   Goal Progress nearly MET 20/30   Target Date 11/10/23   Subjective Report   Subjective Report Patient reports he is doing some of his home exercises not all.  Needs to get in a better routine.   Objective Measure 1   Objective Measure Tandem stance : ~15 sec without UE support   Objective Measure 2   Objective Measure 6 min walk test   Details next visit   Therapeutic Procedure/Exercise   Therapeutic Procedures: strength, endurance, ROM, flexibillity minutes (82212) 25   Ther Proc 1 NU step   Ther Proc 1 - Details level 5 x 6 min for increased strength and endurance   Ther Proc 2 walking program/recumbant bike   Ther Proc 2 - Details PT dicussed importance in progression of endurance using walking or recumbant bike 2 x 10 min daily   PTRx Ther Proc 1 Tandem Stance   PTRx Ther Proc 1 - Details HEP   PTRx Ther Proc 2 [place] Stretch   PTRx Ther Proc 2 - Details HEP   PTRx Ther Proc 3 Cervical Retraction With Patient Overpressure   PTRx Ther Proc 3 - Details verbal review   PTRx Ther Proc 4 Sidestep   PTRx Ther Proc 4 - Details HEP   PTRx Ther Proc 5 Walking Backwards   PTRx Ther Proc 5 - Details HEP   PTRx Ther Proc 6 Calf Raises with Support   PTRx Ther Proc 6 - Details HEP   PTRx Ther Proc 7 Toe Raises with Support   PTRx Ther Proc 7 - Details HEP   PTRx Ther Proc 8 Sit to Stand   PTRx Ther Proc 8 - Details HEP   PTRx Ther Proc 9 Bridging #2A Weight Shift   PTRx Ther Proc 9 -  Details x 10 1 LE lift each rep   PTRx Ther Proc 10 Hip Abduction Straight Leg Raise   PTRx Ther Proc 10 - Details review in all 3 positions x 5 each   PTRx Ther Proc 11 Hip Flexion Straight Leg Raise   PTRx Ther Proc 11 - Details HEP   PTRx Ther Proc 12 Standing Hip Flexor Stretch   PTRx Ther Proc 12 - Details HEP   PTRx Ther Proc 13 Standing Gastroc Stretch   PTRx Ther Proc 13 - Details HEP   PTRx Ther Proc 14 Supine Hamstring Stretch   PTRx Ther Proc 14 - Details HEP   PTRx Ther Proc 15 Prone Trunk Extension Position A   PTRx Ther Proc 15 - Details x 8 no cue needed arms at sides    PTRx Ther Proc 16 Prone Lumbar Extension Exercise #3 (Leg Extension)   PTRx Ther Proc 16 - Details x 8 each LE   PTRx Ther Proc 17 Diaphragmatic Breathing in Sitting   PTRx Ther Proc 17 - Details education regarding recovery and restful breathing along with proper breathing in through nose and out through mouth seated and supine practice x 10 min   PTRx Ther Proc 18 Pendulum/Codmans   PTRx Ther Proc 18 - Details HEP   PTRx Ther Proc 19 Diaphragmatic Breathing in Sitting   PTRx Ther Proc 19 - Details education regarding recovery and restful breathing along with proper breathing in through nose and out through mouth seated and supine practice x 10 min   Skilled Intervention Functional strength, patient education on proper form/technique.   Patient Response/Progress verabalized understanding and stretch   Ther Proc 3 other HEP   Ther Proc 3 - Details supine abdominal deat bug LE 2 x 10 each LE   Therapeutic Activity   PTRx Ther Act 1 Education Sheet General   PTRx Ther Act 1 - Details demonstrated without support added arm swing to today continued to improve   Neuromuscular Re-education   Neuro Re-ed 1 gait speed & balance   Neuro Re-ed 1 - Details fast walking 70 feet x 4 head turns & 180 degree turns L & R   PTRx Neuro Re-ed 1 Romberg Eyes Open   PTRx Neuro Re-ed 1 - Details  x 30 seconds    PTRx Neuro Re-ed 2 Tandem Walking and  Balance   PTRx Neuro Re-ed 2 - Details  x 10 feet required verbal cue   Skilled Intervention static and dynamic balance to reduce falls risk and improve function   Patient Response/Progress Tolerated well, challenged with split stance on box   Physical Performance Test/measures   Physical Performance Test/Measurement, Minutes (17180) 20   Physical Performance Test/Measurement Details FGA   Skilled Intervention 20/30 improved by 3 points from prior score   Patient Response/Progress improved balance   Progress decreased risk of falls   Plan   Home program PTRx   Plan for next session review new balance exercises, progress with dynamic balance as able   Comments   Comments Pt appropriate for continued skilled PT intervention. Respondeing well to balance HEP and tolerating progression with further narrow SUNNI exercises. Will be following up with docotor to discuss if future appointments needed, otherwise pt demonstrating good motivation and understanding for management of condition with exercises for home.         [facility]                                                                                   OUTPATIENT PHYSICAL THERAPY    PLAN OF TREATMENT FOR OUTPATIENT REHABILITATION   Patient's Last Name, First Name, Victorino Sinclair YOB: 1934   Provider's Name   [facility]   Medical Record No.  [id number]     Onset Date: 05/25/23  Start of Care Date: 06/15/23     Medical Diagnosis:  R27.0 (ICD-10-CM) - Ataxia      PT Treatment Diagnosis:  gait abnormality, fall risk, generalized weakness Plan of Treatment  Frequency/Duration: (P) 1 x weekly every 1-3 weeks/ (P) 12 weeks    Certification date from (P) 11/11/23 to (P) 02/03/23         See note for plan of treatment details and functional goals     Latasha Cosme, PT                         I CERTIFY THE NEED FOR THESE SERVICES FURNISHED UNDER        THIS PLAN OF TREATMENT AND WHILE UNDER MY  CARE     (Physician attestation of this document indicates review and certification of the therapy plan).              Referring Provider:  Yuri Funes    Initial Assessment  See Epic Evaluation- Start of Care Date: 06/15/23            PLAN  Continue therapy per current plan of care. Addition visits up to 4 every 1-3 weeks for continued progression of balance, strength and endurance for ambulation safety.    Beginning/End Dates of Progress Note Reporting Period:  06/15/2023 to 12/07/2023    Referring Provider:  Yuri Funes    Detail Level: Zone Detail Level: Detailed

## 2024-12-18 ENCOUNTER — OFFICE VISIT (OUTPATIENT)
Dept: CARDIOLOGY | Facility: CLINIC | Age: 89
End: 2024-12-18
Payer: COMMERCIAL

## 2024-12-18 VITALS
WEIGHT: 193 LBS | BODY MASS INDEX: 29.25 KG/M2 | HEART RATE: 75 BPM | HEIGHT: 68 IN | DIASTOLIC BLOOD PRESSURE: 78 MMHG | SYSTOLIC BLOOD PRESSURE: 122 MMHG

## 2024-12-18 DIAGNOSIS — Z95.0 CARDIAC PACEMAKER IN SITU: Primary | ICD-10-CM

## 2024-12-18 DIAGNOSIS — I49.5 SICK SINUS SYNDROME (H): ICD-10-CM

## 2024-12-18 DIAGNOSIS — I47.29 NSVT (NONSUSTAINED VENTRICULAR TACHYCARDIA) (H): ICD-10-CM

## 2024-12-18 DIAGNOSIS — I48.0 PAROXYSMAL ATRIAL FIBRILLATION (H): ICD-10-CM

## 2024-12-18 NOTE — PROGRESS NOTES
Electrophysiology Clinic Progress Note  Victorino Khan MRN# [id number]   YOB: 1934 Age: 89 year old     Primary cardiologist: Dr. 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    Paroxysmal atrial fibrillation: Noted on device check and longest episodes occurred in 8/2024 lasting 7 days with CVR  [id number] Score: 4 (age ++, DVT ++) on Warfarin due to #5  NSVT  Factor V Leiden deficiency.  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  He is doing overall well and continues to workout regularly doing weights as well as some mild cardio on a recumbent bike.  His goal is to not let his heart rate reach greater than 100 and then slows down his activity.  He denies any symptoms of chest discomfort or shortness of breath with activity.  A recent device check noted episode of PAF lasting for approximately 7 days with CVR.  Patient was unaware of his arrhythmia and is anticoagulated on Coumadin due to history of DVT.      Diagnotic studies:  Device check 11/2024: AP 33%,  63%, AF lasting for 7 days starting on 8/17 with CVR. NSVT x 3 episodes   Echocardiogram 12/2023: LVEF 55-60% without wall motion abnormalities. Mild to moderate AoS  Echocardiogram 2017: LVEF 55-60%. No wall motion abnormalities.             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     History of DVT  Noted to have Factor V Leiden on  "Coumadin    PLAN:     Follow-up with routine device clinic appointments  Return to clinic in 1 year or sooner if needed       Orders this Visit:  Orders Placed This Encounter   Procedures    Follow-Up with Cardiology KARLIE     No orders of the defined types were placed in this encounter.    There are no discontinued medications.    Today's clinic visit entailed:  Review of the result(s) of each unique test - Echo, device checks  Ordering of each unique test  20 minutes spent by me on the date of the encounter doing chart review, history and exam, documentation and further activities per the note  Provider  Link to [facility] Help Grid     The level of medical decision making during this visit was of moderate complexity.    The longitudinal plan of care for the diagnosis(es)/condition(s) as documented were addressed during this visit. Due to the added complexity in care, I will continue to support Victorino Khan in the subsequent management and with ongoing continuity of care.            Review of Systems:     Review of Systems:  Skin:        Eyes:       ENT:       Respiratory:       Cardiovascular:       Gastroenterology:      Genitourinary:       Musculoskeletal:       Neurologic:       Psychiatric:       Heme/Lymph/Imm:       Endocrine:                 Physical Exam:     Vitals: /78 (BP Location: Left arm)   Pulse 75   Ht 1.727 m (5' 8\")   Wt 87.5 kg (193 lb)   BMI 29.35 kg/m    Constitutional: Well nourished and in no apparent distress.  Eyes: Pupils equal, round. Sclerae anicteric.   HEENT: Normocephalic, atraumatic.   Neck: Supple.  Respiratory: Breathing non-labored. Lungs clear to auscultation bilaterally. No crackles, wheezes, rhonchi, or rales.  Cardiovascular:  Regular rate and rhythm, normal S1 and S2. Murmur  Skin: Warm, dry. No rashes, cyanosis, or xanthelasma.  Extremities: No edema.  Neurologic: No gross motor deficits. Alert, awake, and oriented to person, place and time.  Psychiatric: Affect " appropriate.        CURRENT MEDICATIONS:  Current Outpatient Medications   Medication Sig Dispense Refill    acetaminophen (TYLENOL) 500 MG tablet Take 500-1,000 mg by mouth every 6 hours as needed for mild pain      albuterol (PROAIR HFA/PROVENTIL HFA/VENTOLIN HFA) 108 (90 Base) MCG/ACT inhaler Inhale 2 puffs into the lungs every 6 hours 18 g 0    aspirin (ASA) 81 MG EC tablet Take 81 mg by mouth daily      blood glucose monitoring (NO BRAND SPECIFIED) meter device kit Use to test blood sugar one times daily or as directed. 1 kit 3    calcium carbonate (OS- MG [place]. [place]) 500 MG tablet Take 500 mg by mouth daily       Cholecalciferol (VITAMIN D3 PO) Take 2,000 Units by mouth daily       fluticasone (FLONASE) 50 MCG/ACT nasal spray Spray 1 spray into both nostrils daily      fluticasone-salmeterol (ADVAIR HFA) 115-21 MCG/ACT inhaler Inhale 2 puffs into the lungs 2 times daily 12 g 3    multivitamin w/minerals (THERA-VIT-M) tablet Take 1 tablet by mouth daily      warfarin ANTICOAGULANT (COUMADIN) 4 MG tablet TAKE ONE-HALF (1/2) TO 1 TABLET DAILY AS DIRECTED, ADJUST DOSE BASED ON INR RESULTS  23 MG per week      warfarin ANTICOAGULANT (COUMADIN) 5 MG tablet Take 5 mg twice weekly or as directed based on inr results. Uses in conjunction with 4 mg tabs      senna-docusate (SENOKOT-S/PERICOLACE) 8.6-50 MG tablet Take 1-2 tablets by mouth 2 times daily Take while on oral narcotics to prevent or treat constipation. (Patient not taking: Reported on 12/18/2024) 10 tablet 0       ALLERGIES  No Known Allergies      PAST MEDICAL HISTORY:  Past Medical History:   Diagnosis Date    Chronic anticoagulation     Gastroesophageal reflux disease without esophagitis     H/O reactive hypoglycemia     Heterozygous factor V Leiden mutation (H)     Osteoporosis     Personal history of DVT (deep vein thrombosis)     Primary osteoarthritis of right shoulder     Reactive hypoglycemia 09/19/2024    Rotator cuff disorder, right      Rotator cuff syndrome, right     Spondylosis of lumbar region without myelopathy or radiculopathy     Status cardiac pacemaker     Vertebral compression fracture (H)        PAST SURGICAL HISTORY:  Past Surgical History:   Procedure Laterality Date    ARTHROPLASTY ANKLE Left 4/21/2022    Procedure: LEFT TOTAL ANKLE REPLACEMENT;  Surgeon: Magan Chatman MD;  Location: [facility] OR    IMPLANT PACEMAKER Left     For sick sinus syndrome    LAPAROSCOPIC NISSEN FUNDOPLICATION  11/2011    For large paraesophageal hernia    LENGTHEN TENDON ACHILLES Left 4/21/2022    Procedure: TENDON ACHILLELS LENGTHENING, MEDIAL DISPLACEMENT CALCANEAL OSTOTOMY;  Surgeon: Magan Chatman MD;  Location: [facility] OR    TOTAL KNEE ARTHROPLASTY Right 2000    TOTAL KNEE ARTHROPLASTY Left 2010       FAMILY HISTORY:  Family History   Problem Relation Age of Onset    Coronary Artery Disease Early Onset Father     Coronary Artery Disease Early Onset Brother        SOCIAL HISTORY:  Social History     Socioeconomic History    Marital status: Single     Spouse name: None    Number of children: None    Years of education: None    Highest education level: None   Tobacco Use    Smoking status: Never    Smokeless tobacco: Never   Vaping Use    Vaping status: Never Used   Substance and Sexual Activity    Alcohol use: No    Drug use: Never    Sexual activity: Yes     Partners: Female   Social History Narrative    .  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   and has been with SO for 30+ years, has 4 children.  Moved back to [place].      Social Drivers of Health     Financial Resource Strain: Low Risk  (9/19/2024)    Financial Resource Strain     Within the past 12 months, have you or your family members you live with been unable to get utilities (heat, electricity) when it was really needed?: No   Food Insecurity: Low Risk  (9/19/2024)    Food Insecurity     Within the past 12 months, did  you worry that your food would run out before you got money to buy more?: No     Within the past 12 months, did the food you bought just not last and you didn t have money to get more?: No   Transportation Needs: Low Risk  (9/19/2024)    Transportation Needs     Within the past 12 months, has lack of transportation kept you from medical appointments, getting your medicines, non-medical meetings or appointments, work, or from getting things that you need?: No   Physical Activity: Insufficiently Active (9/19/2024)    Exercise Vital Sign     Days of Exercise per Week: 2 days     Minutes of Exercise per Session: 60 min   Stress: Stress Concern Present (9/19/2024)    [employer] [place] [employer] - Occupational Stress Questionnaire     Feeling of Stress : To some extent   Social Connections: Unknown (9/19/2024)    Social Connection and Isolation Panel [NHANES]     Frequency of Social Gatherings with Friends and Family: More than three times a week   Interpersonal Safety: Low Risk  (9/19/2024)    Interpersonal Safety     Do you feel physically and emotionally safe where you currently live?: Yes     Within the past 12 months, have you been hit, slapped, kicked or otherwise physically hurt by someone?: No     Within the past 12 months, have you been humiliated or emotionally abused in other ways by your partner or ex-partner?: No   Housing Stability: Low Risk  (9/19/2024)    Housing Stability     Do you have housing? : Yes     Are you worried about losing your housing?: No

## 2024-12-18 NOTE — LETTER
12/18/2024    Yuri Funes MD  [address] [place] [address]    RE: Victorino Khan       Dear Colleague,     I had the pleasure of seeing Victorino Khan in the [facility].    Electrophysiology Clinic Progress Note  Victorino Khan MRN# [id number]   YOB: 1934 Age: 89 year old     Primary cardiologist: Dr. 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    Paroxysmal atrial fibrillation: Noted on device check and longest episodes occurred in 8/2024 lasting 7 days with CVR  [id number] Score: 4 (age ++, DVT ++) on Warfarin due to #5  NSVT  Factor V Leiden deficiency.  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  He is doing overall well and continues to workout regularly doing weights as well as some mild cardio on a recumbent bike.  His goal is to not let his heart rate reach greater than 100 and then slows down his activity.  He denies any symptoms of chest discomfort or shortness of breath with activity.  A recent device check noted episode of PAF lasting for approximately 7 days with CVR.  Patient was unaware of his arrhythmia and is anticoagulated on Coumadin due to history of DVT.      Diagnotic studies:  Device check 11/2024: AP 33%,  63%, AF lasting for 7 days starting on 8/17 with CVR. NSVT x 3 episodes   Echocardiogram 12/2023: LVEF 55-60% without wall motion abnormalities. Mild to moderate AoS  Echocardiogram 2017: LVEF 55-60%. No wall motion abnormalities.             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     History of DVT  Noted to have Factor V Leiden on Coumadin    PLAN:     Follow-up with routine device clinic appointments  Return to clinic in 1 year or sooner if needed       Orders this Visit:  Orders Placed This Encounter   Procedures     Follow-Up with Cardiology KARLIE     No orders of the defined types were placed in this encounter.    There are no discontinued medications.    Today's clinic visit entailed:  Review of the result(s) of each unique test - Echo, device checks  Ordering of each unique test  20 minutes spent by me on the date of the encounter doing chart review, history and exam, documentation and further activities per the note  Provider  Link to [facility] Help Grid     The level of medical decision making during this visit was of moderate complexity.    The longitudinal plan of care for the diagnosis(es)/condition(s) as documented were addressed during this visit. Due to the added complexity in care, I will continue to support Victorino Khan in the subsequent management and with ongoing continuity of care.            Review of Systems:     Review of Systems:  Skin:        Eyes:       ENT:       Respiratory:       Cardiovascular:       Gastroenterology:      Genitourinary:       Musculoskeletal:       Neurologic:       Psychiatric:       Heme/Lymph/Imm:       Endocrine:                 Physical Exam:     Vitals: /78 (BP Location: Left arm)   Pulse 75   Ht 1.727 m (5' 8\")   Wt 87.5 kg (193 lb)   BMI 29.35 kg/m    Constitutional: Well nourished and in no apparent distress.  Eyes: Pupils equal, round. Sclerae anicteric.   HEENT: Normocephalic, atraumatic.   Neck: Supple.  Respiratory: Breathing non-labored. Lungs clear to auscultation bilaterally. No crackles, wheezes, rhonchi, or rales.  Cardiovascular:  Regular rate and rhythm, normal S1 and S2. " Murmur  Skin: Warm, dry. No rashes, cyanosis, or xanthelasma.  Extremities: No edema.  Neurologic: No gross motor deficits. Alert, awake, and oriented to person, place and time.  Psychiatric: Affect appropriate.        CURRENT MEDICATIONS:  Current Outpatient Medications   Medication Sig Dispense Refill     acetaminophen (TYLENOL) 500 MG tablet Take 500-1,000 mg by mouth every 6 hours as needed for mild pain       albuterol (PROAIR HFA/PROVENTIL HFA/VENTOLIN HFA) 108 (90 Base) MCG/ACT inhaler Inhale 2 puffs into the lungs every 6 hours 18 g 0     aspirin (ASA) 81 MG EC tablet Take 81 mg by mouth daily       blood glucose monitoring (NO BRAND SPECIFIED) meter device kit Use to test blood sugar one times daily or as directed. 1 kit 3     calcium carbonate (OS- MG [place]. [place]) 500 MG tablet Take 500 mg by mouth daily        Cholecalciferol (VITAMIN D3 PO) Take 2,000 Units by mouth daily        fluticasone (FLONASE) 50 MCG/ACT nasal spray Spray 1 spray into both nostrils daily       fluticasone-salmeterol (ADVAIR HFA) 115-21 MCG/ACT inhaler Inhale 2 puffs into the lungs 2 times daily 12 g 3     multivitamin w/minerals (THERA-VIT-M) tablet Take 1 tablet by mouth daily       warfarin ANTICOAGULANT (COUMADIN) 4 MG tablet TAKE ONE-HALF (1/2) TO 1 TABLET DAILY AS DIRECTED, ADJUST DOSE BASED ON INR RESULTS  23 MG per week       warfarin ANTICOAGULANT (COUMADIN) 5 MG tablet Take 5 mg twice weekly or as directed based on inr results. Uses in conjunction with 4 mg tabs       senna-docusate (SENOKOT-S/PERICOLACE) 8.6-50 MG tablet Take 1-2 tablets by mouth 2 times daily Take while on oral narcotics to prevent or treat constipation. (Patient not taking: Reported on 12/18/2024) 10 tablet 0       ALLERGIES  No Known Allergies      PAST MEDICAL HISTORY:  Past Medical History:   Diagnosis Date     Chronic anticoagulation      Gastroesophageal reflux disease without esophagitis      H/O reactive hypoglycemia      Heterozygous factor  V Leiden mutation (H)      Osteoporosis      Personal history of DVT (deep vein thrombosis)      Primary osteoarthritis of right shoulder      Reactive hypoglycemia 09/19/2024     Rotator cuff disorder, right      Rotator cuff syndrome, right      Spondylosis of lumbar region without myelopathy or radiculopathy      Status cardiac pacemaker      Vertebral compression fracture (H)        PAST SURGICAL HISTORY:  Past Surgical History:   Procedure Laterality Date     ARTHROPLASTY ANKLE Left 4/21/2022    Procedure: LEFT TOTAL ANKLE REPLACEMENT;  Surgeon: Magan Chatman MD;  Location:  OR     IMPLANT PACEMAKER Left     For sick sinus syndrome     LAPAROSCOPIC NISSEN FUNDOPLICATION  11/2011    For large paraesophageal hernia     LENGTHEN TENDON ACHILLES Left 4/21/2022    Procedure: TENDON ACHILLELS LENGTHENING, MEDIAL DISPLACEMENT CALCANEAL OSTOTOMY;  Surgeon: Magan Chatman MD;  Location: [facility] OR     TOTAL KNEE ARTHROPLASTY Right 2000     TOTAL KNEE ARTHROPLASTY Left 2010       FAMILY HISTORY:  Family History   Problem Relation Age of Onset     Coronary Artery Disease Early Onset Father      Coronary Artery Disease Early Onset Brother        SOCIAL HISTORY:  Social History     Socioeconomic History     Marital status: Single     Spouse name: None     Number of children: None     Years of education: None     Highest education level: None   Tobacco Use     Smoking status: Never     Smokeless tobacco: Never   Vaping Use     Vaping status: Never Used   Substance and Sexual Activity     Alcohol use: No     Drug use: Never     Sexual activity: Yes     Partners: Female   Social History Narrative    .  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   and has been with SO for 30+ years, has 4 children.  Moved back to [place].      Social Drivers of Health     Financial Resource Strain: Low Risk  (9/19/2024)    Financial Resource Strain      Within the  past 12 months, have you or your family members you live with been unable to get utilities (heat, electricity) when it was really needed?: No   Food Insecurity: Low Risk  (9/19/2024)    Food Insecurity      Within the past 12 months, did you worry that your food would run out before you got money to buy more?: No      Within the past 12 months, did the food you bought just not last and you didn t have money to get more?: No   Transportation Needs: Low Risk  (9/19/2024)    Transportation Needs      Within the past 12 months, has lack of transportation kept you from medical appointments, getting your medicines, non-medical meetings or appointments, work, or from getting things that you need?: No   Physical Activity: Insufficiently Active (9/19/2024)    Exercise Vital Sign      Days of Exercise per Week: 2 days      Minutes of Exercise per Session: 60 min   Stress: Stress Concern Present (9/19/2024)    [employer] - Occupational Stress Questionnaire      Feeling of Stress : To some extent   Social Connections: Unknown (9/19/2024)    Social Connection and Isolation Panel [NHANES]      Frequency of Social Gatherings with Friends and Family: More than three times a week   Interpersonal Safety: Low Risk  (9/19/2024)    Interpersonal Safety      Do you feel physically and emotionally safe where you currently live?: Yes      Within the past 12 months, have you been hit, slapped, kicked or otherwise physically hurt by someone?: No      Within the past 12 months, have you been humiliated or emotionally abused in other ways by your partner or ex-partner?: No   Housing Stability: Low Risk  (9/19/2024)    Housing Stability      Do you have housing? : Yes      Are you worried about losing your housing?: No               Thank you for allowing me to participate in the care of your patient.      Sincerely,     JOHN Carreno [facility]  cc:   Amy Antunez, JOHN CNP  [address]  [place] [id number]

## 2024-12-26 ENCOUNTER — OFFICE VISIT (OUTPATIENT)
Dept: INTERNAL MEDICINE | Facility: CLINIC | Age: 89
End: 2024-12-26
Payer: COMMERCIAL

## 2024-12-26 ENCOUNTER — LAB (OUTPATIENT)
Dept: LAB | Facility: CLINIC | Age: 89
End: 2024-12-26
Payer: COMMERCIAL

## 2024-12-26 ENCOUNTER — ANTICOAGULATION THERAPY VISIT (OUTPATIENT)
Dept: ANTICOAGULATION | Facility: CLINIC | Age: 89
End: 2024-12-26

## 2024-12-26 VITALS
WEIGHT: 189.7 LBS | HEIGHT: 69 IN | DIASTOLIC BLOOD PRESSURE: 84 MMHG | OXYGEN SATURATION: 96 % | TEMPERATURE: 97.6 F | BODY MASS INDEX: 28.1 KG/M2 | HEART RATE: 60 BPM | SYSTOLIC BLOOD PRESSURE: 121 MMHG

## 2024-12-26 DIAGNOSIS — Z86.718 HISTORY OF DEEP VENOUS THROMBOSIS: ICD-10-CM

## 2024-12-26 DIAGNOSIS — M43.17 SPONDYLOLISTHESIS OF LUMBOSACRAL REGION: ICD-10-CM

## 2024-12-26 DIAGNOSIS — D68.51 HETEROZYGOUS FACTOR V LEIDEN MUTATION (H): ICD-10-CM

## 2024-12-26 DIAGNOSIS — Z79.01 CHRONIC ANTICOAGULATION: ICD-10-CM

## 2024-12-26 DIAGNOSIS — Z86.718 HISTORY OF DEEP VENOUS THROMBOSIS: Primary | ICD-10-CM

## 2024-12-26 DIAGNOSIS — Z01.818 PREOPERATIVE EXAMINATION: Primary | ICD-10-CM

## 2024-12-26 LAB
ALBUMIN SERPL BCG-MCNC: 4.3 G/DL (ref 3.5–5.2)
ALP SERPL-CCNC: 69 U/L (ref 40–150)
ALT SERPL W P-5'-P-CCNC: 11 U/L (ref 0–70)
ANION GAP SERPL CALCULATED.3IONS-SCNC: 9 MMOL/L (ref 7–15)
AST SERPL W P-5'-P-CCNC: 25 U/L (ref 0–45)
BILIRUB SERPL-MCNC: 1.6 MG/DL
BUN SERPL-MCNC: 21.8 MG/DL (ref 8–23)
CALCIUM SERPL-MCNC: 10 MG/DL (ref 8.8–10.4)
CHLORIDE SERPL-SCNC: 103 MMOL/L (ref 98–107)
CREAT SERPL-MCNC: 1.15 MG/DL (ref 0.67–1.17)
EGFRCR SERPLBLD CKD-EPI 2021: 60 ML/MIN/1.73M2
ERYTHROCYTE [DISTWIDTH] IN BLOOD BY AUTOMATED COUNT: 12.5 % (ref 10–15)
GLUCOSE SERPL-MCNC: 99 MG/DL (ref 70–99)
HCO3 SERPL-SCNC: 28 MMOL/L (ref 22–29)
HCT VFR BLD AUTO: 42.4 % (ref 40–53)
HGB BLD-MCNC: 14.2 G/DL (ref 13.3–17.7)
INR BLD: 1.8 (ref 0.9–1.1)
MCH RBC QN AUTO: 34.5 PG (ref 26.5–33)
MCHC RBC AUTO-ENTMCNC: 33.5 G/DL (ref 31.5–36.5)
MCV RBC AUTO: 103 FL (ref 78–100)
PLATELET # BLD AUTO: 137 10E3/UL (ref 150–450)
POTASSIUM SERPL-SCNC: 4.5 MMOL/L (ref 3.4–5.3)
PROT SERPL-MCNC: 6.7 G/DL (ref 6.4–8.3)
RBC # BLD AUTO: 4.12 10E6/UL (ref 4.4–5.9)
SODIUM SERPL-SCNC: 140 MMOL/L (ref 135–145)
WBC # BLD AUTO: 4.1 10E3/UL (ref 4–11)

## 2024-12-26 PROCEDURE — 85027 COMPLETE CBC AUTOMATED: CPT | Performed by: INTERNAL MEDICINE

## 2024-12-26 PROCEDURE — 80053 COMPREHEN METABOLIC PANEL: CPT | Performed by: INTERNAL MEDICINE

## 2024-12-26 PROCEDURE — 99214 OFFICE O/P EST MOD 30 MIN: CPT | Performed by: INTERNAL MEDICINE

## 2024-12-26 ASSESSMENT — PAIN SCALES - GENERAL: PAINLEVEL_OUTOF10: MODERATE PAIN (4)

## 2024-12-26 NOTE — PROGRESS NOTES
ANTICOAGULATION MANAGEMENT     Victorino Khan 89 year old male is on warfarin with subtherapeutic INR result. (Goal INR 2.0-3.0)    Recent labs: (last 7 days)     12/26/24  1007   INR 1.8*       ASSESSMENT     Warfarin Lab Questionnaire    Warfarin Doses Last 7 Days      12/26/2024     9:56 AM   Dose in Tablet or Mg   TAB or MG? milligram (mg)     Pt Rptd Dose SUNDAY MONDAY TUESDAY WED THURS FRIDAY SATURDAY 12/26/2024   9:56 AM 4 2 4 2 4 2 4         12/26/2024   Warfarin Lab Questionnaire   Missed doses within past 14 days? No   Changes in diet or alcohol within past 14 days? No   Medication changes since last result? No   Injuries or illness since last result? No   New shortness of breath, severe headaches or sudden changes in vision since last result? No   Abnormal bleeding since last result? No   Upcoming surgery, procedure? Yes   Please explain, date scheduled? surgery coming up   Best number to call with results? [phone]     Previous result: Subtherapeutic  Additional findings: Upcoming surgery/procedure 1/3 Eddy tells me the surgeon wants a 5 day warfarin hold. Pre op was today  Okd for 5 day hold by Dr Funes, no bridge       PLAN     Recommended plan for no diet, medication or health factor changes affecting INR     Dosing Instructions: Continue your current warfarin dose until beginning warfarin hold 12/29. With surgeon's ok will resume usual dose with with next INR in 1 week       Summary  As of 12/26/2024      Full warfarin instructions:  12/29: Hold; 12/30: Hold; 12/31: Hold; 1/1: Hold; 1/2: Hold; Otherwise 2 mg every Tue, Thu, Sat; 4 mg all other days   Next INR check:  --               Telephone call with Eddy who verbalizes understanding and agrees to plan    Lab visit scheduled    Education provided: Please call back if any changes to your diet, medications or how you've been taking warfarin    Plan made per [facility] anticoagulation protocol    Luís Williamson RN  12/26/2024  Anticoagulation  Clinic  Epic pool for routing messages: [facility]  [facility] patient phone line: [phone]        _______________________________________________________________________     Anticoagulation Episode Summary       Current INR goal:  2.0-3.0   TTR:  71.7% (1 y)   Target end date:  Indefinite   Send INR reminders to:  [facility]    Indications    History of deep venous thrombosis [Z86.718]  Heterozygous factor V Leiden mutation (H) [D68.51]  Chronic anticoagulation [Z79.01]             Comments:  --             Anticoagulation Care Providers       Provider Role Specialty Phone number    Yuri Funes MD Referring Internal Medicine [phone]

## 2024-12-26 NOTE — PROGRESS NOTES
Preoperative Evaluation  [facility]  [address] [place] [address]  Phone: [phone]  Fax: [phone]  Primary Provider: Yuri Funes MD  Pre-op Performing Provider: Yuri Funes MD  Dec 26, 2024         2024   Surgical Information   What procedure is being done? L4, L5, S1 INTRACEPT    Facility or Hospital where procedure/surgery will be performed: [facility]    Who is doing the procedure / surgery? Rei Olvera    Date of surgery / procedure: 1/3/25    Time of surgery / procedure: 12:00PM    Where do you plan to recover after surgery? at home with family      Fax number for surgical facility: [phone]    Assessment & Plan     The proposed surgical procedure is considered LOW risk.    Preoperative evaluation  He is medically stable. He has seen cardiology recently and is found to be without active issues. His pacemaker is functioning normally.  I find no contraindication to his having the lumbar surgery, and I recommend proceeding as planned.      Implanted Device   - Type of device: cardiac pacemaker Patient advised to bring device information on day of surgery.    He will hold his warfarin after  until the day of the surgery.  He doesn't require bridging anticoagulation.      - No identified additional risk factors other than previously addressed     Recommendation  Approval given to proceed with proposed procedure, without further diagnostic evaluation.    Dinorah Kam is a 89 year old, presenting for the followin/26/2024    10:20 AM   Additional Questions   Roomed by Asif   Accompanied by alone         2024    10:20 AM   Patient Reported Additional Medications   Patient reports taking the following new medications none     HPI related to upcoming procedure: He has consented to having lumbar surgery for his chronic lumbar pain related to spondylosis, stenosis, and  degenerative arthritis.  He has tired PT and injection therapy without adequate benefit.  He has otherwise been well.         12/26/2024   Pre-Op Questionnaire   Have you ever had a heart attack or stroke? (!) YES     Have you ever had surgery on your heart or blood vessels, such as a stent placement, a coronary artery bypass, or surgery on an artery in your head, neck, heart, or legs? (!) YES     Do you have chest pain with activity? No    Do you have a history of heart failure? No    Do you currently have a cold, bronchitis or symptoms of other infection? No    Do you have a cough, shortness of breath, or wheezing? No    Do you or anyone in your family have previous history of blood clots? (!) YES     Do you or does anyone in your family have a serious bleeding problem such as prolonged bleeding following surgeries or cuts? No    Have you ever had problems with anemia or been told to take iron pills? (!) YES     Have you had any abnormal blood loss such as black, tarry or bloody stools? No    Have you ever had a blood transfusion? No    Are you willing to have a blood transfusion if it is medically needed before, during, or after your surgery? Yes    Have you or any of your relatives ever had problems with anesthesia? No    Do you have sleep apnea, excessive snoring or daytime drowsiness? No    Do you have any artifical heart valves or other implanted medical devices like a pacemaker, defibrillator, or continuous glucose monitor? (!) YES    What type of device do you have? pacemaker    Name of the clinic that manages your device [facility]    Do you have artificial joints? (!) YES    Are you allergic to latex? No       Patient Active Problem List    Diagnosis Date Noted    Reactive hypoglycemia 09/19/2024     Priority: Medium    Spondylosis of lumbar region without myelopathy or radiculopathy      Priority: Medium    Ataxia 07/21/2023     Priority: Medium    Abnormal gait 07/21/2023     Priority: Medium    S/P reverse  total shoulder arthroplasty, right 04/28/2023     Priority: Medium    Rotator cuff disorder, right 03/22/2023     Priority: Medium    Pulmonary nodule 02/27/2023     Priority: Medium    Gastroesophageal reflux disease without esophagitis 01/12/2023     Priority: Medium    H/O reactive hypoglycemia 01/12/2023     Priority: Medium    Chronic obstructive pulmonary disease, unspecified COPD type (H) 11/04/2022     Priority: Medium    S/P ankle joint replacement 04/21/2022     Priority: Medium    Post-traumatic osteoarthritis of multiple joints 11/07/2021     Priority: Medium    Age-related osteoporosis without current pathological fracture 07/13/2021     Priority: Medium    History of deep venous thrombosis 07/11/2021     Priority: Medium    Thrombocytopenia (H) 01/14/2021     Priority: Medium    Second degree Mobitz II AV block 12/21/2018     Priority: Medium    Cardiac pacemaker in situ 10/14/2017     Priority: Medium    Chronic anticoagulation 11/29/2011     Priority: Medium     Formatting of this note might be different from the original.  Update from Spring 2018 IMO load.      Heterozygous factor V Leiden mutation (H) 11/25/2011     Priority: Medium     Formatting of this note might be different from the original.  History of deep venous thrombosis.      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  Paraesophageal hernia repaired with mesh        Past Medical History:   Diagnosis Date    Chronic anticoagulation     Gastroesophageal reflux disease without esophagitis     H/O reactive hypoglycemia     Heterozygous factor V Leiden mutation (H)     Osteoporosis     Personal history of DVT (deep vein thrombosis)     Primary osteoarthritis of right shoulder     Reactive hypoglycemia 09/19/2024    Rotator cuff disorder, right     Rotator cuff syndrome,  right     Spondylosis of lumbar region without myelopathy or radiculopathy     Status cardiac pacemaker     Vertebral compression fracture (H)      Past Surgical History:   Procedure Laterality Date    ARTHROPLASTY ANKLE Left 4/21/2022    Procedure: LEFT TOTAL ANKLE REPLACEMENT;  Surgeon: Magan Chatman MD;  Location: [facility] OR    IMPLANT PACEMAKER Left     For sick sinus syndrome    LAPAROSCOPIC NISSEN FUNDOPLICATION  11/2011    For large paraesophageal hernia    LENGTHEN TENDON ACHILLES Left 4/21/2022    Procedure: TENDON ACHILLELS LENGTHENING, MEDIAL DISPLACEMENT CALCANEAL OSTOTOMY;  Surgeon: Magan Chatman MD;  Location: [facility] OR    TOTAL KNEE ARTHROPLASTY Right 2000    TOTAL KNEE ARTHROPLASTY Left 2010     Current Outpatient Medications   Medication Sig Dispense Refill    acetaminophen (TYLENOL) 500 MG tablet Take 500-1,000 mg by mouth every 6 hours as needed for mild pain      albuterol (PROAIR HFA/PROVENTIL HFA/VENTOLIN HFA) 108 (90 Base) MCG/ACT inhaler Inhale 2 puffs into the lungs every 6 hours 18 g 0    aspirin (ASA) 81 MG EC tablet Take 81 mg by mouth daily      calcium carbonate (OS- MG [place]. [place]) 500 MG tablet Take 500 mg by mouth daily       Cholecalciferol (VITAMIN D3 PO) Take 2,000 Units by mouth daily       fluticasone (FLONASE) 50 MCG/ACT nasal spray Spray 1 spray into both nostrils daily      fluticasone-salmeterol (ADVAIR HFA) 115-21 MCG/ACT inhaler Inhale 2 puffs into the lungs 2 times daily 12 g 3    multivitamin w/minerals (THERA-VIT-M) tablet Take 1 tablet by mouth daily      warfarin ANTICOAGULANT (COUMADIN) 4 MG tablet TAKE ONE-HALF (1/2) TO 1 TABLET DAILY AS DIRECTED, ADJUST DOSE BASED ON INR RESULTS  23 MG per week      warfarin ANTICOAGULANT (COUMADIN) 5 MG tablet Take 5 mg twice weekly or as directed based on inr results. Uses in conjunction with 4 mg tabs      blood glucose monitoring (NO BRAND SPECIFIED) meter device kit Use to test blood sugar one times daily or  "as directed. 1 kit 3    senna-docusate (SENOKOT-S/PERICOLACE) 8.6-50 MG tablet Take 1-2 tablets by mouth 2 times daily Take while on oral narcotics to prevent or treat constipation. (Patient not taking: Reported on 9/19/2024) 10 tablet 0     No Known Allergies     Social History     Tobacco Use    Smoking status: Never    Smokeless tobacco: Never   Substance Use Topics    Alcohol use: No     Family History   Problem Relation Age of Onset    Coronary Artery Disease Early Onset Father     Coronary Artery Disease Early Onset Brother      History   Drug Use Unknown     Review of Systems  See HPI    Objective      PHYSICAL EXAM:  General Appearance: In no acute distress  Vitals:    12/26/24 1023   BP: 121/84   BP Location: Right arm   Patient Position: Sitting   Cuff Size: Adult Regular   Pulse: 60   Temp: 97.6  F (36.4  C)   TempSrc: Tympanic   SpO2: 96%   Weight: 86 kg (189 lb 11.2 oz)   Height: 1.753 m (5' 9\")     Estimated body mass index is 28.01 kg/m  as calculated from the following:    Height as of this encounter: 1.753 m (5' 9\").    Weight as of this encounter: 86 kg (189 lb 11.2 oz).  RESPIRATORY: Clear   CARDIOVASCULAR: S1, S2  ABDOMEN: soft, flat, and non-tender  Diagnostics  Recent Results (from the past week)   INR point of care    Collection Time: 12/26/24 10:07 AM   Result Value Ref Range    INR 1.8 (H) 0.9 - 1.1   CBC with platelets    Collection Time: 12/26/24 11:52 AM   Result Value Ref Range    WBC Count 4.1 4.0 - 11.0 10e3/uL    RBC Count 4.12 (L) 4.40 - 5.90 10e6/uL    Hemoglobin 14.2 13.3 - 17.7 g/dL    Hematocrit 42.4 40.0 - 53.0 %     (H) 78 - 100 fL    MCH 34.5 (H) 26.5 - 33.0 pg    MCHC 33.5 31.5 - 36.5 g/dL    RDW 12.5 10.0 - 15.0 %    Platelet Count 137 (L) 150 - 450 10e3/uL   Comprehensive metabolic panel    Collection Time: 12/26/24 11:52 AM   Result Value Ref Range    Sodium 140 135 - 145 mmol/L    Potassium 4.5 3.4 - 5.3 mmol/L    Carbon Dioxide (CO2) 28 22 - 29 mmol/L    Anion Gap " 9 7 - 15 mmol/L    Urea Nitrogen 21.8 8.0 - 23.0 mg/dL    Creatinine 1.15 0.67 - 1.17 mg/dL    GFR Estimate 60 (L) >60 mL/min/1.73m2    Calcium 10.0 8.8 - 10.4 mg/dL    Chloride 103 98 - 107 mmol/L    Glucose 99 70 - 99 mg/dL    Alkaline Phosphatase 69 40 - 150 U/L    AST 25 0 - 45 U/L    ALT 11 0 - 70 U/L    Protein Total 6.7 6.4 - 8.3 g/dL    Albumin 4.3 3.5 - 5.2 g/dL    Bilirubin Total 1.6 (H) <=1.2 mg/dL      Revised Cardiac Risk Index (RCRI)  The patient has the following serious cardiovascular risks for perioperative complications:   - No serious cardiac risks = 0 points     RCRI Interpretation: 0 points: Class I (very low risk - 0.4% complication rate)    Signed Electronically by: Yuri Funes MD  A copy of this evaluation report is provided to the requesting physician.

## 2024-12-28 ENCOUNTER — HEALTH MAINTENANCE LETTER (OUTPATIENT)
Age: 89
End: 2024-12-28

## 2024-12-30 ENCOUNTER — TELEPHONE (OUTPATIENT)
Dept: INTERNAL MEDICINE | Facility: CLINIC | Age: 89
End: 2024-12-30
Payer: COMMERCIAL

## 2024-12-30 NOTE — TELEPHONE ENCOUNTER
December 30, 2024    [facility] Request to Hold Blood Thinning Medication was picked up from outbox of Dr. Funes and sent via fax to [phone].    Darlene Waller

## 2024-12-30 NOTE — TELEPHONE ENCOUNTER
December 30, 2024    Patient's pre-operative physical documentation and labs have been completed by Dr. Funes.  The pre-operative paperwork was faxed to [phone]  per instructions from the surgeon.    Pam J. Behr

## 2025-01-10 ENCOUNTER — TRANSFERRED RECORDS (OUTPATIENT)
Dept: HEALTH INFORMATION MANAGEMENT | Facility: CLINIC | Age: OVER 89
End: 2025-01-10

## 2025-01-10 ENCOUNTER — TELEPHONE (OUTPATIENT)
Dept: INTERNAL MEDICINE | Facility: CLINIC | Age: OVER 89
End: 2025-01-10

## 2025-01-10 ENCOUNTER — TELEPHONE (OUTPATIENT)
Dept: CARDIOLOGY | Facility: CLINIC | Age: OVER 89
End: 2025-01-10

## 2025-01-10 DIAGNOSIS — D68.51 HETEROZYGOUS FACTOR V LEIDEN MUTATION: ICD-10-CM

## 2025-01-10 DIAGNOSIS — Z86.718 HISTORY OF DEEP VENOUS THROMBOSIS: Primary | ICD-10-CM

## 2025-01-10 DIAGNOSIS — Z79.01 CHRONIC ANTICOAGULATION: ICD-10-CM

## 2025-01-10 NOTE — TELEPHONE ENCOUNTER
"Alert received from patient's PPM for, \"Exceeded AT/AF [place].\"   Upon review, patient has been in persistent AF since 1/2/25. Rates are controlled per histogram.  Patient has a history of pAF, takes warfarin.    I spoke with patient. He was surprised and alarmed to hear that he had been in AF for so long though it does sound like he has been asymptomatic.  He did have back surgery on 1/3/25 (L4, L5, S1 INTRACEPT). He is still recovering. They did have him hold his warfarin prior to the procedure x7days. He restarted his warfarin on 1/3/25. His INR today was still low at 1.4. His warfarin dose was inreased accordingly by his INR clinic.    He is aware that an update will be routed to Amy Antunez for review and recommendation.   He knows the importance of warfarin compliance and following the direction of his INR clinic regarding dosing. He will call the clinic with new symptoms or concerns.    TASNEEM WINTERS            Presenting Rhythm logged 1/10/25 @ 1253:            Histogram:              "

## 2025-01-10 NOTE — TELEPHONE ENCOUNTER
Patient Returning Call    Reason for call:  INR    Information relayed to patient:  No    Patient has additional questions:  Yes    What are your questions/concerns:  dosing    Who does the patient want to speak with:  RN    Is an  needed?:  No      Could we send this information to you in [place] or would you prefer to receive a phone call?:   Patient would prefer a phone call   Okay to leave a detailed message?: Yes at Cell number on file:    Telephone Information:   Mobile [phone]

## 2025-01-13 NOTE — TELEPHONE ENCOUNTER
Patient states he is trying to reach his INR nurse as he has questions regarding his INR numbers after he recently had surgery.  Patient reports he has called and left messages and has not gotten any return calls. Attempted to confirm phone number for anticoag with patient, however he stated he could not do at this time.  Writer advised patient I would send message to anticoag team to give him a call.  Patient agreeable.      Per chart review, telephone encounter  documented patient spoke to RN at 12:00 today.  Patient denied this occurred and stated he would like to a phone call from INR team.

## 2025-01-13 NOTE — TELEPHONE ENCOUNTER
No further recommendations since rate controlled and asymptomatic. Agree with maintaining therapeutic INR.     Amy Antunez, JOHN, CNP

## 2025-01-13 NOTE — TELEPHONE ENCOUNTER
Called patient and informed of recommendations. He is aware that we will continue to monitor and that he should contact the clinic with any new symptoms or concerns.  TASNEEM WINTERS

## 2025-01-15 ENCOUNTER — DOCUMENTATION ONLY (OUTPATIENT)
Dept: ANTICOAGULATION | Facility: CLINIC | Age: OVER 89
End: 2025-01-15
Payer: COMMERCIAL

## 2025-01-15 DIAGNOSIS — Z86.718 HISTORY OF DEEP VENOUS THROMBOSIS: Primary | ICD-10-CM

## 2025-01-15 DIAGNOSIS — D68.51 HETEROZYGOUS FACTOR V LEIDEN MUTATION: ICD-10-CM

## 2025-01-15 NOTE — PROGRESS NOTES
ANTICOAGULATION CLINIC REFERRAL RENEWAL REQUEST       An annual renewal order is required for all patients referred to [facility].?  Please review and sign the pended referral order for Victorino Khan.       ANTICOAGULATION SUMMARY      Warfarin indication(s)   DVT and Heterozygous Factor V Leiden    Mechanical heart valve present?  NO       Current goal range   INR: 2.0-3.0     Goal appropriate for indication? Goal INR 2-3, standard for indication(s) above     Time in Therapeutic Range (TTR)  (Goal > 60%) 67.6%       Office visit with referring provider's group within last year yes on 9/19/24       Luís Williamson RN  [facility]

## 2025-01-17 ENCOUNTER — LAB (OUTPATIENT)
Dept: LAB | Facility: CLINIC | Age: OVER 89
End: 2025-01-17
Payer: COMMERCIAL

## 2025-01-17 DIAGNOSIS — D68.51 HETEROZYGOUS FACTOR V LEIDEN MUTATION: ICD-10-CM

## 2025-01-17 DIAGNOSIS — Z86.718 HISTORY OF DEEP VENOUS THROMBOSIS: ICD-10-CM

## 2025-01-17 LAB — INR BLD: 3 (ref 0.9–1.1)

## 2025-01-17 PROCEDURE — 85610 PROTHROMBIN TIME: CPT

## 2025-01-17 PROCEDURE — 36416 COLLJ CAPILLARY BLOOD SPEC: CPT

## 2025-01-24 ENCOUNTER — LAB (OUTPATIENT)
Dept: LAB | Facility: CLINIC | Age: OVER 89
End: 2025-01-24
Payer: COMMERCIAL

## 2025-01-24 DIAGNOSIS — Z86.718 HISTORY OF DEEP VENOUS THROMBOSIS: ICD-10-CM

## 2025-01-24 DIAGNOSIS — D68.51 HETEROZYGOUS FACTOR V LEIDEN MUTATION: ICD-10-CM

## 2025-01-24 LAB — INR BLD: 2.1 (ref 0.9–1.1)

## 2025-01-24 PROCEDURE — 36416 COLLJ CAPILLARY BLOOD SPEC: CPT

## 2025-01-24 PROCEDURE — 85610 PROTHROMBIN TIME: CPT

## 2025-02-11 ENCOUNTER — TELEPHONE (OUTPATIENT)
Dept: INTERNAL MEDICINE | Facility: CLINIC | Age: OVER 89
End: 2025-02-11
Payer: COMMERCIAL

## 2025-02-11 DIAGNOSIS — Z79.01 CHRONIC ANTICOAGULATION: ICD-10-CM

## 2025-02-11 DIAGNOSIS — Z86.718 HISTORY OF DEEP VENOUS THROMBOSIS: Primary | ICD-10-CM

## 2025-02-11 DIAGNOSIS — D68.51 HETEROZYGOUS FACTOR V LEIDEN MUTATION: ICD-10-CM

## 2025-02-11 RX ORDER — WARFARIN SODIUM 4 MG/1
TABLET ORAL
Qty: 48 TABLET | Refills: 1 | Status: SHIPPED | OUTPATIENT
Start: 2025-02-11

## 2025-02-11 RX ORDER — WARFARIN SODIUM 3 MG/1
TABLET ORAL
Qty: 36 TABLET | Refills: 1 | Status: SHIPPED | OUTPATIENT
Start: 2025-02-11

## 2025-02-11 NOTE — TELEPHONE ENCOUNTER
Warfarin 4 mg and 3 mg sent to pharmacy. Discussed dosing which will be 4mg on 3 days per week and 3 mg on 4 days per week, next inr scheduled.

## 2025-02-11 NOTE — TELEPHONE ENCOUNTER
Medication Question or Refill        What medication are you calling about (include dose and sig)?: Coumadin he would like the 60 4 grams and 40 3 grams    Preferred Pharmacy:       [facility] - [address] [address]  Phone: [phone] Fax: [phone]        Controlled Substance Agreement on file:   CSA -- Patient Level:    CSA: None found at the patient level.       Who prescribed the medication?: Dr Landaverde    Do you need a refill? Yes    When did you use the medication last? Last night     Patient offered an appointment? No    Do you have any questions or concerns?  No      Could we send this information to you in [place] or would you prefer to receive a phone call?:   Patient would prefer a phone call   Okay to leave a detailed message?: Yes at Home number on file [phone] (home)

## 2025-02-12 ENCOUNTER — ANCILLARY PROCEDURE (OUTPATIENT)
Dept: CARDIOLOGY | Facility: CLINIC | Age: OVER 89
End: 2025-02-12
Attending: INTERNAL MEDICINE
Payer: COMMERCIAL

## 2025-02-12 DIAGNOSIS — I44.2 CHB (COMPLETE HEART BLOCK) (H): ICD-10-CM

## 2025-02-12 DIAGNOSIS — Z95.0 CARDIAC PACEMAKER IN SITU: ICD-10-CM

## 2025-02-12 LAB
MDC_IDC_EPISODE_DTM: NORMAL
MDC_IDC_EPISODE_ID: NORMAL
MDC_IDC_EPISODE_TYPE: NORMAL
MDC_IDC_LEAD_CONNECTION_STATUS: NORMAL
MDC_IDC_LEAD_CONNECTION_STATUS: NORMAL
MDC_IDC_LEAD_IMPLANT_DT: NORMAL
MDC_IDC_LEAD_IMPLANT_DT: NORMAL
MDC_IDC_LEAD_LOCATION: NORMAL
MDC_IDC_LEAD_LOCATION: NORMAL
MDC_IDC_LEAD_LOCATION_DETAIL_1: NORMAL
MDC_IDC_LEAD_LOCATION_DETAIL_1: NORMAL
MDC_IDC_LEAD_MFG: NORMAL
MDC_IDC_LEAD_MFG: NORMAL
MDC_IDC_LEAD_MODEL: NORMAL
MDC_IDC_LEAD_MODEL: NORMAL
MDC_IDC_LEAD_POLARITY_TYPE: NORMAL
MDC_IDC_LEAD_POLARITY_TYPE: NORMAL
MDC_IDC_LEAD_SERIAL: NORMAL
MDC_IDC_LEAD_SERIAL: NORMAL
MDC_IDC_MSMT_BATTERY_DTM: NORMAL
MDC_IDC_MSMT_BATTERY_REMAINING_LONGEVITY: 22 MO
MDC_IDC_MSMT_BATTERY_REMAINING_PERCENTAGE: 20 %
MDC_IDC_MSMT_BATTERY_RRT_TRIGGER: NORMAL
MDC_IDC_MSMT_BATTERY_STATUS: NORMAL
MDC_IDC_MSMT_BATTERY_VOLTAGE: 2.87 V
MDC_IDC_MSMT_LEADCHNL_RA_IMPEDANCE_VALUE: 440 OHM
MDC_IDC_MSMT_LEADCHNL_RA_LEAD_CHANNEL_STATUS: NORMAL
MDC_IDC_MSMT_LEADCHNL_RA_PACING_THRESHOLD_AMPLITUDE: 0.5 V
MDC_IDC_MSMT_LEADCHNL_RA_PACING_THRESHOLD_PULSEWIDTH: 0.5 MS
MDC_IDC_MSMT_LEADCHNL_RA_SENSING_INTR_AMPL: 4.7 MV
MDC_IDC_MSMT_LEADCHNL_RV_IMPEDANCE_VALUE: 380 OHM
MDC_IDC_MSMT_LEADCHNL_RV_LEAD_CHANNEL_STATUS: NORMAL
MDC_IDC_MSMT_LEADCHNL_RV_PACING_THRESHOLD_AMPLITUDE: 0.75 V
MDC_IDC_MSMT_LEADCHNL_RV_PACING_THRESHOLD_PULSEWIDTH: 0.5 MS
MDC_IDC_MSMT_LEADCHNL_RV_SENSING_INTR_AMPL: 4.7 MV
MDC_IDC_PG_IMPLANT_DTM: NORMAL
MDC_IDC_PG_MFG: NORMAL
MDC_IDC_PG_MODEL: NORMAL
MDC_IDC_PG_SERIAL: NORMAL
MDC_IDC_PG_TYPE: NORMAL
MDC_IDC_SESS_CLINIC_NAME: NORMAL
MDC_IDC_SESS_DTM: NORMAL
MDC_IDC_SESS_REPROGRAMMED: NO
MDC_IDC_SESS_TYPE: NORMAL
MDC_IDC_SET_BRADY_AT_MODE_SWITCH_MODE: NORMAL
MDC_IDC_SET_BRADY_AT_MODE_SWITCH_RATE: 170 {BEATS}/MIN
MDC_IDC_SET_BRADY_LOWRATE: 60 {BEATS}/MIN
MDC_IDC_SET_BRADY_MAX_SENSOR_RATE: 130 {BEATS}/MIN
MDC_IDC_SET_BRADY_MAX_TRACKING_RATE: 120 {BEATS}/MIN
MDC_IDC_SET_BRADY_MODE: NORMAL
MDC_IDC_SET_BRADY_PAV_DELAY_LOW: 250 MS
MDC_IDC_SET_BRADY_SAV_DELAY_LOW: 250 MS
MDC_IDC_SET_LEADCHNL_RA_PACING_AMPLITUDE: 2 V
MDC_IDC_SET_LEADCHNL_RA_PACING_ANODE_ELECTRODE_1: NORMAL
MDC_IDC_SET_LEADCHNL_RA_PACING_ANODE_LOCATION_1: NORMAL
MDC_IDC_SET_LEADCHNL_RA_PACING_CAPTURE_MODE: NORMAL
MDC_IDC_SET_LEADCHNL_RA_PACING_CATHODE_ELECTRODE_1: NORMAL
MDC_IDC_SET_LEADCHNL_RA_PACING_CATHODE_LOCATION_1: NORMAL
MDC_IDC_SET_LEADCHNL_RA_PACING_POLARITY: NORMAL
MDC_IDC_SET_LEADCHNL_RA_PACING_PULSEWIDTH: 0.5 MS
MDC_IDC_SET_LEADCHNL_RA_SENSING_ADAPTATION_MODE: NORMAL
MDC_IDC_SET_LEADCHNL_RA_SENSING_ANODE_ELECTRODE_1: NORMAL
MDC_IDC_SET_LEADCHNL_RA_SENSING_ANODE_LOCATION_1: NORMAL
MDC_IDC_SET_LEADCHNL_RA_SENSING_CATHODE_ELECTRODE_1: NORMAL
MDC_IDC_SET_LEADCHNL_RA_SENSING_CATHODE_LOCATION_1: NORMAL
MDC_IDC_SET_LEADCHNL_RA_SENSING_POLARITY: NORMAL
MDC_IDC_SET_LEADCHNL_RA_SENSING_SENSITIVITY: 0.3 MV
MDC_IDC_SET_LEADCHNL_RV_PACING_AMPLITUDE: 2 V
MDC_IDC_SET_LEADCHNL_RV_PACING_ANODE_ELECTRODE_1: NORMAL
MDC_IDC_SET_LEADCHNL_RV_PACING_ANODE_LOCATION_1: NORMAL
MDC_IDC_SET_LEADCHNL_RV_PACING_CAPTURE_MODE: NORMAL
MDC_IDC_SET_LEADCHNL_RV_PACING_CATHODE_ELECTRODE_1: NORMAL
MDC_IDC_SET_LEADCHNL_RV_PACING_CATHODE_LOCATION_1: NORMAL
MDC_IDC_SET_LEADCHNL_RV_PACING_POLARITY: NORMAL
MDC_IDC_SET_LEADCHNL_RV_PACING_PULSEWIDTH: 0.5 MS
MDC_IDC_SET_LEADCHNL_RV_SENSING_ADAPTATION_MODE: NORMAL
MDC_IDC_SET_LEADCHNL_RV_SENSING_ANODE_ELECTRODE_1: NORMAL
MDC_IDC_SET_LEADCHNL_RV_SENSING_ANODE_LOCATION_1: NORMAL
MDC_IDC_SET_LEADCHNL_RV_SENSING_CATHODE_ELECTRODE_1: NORMAL
MDC_IDC_SET_LEADCHNL_RV_SENSING_CATHODE_LOCATION_1: NORMAL
MDC_IDC_SET_LEADCHNL_RV_SENSING_POLARITY: NORMAL
MDC_IDC_SET_LEADCHNL_RV_SENSING_SENSITIVITY: 0.5 MV
MDC_IDC_STAT_AT_BURDEN_PERCENT: 99 %
MDC_IDC_STAT_AT_DTM_END: NORMAL
MDC_IDC_STAT_AT_DTM_START: NORMAL
MDC_IDC_STAT_AT_MODE_SW_COUNT: 1
MDC_IDC_STAT_AT_MODE_SW_COUNT_PER_DAY: 0
MDC_IDC_STAT_AT_MODE_SW_MAX_DURATION: NORMAL S
MDC_IDC_STAT_AT_MODE_SW_PERCENT_TIME: 100 %
MDC_IDC_STAT_BRADY_AP_VP_PERCENT: 0 %
MDC_IDC_STAT_BRADY_AP_VS_PERCENT: 0 %
MDC_IDC_STAT_BRADY_AS_VP_PERCENT: 0 %
MDC_IDC_STAT_BRADY_AS_VS_PERCENT: 0 %
MDC_IDC_STAT_BRADY_DTM_END: NORMAL
MDC_IDC_STAT_BRADY_DTM_START: NORMAL
MDC_IDC_STAT_BRADY_RA_PERCENT_PACED: 1 %
MDC_IDC_STAT_BRADY_RV_PERCENT_PACED: 76 %
MDC_IDC_STAT_CRT_DTM_END: NORMAL
MDC_IDC_STAT_CRT_DTM_START: NORMAL
MDC_IDC_STAT_HEART_RATE_ATRIAL_MAX: 330 {BEATS}/MIN
MDC_IDC_STAT_HEART_RATE_ATRIAL_MEAN: 294 {BEATS}/MIN
MDC_IDC_STAT_HEART_RATE_ATRIAL_MIN: 40 {BEATS}/MIN
MDC_IDC_STAT_HEART_RATE_DTM_END: NORMAL
MDC_IDC_STAT_HEART_RATE_DTM_START: NORMAL

## 2025-02-12 PROCEDURE — 93296 REM INTERROG EVL PM/IDS: CPT | Performed by: INTERNAL MEDICINE

## 2025-02-12 PROCEDURE — 93294 REM INTERROG EVL PM/LDLS PM: CPT | Performed by: INTERNAL MEDICINE

## 2025-03-11 ENCOUNTER — ANTICOAGULATION THERAPY VISIT (OUTPATIENT)
Dept: ANTICOAGULATION | Facility: CLINIC | Age: OVER 89
End: 2025-03-11

## 2025-03-11 ENCOUNTER — LAB (OUTPATIENT)
Dept: LAB | Facility: CLINIC | Age: OVER 89
End: 2025-03-11
Payer: COMMERCIAL

## 2025-03-11 ENCOUNTER — TELEPHONE (OUTPATIENT)
Dept: ANTICOAGULATION | Facility: CLINIC | Age: OVER 89
End: 2025-03-11

## 2025-03-11 DIAGNOSIS — Z86.718 HISTORY OF DEEP VENOUS THROMBOSIS: Primary | ICD-10-CM

## 2025-03-11 DIAGNOSIS — Z79.01 CHRONIC ANTICOAGULATION: Primary | ICD-10-CM

## 2025-03-11 DIAGNOSIS — D68.51 HETEROZYGOUS FACTOR V LEIDEN MUTATION: ICD-10-CM

## 2025-03-11 DIAGNOSIS — Z86.718 HISTORY OF DEEP VENOUS THROMBOSIS: ICD-10-CM

## 2025-03-11 DIAGNOSIS — Z79.01 CHRONIC ANTICOAGULATION: ICD-10-CM

## 2025-03-11 LAB — INR BLD: 3.2 (ref 0.9–1.1)

## 2025-03-11 PROCEDURE — 85610 PROTHROMBIN TIME: CPT

## 2025-03-11 PROCEDURE — 36416 COLLJ CAPILLARY BLOOD SPEC: CPT

## 2025-03-11 NOTE — TELEPHONE ENCOUNTER
Lab calling for standing orders.  Anticoagulation clinic referral renewed on 1/15/25. Standing order entered.

## 2025-03-11 NOTE — PROGRESS NOTES
ANTICOAGULATION MANAGEMENT     Victorino Khan 89 year old male is on warfarin with therapeutic INR result. (Goal INR 2.0-3.0)    Recent labs: (last 7 days)     03/11/25  1126   INR 3.2*       ASSESSMENT     Warfarin Lab Questionnaire    Warfarin Doses Last 7 Days      3/11/2025    11:06 AM   Dose in Tablet or Mg   TAB or MG? milligram (mg)     Pt Rptd Dose ALEJANDRINA MONDAY TUESDAY WED THURS FRIDAY SATURDAY   3/11/2025  11:06 AM 4 4 4 3 3 2 2         3/11/2025   Warfarin Lab Questionnaire   Missed doses within past 14 days? No   Changes in diet or alcohol within past 14 days? No   Medication changes since last result? No   Injuries or illness since last result? No   New shortness of breath, severe headaches or sudden changes in vision since last result? No   Abnormal bleeding since last result? No   Upcoming surgery, procedure? No     Previous result: Therapeutic last 2(+) visits  Additional findings: None       PLAN     Recommended plan for no diet, medication or health factor changes affecting INR     Dosing Instructions: decrease your warfarin dose (8.3% change) with next INR in 2 weeks       Summary  As of 3/11/2025      Full warfarin instructions:  2 mg every Tue, Thu, Sat; 4 mg all other days   Next INR check:  3/25/2025               Telephone call with Eddy who verbalizes understanding and agrees to plan    Contact [phone] to schedule and with any changes, questions or concerns.     Education provided: Please call back if any changes to your diet, medications or how you've been taking warfarin    Plan made per [facility] anticoagulation protocol    Luís Williamson RN  3/11/2025  Anticoagulation Clinic  [employer] for routing messages: abraham [address] [place]  [facility] patient phone line: [phone]        _______________________________________________________________________     Anticoagulation Episode Summary       Current INR goal:  2.0-3.0   TTR:  58.1% (1 y)   Target end date:  Indefinite   Send INR reminders to:   [facility]    Indications    History of deep venous thrombosis [Z86.718]  Heterozygous factor V Leiden mutation [D68.51]  Chronic anticoagulation [Z79.01]             Comments:  --             Anticoagulation Care Providers       Provider Role Specialty Phone number    Yuri Funes MD Referring Internal Medicine [phone]

## 2025-03-21 ENCOUNTER — TRANSFERRED RECORDS (OUTPATIENT)
Dept: HEALTH INFORMATION MANAGEMENT | Facility: CLINIC | Age: OVER 89
End: 2025-03-21
Payer: COMMERCIAL

## 2025-03-26 ENCOUNTER — TRANSFERRED RECORDS (OUTPATIENT)
Dept: HEALTH INFORMATION MANAGEMENT | Facility: CLINIC | Age: OVER 89
End: 2025-03-26
Payer: COMMERCIAL

## 2025-04-09 ENCOUNTER — MYC MEDICAL ADVICE (OUTPATIENT)
Dept: ANTICOAGULATION | Facility: CLINIC | Age: OVER 89
End: 2025-04-09
Payer: COMMERCIAL

## 2025-04-13 ENCOUNTER — HEALTH MAINTENANCE LETTER (OUTPATIENT)
Age: OVER 89
End: 2025-04-13

## 2025-04-15 ENCOUNTER — TRANSFERRED RECORDS (OUTPATIENT)
Dept: HEALTH INFORMATION MANAGEMENT | Facility: CLINIC | Age: OVER 89
End: 2025-04-15
Payer: COMMERCIAL

## 2025-04-16 ENCOUNTER — TELEPHONE (OUTPATIENT)
Dept: ANTICOAGULATION | Facility: CLINIC | Age: OVER 89
End: 2025-04-16
Payer: COMMERCIAL

## 2025-04-16 DIAGNOSIS — Z86.718 HISTORY OF DEEP VENOUS THROMBOSIS: Primary | ICD-10-CM

## 2025-04-16 DIAGNOSIS — Z79.01 CHRONIC ANTICOAGULATION: ICD-10-CM

## 2025-04-16 DIAGNOSIS — D68.51 HETEROZYGOUS FACTOR V LEIDEN MUTATION: ICD-10-CM

## 2025-04-16 RX ORDER — WARFARIN SODIUM 4 MG/1
TABLET ORAL
Qty: 90 TABLET | Refills: 1 | Status: SHIPPED | OUTPATIENT
Start: 2025-04-16

## 2025-04-16 NOTE — TELEPHONE ENCOUNTER
General Call      Reason for Call:  patient called and states needs medication refill for Warfarin    Also needs to know when his next INR is due.    Please contact patient.  Thank you.    What are your questions or concerns:  no    Date of last appointment with provider: na    Could we send this information to you in [place] or would you prefer to receive a phone call?:   Patient would prefer a phone call   Okay to leave a detailed message?: Yes at Cell number on file:    Telephone Information:   Mobile [phone]

## 2025-04-16 NOTE — TELEPHONE ENCOUNTER
ANTICOAGULATION MANAGEMENT:  Medication Refill    Anticoagulation Summary  As of 3/11/2025      Warfarin maintenance plan:  2 mg (4 mg x 0.5) every Tue, Thu, Sat; 4 mg (4 mg x 1) all other days   Next INR check:  3/25/2025   Target end date:  Indefinite    Indications    History of deep venous thrombosis [Z86.718]  Heterozygous factor V Leiden mutation [D68.51]  Chronic anticoagulation [Z79.01]                 Anticoagulation Care Providers       Provider Role Specialty Phone number    Yuri Funes MD Referring Internal Medicine [phone]            Refill Criteria    Visit with referring provider/group: Meets criteria: visit within referring provider group in the last 15 months on 12/26/24    [facility] referral last signed: 01/15/2025; within last year:  Yes    Lab monitoring is up to date (not exceeding 2 weeks overdue): No    Victorino does NOT meet all criteria for refill: > 2 weeks overdue for lab monitoring . Spoke with Eddy and scheduled inr for next week. 90 day refill granted    Luís Williamson RN  Anticoagulation Clinic

## 2025-05-12 ENCOUNTER — TRANSFERRED RECORDS (OUTPATIENT)
Dept: HEALTH INFORMATION MANAGEMENT | Facility: CLINIC | Age: OVER 89
End: 2025-05-12
Payer: COMMERCIAL

## 2025-05-21 ENCOUNTER — ANCILLARY PROCEDURE (OUTPATIENT)
Dept: CARDIOLOGY | Facility: CLINIC | Age: OVER 89
End: 2025-05-21
Attending: INTERNAL MEDICINE
Payer: COMMERCIAL

## 2025-05-21 DIAGNOSIS — I44.2 CHB (COMPLETE HEART BLOCK) (H): ICD-10-CM

## 2025-05-21 DIAGNOSIS — Z95.0 CARDIAC PACEMAKER IN SITU: ICD-10-CM

## 2025-05-21 LAB
MDC_IDC_EPISODE_DTM: NORMAL
MDC_IDC_EPISODE_DTM: NORMAL
MDC_IDC_EPISODE_ID: NORMAL
MDC_IDC_EPISODE_ID: NORMAL
MDC_IDC_EPISODE_TYPE: NORMAL
MDC_IDC_EPISODE_TYPE: NORMAL
MDC_IDC_LEAD_CONNECTION_STATUS: NORMAL
MDC_IDC_LEAD_CONNECTION_STATUS: NORMAL
MDC_IDC_LEAD_IMPLANT_DT: NORMAL
MDC_IDC_LEAD_IMPLANT_DT: NORMAL
MDC_IDC_LEAD_LOCATION: NORMAL
MDC_IDC_LEAD_LOCATION: NORMAL
MDC_IDC_LEAD_LOCATION_DETAIL_1: NORMAL
MDC_IDC_LEAD_LOCATION_DETAIL_1: NORMAL
MDC_IDC_LEAD_MFG: NORMAL
MDC_IDC_LEAD_MFG: NORMAL
MDC_IDC_LEAD_MODEL: NORMAL
MDC_IDC_LEAD_MODEL: NORMAL
MDC_IDC_LEAD_POLARITY_TYPE: NORMAL
MDC_IDC_LEAD_POLARITY_TYPE: NORMAL
MDC_IDC_LEAD_SERIAL: NORMAL
MDC_IDC_LEAD_SERIAL: NORMAL
MDC_IDC_MSMT_BATTERY_DTM: NORMAL
MDC_IDC_MSMT_BATTERY_REMAINING_LONGEVITY: 18 MO
MDC_IDC_MSMT_BATTERY_REMAINING_PERCENTAGE: 17 %
MDC_IDC_MSMT_BATTERY_RRT_TRIGGER: NORMAL
MDC_IDC_MSMT_BATTERY_STATUS: NORMAL
MDC_IDC_MSMT_BATTERY_VOLTAGE: 2.84 V
MDC_IDC_MSMT_LEADCHNL_RA_IMPEDANCE_VALUE: 450 OHM
MDC_IDC_MSMT_LEADCHNL_RA_LEAD_CHANNEL_STATUS: NORMAL
MDC_IDC_MSMT_LEADCHNL_RA_PACING_THRESHOLD_AMPLITUDE: 0.5 V
MDC_IDC_MSMT_LEADCHNL_RA_PACING_THRESHOLD_PULSEWIDTH: 0.5 MS
MDC_IDC_MSMT_LEADCHNL_RA_SENSING_INTR_AMPL: 4.7 MV
MDC_IDC_MSMT_LEADCHNL_RV_IMPEDANCE_VALUE: 380 OHM
MDC_IDC_MSMT_LEADCHNL_RV_LEAD_CHANNEL_STATUS: NORMAL
MDC_IDC_MSMT_LEADCHNL_RV_PACING_THRESHOLD_AMPLITUDE: 0.75 V
MDC_IDC_MSMT_LEADCHNL_RV_PACING_THRESHOLD_PULSEWIDTH: 0.5 MS
MDC_IDC_MSMT_LEADCHNL_RV_SENSING_INTR_AMPL: 6.4 MV
MDC_IDC_PG_IMPLANT_DTM: NORMAL
MDC_IDC_PG_MFG: NORMAL
MDC_IDC_PG_MODEL: NORMAL
MDC_IDC_PG_SERIAL: NORMAL
MDC_IDC_PG_TYPE: NORMAL
MDC_IDC_SESS_CLINIC_NAME: NORMAL
MDC_IDC_SESS_DTM: NORMAL
MDC_IDC_SESS_REPROGRAMMED: NO
MDC_IDC_SESS_TYPE: NORMAL
MDC_IDC_SET_BRADY_AT_MODE_SWITCH_MODE: NORMAL
MDC_IDC_SET_BRADY_AT_MODE_SWITCH_RATE: 170 {BEATS}/MIN
MDC_IDC_SET_BRADY_LOWRATE: 60 {BEATS}/MIN
MDC_IDC_SET_BRADY_MAX_SENSOR_RATE: 130 {BEATS}/MIN
MDC_IDC_SET_BRADY_MAX_TRACKING_RATE: 120 {BEATS}/MIN
MDC_IDC_SET_BRADY_MODE: NORMAL
MDC_IDC_SET_BRADY_PAV_DELAY_LOW: 250 MS
MDC_IDC_SET_BRADY_SAV_DELAY_LOW: 250 MS
MDC_IDC_SET_LEADCHNL_RA_PACING_AMPLITUDE: 2 V
MDC_IDC_SET_LEADCHNL_RA_PACING_ANODE_ELECTRODE_1: NORMAL
MDC_IDC_SET_LEADCHNL_RA_PACING_ANODE_LOCATION_1: NORMAL
MDC_IDC_SET_LEADCHNL_RA_PACING_CAPTURE_MODE: NORMAL
MDC_IDC_SET_LEADCHNL_RA_PACING_CATHODE_ELECTRODE_1: NORMAL
MDC_IDC_SET_LEADCHNL_RA_PACING_CATHODE_LOCATION_1: NORMAL
MDC_IDC_SET_LEADCHNL_RA_PACING_POLARITY: NORMAL
MDC_IDC_SET_LEADCHNL_RA_PACING_PULSEWIDTH: 0.5 MS
MDC_IDC_SET_LEADCHNL_RA_SENSING_ADAPTATION_MODE: NORMAL
MDC_IDC_SET_LEADCHNL_RA_SENSING_ANODE_ELECTRODE_1: NORMAL
MDC_IDC_SET_LEADCHNL_RA_SENSING_ANODE_LOCATION_1: NORMAL
MDC_IDC_SET_LEADCHNL_RA_SENSING_CATHODE_ELECTRODE_1: NORMAL
MDC_IDC_SET_LEADCHNL_RA_SENSING_CATHODE_LOCATION_1: NORMAL
MDC_IDC_SET_LEADCHNL_RA_SENSING_POLARITY: NORMAL
MDC_IDC_SET_LEADCHNL_RA_SENSING_SENSITIVITY: 0.3 MV
MDC_IDC_SET_LEADCHNL_RV_PACING_AMPLITUDE: 2 V
MDC_IDC_SET_LEADCHNL_RV_PACING_ANODE_ELECTRODE_1: NORMAL
MDC_IDC_SET_LEADCHNL_RV_PACING_ANODE_LOCATION_1: NORMAL
MDC_IDC_SET_LEADCHNL_RV_PACING_CAPTURE_MODE: NORMAL
MDC_IDC_SET_LEADCHNL_RV_PACING_CATHODE_ELECTRODE_1: NORMAL
MDC_IDC_SET_LEADCHNL_RV_PACING_CATHODE_LOCATION_1: NORMAL
MDC_IDC_SET_LEADCHNL_RV_PACING_POLARITY: NORMAL
MDC_IDC_SET_LEADCHNL_RV_PACING_PULSEWIDTH: 0.5 MS
MDC_IDC_SET_LEADCHNL_RV_SENSING_ADAPTATION_MODE: NORMAL
MDC_IDC_SET_LEADCHNL_RV_SENSING_ANODE_ELECTRODE_1: NORMAL
MDC_IDC_SET_LEADCHNL_RV_SENSING_ANODE_LOCATION_1: NORMAL
MDC_IDC_SET_LEADCHNL_RV_SENSING_CATHODE_ELECTRODE_1: NORMAL
MDC_IDC_SET_LEADCHNL_RV_SENSING_CATHODE_LOCATION_1: NORMAL
MDC_IDC_SET_LEADCHNL_RV_SENSING_POLARITY: NORMAL
MDC_IDC_SET_LEADCHNL_RV_SENSING_SENSITIVITY: 0.5 MV
MDC_IDC_STAT_AT_BURDEN_PERCENT: 99 %
MDC_IDC_STAT_AT_DTM_END: NORMAL
MDC_IDC_STAT_AT_DTM_START: NORMAL
MDC_IDC_STAT_AT_MODE_SW_COUNT: 1
MDC_IDC_STAT_AT_MODE_SW_COUNT_PER_DAY: 0
MDC_IDC_STAT_AT_MODE_SW_MAX_DURATION: NORMAL S
MDC_IDC_STAT_AT_MODE_SW_PERCENT_TIME: 100 %
MDC_IDC_STAT_BRADY_AP_VP_PERCENT: 0 %
MDC_IDC_STAT_BRADY_AP_VS_PERCENT: 0 %
MDC_IDC_STAT_BRADY_AS_VP_PERCENT: 0 %
MDC_IDC_STAT_BRADY_AS_VS_PERCENT: 0 %
MDC_IDC_STAT_BRADY_DTM_END: NORMAL
MDC_IDC_STAT_BRADY_DTM_START: NORMAL
MDC_IDC_STAT_BRADY_RA_PERCENT_PACED: 1 %
MDC_IDC_STAT_BRADY_RV_PERCENT_PACED: 86 %
MDC_IDC_STAT_CRT_DTM_END: NORMAL
MDC_IDC_STAT_CRT_DTM_START: NORMAL
MDC_IDC_STAT_HEART_RATE_ATRIAL_MAX: 330 {BEATS}/MIN
MDC_IDC_STAT_HEART_RATE_ATRIAL_MEAN: 298 {BEATS}/MIN
MDC_IDC_STAT_HEART_RATE_ATRIAL_MIN: 40 {BEATS}/MIN
MDC_IDC_STAT_HEART_RATE_DTM_END: NORMAL
MDC_IDC_STAT_HEART_RATE_DTM_START: NORMAL

## 2025-05-21 PROCEDURE — 93294 REM INTERROG EVL PM/LDLS PM: CPT | Performed by: INTERNAL MEDICINE

## 2025-05-21 PROCEDURE — 93296 REM INTERROG EVL PM/IDS: CPT | Performed by: INTERNAL MEDICINE

## 2025-06-03 ENCOUNTER — TRANSFERRED RECORDS (OUTPATIENT)
Dept: HEALTH INFORMATION MANAGEMENT | Facility: CLINIC | Age: OVER 89
End: 2025-06-03
Payer: COMMERCIAL

## 2025-06-04 ENCOUNTER — TELEPHONE (OUTPATIENT)
Dept: ANTICOAGULATION | Facility: CLINIC | Age: OVER 89
End: 2025-06-04
Payer: COMMERCIAL

## 2025-06-04 NOTE — TELEPHONE ENCOUNTER
ANTICOAGULATION     Victorino Khan is overdue for an INR check.     Spoke with Jada FAULKNER and scheduled lab appointment on 6/5    Karey Baca, RN  6/4/2025  Anticoagulation Clinic  [facility] for routing messages: abraham [address] [place]  [facility] patient phone line: [phone]

## 2025-06-05 ENCOUNTER — LAB (OUTPATIENT)
Dept: LAB | Facility: CLINIC | Age: OVER 89
End: 2025-06-05
Payer: COMMERCIAL

## 2025-06-05 ENCOUNTER — RESULTS FOLLOW-UP (OUTPATIENT)
Dept: ANTICOAGULATION | Facility: CLINIC | Age: OVER 89
End: 2025-06-05

## 2025-06-05 ENCOUNTER — ANTICOAGULATION THERAPY VISIT (OUTPATIENT)
Dept: ANTICOAGULATION | Facility: CLINIC | Age: OVER 89
End: 2025-06-05

## 2025-06-05 DIAGNOSIS — Z79.01 CHRONIC ANTICOAGULATION: ICD-10-CM

## 2025-06-05 DIAGNOSIS — D68.51 HETEROZYGOUS FACTOR V LEIDEN MUTATION: ICD-10-CM

## 2025-06-05 DIAGNOSIS — Z86.718 HISTORY OF DEEP VENOUS THROMBOSIS: Primary | ICD-10-CM

## 2025-06-05 DIAGNOSIS — Z86.718 HISTORY OF DEEP VENOUS THROMBOSIS: ICD-10-CM

## 2025-06-05 LAB — INR BLD: 1.8 (ref 0.9–1.1)

## 2025-06-05 NOTE — PROGRESS NOTES
ANTICOAGULATION MANAGEMENT     Victorino Khan 89 year old male is on warfarin with subtherapeutic INR result. (Goal INR 2.0-3.0)    Recent labs: (last 7 days)     06/05/25  0910   INR 1.8*       ASSESSMENT     Warfarin Lab Questionnaire    Warfarin Doses Last 7 Days      6/5/2025     9:06 AM   Dose in Tablet or Mg   TAB or MG? tablet (tab)     Pt Rptd Dose SUNDAY MONDAY TUESDAY WED THURS FRIDAY SATURDAY 6/5/2025   9:06 AM 4 2 4 2 4 2 4         6/5/2025   Warfarin Lab Questionnaire   Missed doses within past 14 days? No   Changes in diet or alcohol within past 14 days? No   Medication changes since last result? No   Injuries or illness since last result? No   New shortness of breath, severe headaches or sudden changes in vision since last result? No   Abnormal bleeding since last result? No   Upcoming surgery, procedure? No     Previous result: Therapeutic last visit; previously outside of goal range  Additional findings: None       PLAN     Recommended plan for no diet, medication or health factor changes affecting INR     Dosing Instructions: booster dose then continue your current warfarin dose with next INR in 2 weeks       Summary  As of 6/5/2025      Full warfarin instructions:  6/5: 4 mg; Otherwise 2 mg every Tue, Thu, Sat; 4 mg all other days   Next INR check:  6/18/2025               Telephone call with Eddy who verbalizes understanding and agrees to plan    Lab visit scheduled    Education provided: Please call back if any changes to your diet, medications or how you've been taking warfarin  Goal range and lab monitoring: goal range and significance of current result, Importance of therapeutic range, and Importance of following up at instructed interval  Symptom monitoring: monitoring for clotting signs and symptoms and monitoring for stroke signs and symptoms    Plan made per [facility] anticoagulation protocol    Terra Cervantes, RN  6/5/2025  Anticoagulation Clinic  [employer] for routing messages: abraham [address]  [place]  [facility] patient phone line: [phone]        _______________________________________________________________________     Anticoagulation Episode Summary       Current INR goal:  2.0-3.0   TTR:  58.2% (1 y)   Target end date:  Indefinite   Send INR reminders to:  [facility]    Indications    History of deep venous thrombosis [Z86.718]  Heterozygous factor V Leiden mutation [D68.51]  Chronic anticoagulation [Z79.01]             Comments:  --             Anticoagulation Care Providers       Provider Role Specialty Phone number    Yuri Funes MD Referring Internal Medicine [phone]

## 2025-06-11 ENCOUNTER — OFFICE VISIT (OUTPATIENT)
Dept: INTERNAL MEDICINE | Facility: CLINIC | Age: OVER 89
End: 2025-06-11
Payer: COMMERCIAL

## 2025-06-11 ENCOUNTER — ANTICOAGULATION THERAPY VISIT (OUTPATIENT)
Dept: ANTICOAGULATION | Facility: CLINIC | Age: OVER 89
End: 2025-06-11

## 2025-06-11 VITALS
HEIGHT: 69 IN | SYSTOLIC BLOOD PRESSURE: 127 MMHG | TEMPERATURE: 96.8 F | DIASTOLIC BLOOD PRESSURE: 84 MMHG | HEART RATE: 70 BPM | OXYGEN SATURATION: 94 % | BODY MASS INDEX: 27.4 KG/M2 | WEIGHT: 185 LBS | RESPIRATION RATE: 15 BRPM

## 2025-06-11 DIAGNOSIS — M47.816 SPONDYLOSIS OF LUMBAR REGION WITHOUT MYELOPATHY OR RADICULOPATHY: Primary | ICD-10-CM

## 2025-06-11 DIAGNOSIS — M81.0 AGE-RELATED OSTEOPOROSIS WITHOUT CURRENT PATHOLOGICAL FRACTURE: ICD-10-CM

## 2025-06-11 DIAGNOSIS — D68.51 HETEROZYGOUS FACTOR V LEIDEN MUTATION: ICD-10-CM

## 2025-06-11 DIAGNOSIS — Z79.01 CHRONIC ANTICOAGULATION: ICD-10-CM

## 2025-06-11 DIAGNOSIS — Z86.718 HISTORY OF DEEP VENOUS THROMBOSIS: ICD-10-CM

## 2025-06-11 DIAGNOSIS — K59.09 CHRONIC CONSTIPATION: ICD-10-CM

## 2025-06-11 DIAGNOSIS — F11.90 CHRONIC, CONTINUOUS USE OF OPIOIDS: ICD-10-CM

## 2025-06-11 DIAGNOSIS — Z86.718 HISTORY OF DEEP VENOUS THROMBOSIS: Primary | ICD-10-CM

## 2025-06-11 LAB
ERYTHROCYTE [DISTWIDTH] IN BLOOD BY AUTOMATED COUNT: 13.4 % (ref 10–15)
HCT VFR BLD AUTO: 41.8 % (ref 40–53)
HGB BLD-MCNC: 13.6 G/DL (ref 13.3–17.7)
INR BLD: 2.2 (ref 0.9–1.1)
MCH RBC QN AUTO: 34.3 PG (ref 26.5–33)
MCHC RBC AUTO-ENTMCNC: 32.5 G/DL (ref 31.5–36.5)
MCV RBC AUTO: 106 FL (ref 78–100)
PLATELET # BLD AUTO: 162 10E3/UL (ref 150–450)
RBC # BLD AUTO: 3.96 10E6/UL (ref 4.4–5.9)
WBC # BLD AUTO: 6.3 10E3/UL (ref 4–11)

## 2025-06-11 PROCEDURE — 85027 COMPLETE CBC AUTOMATED: CPT | Performed by: INTERNAL MEDICINE

## 2025-06-11 PROCEDURE — 36415 COLL VENOUS BLD VENIPUNCTURE: CPT | Performed by: INTERNAL MEDICINE

## 2025-06-11 PROCEDURE — 80053 COMPREHEN METABOLIC PANEL: CPT | Performed by: INTERNAL MEDICINE

## 2025-06-11 PROCEDURE — 99214 OFFICE O/P EST MOD 30 MIN: CPT | Performed by: INTERNAL MEDICINE

## 2025-06-11 PROCEDURE — 3079F DIAST BP 80-89 MM HG: CPT | Performed by: INTERNAL MEDICINE

## 2025-06-11 PROCEDURE — 3074F SYST BP LT 130 MM HG: CPT | Performed by: INTERNAL MEDICINE

## 2025-06-11 PROCEDURE — G2211 COMPLEX E/M VISIT ADD ON: HCPCS | Performed by: INTERNAL MEDICINE

## 2025-06-11 PROCEDURE — 85610 PROTHROMBIN TIME: CPT | Performed by: INTERNAL MEDICINE

## 2025-06-11 PROCEDURE — 1125F AMNT PAIN NOTED PAIN PRSNT: CPT | Performed by: INTERNAL MEDICINE

## 2025-06-11 PROCEDURE — 36416 COLLJ CAPILLARY BLOOD SPEC: CPT | Performed by: INTERNAL MEDICINE

## 2025-06-11 RX ORDER — AMOXICILLIN 250 MG
1 CAPSULE ORAL DAILY
Qty: 180 TABLET | Refills: 3 | Status: SHIPPED | OUTPATIENT
Start: 2025-06-11

## 2025-06-11 RX ORDER — ACETAMINOPHEN AND CODEINE PHOSPHATE 300; 30 MG/1; MG/1
1 TABLET ORAL 2 TIMES DAILY PRN
Qty: 120 TABLET | Refills: 3 | Status: SHIPPED | OUTPATIENT
Start: 2025-06-11 | End: 2025-06-14

## 2025-06-11 ASSESSMENT — PAIN SCALES - GENERAL: PAINLEVEL_OUTOF10: MODERATE PAIN (5)

## 2025-06-11 NOTE — PROGRESS NOTES
ANTICOAGULATION MANAGEMENT     Victorino Khan 89 year old male is on warfarin with therapeutic INR result. (Goal INR 2.0-3.0)    Recent labs: (last 7 days)     06/11/25  1236   INR 2.2*       ASSESSMENT     Source(s): Chart Review and Patient/Caregiver Call     Warfarin doses taken: Warfarin taken as instructed  Diet: No new diet changes identified  Medication/supplement changes: None noted  New illness, injury, or hospitalization: No  Signs or symptoms of bleeding or clotting: No  Previous result: Subtherapeutic  Additional findings: None       PLAN     Recommended plan for no diet, medication or health factor changes affecting INR     Dosing Instructions: Continue your current warfarin dose with next INR in 2 weeks       Summary  As of 6/11/2025      Full warfarin instructions:  2 mg every Tue, Thu, Sat; 4 mg all other days   Next INR check:  7/2/2025               Telephone call with Eddy who verbalizes understanding and agrees to plan    Patient elected to schedule next visit 7/2    Education provided: Please call back if any changes to your diet, medications or how you've been taking warfarin    Plan made per [facility] anticoagulation protocol    Luís Williamson RN  6/11/2025  Anticoagulation Clinic  [employer] for routing messages: [facility]  [facility] patient phone line: [phone]        _______________________________________________________________________     Anticoagulation Episode Summary       Current INR goal:  2.0-3.0   TTR:  59.0% (1 y)   Target end date:  Indefinite   [facility]  [facility]    Indications    History of deep venous thrombosis [Z86.718]  Heterozygous factor V Leiden mutation [D68.51]  Chronic anticoagulation [Z79.01]             Comments:  --             Anticoagulation Care Providers       Provider Role Specialty Phone number    Yuri Funes MD Referring Internal Medicine [phone]

## 2025-06-11 NOTE — PROGRESS NOTES
ASSESSMENT AND PLAN:    1. Spondylosis of lumbar region without myelopathy or radiculopathy  A chronic low back pain when standing, not at rest, or at night.  His lumbar MRI 11/2024 revealed old compression fractures, vertebroplasty and extensive lumbar spondylosis and foraminal stenosis. On warfarin.  Unlikely to be able to have surgical benefit. Can't take NSAID.  He has used tylenol #3 in the past with benefit.  Will prescribe one pill bid prn.  Advised to limit its use to this level, and take senekot bid to avoid constipation.      2. Age-related osteoporosis without current pathological fracture  Last DXA 2024 - osteopenia.  He takes vitamin D3 and calcium.  No qualification for denosumab now.     3. Chronic anticoagulation  It goes well with warfarin.  He is not currently interested in changing to eliquis of xarelto.     4. Chronic continuous opioid therapy  He is going to try chronic low dose of tylenol #3 to see if this helps his quality of life.  He understands that constipation can occur and escalation would reverse its effectiveness.     Follow up 3 months.   The longitudinal plan of care for the diagnosis(es)/condition(s) as documented were addressed during this visit. Due to the added complexity in care, I will continue to support Eddy in the subsequent management and with ongoing continuity of care.    CHIEF COMPLAINT:  Back pain, osteopenia.     HISTORY OF PRESENT ILLNESS:  Victorino Khan is a 89 year old male here in follow up.  His main problem is low back pain when he stands a significant time. This is not new. He uses tylenol 5 per day without much benefit.  Takes baby aspirin, knows he can't take NSAID therapy.  He has used tylenol #3 in the past with benefit.  Not having sciatica and the back doesn't hurt at night or when sitting, just with standing.  No voiding issues.  Cheerful, no dyspnea or cough.     REVIEW OF SYSTEMS:   See HPI, all other systems on review are negative.    Past Medical  "History:   Diagnosis Date    Chronic anticoagulation     Gastroesophageal reflux disease without esophagitis     H/O reactive hypoglycemia     Heterozygous factor V Leiden mutation     Osteoporosis     Personal history of DVT (deep vein thrombosis)     Primary osteoarthritis of right shoulder     Reactive hypoglycemia 09/19/2024    Rotator cuff disorder, right     Rotator cuff syndrome, right     Spondylosis of lumbar region without myelopathy or radiculopathy     Status cardiac pacemaker     Vertebral compression fracture (H)      History   Smoking Status    Never   Smokeless Tobacco    Never     Family History   Problem Relation Age of Onset    Coronary Artery Disease Early Onset Father     Coronary Artery Disease Early Onset Brother      Past Surgical History:   Procedure Laterality Date    ARTHROPLASTY ANKLE Left 4/21/2022    Procedure: LEFT TOTAL ANKLE REPLACEMENT;  Surgeon: Magan Chatman MD;  Location: [facility] OR    IMPLANT PACEMAKER Left     For sick sinus syndrome    LAPAROSCOPIC NISSEN FUNDOPLICATION  11/2011    For large paraesophageal hernia    LENGTHEN TENDON ACHILLES Left 4/21/2022    Procedure: TENDON ACHILLELS LENGTHENING, MEDIAL DISPLACEMENT CALCANEAL OSTOTOMY;  Surgeon: Magan Chatman MD;  Location: [facility] OR    TOTAL KNEE ARTHROPLASTY Right 2000    TOTAL KNEE ARTHROPLASTY Left 2010     No Known Allergies    VITALS:  Vitals:    06/11/25 1147   BP: 127/84   BP Location: Left arm   Patient Position: Sitting   Cuff Size: Adult Regular   Pulse: 70   Resp: 15   Temp: 96.8  F (36  C)   TempSrc: Temporal   SpO2: 94%   Weight: 83.9 kg (185 lb)   Height: 1.753 m (5' 9.02\")     Estimated body mass index is 27.31 kg/m  as calculated from the following:    Height as of this encounter: 1.753 m (5' 9.02\").    Weight as of this encounter: 83.9 kg (185 lb).  Wt Readings from Last 3 Encounters:   06/11/25 83.9 kg (185 lb)   12/26/24 86 kg (189 lb 11.2 oz)   12/18/24 87.5 kg (193 lb)     PHYSICAL " EXAM:  Constitutional:  In NAD, alert and oriented  HEENT: nose and throat clear, ears normal  Cardiac:  S1 S2   Lungs: Clear     DECISION TO OBTAIN OLD RECORDS AND/OR OBTAIN HISTORY FROM SOMEONE OTHER THAN PATIENT, AND/OR ACCESSING CARE EVERYWHERE):  1  0     REVIEW AND SUMMARIZATION OF OLD RECORDS, AND/OR OBTAINING HISTORY FROM SOMEONE OTHER THAN PATIENT, AND/OR DISCUSSION OF CASE WITH ANOTHER HEALTH CARE PROVIDER:  2 reviewed past health notes    REVIEW AND/OR ORDER OF OF CLINICAL LAB TESTS: 1  ordered.    REVIEW AND/OR ORDER OF RADIOLOGY TESTS: 1 reviewed lumbar MRI.    REVIEW AND/OR ORDER OF MEDICAL TESTS (EKG/ECHO/COLONOSCOPY/EGD): 1  0    INDEPENDENT  VISUALIZATION OF IMAGE, TRACING, OR SPECIMEN ITSELF (2 EACH):  0     TOTAL: 4    Current Outpatient Medications   Medication Sig Dispense Refill    acetaminophen (TYLENOL) 500 MG tablet Take 500-1,000 mg by mouth every 6 hours as needed for mild pain      albuterol (PROAIR HFA/PROVENTIL HFA/VENTOLIN HFA) 108 (90 Base) MCG/ACT inhaler Inhale 2 puffs into the lungs every 6 hours 18 g 0    aspirin (ASA) 81 MG EC tablet Take 81 mg by mouth daily      blood glucose monitoring (NO BRAND SPECIFIED) meter device kit Use to test blood sugar one times daily or as directed. 1 kit 3    calcium carbonate (OS- MG [place]) 500 MG tablet Take 500 mg by mouth daily       Cholecalciferol (VITAMIN D3 PO) Take 2,000 Units by mouth daily       fluticasone (FLONASE) 50 MCG/ACT nasal spray Spray 1 spray into both nostrils daily      fluticasone-salmeterol (ADVAIR HFA) 115-21 MCG/ACT inhaler Inhale 2 puffs into the lungs 2 times daily 12 g 3    multivitamin w/minerals (THERA-VIT-M) tablet Take 1 tablet by mouth daily      warfarin ANTICOAGULANT (COUMADIN) 3 MG tablet Take one tablet daily on 3 days per week or as directed based on inr results 36 tablet 1    warfarin ANTICOAGULANT (COUMADIN) 4 MG tablet Take up to 1 tablet daily or as directed based on inr result 90 tablet 1     warfarin ANTICOAGULANT (COUMADIN) 5 MG tablet Take 5 mg twice weekly or as directed based on inr results. Uses in conjunction with 4 mg tabs      senna-docusate (SENOKOT-S/PERICOLACE) 8.6-50 MG tablet Take 1-2 tablets by mouth 2 times daily Take while on oral narcotics to prevent or treat constipation. (Patient not taking: Reported on 6/11/2025) 10 tablet 0     Yuri Funes MD  Internal Medicine  [facility]

## 2025-06-12 ENCOUNTER — RESULTS FOLLOW-UP (OUTPATIENT)
Dept: INTERNAL MEDICINE | Facility: CLINIC | Age: OVER 89
End: 2025-06-12
Payer: COMMERCIAL

## 2025-06-12 LAB
ALBUMIN SERPL BCG-MCNC: 4.2 G/DL (ref 3.5–5.2)
ALP SERPL-CCNC: 70 U/L (ref 40–150)
ALT SERPL W P-5'-P-CCNC: 16 U/L (ref 0–70)
ANION GAP SERPL CALCULATED.3IONS-SCNC: 8 MMOL/L (ref 7–15)
AST SERPL W P-5'-P-CCNC: 26 U/L (ref 0–45)
BILIRUB SERPL-MCNC: 1.7 MG/DL
BUN SERPL-MCNC: 20.8 MG/DL (ref 8–23)
CALCIUM SERPL-MCNC: 10 MG/DL (ref 8.8–10.4)
CHLORIDE SERPL-SCNC: 104 MMOL/L (ref 98–107)
CREAT SERPL-MCNC: 1.13 MG/DL (ref 0.67–1.17)
EGFRCR SERPLBLD CKD-EPI 2021: 61 ML/MIN/1.73M2
GLUCOSE SERPL-MCNC: 105 MG/DL (ref 70–99)
HCO3 SERPL-SCNC: 27 MMOL/L (ref 22–29)
POTASSIUM SERPL-SCNC: 4.9 MMOL/L (ref 3.4–5.3)
PROT SERPL-MCNC: 6.5 G/DL (ref 6.4–8.3)
SODIUM SERPL-SCNC: 139 MMOL/L (ref 135–145)

## 2025-06-19 ENCOUNTER — TRANSFERRED RECORDS (OUTPATIENT)
Dept: HEALTH INFORMATION MANAGEMENT | Facility: CLINIC | Age: OVER 89
End: 2025-06-19
Payer: COMMERCIAL

## 2025-07-02 ENCOUNTER — LAB (OUTPATIENT)
Dept: LAB | Facility: CLINIC | Age: OVER 89
End: 2025-07-02
Payer: COMMERCIAL

## 2025-07-02 ENCOUNTER — ANTICOAGULATION THERAPY VISIT (OUTPATIENT)
Dept: ANTICOAGULATION | Facility: CLINIC | Age: OVER 89
End: 2025-07-02

## 2025-07-02 DIAGNOSIS — D68.51 HETEROZYGOUS FACTOR V LEIDEN MUTATION: ICD-10-CM

## 2025-07-02 DIAGNOSIS — Z79.01 CHRONIC ANTICOAGULATION: ICD-10-CM

## 2025-07-02 DIAGNOSIS — Z86.718 HISTORY OF DEEP VENOUS THROMBOSIS: ICD-10-CM

## 2025-07-02 DIAGNOSIS — Z86.718 HISTORY OF DEEP VENOUS THROMBOSIS: Primary | ICD-10-CM

## 2025-07-02 LAB — INR BLD: 4.5 (ref 0.9–1.1)

## 2025-07-02 PROCEDURE — 85610 PROTHROMBIN TIME: CPT

## 2025-07-02 PROCEDURE — 36416 COLLJ CAPILLARY BLOOD SPEC: CPT

## 2025-07-02 NOTE — PROGRESS NOTES
ANTICOAGULATION MANAGEMENT     Victorino Khan 89 year old male is on warfarin with supratherapeutic INR result. (Goal INR 2.0-3.0)    Recent labs: (last 7 days)     07/02/25  1052   INR 4.5*       ASSESSMENT     Source(s): Chart Review and Patient/Caregiver Call     Warfarin doses taken: Warfarin taken as instructed  Diet: No new diet changes identified  Medication/supplement changes: taking rtc tylenol 3 d/t back pain  New illness, injury, or hospitalization: No  Signs or symptoms of bleeding or clotting: No  Previous result: Therapeutic last visit; previously outside of goal range  Additional findings: None       PLAN     Recommended plan for ongoing change(s) affecting INR     Dosing Instructions: booster dose then decrease your warfarin dose (9.1% change) with next INR in 10 days       Summary  As of 7/2/2025      Full warfarin instructions:  7/2: Hold; Otherwise 4 mg every Mon, Wed, Fri; 2 mg all other days   Next INR check:  7/10/2025               Telephone call with Eddy who verbalizes understanding and agrees to plan    Lab visit scheduled    Education provided: Please call back if any changes to your diet, medications or how you've been taking warfarin    Plan made per [facility] anticoagulation protocol    Luís Williamson RN  7/2/2025  Anticoagulation Clinic  [employer] for routing messages: abraham [address] [place]  [facility] patient phone line: [phone]        _______________________________________________________________________     Anticoagulation Episode Summary       Current INR goal:  2.0-3.0   TTR:  56.2% (1 y)   Target end date:  Indefinite   Send INR reminders to:  [facility]    Indications    History of deep venous thrombosis [Z86.718]  Heterozygous factor V Leiden mutation [D68.51]  Chronic anticoagulation [Z79.01]             Comments:  --             Anticoagulation Care Providers       Provider Role Specialty Phone number    Yuri Funes MD Referring Internal Medicine [phone]

## 2025-07-11 ENCOUNTER — TELEPHONE (OUTPATIENT)
Dept: INTERNAL MEDICINE | Facility: CLINIC | Age: OVER 89
End: 2025-07-11

## 2025-07-11 NOTE — TELEPHONE ENCOUNTER
07/11 Pt came in for his INR today and requested a refill on his Generic advair 500/50 Fluticasone Propionate please send to [facility]

## 2025-07-11 NOTE — TELEPHONE ENCOUNTER
Attempted to call pt back & left VM. If pt calls back please let him know that he needs to reach out to his pulmonologist for his Advair prescription as we have not filled this since 2023.    [facility]:  [phone]

## 2025-07-14 NOTE — TELEPHONE ENCOUNTER
"Spoke with patient to relay the information below. Patient requested that this nurse call the pulmonologist to tell them he needs a refill. Nurse explained that isn't something they can do, but the patient is able to call and request the refill. Patient become upset with nurse and stated \"what good is this phone call then\", and hung up the phone.   "

## 2025-07-27 ENCOUNTER — HEALTH MAINTENANCE LETTER (OUTPATIENT)
Age: OVER 89
End: 2025-07-27

## 2025-07-31 DIAGNOSIS — M47.816 SPONDYLOSIS OF LUMBAR SPINE: Primary | ICD-10-CM

## 2025-08-18 ENCOUNTER — ANTICOAGULATION THERAPY VISIT (OUTPATIENT)
Dept: ANTICOAGULATION | Facility: CLINIC | Age: OVER 89
End: 2025-08-18

## 2025-08-18 ENCOUNTER — LAB (OUTPATIENT)
Dept: LAB | Facility: CLINIC | Age: OVER 89
End: 2025-08-18
Payer: COMMERCIAL

## 2025-08-18 DIAGNOSIS — Z79.01 CHRONIC ANTICOAGULATION: ICD-10-CM

## 2025-08-18 DIAGNOSIS — D68.51 HETEROZYGOUS FACTOR V LEIDEN MUTATION: ICD-10-CM

## 2025-08-18 DIAGNOSIS — Z86.718 HISTORY OF DEEP VENOUS THROMBOSIS: ICD-10-CM

## 2025-08-18 DIAGNOSIS — Z86.718 HISTORY OF DEEP VENOUS THROMBOSIS: Primary | ICD-10-CM

## 2025-08-18 LAB — INR BLD: 2 (ref 0.9–1.1)

## 2025-08-18 PROCEDURE — 85610 PROTHROMBIN TIME: CPT

## 2025-08-18 PROCEDURE — 36416 COLLJ CAPILLARY BLOOD SPEC: CPT

## 2025-08-20 ENCOUNTER — PATIENT OUTREACH (OUTPATIENT)
Dept: CARE COORDINATION | Facility: CLINIC | Age: OVER 89
End: 2025-08-20
Payer: COMMERCIAL

## (undated) DEVICE — GLOVE PROTEXIS POWDER FREE 8.0 ORTHOPEDIC 2D73ET80

## (undated) DEVICE — PREP CHLORAPREP 26ML TINTED ORANGE  260815

## (undated) DEVICE — GLOVE PROTEXIS BLUE W/NEU-THERA 8.0  2D73EB80

## (undated) DEVICE — BLADE KNIFE SURG 15 371115

## (undated) DEVICE — SU ETHIBOND 0 CT-1 CR 8X18" CX21D

## (undated) DEVICE — BLADE SAW RECIP STRK 70X6X0.025MM 0277-096-250S5

## (undated) DEVICE — GLOVE PROTEXIS W/NEU-THERA 7.5  2D73TE75

## (undated) DEVICE — SU VICRYL 3-0 PS-1 18" UND J683

## (undated) DEVICE — Device

## (undated) DEVICE — BLADE SAW SAGITTAL 25.5X9.5X.4MM FINE LINVATEC 5023-138

## (undated) DEVICE — SU PROLENE 4-0 FS-2 18' 8683G

## (undated) DEVICE — SU VICRYL 0 CT-1 27" J340H

## (undated) DEVICE — DRAPE IOBAN INCISE 23X17" 6650EZ

## (undated) DEVICE — STPL SKIN 35W 6.9MM  PXW35

## (undated) DEVICE — GOWN IMPERVIOUS SPECIALTY XLG/XLONG 32474

## (undated) DEVICE — ESU GROUND PAD UNIVERSAL W/O CORD

## (undated) DEVICE — SOL NACL 0.9% IRRIG 3000ML BAG 2B7477

## (undated) DEVICE — BNDG ELASTIC 4" DBL LENGTH UNSTERILE 6611-14

## (undated) DEVICE — BLADE SAW RECIP STRK 77.5X11X1.23MM 0277-096-326

## (undated) DEVICE — SOL NACL 0.9% IRRIG 1000ML BOTTLE 2F7124

## (undated) DEVICE — DRAPE C-ARMOR 5 SIDED 5523

## (undated) DEVICE — DRSG KERLIX FLUFFS X5

## (undated) DEVICE — BLADE SAW LG BONE TORNIER 70X13X1.27MM SAW6944T

## (undated) DEVICE — SOL WATER IRRIG 1000ML BOTTLE 2F7114

## (undated) DEVICE — BLADE SAW TORNIER NARROW 11-2614

## (undated) DEVICE — GLOVE PROTEXIS POWDER FREE 7.5 ORTHOPEDIC 2D73ET75

## (undated) DEVICE — SU ETHILON 3-0 FS-1 18" 669H

## (undated) DEVICE — DRILL BIT L135 MM OD3 MM LJV528T

## (undated) DEVICE — SU MONOCRYL 3-0 PS-2 27" Y427H

## (undated) DEVICE — CAST PADDING 4" STERILE 9044S

## (undated) DEVICE — BLADE SAW INTEGRA 85X21X1.27MM SAW6945T

## (undated) DEVICE — DRAPE MINI C-ARM 4003

## (undated) DEVICE — LINEN TOWEL PACK X5 5464

## (undated) DEVICE — CAST PADDING 2" UNSTERILE 9062

## (undated) DEVICE — SYR BULB IRRIG DOVER 60 ML LATEX FREE 67000

## (undated) DEVICE — CAST PLASTER SPLINT 5X30" 7395

## (undated) DEVICE — SET OF 12 PINS +1 REAMER FOR SALTO ANKLE LJV-529T

## (undated) DEVICE — DRSG GAUZE 4X4" 3033

## (undated) DEVICE — SU ETHIBOND 0 OS-4 30" X517H

## (undated) DEVICE — IMM LIMB ELEVATOR DC40-0203

## (undated) DEVICE — CAST PADDING 4" UNSTERILE 9044

## (undated) DEVICE — DRAPE C-ARM 60X42" 1013

## (undated) DEVICE — ESU PENCIL W/SMOKE EVAC NEPTUNE STRYKER 0703-046-000

## (undated) DEVICE — DRILL BIT TORNIER 3MM LONG DWD060

## (undated) DEVICE — SUCTION CANISTER MEDIVAC LINER 3000ML W/LID 65651-530

## (undated) DEVICE — KIT ARTHREX BIO-TENODESIS AR-1676DS

## (undated) DEVICE — PACK EXTREMITY SOP15EXFSD

## (undated) DEVICE — DRILL PARAGON 28 4.6X220MM CAN 3/16" SQ CON P99-110-4622

## (undated) DEVICE — ESU GROUND PAD ADULT W/CORD E7507

## (undated) DEVICE — SU VICRYL 2-0 CT-1 27" UND J259H

## (undated) DEVICE — SU ETHILON 3-0 PS-1 18" 1663G

## (undated) DEVICE — DRSG XEROFORM 5X9" 8884431605

## (undated) DEVICE — SU VICRYL 2-0 CT-2 27" UND J269H

## (undated) DEVICE — DRSG XEROFORM 1X8"

## (undated) DEVICE — SU ETHIBOND 2-0 CT-2 30" X411H

## (undated) DEVICE — BLADE CLIPPER 4406

## (undated) RX ORDER — LIDOCAINE HYDROCHLORIDE 20 MG/ML
INJECTION, SOLUTION EPIDURAL; INFILTRATION; INTRACAUDAL; PERINEURAL
Status: DISPENSED
Start: 2022-04-14

## (undated) RX ORDER — BUPIVACAINE HYDROCHLORIDE 2.5 MG/ML
INJECTION, SOLUTION EPIDURAL; INFILTRATION; INTRACAUDAL
Status: DISPENSED
Start: 2017-10-13

## (undated) RX ORDER — CEFAZOLIN SODIUM/WATER 2 G/20 ML
SYRINGE (ML) INTRAVENOUS
Status: DISPENSED
Start: 2022-04-14

## (undated) RX ORDER — ONDANSETRON 2 MG/ML
INJECTION INTRAMUSCULAR; INTRAVENOUS
Status: DISPENSED
Start: 2022-04-14

## (undated) RX ORDER — ALBUMIN, HUMAN INJ 5% 5 %
SOLUTION INTRAVENOUS
Status: DISPENSED
Start: 2022-04-21

## (undated) RX ORDER — CEFAZOLIN SODIUM/WATER 2 G/20 ML
SYRINGE (ML) INTRAVENOUS
Status: DISPENSED
Start: 2022-04-21

## (undated) RX ORDER — DEXAMETHASONE SODIUM PHOSPHATE 4 MG/ML
INJECTION, SOLUTION INTRA-ARTICULAR; INTRALESIONAL; INTRAMUSCULAR; INTRAVENOUS; SOFT TISSUE
Status: DISPENSED
Start: 2022-04-14

## (undated) RX ORDER — PROPOFOL 10 MG/ML
INJECTION, EMULSION INTRAVENOUS
Status: DISPENSED
Start: 2022-04-14

## (undated) RX ORDER — FENTANYL CITRATE 50 UG/ML
INJECTION, SOLUTION INTRAMUSCULAR; INTRAVENOUS
Status: DISPENSED
Start: 2017-10-13

## (undated) RX ORDER — FENTANYL CITRATE 50 UG/ML
INJECTION, SOLUTION INTRAMUSCULAR; INTRAVENOUS
Status: DISPENSED
Start: 2022-04-21

## (undated) RX ORDER — TRANEXAMIC ACID 650 MG/1
TABLET ORAL
Status: DISPENSED
Start: 2022-04-14

## (undated) RX ORDER — LIDOCAINE HYDROCHLORIDE 10 MG/ML
INJECTION, SOLUTION EPIDURAL; INFILTRATION; INTRACAUDAL; PERINEURAL
Status: DISPENSED
Start: 2017-10-13

## (undated) RX ORDER — FENTANYL CITRATE 50 UG/ML
INJECTION, SOLUTION INTRAMUSCULAR; INTRAVENOUS
Status: DISPENSED
Start: 2022-04-14

## (undated) RX ORDER — PROPOFOL 10 MG/ML
INJECTION, EMULSION INTRAVENOUS
Status: DISPENSED
Start: 2022-04-21

## (undated) RX ORDER — CEFAZOLIN SODIUM 2 G/100ML
INJECTION, SOLUTION INTRAVENOUS
Status: DISPENSED
Start: 2017-10-13